# Patient Record
Sex: MALE | Race: BLACK OR AFRICAN AMERICAN | NOT HISPANIC OR LATINO | Employment: UNEMPLOYED | RURAL
[De-identification: names, ages, dates, MRNs, and addresses within clinical notes are randomized per-mention and may not be internally consistent; named-entity substitution may affect disease eponyms.]

---

## 2021-04-19 ENCOUNTER — HISTORICAL (OUTPATIENT)
Dept: ADMINISTRATIVE | Facility: HOSPITAL | Age: 31
End: 2021-04-19

## 2021-04-19 LAB
ALBUMIN SERPL BCP-MCNC: 2.7 G/DL (ref 3.5–5)
ALBUMIN/GLOB SERPL: 0.5 {RATIO}
ALP SERPL-CCNC: 77 U/L (ref 45–115)
ALT SERPL W P-5'-P-CCNC: 18 U/L (ref 16–61)
ANION GAP SERPL CALCULATED.3IONS-SCNC: 16 MMOL/L (ref 7–16)
AST SERPL W P-5'-P-CCNC: 22 U/L (ref 15–37)
BASOPHILS # BLD AUTO: 0.03 X10E3/UL (ref 0–0.2)
BASOPHILS NFR BLD AUTO: 0.3 % (ref 0–1)
BILIRUB SERPL-MCNC: 0.4 MG/DL (ref 0–1.2)
BILIRUB UR QL STRIP: NEGATIVE MG/DL
BUN SERPL-MCNC: 6 MG/DL (ref 7–18)
CALCIUM SERPL-MCNC: 9 MG/DL (ref 8.5–10.1)
CHLORIDE SERPL-SCNC: 95 MMOL/L (ref 98–107)
CLARITY UR: CLEAR
CO2 SERPL-SCNC: 22 MMOL/L (ref 21–32)
COLOR UR: YELLOW
CREAT SERPL-MCNC: 1.16 MG/DL (ref 0.7–1.3)
EOSINOPHIL # BLD AUTO: 0.01 X10E3/UL (ref 0–0.5)
EOSINOPHIL NFR BLD AUTO: 0.1 % (ref 1–4)
ERYTHROCYTE [DISTWIDTH] IN BLOOD BY AUTOMATED COUNT: 13 % (ref 11.5–14.5)
FLUAV AG UPPER RESP QL IA.RAPID: NEGATIVE
FLUBV AG UPPER RESP QL IA.RAPID: NEGATIVE
GLOBULIN SER-MCNC: 5.8 G/DL (ref 2–4)
GLUCOSE SERPL-MCNC: 156 MG/DL (ref 74–106)
GLUCOSE UR STRIP-MCNC: NORMAL MG/DL
HCT VFR BLD AUTO: 41.2 % (ref 40–54)
HGB BLD-MCNC: 14.5 G/DL (ref 13.5–18)
KETONES UR STRIP-SCNC: ABNORMAL MG/DL
LACTATE SERPL-SCNC: 2.9 MMOL/L (ref 0.4–2)
LEUKOCYTE ESTERASE UR QL STRIP: NEGATIVE LEU/UL
LIPASE SERPL-CCNC: 248 U/L (ref 73–393)
LYMPHOCYTES # BLD AUTO: 0.66 X10E3/UL (ref 1–4.8)
LYMPHOCYTES NFR BLD AUTO: 6.6 % (ref 27–41)
MCH RBC QN AUTO: 28.8 PG (ref 27–31)
MCHC RBC AUTO-ENTMCNC: 35.2 G/DL (ref 32–36)
MCV RBC AUTO: 82 FL (ref 80–96)
MONOCYTES # BLD AUTO: 0.65 X10E3/UL (ref 0–0.8)
MONOCYTES NFR BLD AUTO: 6.5 % (ref 2–6)
MPC BLD CALC-MCNC: 9.8 FL (ref 9.4–12.4)
NEUTROPHILS # BLD AUTO: 8.64 X10E3/UL (ref 1.8–7.7)
NEUTROPHILS NFR BLD AUTO: 86.5 % (ref 53–65)
NITRITE UR QL STRIP: NEGATIVE
PH UR STRIP: 6 PH UNITS (ref 5–8)
PLATELET # BLD AUTO: 197 X10E3/UL (ref 150–450)
POTASSIUM SERPL-SCNC: 2.9 MMOL/L (ref 3.5–5.1)
PROT SERPL-MCNC: 8.5 G/DL (ref 6.4–8.2)
PROT UR QL STRIP: NEGATIVE MG/DL
RBC # BLD AUTO: 5.04 X10E6/UL (ref 4.6–6.2)
RBC # UR STRIP: ABNORMAL ERY/UL
SARS-COV+SARS-COV-2 AG RESP QL IA.RAPID: NEGATIVE
SODIUM SERPL-SCNC: 130 MMOL/L (ref 136–145)
SP GR UR STRIP: <=1.005 (ref 1–1.03)
UROBILINOGEN UR STRIP-ACNC: 0.2 MG/DL
WBC # BLD AUTO: 9.99 X10E3/UL (ref 4.5–11)

## 2021-05-30 ENCOUNTER — HOSPITAL ENCOUNTER (INPATIENT)
Facility: HOSPITAL | Age: 31
LOS: 2 days | Discharge: HOME | DRG: 565 | End: 2021-06-02
Attending: EMERGENCY MEDICINE | Admitting: SURGERY

## 2021-05-30 DIAGNOSIS — S27.322A CONTUSION OF BOTH LUNGS, INITIAL ENCOUNTER: ICD-10-CM

## 2021-05-30 DIAGNOSIS — S22.20XA STERNAL FRACTURE: ICD-10-CM
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
## 2021-05-30 DIAGNOSIS — T14.90XA TRAUMA: ICD-10-CM

## 2021-05-30 DIAGNOSIS — S27.322A: ICD-10-CM

## 2021-05-30 DIAGNOSIS — S22.20XA CLOSED FRACTURE OF STERNUM, UNSPECIFIED PORTION OF STERNUM, INITIAL ENCOUNTER: ICD-10-CM
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
## 2021-05-30 PROCEDURE — 93010 EKG 12-LEAD: ICD-10-PCS | Mod: ,,, | Performed by: HOSPITALIST

## 2021-05-30 PROCEDURE — 96375 TX/PRO/DX INJ NEW DRUG ADDON: CPT

## 2021-05-30 PROCEDURE — 96361 HYDRATE IV INFUSION ADD-ON: CPT

## 2021-05-30 PROCEDURE — 99285 PR EMERGENCY DEPT VISIT,LEVEL V: ICD-10-PCS | Mod: ,,, | Performed by: EMERGENCY MEDICINE

## 2021-05-30 PROCEDURE — G0390 TRAUMA RESPONS W/HOSP CRITI: HCPCS

## 2021-05-30 PROCEDURE — 93005 ELECTROCARDIOGRAM TRACING: CPT

## 2021-05-30 PROCEDURE — 99285 EMERGENCY DEPT VISIT HI MDM: CPT | Mod: 25

## 2021-05-30 PROCEDURE — 96365 THER/PROPH/DIAG IV INF INIT: CPT

## 2021-05-30 PROCEDURE — 99285 EMERGENCY DEPT VISIT HI MDM: CPT | Mod: ,,, | Performed by: EMERGENCY MEDICINE

## 2021-05-30 PROCEDURE — 93010 ELECTROCARDIOGRAM REPORT: CPT | Mod: ,,, | Performed by: HOSPITALIST

## 2021-05-30 RX ORDER — AMLODIPINE BESYLATE 10 MG/1
10 TABLET ORAL
COMMUNITY
End: 2021-12-15 | Stop reason: ALTCHOICE
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
## 2021-05-31 LAB
ALBUMIN SERPL BCP-MCNC: 3.5 G/DL (ref 3.5–5)
ALBUMIN/GLOB SERPL: 0.7 {RATIO}
ALP SERPL-CCNC: 81 U/L (ref 45–115)
ALT SERPL W P-5'-P-CCNC: 43 U/L (ref 16–61)
AMPHET UR QL SCN: NEGATIVE
ANION GAP SERPL CALCULATED.3IONS-SCNC: 19 MMOL/L (ref 7–16)
ANISOCYTOSIS BLD QL SMEAR: ABNORMAL
APTT PPP: 28.2 SECONDS (ref 25.2–37.3)
AST SERPL W P-5'-P-CCNC: 42 U/L (ref 15–37)
BARBITURATES UR QL SCN: NEGATIVE
BASOPHILS # BLD AUTO: 0.02 K/UL (ref 0–0.2)
BASOPHILS # BLD AUTO: 0.05 K/UL (ref 0–0.2)
BASOPHILS NFR BLD AUTO: 0.4 % (ref 0–1)
BASOPHILS NFR BLD AUTO: 0.9 % (ref 0–1)
BENZODIAZ METAB UR QL SCN: NEGATIVE
BILIRUB SERPL-MCNC: 0.3 MG/DL (ref 0–1.2)
BILIRUB UR QL STRIP: NEGATIVE
BUN SERPL-MCNC: 14 MG/DL (ref 7–18)
BUN/CREAT SERPL: 21 (ref 6–20)
CALCIUM SERPL-MCNC: 8.5 MG/DL (ref 8.5–10.1)
CANNABINOIDS UR QL SCN: NEGATIVE
CHLORIDE SERPL-SCNC: 104 MMOL/L (ref 98–107)
CLARITY UR: CLEAR
CO2 SERPL-SCNC: 23 MMOL/L (ref 21–32)
COCAINE UR QL SCN: POSITIVE
COLOR UR: NORMAL
CREAT SERPL-MCNC: 0.67 MG/DL (ref 0.7–1.3)
DIFFERENTIAL METHOD BLD: ABNORMAL
DIFFERENTIAL METHOD BLD: ABNORMAL
EOSINOPHIL # BLD AUTO: 0.01 K/UL (ref 0–0.5)
EOSINOPHIL # BLD AUTO: 0.15 K/UL (ref 0–0.5)
EOSINOPHIL NFR BLD AUTO: 0.2 % (ref 1–4)
EOSINOPHIL NFR BLD AUTO: 2.8 % (ref 1–4)
EOSINOPHIL NFR BLD MANUAL: 1 % (ref 1–4)
ERYTHROCYTE [DISTWIDTH] IN BLOOD BY AUTOMATED COUNT: 15.8 % (ref 11.5–14.5)
ERYTHROCYTE [DISTWIDTH] IN BLOOD BY AUTOMATED COUNT: 15.9 % (ref 11.5–14.5)
ETHANOL SERPL-MCNC: 369 MG/DL
GLOBULIN SER-MCNC: 4.9 G/DL (ref 2–4)
GLUCOSE SERPL-MCNC: 94 MG/DL (ref 74–106)
GLUCOSE UR STRIP-MCNC: NEGATIVE MG/DL
HCT VFR BLD AUTO: 39.6 % (ref 40–54)
HCT VFR BLD AUTO: 45.2 % (ref 40–54)
HGB BLD-MCNC: 13.6 G/DL (ref 13.5–18)
HGB BLD-MCNC: 15.7 G/DL (ref 13.5–18)
IMM GRANULOCYTES # BLD AUTO: 0.04 K/UL (ref 0–0.04)
IMM GRANULOCYTES # BLD AUTO: 0.11 K/UL (ref 0–0.04)
IMM GRANULOCYTES NFR BLD: 0.8 % (ref 0–0.4)
IMM GRANULOCYTES NFR BLD: 2.1 % (ref 0–0.4)
INDIRECT COOMBS: NORMAL
INR BLD: 0.99 (ref 0.9–1.1)
KETONES UR STRIP-SCNC: NEGATIVE MG/DL
LEUKOCYTE ESTERASE UR QL STRIP: NEGATIVE
LYMPHOCYTES # BLD AUTO: 0.42 K/UL (ref 1–4.8)
LYMPHOCYTES # BLD AUTO: 0.95 K/UL (ref 1–4.8)
LYMPHOCYTES NFR BLD AUTO: 17.8 % (ref 27–41)
LYMPHOCYTES NFR BLD AUTO: 8.8 % (ref 27–41)
LYMPHOCYTES NFR BLD MANUAL: 9 % (ref 27–41)
MCH RBC QN AUTO: 28.9 PG (ref 27–31)
MCH RBC QN AUTO: 29.2 PG (ref 27–31)
MCHC RBC AUTO-ENTMCNC: 34.3 G/DL (ref 32–36)
MCHC RBC AUTO-ENTMCNC: 34.7 G/DL (ref 32–36)
MCV RBC AUTO: 83.2 FL (ref 80–96)
MCV RBC AUTO: 85 FL (ref 80–96)
MONOCYTES # BLD AUTO: 0.37 K/UL (ref 0–0.8)
MONOCYTES # BLD AUTO: 0.82 K/UL (ref 0–0.8)
MONOCYTES NFR BLD AUTO: 15.3 % (ref 2–6)
MONOCYTES NFR BLD AUTO: 7.7 % (ref 2–6)
MONOCYTES NFR BLD MANUAL: 7 % (ref 2–6)
MPC BLD CALC-MCNC: 9.5 FL (ref 9.4–12.4)
MPC BLD CALC-MCNC: 9.8 FL (ref 9.4–12.4)
NEUTROPHILS # BLD AUTO: 3.27 K/UL (ref 1.8–7.7)
NEUTROPHILS # BLD AUTO: 3.94 K/UL (ref 1.8–7.7)
NEUTROPHILS NFR BLD AUTO: 61.1 % (ref 53–65)
NEUTROPHILS NFR BLD AUTO: 82.1 % (ref 53–65)
NEUTS BAND NFR BLD MANUAL: 4 % (ref 1–5)
NEUTS SEG NFR BLD MANUAL: 79 % (ref 50–62)
NITRITE UR QL STRIP: NEGATIVE
NRBC # BLD AUTO: 0 X10E3/UL
NRBC # BLD AUTO: 0 X10E3/UL
NRBC, AUTO (.00): 0 %
NRBC, AUTO (.00): 0 %
OPIATES UR QL SCN: NEGATIVE
PCP UR QL SCN: NEGATIVE
PH UR STRIP: 5 PH UNITS
PLATELET # BLD AUTO: 158 K/UL (ref 150–400)
PLATELET # BLD AUTO: 175 K/UL (ref 150–400)
PLATELET MORPHOLOGY: NORMAL
POTASSIUM SERPL-SCNC: 3.9 MMOL/L (ref 3.5–5.1)
PROT SERPL-MCNC: 8.4 G/DL (ref 6.4–8.2)
PROT UR QL STRIP: NEGATIVE
PROTHROMBIN TIME: 12.6 SECONDS (ref 11.7–14.7)
RBC # BLD AUTO: 4.66 M/UL (ref 4.6–6.2)
RBC # BLD AUTO: 5.43 M/UL (ref 4.6–6.2)
RBC # UR STRIP: NEGATIVE /UL
RH BLD: NORMAL
SODIUM SERPL-SCNC: 142 MMOL/L (ref 136–145)
SP GR UR STRIP: <=1.005
UROBILINOGEN UR STRIP-ACNC: 0.2 MG/DL
WBC # BLD AUTO: 4.8 K/UL (ref 4.5–11)
WBC # BLD AUTO: 5.35 K/UL (ref 4.5–11)

## 2021-05-31 PROCEDURE — 81003 URINALYSIS AUTO W/O SCOPE: CPT | Mod: 59 | Performed by: EMERGENCY MEDICINE

## 2021-05-31 PROCEDURE — 85025 COMPLETE CBC W/AUTO DIFF WBC: CPT | Performed by: SURGERY

## 2021-05-31 PROCEDURE — 86900 BLOOD TYPING SEROLOGIC ABO: CPT | Performed by: EMERGENCY MEDICINE

## 2021-05-31 PROCEDURE — 63600175 PHARM REV CODE 636 W HCPCS: Performed by: SURGERY

## 2021-05-31 PROCEDURE — 85025 COMPLETE CBC W/AUTO DIFF WBC: CPT | Performed by: EMERGENCY MEDICINE

## 2021-05-31 PROCEDURE — 85610 PROTHROMBIN TIME: CPT | Performed by: EMERGENCY MEDICINE

## 2021-05-31 PROCEDURE — 11000001 HC ACUTE MED/SURG PRIVATE ROOM

## 2021-05-31 PROCEDURE — 80307 DRUG TEST PRSMV CHEM ANLYZR: CPT | Performed by: EMERGENCY MEDICINE

## 2021-05-31 PROCEDURE — 25000003 PHARM REV CODE 250: Performed by: SURGERY

## 2021-05-31 PROCEDURE — 80053 COMPREHEN METABOLIC PANEL: CPT | Performed by: EMERGENCY MEDICINE

## 2021-05-31 PROCEDURE — 36415 COLL VENOUS BLD VENIPUNCTURE: CPT | Performed by: EMERGENCY MEDICINE

## 2021-05-31 PROCEDURE — 85730 THROMBOPLASTIN TIME PARTIAL: CPT | Performed by: EMERGENCY MEDICINE

## 2021-05-31 PROCEDURE — 25000003 PHARM REV CODE 250: Performed by: EMERGENCY MEDICINE

## 2021-05-31 PROCEDURE — 63600175 PHARM REV CODE 636 W HCPCS: Performed by: EMERGENCY MEDICINE

## 2021-05-31 PROCEDURE — 25500020 PHARM REV CODE 255: Performed by: EMERGENCY MEDICINE

## 2021-05-31 RX ORDER — ONDANSETRON 2 MG/ML
4 INJECTION INTRAMUSCULAR; INTRAVENOUS
Status: COMPLETED | OUTPATIENT
Start: 2021-05-31 | End: 2021-05-31

## 2021-05-31 RX ORDER — PREDNISONE 20 MG/1
20 TABLET ORAL DAILY
COMMUNITY
End: 2021-12-15 | Stop reason: ALTCHOICE

## 2021-05-31 RX ORDER — MORPHINE SULFATE 4 MG/ML
4 INJECTION, SOLUTION INTRAMUSCULAR; INTRAVENOUS
Status: COMPLETED | OUTPATIENT
Start: 2021-05-31 | End: 2021-05-31

## 2021-05-31 RX ORDER — PREDNISONE 20 MG/1
20 TABLET ORAL DAILY
Status: DISCONTINUED | OUTPATIENT
Start: 2021-05-31 | End: 2021-06-02 | Stop reason: HOSPADM

## 2021-05-31 RX ORDER — HYDROCODONE BITARTRATE AND ACETAMINOPHEN 5; 325 MG/1; MG/1
2 TABLET ORAL EVERY 6 HOURS PRN
Status: DISCONTINUED | OUTPATIENT
Start: 2021-05-31 | End: 2021-06-02 | Stop reason: HOSPADM

## 2021-05-31 RX ORDER — LOSARTAN POTASSIUM AND HYDROCHLOROTHIAZIDE 12.5; 1 MG/1; MG/1
1 TABLET ORAL 2 TIMES DAILY
COMMUNITY
End: 2021-12-15 | Stop reason: ALTCHOICE

## 2021-05-31 RX ORDER — SODIUM CHLORIDE 0.9 % (FLUSH) 0.9 %
10 SYRINGE (ML) INJECTION
Status: DISCONTINUED | OUTPATIENT
Start: 2021-05-31 | End: 2021-06-02 | Stop reason: HOSPADM

## 2021-05-31 RX ORDER — DEXTROSE MONOHYDRATE, SODIUM CHLORIDE, AND POTASSIUM CHLORIDE 50; 1.49; 4.5 G/1000ML; G/1000ML; G/1000ML
INJECTION, SOLUTION INTRAVENOUS CONTINUOUS
Status: DISCONTINUED | OUTPATIENT
Start: 2021-05-31 | End: 2021-05-31

## 2021-05-31 RX ORDER — PREDNISONE 20 MG/1
20 TABLET ORAL
Status: COMPLETED | OUTPATIENT
Start: 2021-05-31 | End: 2021-05-31

## 2021-05-31 RX ORDER — LOSARTAN POTASSIUM 50 MG/1
50 TABLET ORAL DAILY
Status: DISCONTINUED | OUTPATIENT
Start: 2021-05-31 | End: 2021-06-02 | Stop reason: HOSPADM

## 2021-05-31 RX ORDER — TALC
6 POWDER (GRAM) TOPICAL NIGHTLY PRN
Status: DISCONTINUED | OUTPATIENT
Start: 2021-05-31 | End: 2021-05-31

## 2021-05-31 RX ORDER — AMLODIPINE BESYLATE 10 MG/1
10 TABLET ORAL DAILY
Status: DISCONTINUED | OUTPATIENT
Start: 2021-05-31 | End: 2021-06-02 | Stop reason: HOSPADM

## 2021-05-31 RX ORDER — HYDROCHLOROTHIAZIDE 12.5 MG/1
12.5 TABLET ORAL DAILY
Status: DISCONTINUED | OUTPATIENT
Start: 2021-05-31 | End: 2021-06-02 | Stop reason: HOSPADM

## 2021-05-31 RX ORDER — MORPHINE SULFATE 2 MG/ML
3 INJECTION, SOLUTION INTRAMUSCULAR; INTRAVENOUS
Status: DISCONTINUED | OUTPATIENT
Start: 2021-05-31 | End: 2021-06-01

## 2021-05-31 RX ADMIN — PREDNISONE 20 MG: 20 TABLET ORAL at 11:05

## 2021-05-31 RX ADMIN — AMLODIPINE BESYLATE 10 MG: 10 TABLET ORAL at 11:05

## 2021-05-31 RX ADMIN — POTASSIUM CHLORIDE, DEXTROSE MONOHYDRATE AND SODIUM CHLORIDE: 150; 5; 450 INJECTION, SOLUTION INTRAVENOUS at 09:05

## 2021-05-31 RX ADMIN — MORPHINE SULFATE 3 MG: 2 INJECTION, SOLUTION INTRAMUSCULAR; INTRAVENOUS at 09:05

## 2021-05-31 RX ADMIN — HYDROCODONE BITARTRATE AND ACETAMINOPHEN 2 TABLET: 5; 325 TABLET ORAL at 03:05

## 2021-05-31 RX ADMIN — IOPAMIDOL 100 ML: 755 INJECTION, SOLUTION INTRAVENOUS at 01:05

## 2021-05-31 RX ADMIN — LOSARTAN POTASSIUM 50 MG: 50 TABLET, FILM COATED ORAL at 11:05

## 2021-05-31 RX ADMIN — ONDANSETRON 4 MG: 2 INJECTION INTRAMUSCULAR; INTRAVENOUS at 05:05

## 2021-05-31 RX ADMIN — PREDNISONE 20 MG: 20 TABLET ORAL at 03:05

## 2021-05-31 RX ADMIN — HYDROCHLOROTHIAZIDE 12.5 MG: 12.5 TABLET ORAL at 12:05

## 2021-05-31 RX ADMIN — FOLIC ACID: 5 INJECTION, SOLUTION INTRAMUSCULAR; INTRAVENOUS; SUBCUTANEOUS at 03:05

## 2021-05-31 RX ADMIN — MORPHINE SULFATE 3 MG: 2 INJECTION, SOLUTION INTRAMUSCULAR; INTRAVENOUS at 06:05

## 2021-05-31 RX ADMIN — SODIUM CHLORIDE 1000 ML: 9 INJECTION, SOLUTION INTRAVENOUS at 12:05

## 2021-05-31 RX ADMIN — MORPHINE SULFATE 4 MG: 4 INJECTION INTRAVENOUS at 05:05

## 2021-06-01 PROCEDURE — 25000003 PHARM REV CODE 250: Performed by: NURSE PRACTITIONER

## 2021-06-01 PROCEDURE — 25000003 PHARM REV CODE 250: Performed by: SURGERY

## 2021-06-01 PROCEDURE — 11000001 HC ACUTE MED/SURG PRIVATE ROOM

## 2021-06-01 PROCEDURE — 99900035 HC TECH TIME PER 15 MIN (STAT)

## 2021-06-01 PROCEDURE — 63600175 PHARM REV CODE 636 W HCPCS: Performed by: SURGERY

## 2021-06-01 PROCEDURE — 94761 N-INVAS EAR/PLS OXIMETRY MLT: CPT

## 2021-06-01 PROCEDURE — 97166 OT EVAL MOD COMPLEX 45 MIN: CPT

## 2021-06-01 RX ORDER — METHOCARBAMOL 500 MG/1
500 TABLET, FILM COATED ORAL 4 TIMES DAILY
Status: DISCONTINUED | OUTPATIENT
Start: 2021-06-01 | End: 2021-06-02 | Stop reason: HOSPADM

## 2021-06-01 RX ORDER — DOCUSATE SODIUM 100 MG/1
100 CAPSULE, LIQUID FILLED ORAL DAILY
Status: DISCONTINUED | OUTPATIENT
Start: 2021-06-01 | End: 2021-06-02 | Stop reason: HOSPADM

## 2021-06-01 RX ORDER — ALBUTEROL SULFATE 0.83 MG/ML
2.5 SOLUTION RESPIRATORY (INHALATION) EVERY 4 HOURS PRN
Status: DISCONTINUED | OUTPATIENT
Start: 2021-06-01 | End: 2021-06-02 | Stop reason: HOSPADM

## 2021-06-01 RX ADMIN — LOSARTAN POTASSIUM 50 MG: 50 TABLET, FILM COATED ORAL at 08:06

## 2021-06-01 RX ADMIN — AMLODIPINE BESYLATE 10 MG: 10 TABLET ORAL at 08:06

## 2021-06-01 RX ADMIN — HYDROCODONE BITARTRATE AND ACETAMINOPHEN 2 TABLET: 5; 325 TABLET ORAL at 12:06

## 2021-06-01 RX ADMIN — HYDROCODONE BITARTRATE AND ACETAMINOPHEN 2 TABLET: 5; 325 TABLET ORAL at 11:06

## 2021-06-01 RX ADMIN — METHOCARBAMOL 500 MG: 500 TABLET ORAL at 05:06

## 2021-06-01 RX ADMIN — METHOCARBAMOL 500 MG: 500 TABLET ORAL at 08:06

## 2021-06-01 RX ADMIN — DOCUSATE SODIUM 100 MG: 100 CAPSULE, LIQUID FILLED ORAL at 02:06

## 2021-06-01 RX ADMIN — PREDNISONE 20 MG: 20 TABLET ORAL at 08:06

## 2021-06-01 RX ADMIN — HYDROCODONE BITARTRATE AND ACETAMINOPHEN 2 TABLET: 5; 325 TABLET ORAL at 06:06

## 2021-06-01 RX ADMIN — HYDROCODONE BITARTRATE AND ACETAMINOPHEN 2 TABLET: 5; 325 TABLET ORAL at 08:06

## 2021-06-02 VITALS
BODY MASS INDEX: 20.77 KG/M2 | SYSTOLIC BLOOD PRESSURE: 156 MMHG | TEMPERATURE: 98 F | DIASTOLIC BLOOD PRESSURE: 103 MMHG | RESPIRATION RATE: 20 BRPM | HEIGHT: 73 IN | OXYGEN SATURATION: 97 % | HEART RATE: 59 BPM | WEIGHT: 156.75 LBS

## 2021-06-02 PROBLEM — S27.322A CONTUSION OF BOTH LUNGS: Status: ACTIVE | Noted: 2021-06-02

## 2021-06-02 PROBLEM — S22.20XA STERNAL FRACTURE: Status: ACTIVE | Noted: 2021-06-02

## 2021-06-02 PROCEDURE — 63600175 PHARM REV CODE 636 W HCPCS: Performed by: SURGERY

## 2021-06-02 PROCEDURE — 99238 PR HOSPITAL DISCHARGE DAY,<30 MIN: ICD-10-PCS | Mod: ,,, | Performed by: NURSE PRACTITIONER

## 2021-06-02 PROCEDURE — 25000003 PHARM REV CODE 250: Performed by: SURGERY

## 2021-06-02 PROCEDURE — 25000003 PHARM REV CODE 250: Performed by: NURSE PRACTITIONER

## 2021-06-02 PROCEDURE — 99900035 HC TECH TIME PER 15 MIN (STAT)

## 2021-06-02 PROCEDURE — 99238 HOSP IP/OBS DSCHRG MGMT 30/<: CPT | Mod: ,,, | Performed by: NURSE PRACTITIONER

## 2021-06-02 PROCEDURE — 94761 N-INVAS EAR/PLS OXIMETRY MLT: CPT

## 2021-06-02 RX ORDER — METHOCARBAMOL 500 MG/1
500 TABLET, FILM COATED ORAL 4 TIMES DAILY
Qty: 40 TABLET | Refills: 0 | Status: SHIPPED | OUTPATIENT
Start: 2021-06-02 | End: 2021-06-12

## 2021-06-02 RX ORDER — PREDNISONE 20 MG/1
20 TABLET ORAL DAILY
Qty: 30 TABLET | Refills: 0 | Status: SHIPPED | OUTPATIENT
Start: 2021-06-02 | End: 2021-12-15 | Stop reason: ALTCHOICE

## 2021-06-02 RX ORDER — ONDANSETRON 4 MG/1
4 TABLET, FILM COATED ORAL EVERY 6 HOURS PRN
Qty: 20 TABLET | Refills: 0 | Status: SHIPPED | OUTPATIENT
Start: 2021-06-02 | End: 2021-12-15 | Stop reason: ALTCHOICE

## 2021-06-02 RX ORDER — ACETAMINOPHEN 500 MG
1000 TABLET ORAL EVERY 6 HOURS PRN
Qty: 20 TABLET | Refills: 0 | Status: SHIPPED | OUTPATIENT
Start: 2021-06-02 | End: 2022-01-24

## 2021-06-02 RX ORDER — ACETAMINOPHEN 500 MG
1000 TABLET ORAL EVERY 6 HOURS PRN
Status: DISCONTINUED | OUTPATIENT
Start: 2021-06-02 | End: 2021-06-02 | Stop reason: HOSPADM

## 2021-06-02 RX ADMIN — AMLODIPINE BESYLATE 10 MG: 10 TABLET ORAL at 09:06

## 2021-06-02 RX ADMIN — PREDNISONE 20 MG: 20 TABLET ORAL at 09:06

## 2021-06-02 RX ADMIN — HYDROCHLOROTHIAZIDE 12.5 MG: 12.5 TABLET ORAL at 09:06

## 2021-06-02 RX ADMIN — DOCUSATE SODIUM 100 MG: 100 CAPSULE, LIQUID FILLED ORAL at 09:06

## 2021-06-02 RX ADMIN — METHOCARBAMOL 500 MG: 500 TABLET ORAL at 09:06

## 2021-06-02 RX ADMIN — LOSARTAN POTASSIUM 50 MG: 50 TABLET, FILM COATED ORAL at 09:06

## 2021-06-02 RX ADMIN — HYDROCODONE BITARTRATE AND ACETAMINOPHEN 2 TABLET: 5; 325 TABLET ORAL at 04:06

## 2021-12-14 ENCOUNTER — HOSPITAL ENCOUNTER (EMERGENCY)
Facility: HOSPITAL | Age: 31
Discharge: HOME | End: 2021-12-14
Payer: COMMERCIAL

## 2021-12-14 VITALS
BODY MASS INDEX: 21.58 KG/M2 | RESPIRATION RATE: 16 BRPM | DIASTOLIC BLOOD PRESSURE: 119 MMHG | OXYGEN SATURATION: 96 % | HEART RATE: 91 BPM | TEMPERATURE: 99 F | WEIGHT: 162.81 LBS | HEIGHT: 73 IN | SYSTOLIC BLOOD PRESSURE: 162 MMHG

## 2021-12-14 DIAGNOSIS — I10 PRIMARY HYPERTENSION: Primary | ICD-10-CM

## 2021-12-14 PROCEDURE — 99283 EMERGENCY DEPT VISIT LOW MDM: CPT | Mod: ,,, | Performed by: NURSE PRACTITIONER

## 2021-12-14 PROCEDURE — 99283 PR EMERGENCY DEPT VISIT,LEVEL III: ICD-10-PCS | Mod: ,,, | Performed by: NURSE PRACTITIONER

## 2021-12-14 PROCEDURE — 99281 EMR DPT VST MAYX REQ PHY/QHP: CPT

## 2021-12-15 ENCOUNTER — OFFICE VISIT (OUTPATIENT)
Dept: FAMILY MEDICINE | Facility: CLINIC | Age: 31
End: 2021-12-15
Payer: COMMERCIAL

## 2021-12-15 VITALS
TEMPERATURE: 97 F | DIASTOLIC BLOOD PRESSURE: 123 MMHG | HEIGHT: 73 IN | SYSTOLIC BLOOD PRESSURE: 179 MMHG | BODY MASS INDEX: 21.42 KG/M2 | OXYGEN SATURATION: 97 % | WEIGHT: 161.63 LBS | HEART RATE: 86 BPM

## 2021-12-15 DIAGNOSIS — I10 HYPERTENSION, UNSPECIFIED TYPE: ICD-10-CM

## 2021-12-15 DIAGNOSIS — J02.9 SORE THROAT: ICD-10-CM

## 2021-12-15 DIAGNOSIS — D86.9 SARCOIDOSIS: ICD-10-CM

## 2021-12-15 DIAGNOSIS — J40 BRONCHITIS: Primary | ICD-10-CM

## 2021-12-15 LAB
CTP QC/QA: YES
CTP QC/QA: YES
FLUAV AG NPH QL: NEGATIVE
FLUBV AG NPH QL: NEGATIVE
S PYO RRNA THROAT QL PROBE: NEGATIVE
SARS-COV-2 AG RESP QL IA.RAPID: NEGATIVE

## 2021-12-15 PROCEDURE — 96372 THER/PROPH/DIAG INJ SC/IM: CPT | Mod: ,,, | Performed by: NURSE PRACTITIONER

## 2021-12-15 PROCEDURE — 87428 POCT SARS-COV2 (COVID) WITH FLU ANTIGEN: ICD-10-PCS | Mod: QW,,, | Performed by: NURSE PRACTITIONER

## 2021-12-15 PROCEDURE — 96372 PR INJECTION,THERAP/PROPH/DIAG2ST, IM OR SUBCUT: ICD-10-PCS | Mod: ,,, | Performed by: NURSE PRACTITIONER

## 2021-12-15 PROCEDURE — 87880 STREP A ASSAY W/OPTIC: CPT | Mod: QW,,, | Performed by: NURSE PRACTITIONER

## 2021-12-15 PROCEDURE — 99213 OFFICE O/P EST LOW 20 MIN: CPT | Mod: 25,,, | Performed by: NURSE PRACTITIONER

## 2021-12-15 PROCEDURE — 4010F PR ACE/ARB THEARPY RXD/TAKEN: ICD-10-PCS | Mod: ,,, | Performed by: NURSE PRACTITIONER

## 2021-12-15 PROCEDURE — 99213 PR OFFICE/OUTPT VISIT, EST, LEVL III, 20-29 MIN: ICD-10-PCS | Mod: 25,,, | Performed by: NURSE PRACTITIONER

## 2021-12-15 PROCEDURE — 4010F ACE/ARB THERAPY RXD/TAKEN: CPT | Mod: ,,, | Performed by: NURSE PRACTITIONER

## 2021-12-15 PROCEDURE — 87428 SARSCOV & INF VIR A&B AG IA: CPT | Mod: QW,,, | Performed by: NURSE PRACTITIONER

## 2021-12-15 PROCEDURE — 87880 POCT RAPID STREP A: ICD-10-PCS | Mod: QW,,, | Performed by: NURSE PRACTITIONER

## 2021-12-15 RX ORDER — PREDNISONE 10 MG/1
10 TABLET ORAL DAILY
Qty: 10 TABLET | Refills: 0 | Status: SHIPPED | OUTPATIENT
Start: 2021-12-15 | End: 2021-12-25

## 2021-12-15 RX ORDER — AMLODIPINE BESYLATE 10 MG/1
10 TABLET ORAL DAILY
Qty: 30 TABLET | Refills: 0 | Status: SHIPPED | OUTPATIENT
Start: 2021-12-15 | End: 2022-02-18

## 2021-12-15 RX ORDER — OLMESARTAN MEDOXOMIL 40 MG/1
40 TABLET ORAL DAILY
Qty: 30 TABLET | Refills: 0 | Status: SHIPPED | OUTPATIENT
Start: 2021-12-15 | End: 2022-02-18

## 2021-12-15 RX ORDER — AZITHROMYCIN 500 MG/1
500 TABLET, FILM COATED ORAL DAILY
Qty: 7 TABLET | Refills: 0 | Status: SHIPPED | OUTPATIENT
Start: 2021-12-15 | End: 2021-12-21

## 2021-12-15 RX ORDER — PROMETHAZINE HYDROCHLORIDE AND DEXTROMETHORPHAN HYDROBROMIDE 6.25; 15 MG/5ML; MG/5ML
5 SYRUP ORAL EVERY 4 HOURS PRN
Qty: 180 ML | Refills: 0 | Status: SHIPPED | OUTPATIENT
Start: 2021-12-15 | End: 2021-12-25

## 2021-12-15 RX ORDER — CEFTRIAXONE 1 G/1
1 INJECTION, POWDER, FOR SOLUTION INTRAMUSCULAR; INTRAVENOUS
Status: COMPLETED | OUTPATIENT
Start: 2021-12-15 | End: 2021-12-15

## 2021-12-15 RX ADMIN — CEFTRIAXONE 1 G: 1 INJECTION, POWDER, FOR SOLUTION INTRAMUSCULAR; INTRAVENOUS at 11:12

## 2022-01-24 ENCOUNTER — OFFICE VISIT (OUTPATIENT)
Dept: FAMILY MEDICINE | Facility: CLINIC | Age: 32
End: 2022-01-24
Payer: COMMERCIAL

## 2022-01-24 VITALS
BODY MASS INDEX: 21.05 KG/M2 | SYSTOLIC BLOOD PRESSURE: 138 MMHG | OXYGEN SATURATION: 99 % | DIASTOLIC BLOOD PRESSURE: 94 MMHG | WEIGHT: 158.81 LBS | TEMPERATURE: 98 F | HEART RATE: 88 BPM | HEIGHT: 73 IN

## 2022-01-24 DIAGNOSIS — R07.89 INTERMITTENT LEFT-SIDED CHEST PAIN: Primary | ICD-10-CM

## 2022-01-24 DIAGNOSIS — J45.909 ASTHMA, UNSPECIFIED ASTHMA SEVERITY, UNSPECIFIED WHETHER COMPLICATED, UNSPECIFIED WHETHER PERSISTENT: ICD-10-CM

## 2022-01-24 DIAGNOSIS — D86.9 SARCOIDOSIS: ICD-10-CM

## 2022-01-24 DIAGNOSIS — I10 HYPERTENSION, UNSPECIFIED TYPE: ICD-10-CM

## 2022-01-24 DIAGNOSIS — M79.602 LEFT ARM PAIN: ICD-10-CM

## 2022-01-24 PROCEDURE — 1160F RVW MEDS BY RX/DR IN RCRD: CPT | Mod: ,,, | Performed by: REGISTERED NURSE

## 2022-01-24 PROCEDURE — 1159F MED LIST DOCD IN RCRD: CPT | Mod: ,,, | Performed by: REGISTERED NURSE

## 2022-01-24 PROCEDURE — 3080F PR MOST RECENT DIASTOLIC BLOOD PRESSURE >= 90 MM HG: ICD-10-PCS | Mod: ,,, | Performed by: REGISTERED NURSE

## 2022-01-24 PROCEDURE — 1159F PR MEDICATION LIST DOCUMENTED IN MEDICAL RECORD: ICD-10-PCS | Mod: ,,, | Performed by: REGISTERED NURSE

## 2022-01-24 PROCEDURE — 3008F BODY MASS INDEX DOCD: CPT | Mod: ,,, | Performed by: REGISTERED NURSE

## 2022-01-24 PROCEDURE — 99213 PR OFFICE/OUTPT VISIT, EST, LEVL III, 20-29 MIN: ICD-10-PCS | Mod: ,,, | Performed by: REGISTERED NURSE

## 2022-01-24 PROCEDURE — 1160F PR REVIEW ALL MEDS BY PRESCRIBER/CLIN PHARMACIST DOCUMENTED: ICD-10-PCS | Mod: ,,, | Performed by: REGISTERED NURSE

## 2022-01-24 PROCEDURE — 3075F SYST BP GE 130 - 139MM HG: CPT | Mod: ,,, | Performed by: REGISTERED NURSE

## 2022-01-24 PROCEDURE — 3080F DIAST BP >= 90 MM HG: CPT | Mod: ,,, | Performed by: REGISTERED NURSE

## 2022-01-24 PROCEDURE — 99213 OFFICE O/P EST LOW 20 MIN: CPT | Mod: ,,, | Performed by: REGISTERED NURSE

## 2022-01-24 PROCEDURE — 3075F PR MOST RECENT SYSTOLIC BLOOD PRESS GE 130-139MM HG: ICD-10-PCS | Mod: ,,, | Performed by: REGISTERED NURSE

## 2022-01-24 PROCEDURE — 3008F PR BODY MASS INDEX (BMI) DOCUMENTED: ICD-10-PCS | Mod: ,,, | Performed by: REGISTERED NURSE

## 2022-01-24 RX ORDER — METHOCARBAMOL 500 MG/1
500 TABLET, FILM COATED ORAL 3 TIMES DAILY
Qty: 15 TABLET | Refills: 0 | Status: SHIPPED | OUTPATIENT
Start: 2022-01-24 | End: 2022-01-29

## 2022-01-24 RX ORDER — DICLOFENAC SODIUM 50 MG/1
50 TABLET, DELAYED RELEASE ORAL 2 TIMES DAILY
Qty: 20 TABLET | Refills: 0 | Status: SHIPPED | OUTPATIENT
Start: 2022-01-24 | End: 2022-02-03

## 2022-01-24 RX ORDER — PREDNISONE 20 MG/1
1 TABLET ORAL DAILY
COMMUNITY
Start: 2022-01-18 | End: 2022-03-05 | Stop reason: CLARIF

## 2022-01-24 NOTE — LETTER
January 24, 2022      [facility]  [address] [place] [address] [place] [address]  Phone: [phone]  Fax: [phone]       Patient: Bo Ku   YOB: 1990  Date of Visit: 01/24/2022    To Whom It May Concern:    Buffy Ku  was at [facility] on 01/24/2022. The patient may return to work 01/25/2022 with no restrictions. Please excuse Mr. Ku from work on 01/20/2022 and 01/23/2022-01/24/2022. If you have any questions or concerns, or if I can be of further assistance, please do not hesitate to contact me.    Sincerely,        JEREMIAS Stone

## 2022-01-24 NOTE — LETTER
January 24, 2022      [facility]  [address] [place] [address] [place] [address]  Phone: [phone]  Fax: [phone]       Patient: Bo Ku   YOB: 1990  Date of Visit: 01/24/2022    To Whom It May Concern:    Buffy Ku  was at [facility] on 01/24/2022. The patient may return to work on 01/25/2022 with no restrictions. Please excuse Mr. Ku from 01/23/2022-01/24/2022.  If you have any questions or concerns, or if I can be of further assistance, please do not hesitate to contact me.    Sincerely,        JEREMIAS Stone

## 2022-01-27 NOTE — PATIENT INSTRUCTIONS
Will review results of chest x-ray with patient when available. Patient instructed to contact his pulmonologist to discuss his pain as he has a known history of Sarcoidosis and Asthma. Referring to cardiology for review due to history of hypertension and lung disease.     Patient instructed to go to the ER if he has another acute episode of chest pain or if chest pain persists, he has any shortness of breath, or if he has a high blood pressure and chest pain.

## 2022-01-27 NOTE — PROGRESS NOTES
JEREMIAS Stone        PATIENT NAME: Bo Ku  : 1990  DATE: 22  MRN: [id number]      Billing Provider: JEREMIAS Stone  Level of Service:   Patient PCP Information     Provider PCP Type    Primary Doctor No General          Reason for Visit / Chief Complaint: Chest Pain (Pain that radiates under left arm and goes to elbow/Pain also travels up left side of neck/) and Headache (On top of head)       Update PCP  Update Chief Complaint         History of Present Illness / Problem Focused Workflow     Bo Ku presents to the clinic with chest pain.     HPI 30 y/o AAM here today with complaints of right sternal pain. No nausea or vomiting. The pain radiates into his armpit but he denies pain radiating up into his neck.    Review of Systems     Review of Systems   Constitutional: Negative.    HENT: Negative.    Respiratory: Positive for chest tightness.    Cardiovascular: Positive for chest pain.   Gastrointestinal: Negative.    Genitourinary: Negative.    Musculoskeletal: Positive for back pain.   Neurological: Negative.    Psychiatric/Behavioral: Negative.         Medical / Social / Family History     Past Medical History:   Diagnosis Date    Hypertension     Sarcoidosis     Sarcoidosis 2019       Past Surgical History:   Procedure Laterality Date    ANTERIOR CRUCIATE LIGAMENT REPAIR Left 2004    ANTERIOR CRUCIATE LIGAMENT REPAIR Left     FRACTURE SURGERY         Social History  Mr. Bo Ku  reports that he has been smoking. He has never used smokeless tobacco. He reports current alcohol use of about 2.0 standard drinks of alcohol per week. He reports that he does not use drugs.    Family History  Mr. Bo Ku's family history is not on file.    Medications and Allergies     Medications  No outpatient medications have been marked as taking for the 22 encounter (Office Visit) with JEREMIAS Stone.       Allergies  Review of patient's allergies  "indicates:   Allergen Reactions    Fish containing products Anaphylaxis    Shellfish containing products Anaphylaxis    Lisinopril        Physical Examination     Vitals:    01/24/22 1536   BP: (!) 138/94   Pulse: 88   Temp:      Physical Exam  Vitals and nursing note reviewed.   Constitutional:       Appearance: Normal appearance.   HENT:      Head: Normocephalic and atraumatic.   Cardiovascular:      Rate and Rhythm: Normal rate and regular rhythm.      Heart sounds: Normal heart sounds.      Comments: Patient states his chest is not hurting "right now" but states was sent home from work Friday and Monday for the chest pain.   Pulmonary:      Effort: Pulmonary effort is normal.      Breath sounds: Normal breath sounds.   Abdominal:      General: Abdomen is flat. Bowel sounds are normal.      Palpations: Abdomen is soft.   Musculoskeletal:         General: Normal range of motion.      Cervical back: Normal range of motion.   Skin:     General: Skin is warm and dry.   Neurological:      Mental Status: He is alert and oriented to person, place, and time.   Psychiatric:         Behavior: Behavior normal.          Assessment and Plan (including Health Maintenance)      Problem List  Smart Sets  Document Outside HM   :    Plan:   Intermittent left-sided chest pain  -     Ambulatory referral/consult to Cardiology; Future; Expected date: 02/03/2022    Asthma, unspecified asthma severity, unspecified whether complicated, unspecified whether persistent  -     X-Ray Chest PA And Lateral; Future; Expected date: 01/24/2022    Sarcoidosis    Left arm pain  -     diclofenac (VOLTAREN) 50 MG EC tablet; Take 1 tablet (50 mg total) by mouth 2 (two) times daily. for 10 days  Dispense: 20 tablet; Refill: 0  -     methocarbamoL (ROBAXIN) 500 MG Tab; Take 1 tablet (500 mg total) by mouth 3 (three) times daily. for 5 days  Dispense: 15 tablet; Refill: 0    Hypertension, unspecified type  -     Ambulatory referral/consult to " Cardiology; Future; Expected date: 02/03/2022           Health Maintenance Due   Topic Date Due    Hepatitis C Screening  Never done    Lipid Panel  Never done    COVID-19 Vaccine (1) Never done    Pneumococcal Vaccines (Age 0-64) (1 of 2 - PPSV23) Never done    HIV Screening  Never done    TETANUS VACCINE  Never done    Influenza Vaccine (1) Never done       Problem List Items Addressed This Visit        Pulmonary    Asthma    Relevant Orders    X-Ray Chest PA And Lateral (Completed)       Cardiac/Vascular    Hypertension    Relevant Orders    Ambulatory referral/consult to Cardiology       Immunology/Multi System    Sarcoidosis      Other Visit Diagnoses     Intermittent left-sided chest pain    -  Primary    Relevant Orders    Ambulatory referral/consult to Cardiology    Left arm pain        Relevant Medications    diclofenac (VOLTAREN) 50 MG EC tablet    methocarbamoL (ROBAXIN) 500 MG Tab          The patient has no Health Maintenance topics of status Not Due    No future appointments.     Patient Instructions   Will review results of chest x-ray with patient when available. Patient instructed to contact his pulmonologist to discuss his pain as he has a known history of Sarcoidosis and Asthma. Referring to cardiology for review due to history of hypertension and lung disease.     Patient instructed to go to the ER if he has another acute episode of chest pain or if chest pain persists, he has any shortness of breath, or if he has a high blood pressure and chest pain.     Follow up in about 2 weeks (around 2/7/2022).     Signature:  JEREMIAS Stone      Date of encounter: 1/24/22

## 2022-02-18 DIAGNOSIS — I10 HYPERTENSION, UNSPECIFIED TYPE: ICD-10-CM

## 2022-02-18 RX ORDER — AMLODIPINE BESYLATE 10 MG/1
TABLET ORAL
Qty: 30 TABLET | Refills: 0 | Status: SHIPPED | OUTPATIENT
Start: 2022-02-18 | End: 2022-03-05 | Stop reason: SDUPTHER

## 2022-02-18 RX ORDER — OLMESARTAN MEDOXOMIL 40 MG/1
TABLET ORAL
Qty: 30 TABLET | Refills: 0 | Status: SHIPPED | OUTPATIENT
Start: 2022-02-18 | End: 2022-03-05 | Stop reason: CLARIF

## 2022-02-18 NOTE — PROGRESS NOTES
Patient seen on 01/27/2022 for chest pain. He never reported he needed refills on hypertension medications at that time. 30 Day supply given to patient. NAOMY Silvestre LPN following up with patient to verify if he has a cardiologist appointment scheduled.

## 2022-03-05 ENCOUNTER — HOSPITAL ENCOUNTER (EMERGENCY)
Facility: HOSPITAL | Age: 32
Discharge: HOME | End: 2022-03-05
Payer: COMMERCIAL

## 2022-03-05 VITALS
HEART RATE: 98 BPM | WEIGHT: 159.81 LBS | OXYGEN SATURATION: 98 % | DIASTOLIC BLOOD PRESSURE: 111 MMHG | TEMPERATURE: 99 F | RESPIRATION RATE: 19 BRPM | BODY MASS INDEX: 21.18 KG/M2 | HEIGHT: 73 IN | SYSTOLIC BLOOD PRESSURE: 151 MMHG

## 2022-03-05 DIAGNOSIS — R04.2 HEMOPTYSIS: ICD-10-CM

## 2022-03-05 DIAGNOSIS — I10 HYPERTENSION, UNSPECIFIED TYPE: ICD-10-CM

## 2022-03-05 DIAGNOSIS — R07.9 CHEST PAIN: Primary | ICD-10-CM

## 2022-03-05 DIAGNOSIS — R06.02 SOB (SHORTNESS OF BREATH): ICD-10-CM

## 2022-03-05 LAB
ALBUMIN SERPL BCP-MCNC: 3.2 G/DL (ref 3.5–5)
ALBUMIN/GLOB SERPL: 0.6 {RATIO}
ALP SERPL-CCNC: 78 U/L (ref 45–115)
ALT SERPL W P-5'-P-CCNC: 37 U/L (ref 16–61)
ANION GAP SERPL CALCULATED.3IONS-SCNC: 14 MMOL/L (ref 7–16)
AST SERPL W P-5'-P-CCNC: 41 U/L (ref 15–37)
BASOPHILS # BLD AUTO: 0.02 K/UL (ref 0–0.2)
BASOPHILS NFR BLD AUTO: 0.2 % (ref 0–1)
BILIRUB SERPL-MCNC: 0.7 MG/DL (ref 0–1.2)
BUN SERPL-MCNC: 4 MG/DL (ref 7–18)
BUN/CREAT SERPL: 5 (ref 6–20)
CALCIUM SERPL-MCNC: 8.1 MG/DL (ref 8.5–10.1)
CHLORIDE SERPL-SCNC: 97 MMOL/L (ref 98–107)
CO2 SERPL-SCNC: 28 MMOL/L (ref 21–32)
CREAT SERPL-MCNC: 0.77 MG/DL (ref 0.7–1.3)
D DIMER PPP FEU-MCNC: 0.73 ΜG/ML (ref 0–0.47)
DIFFERENTIAL METHOD BLD: ABNORMAL
EOSINOPHIL # BLD AUTO: 0.03 K/UL (ref 0–0.5)
EOSINOPHIL NFR BLD AUTO: 0.3 % (ref 1–4)
ERYTHROCYTE [DISTWIDTH] IN BLOOD BY AUTOMATED COUNT: 14.2 % (ref 11.5–14.5)
FLUAV AG UPPER RESP QL IA.RAPID: NEGATIVE
FLUBV AG UPPER RESP QL IA.RAPID: NEGATIVE
GLOBULIN SER-MCNC: 5.5 G/DL (ref 2–4)
GLUCOSE SERPL-MCNC: 129 MG/DL (ref 74–106)
HCT VFR BLD AUTO: 40.7 % (ref 40–54)
HGB BLD-MCNC: 14.3 G/DL (ref 13.5–18)
INR BLD: 1 (ref 0.9–1.1)
LYMPHOCYTES # BLD AUTO: 0.68 K/UL (ref 1–4.8)
LYMPHOCYTES NFR BLD AUTO: 7.8 % (ref 27–41)
LYMPHOCYTES NFR BLD MANUAL: 14 % (ref 27–41)
MCH RBC QN AUTO: 29.3 PG (ref 27–31)
MCHC RBC AUTO-ENTMCNC: 35.1 G/DL (ref 32–36)
MCV RBC AUTO: 83.4 FL (ref 80–96)
MONOCYTES # BLD AUTO: 0.78 K/UL (ref 0–0.8)
MONOCYTES NFR BLD AUTO: 9 % (ref 2–6)
MONOCYTES NFR BLD MANUAL: 10 % (ref 2–6)
MPC BLD CALC-MCNC: 10.6 FL (ref 9.4–12.4)
NEUTROPHILS # BLD AUTO: 7.16 K/UL (ref 1.8–7.7)
NEUTROPHILS NFR BLD AUTO: 82.7 % (ref 53–65)
NEUTS SEG NFR BLD MANUAL: 76 % (ref 50–62)
PLATELET # BLD AUTO: 205 K/UL (ref 150–400)
PLATELET MORPHOLOGY: NORMAL
POTASSIUM SERPL-SCNC: 3.1 MMOL/L (ref 3.5–5.1)
PROT SERPL-MCNC: 8.7 G/DL (ref 6.4–8.2)
PROTHROMBIN TIME: 13.3 SECONDS (ref 11.7–14.7)
RBC # BLD AUTO: 4.88 M/UL (ref 4.6–6.2)
RBC MORPH BLD: NORMAL
SARS-COV+SARS-COV-2 AG RESP QL IA.RAPID: NEGATIVE
SODIUM SERPL-SCNC: 136 MMOL/L (ref 136–145)
TROPONIN I SERPL HS-MCNC: 10.7 PG/ML
TROPONIN I SERPL HS-MCNC: 14.2 PG/ML
WBC # BLD AUTO: 8.67 K/UL (ref 4.5–11)

## 2022-03-05 PROCEDURE — 87428 SARSCOV & INF VIR A&B AG IA: CPT | Performed by: NURSE PRACTITIONER

## 2022-03-05 PROCEDURE — 36415 COLL VENOUS BLD VENIPUNCTURE: CPT | Performed by: NURSE PRACTITIONER

## 2022-03-05 PROCEDURE — 85378 FIBRIN DEGRADE SEMIQUANT: CPT | Performed by: NURSE PRACTITIONER

## 2022-03-05 PROCEDURE — 93005 ELECTROCARDIOGRAM TRACING: CPT

## 2022-03-05 PROCEDURE — 96360 HYDRATION IV INFUSION INIT: CPT | Mod: 59

## 2022-03-05 PROCEDURE — 99284 EMERGENCY DEPT VISIT MOD MDM: CPT | Mod: 25

## 2022-03-05 PROCEDURE — 93010 EKG 12-LEAD: ICD-10-PCS | Mod: ,,, | Performed by: INTERNAL MEDICINE

## 2022-03-05 PROCEDURE — 99284 PR EMERGENCY DEPT VISIT,LEVEL IV: ICD-10-PCS | Mod: ,,, | Performed by: NURSE PRACTITIONER

## 2022-03-05 PROCEDURE — 85610 PROTHROMBIN TIME: CPT | Performed by: NURSE PRACTITIONER

## 2022-03-05 PROCEDURE — 84484 ASSAY OF TROPONIN QUANT: CPT | Performed by: NURSE PRACTITIONER

## 2022-03-05 PROCEDURE — 25000003 PHARM REV CODE 250: Performed by: NURSE PRACTITIONER

## 2022-03-05 PROCEDURE — 25000242 PHARM REV CODE 250 ALT 637 W/ HCPCS: Performed by: NURSE PRACTITIONER

## 2022-03-05 PROCEDURE — 25500020 PHARM REV CODE 255: Performed by: NURSE PRACTITIONER

## 2022-03-05 PROCEDURE — 85025 COMPLETE CBC W/AUTO DIFF WBC: CPT | Performed by: NURSE PRACTITIONER

## 2022-03-05 PROCEDURE — 93010 ELECTROCARDIOGRAM REPORT: CPT | Mod: ,,, | Performed by: INTERNAL MEDICINE

## 2022-03-05 PROCEDURE — 99284 EMERGENCY DEPT VISIT MOD MDM: CPT | Mod: ,,, | Performed by: NURSE PRACTITIONER

## 2022-03-05 PROCEDURE — 80053 COMPREHEN METABOLIC PANEL: CPT | Performed by: NURSE PRACTITIONER

## 2022-03-05 RX ORDER — ASPIRIN 325 MG
325 TABLET ORAL ONCE
Status: DISCONTINUED | OUTPATIENT
Start: 2022-03-05 | End: 2022-03-05

## 2022-03-05 RX ORDER — AMLODIPINE BESYLATE 10 MG/1
10 TABLET ORAL DAILY
Qty: 30 TABLET | Refills: 0 | Status: ON HOLD | OUTPATIENT
Start: 2022-03-05 | End: 2023-03-03 | Stop reason: HOSPADM

## 2022-03-05 RX ORDER — LOSARTAN POTASSIUM 25 MG/1
25 TABLET ORAL DAILY
Qty: 30 TABLET | Refills: 0 | Status: SHIPPED | OUTPATIENT
Start: 2022-03-05 | End: 2022-03-24

## 2022-03-05 RX ORDER — NITROGLYCERIN 0.4 MG/1
0.4 TABLET SUBLINGUAL
Status: COMPLETED | OUTPATIENT
Start: 2022-03-05 | End: 2022-03-05

## 2022-03-05 RX ORDER — LOSARTAN POTASSIUM 25 MG/1
25 TABLET ORAL DAILY
COMMUNITY
End: 2022-03-05 | Stop reason: SDUPTHER

## 2022-03-05 RX ADMIN — IOPAMIDOL 100 ML: 755 INJECTION, SOLUTION INTRAVENOUS at 11:03

## 2022-03-05 RX ADMIN — NITROGLYCERIN 0.4 MG: 0.4 TABLET SUBLINGUAL at 10:03

## 2022-03-05 RX ADMIN — SODIUM CHLORIDE 1000 ML: 9 INJECTION, SOLUTION INTRAVENOUS at 11:03

## 2022-03-05 NOTE — ED TRIAGE NOTES
Presents to ER with reports of Coughing x 8.5 hours. Patient reports coughing up dark tinged blood sputum also.

## 2022-03-05 NOTE — ED PROVIDER NOTES
Encounter Date: 3/5/2022       History     Chief Complaint   Patient presents with    Cough    Pleurisy    Hemoptysis     31 yr old ambulatory to ED with c/o cough, chest pain and coughing up blood since 0200 this morning.  Reports known hx of sarcoidosis and see pulmonology at [facility] but has not seen him recently.  Admits to hx of HTN but reports ran out of meds yesterday.          Review of patient's allergies indicates:   Allergen Reactions    Fish containing products Anaphylaxis    Shellfish containing products Anaphylaxis    Lisinopril      Past Medical History:   Diagnosis Date    Hypertension     Sarcoidosis     Sarcoidosis 2019     Past Surgical History:   Procedure Laterality Date    ANTERIOR CRUCIATE LIGAMENT REPAIR Left 09/01/2004    ANTERIOR CRUCIATE LIGAMENT REPAIR Left     FRACTURE SURGERY       History reviewed. No pertinent family history.  Social History     Tobacco Use    Smoking status: Never Smoker    Smokeless tobacco: Never Used   Substance Use Topics    Alcohol use: Yes     Alcohol/week: 2.0 standard drinks     Types: 2 Cans of beer per week    Drug use: Never     Review of Systems   Constitutional: Negative for activity change and fever.   HENT: Negative.    Respiratory: Positive for cough and shortness of breath.    Cardiovascular: Positive for chest pain. Negative for palpitations and leg swelling.   Gastrointestinal: Negative for abdominal pain, diarrhea, nausea and vomiting.   Genitourinary: Negative.    Musculoskeletal: Negative for back pain.   Skin: Negative for color change.   Neurological: Negative for dizziness, light-headedness and headaches.   Hematological: Negative.    Psychiatric/Behavioral: Negative.        Physical Exam     Initial Vitals [03/05/22 1008]   BP Pulse Resp Temp SpO2   (!) 191/120 (!) 114 20 99.1 °F (37.3 °C) 96 %      MAP       --         Physical Exam    Nursing note and vitals reviewed.  Constitutional: He appears well-developed and  well-nourished.   Neck: Neck supple.   Cardiovascular: Normal rate and regular rhythm.   Pulmonary/Chest: He has rhonchi.   Abdominal: Abdomen is soft. There is no abdominal tenderness.   Musculoskeletal:         General: No edema. Normal range of motion.      Cervical back: Neck supple.     Neurological: He is alert and oriented to person, place, and time.   Skin: Skin is warm and dry. Capillary refill takes less than 2 seconds.   Psychiatric: He has a normal mood and affect.         Medical Screening Exam   See Full Note    ED Course   Procedures  Labs Reviewed   COMPREHENSIVE METABOLIC PANEL - Abnormal; Notable for the following components:       Result Value    Potassium 3.1 (*)     Chloride 97 (*)     Glucose 129 (*)     BUN 4 (*)     BUN/Creatinine Ratio 5 (*)     Calcium 8.1 (*)     Total Protein 8.7 (*)     Albumin 3.2 (*)     Globulin 5.5 (*)     AST 41 (*)     All other components within normal limits   D DIMER, QUANTITATIVE - Abnormal; Notable for the following components:    D-Dimer 0.73 (*)     All other components within normal limits   CBC WITH DIFFERENTIAL - Abnormal; Notable for the following components:    Neutrophils % 82.7 (*)     Lymphocytes % 7.8 (*)     Lymphocytes, Absolute 0.68 (*)     Monocytes % 9.0 (*)     Eosinophils % 0.3 (*)     All other components within normal limits   MANUAL DIFFERENTIAL - Abnormal; Notable for the following components:    Segmented Neutrophils, Man % 76 (*)     Lymphocytes, Man % 14 (*)     Monocytes, Man % 10 (*)     All other components within normal limits   SARS-COV2 (COVID) W/ FLU ANTIGEN - Normal    Narrative:     Negative SARS-CoV results should not be used as the sole basis for treatment or patient management decisions; negative results should be considered in the context of a patient's recent exposures, history and the presene of clinical signs and symptoms consistent with COVID-19.  Negative results should be treated as presumptive and confirmed by  molecular assay, if necessary for patient management.   TROPONIN I - Normal   PROTIME-INR - Normal   TROPONIN I - Normal   CBC W/ AUTO DIFFERENTIAL    Narrative:     The following orders were created for panel order CBC Auto Differential.  Procedure                               Abnormality         Status                     ---------                               -----------         ------                     CBC with Differential[[id number]]        Abnormal            Final result               Manual Differential[[id number]]          Abnormal            Final result                 Please view results for these tests on the individual orders.        ECG Results          EKG 12-lead (In process)  Result time 03/05/22 12:00:37    In process by Interface, Lab In [facility] (03/05/22 12:00:37)                 Narrative:    Test Reason : R07.9,    Vent. Rate : 121 BPM     Atrial Rate : 121 BPM     P-R Int : 142 ms          QRS Dur : 084 ms      QT Int : 344 ms       P-R-T Axes : 052 066 -24 degrees     QTc Int : 488 ms    Sinus tachycardia  Biatrial enlargement  Right ventricular conduction delay  LVH  ST and T wave abnormality, consider inferior ischemia  Abnormal ECG  When compared with ECG of 30-MAY-2021 23:41,  PREVIOUS ECG IS PRESENT    Referred By: [email or web address]   SELF           Confirmed By:                             Imaging Results          CTA Chest Non-Coronary (PE Study) (Final result)  Result time 03/05/22 11:39:38    Final result by Zeus Westfall MD (03/05/22 11:39:38)                 Impression:      No central pulmonary embolus.  Evaluation for acute pulmonary embolic disease is otherwise limited due to contrast timing factors.    Chronic lung changes and stable mediastinal lymphadenopathy in this patient with a history of sarcoidosis    No definite acute changes      Electronically signed by: Zeus Westfall  Date:    03/05/2022  Time:    11:39             Narrative:    EXAMINATION:  CTA CHEST NON  CORONARY    CLINICAL HISTORY:  Pulmonary embolism (PE) suspected, unknown D-dimer;.    Cough.  Hemoptysis.  Pleurisy    COMPARISON:  05/31/2021 CT chest available    TECHNIQUE:  Thin spiral CT sections were obtained through the chest during the dynamic IV administration of 100 ml of Isovue 370.  In addition to multiplanar reconstruction images, 3-D images were also generated, archived, and analyzed.    The CT examination was performed using one or more of the following dose reduction techniques: Automated exposure control, adjustment of the mA and kV according to patient's size, use of acute or iterative reconstruction techniques.    FINDINGS:  There is no central pulmonary embolus or saddle embolus.  Evaluation for pulmonary embolic disease otherwise is grossly limited because of contrast timing fractures.    There is no thoracic aorta aneurysm or dissection.    There is right and left paratracheal and AP window lymphadenopathy as before in this patient with a known history of sarcoidosis.    There is equivocal trace layering pleural effusion bilaterally.  There is no pericardial effusion.    Central airway is clear.  There are some changes of centrilobular emphysema.  There are confluent areas of multifocal parenchymal consolidation in the mid to lower lungs bilaterally, involving both lower lobes, lingula, and right middle lobe as well as some upper lobe involvement.  There is also an element of reticulonodular infiltrate.  These changes are chronic and were present on 05/31/2021.    There is no definite acute process in the partially visualized upper abdomen.    There is no acute osseous abnormality                               X-Ray Chest 1 View (Final result)  Result time 03/05/22 10:36:47    Final result by Zeus Westfall MD (03/05/22 10:36:47)                 Impression:      Chronic lung infiltrates without change.  No definite new or worsening process      Electronically signed by: Zeus  Khoi  Date:    03/05/2022  Time:    10:36             Narrative:    EXAMINATION:  XR CHEST 1 VIEW    CLINICAL HISTORY:  cough;.    Hemoptysis.  History of sarcoidosis.  History of hypertension    COMPARISON:  January 24, 2022 chest x-ray    TECHNIQUE:  Chest x-ray AP portable    FINDINGS:  The cardiac silhouette is not enlarged.  There is no mediastinal mass.    There is some continued confluent and platelike infiltrate in the mid to lower lungs bilaterally.  There is no new or worsening infiltrate.  There is no pleural effusion.    Osseous structures are unchanged                                 Medications   nitroGLYCERIN SL tablet 0.4 mg (0.4 mg Sublingual Given 3/5/22 1011)   iopamidoL (ISOVUE-370) injection 100 mL (100 mLs Intravenous Given 3/5/22 1117)   sodium chloride 0.9% bolus 1,000 mL (1,000 mLs Intravenous New Bag 3/5/22 1157)        Additional MDM:   Chest Pain: Medications: Nitroglycerin SL. Pain response to treatment: unchanged. Initial EKG: non-specific ST changes. EKG change from treatment: no changes.   PERC Rule:   Age is greater than or equal to 50 = 0.0  Heart Rate is greater than or equal to 100 = 1.0  SaO2 on room air < 95% = 0.0  Unilateral leg swelling = 0.0  Hemoptysis = 1.0  Recent surgery or trauma = 0.0  Prior PE or DVT =  0.0  Hormone use = 0.00  PERC Score = 2  Heart Score:    History:          Slightly suspicious.    ANNALEE Score:   Age over 65:                                    0.00   > or = to 3 CAD risk factors:           0.00  Established CAD:                            0.00  > or = to 2 anginal events in the past 24 hours: 0.00  Use of ASA in past 7 days:              0.00  Elevated Enzymes:                         0.00  ST Depression > or = to 0.05 mV:  0.00  ANNALEE score: 0          ED Course as of 03/05/22 1318   Sat Mar 05, 2022   1037 HR up to 133, D dimer not resulted but concerned about PE, will call for [facility] telemed about CT.  [CG]   1040 Dr. Elliott agrees with CT [CG]    2592 Med Com reports Dr. Elliott on another call.  [CG]   1307 Telemed consult with Dr Elliott who states ok to DC with f/u with PCP this week. [CG]      ED Course User Index  [CG] JEREMIAS Bravo          Clinical Impression:   Final diagnoses:  [R07.9] Chest pain (Primary)  [R04.2] Hemoptysis  [R06.02] SOB (shortness of breath)  [I10] Hypertension, unspecified type          ED Disposition Condition    Discharge Stable        ED Prescriptions     Medication Sig Dispense Start Date End Date Auth. Provider    amLODIPine (NORVASC) 10 MG tablet Take 1 tablet (10 mg total) by mouth once daily. 30 tablet 3/5/2022 4/4/2022 JEREMIAS Bravo    losartan (COZAAR) 25 MG tablet Take 1 tablet (25 mg total) by mouth once daily. 30 tablet 3/5/2022 4/4/2022 JEREMIAS Bravo        Follow-up Information     Follow up With Specialties Details Why Contact Info    [facility] Go in 2 days  [address] [place] [id number] [phone]             JEREMIAS Bravo  03/05/22 3396

## 2022-03-05 NOTE — DISCHARGE INSTRUCTIONS
Take BP meds as prescribed.  See your primary care provider this week to have BP recheck and discuss todays symptoms.  Return for return of chest pain, if short of breath or worsening condition

## 2022-03-24 ENCOUNTER — OFFICE VISIT (OUTPATIENT)
Dept: FAMILY MEDICINE | Facility: CLINIC | Age: 32
End: 2022-03-24
Payer: COMMERCIAL

## 2022-03-24 DIAGNOSIS — Z00.01 ENCOUNTER FOR GENERAL ADULT MEDICAL EXAMINATION WITH ABNORMAL FINDINGS: Primary | ICD-10-CM

## 2022-03-24 DIAGNOSIS — I10 HYPERTENSION, UNSPECIFIED TYPE: ICD-10-CM

## 2022-03-24 DIAGNOSIS — Z13.220 SCREENING FOR LIPOID DISORDERS: ICD-10-CM

## 2022-03-24 LAB
ALBUMIN SERPL BCP-MCNC: 3 G/DL (ref 3.5–5)
ALBUMIN/GLOB SERPL: 0.6 {RATIO}
ALP SERPL-CCNC: 92 U/L (ref 45–115)
ALT SERPL W P-5'-P-CCNC: 62 U/L (ref 16–61)
ANION GAP SERPL CALCULATED.3IONS-SCNC: 11 MMOL/L (ref 7–16)
AST SERPL W P-5'-P-CCNC: 96 U/L (ref 15–37)
BASOPHILS # BLD AUTO: 0.04 K/UL (ref 0–0.2)
BASOPHILS NFR BLD AUTO: 0.9 % (ref 0–1)
BILIRUB SERPL-MCNC: 1.1 MG/DL (ref 0–1.2)
BILIRUB UR QL STRIP: ABNORMAL
BUN SERPL-MCNC: 5 MG/DL (ref 7–18)
BUN/CREAT SERPL: 7 (ref 6–20)
CALCIUM SERPL-MCNC: 8.8 MG/DL (ref 8.5–10.1)
CHLORIDE SERPL-SCNC: 99 MMOL/L (ref 98–107)
CHOLEST SERPL-MCNC: 121 MG/DL (ref 0–200)
CHOLEST/HDLC SERPL: 1.9 {RATIO}
CLARITY UR: CLEAR
CO2 SERPL-SCNC: 26 MMOL/L (ref 21–32)
COLOR UR: YELLOW
CREAT SERPL-MCNC: 0.68 MG/DL (ref 0.7–1.3)
CREAT UR-MCNC: 233 MG/DL (ref 39–259)
DIFFERENTIAL METHOD BLD: ABNORMAL
EOSINOPHIL # BLD AUTO: 0.06 K/UL (ref 0–0.5)
EOSINOPHIL NFR BLD AUTO: 1.4 % (ref 1–4)
ERYTHROCYTE [DISTWIDTH] IN BLOOD BY AUTOMATED COUNT: 14.6 % (ref 11.5–14.5)
GLOBULIN SER-MCNC: 5.2 G/DL (ref 2–4)
GLUCOSE SERPL-MCNC: 106 MG/DL (ref 74–106)
GLUCOSE UR STRIP-MCNC: NEGATIVE MG/DL
HCT VFR BLD AUTO: 41.9 % (ref 40–54)
HDLC SERPL-MCNC: 64 MG/DL (ref 40–60)
HGB BLD-MCNC: 14.7 G/DL (ref 13.5–18)
IMM GRANULOCYTES # BLD AUTO: 0.02 K/UL (ref 0–0.04)
IMM GRANULOCYTES NFR BLD: 0.5 % (ref 0–0.4)
KETONES UR STRIP-SCNC: 15 MG/DL
LDLC SERPL CALC-MCNC: 44 MG/DL
LDLC/HDLC SERPL: 0.7 {RATIO}
LEUKOCYTE ESTERASE UR QL STRIP: NEGATIVE
LYMPHOCYTES # BLD AUTO: 0.61 K/UL (ref 1–4.8)
LYMPHOCYTES NFR BLD AUTO: 14.5 % (ref 27–41)
MCH RBC QN AUTO: 30.2 PG (ref 27–31)
MCHC RBC AUTO-ENTMCNC: 35.1 G/DL (ref 32–36)
MCV RBC AUTO: 86.2 FL (ref 80–96)
MICROALBUMIN UR-MCNC: 1.5 MG/DL (ref 0–2.8)
MICROALBUMIN/CREAT RATIO PNL UR: 6.4 MG/G (ref 0–30)
MONOCYTES # BLD AUTO: 0.69 K/UL (ref 0–0.8)
MONOCYTES NFR BLD AUTO: 16.4 % (ref 2–6)
MPC BLD CALC-MCNC: 10.9 FL (ref 9.4–12.4)
NEUTROPHILS # BLD AUTO: 2.8 K/UL (ref 1.8–7.7)
NEUTROPHILS NFR BLD AUTO: 66.3 % (ref 53–65)
NITRITE UR QL STRIP: NEGATIVE
NONHDLC SERPL-MCNC: 57 MG/DL
NRBC # BLD AUTO: 0 X10E3/UL
NRBC, AUTO (.00): 0 %
PH UR STRIP: 7 PH UNITS
PLATELET # BLD AUTO: 181 K/UL (ref 150–400)
POTASSIUM SERPL-SCNC: 3.4 MMOL/L (ref 3.5–5.1)
PROT SERPL-MCNC: 8.2 G/DL (ref 6.4–8.2)
PROT UR QL STRIP: NEGATIVE
RBC # BLD AUTO: 4.86 M/UL (ref 4.6–6.2)
RBC # UR STRIP: NEGATIVE /UL
SODIUM SERPL-SCNC: 133 MMOL/L (ref 136–145)
SP GR UR STRIP: 1.01
TRIGL SERPL-MCNC: 63 MG/DL (ref 35–150)
UROBILINOGEN UR STRIP-ACNC: 1 MG/DL
VLDLC SERPL-MCNC: 13 MG/DL
WBC # BLD AUTO: 4.22 K/UL (ref 4.5–11)

## 2022-03-24 PROCEDURE — 3077F PR MOST RECENT SYSTOLIC BLOOD PRESSURE >= 140 MM HG: ICD-10-PCS | Mod: ,,, | Performed by: REGISTERED NURSE

## 2022-03-24 PROCEDURE — 80053 COMPREHENSIVE METABOLIC PANEL: ICD-10-PCS | Mod: ,,, | Performed by: CLINICAL MEDICAL LABORATORY

## 2022-03-24 PROCEDURE — 1159F MED LIST DOCD IN RCRD: CPT | Mod: ,,, | Performed by: REGISTERED NURSE

## 2022-03-24 PROCEDURE — 3077F SYST BP >= 140 MM HG: CPT | Mod: ,,, | Performed by: REGISTERED NURSE

## 2022-03-24 PROCEDURE — 3061F NEG MICROALBUMINURIA REV: CPT | Mod: ,,, | Performed by: REGISTERED NURSE

## 2022-03-24 PROCEDURE — 80053 COMPREHEN METABOLIC PANEL: CPT | Mod: ,,, | Performed by: CLINICAL MEDICAL LABORATORY

## 2022-03-24 PROCEDURE — 3080F PR MOST RECENT DIASTOLIC BLOOD PRESSURE >= 90 MM HG: ICD-10-PCS | Mod: ,,, | Performed by: REGISTERED NURSE

## 2022-03-24 PROCEDURE — 82570 MICROALBUMIN / CREATININE RATIO URINE: ICD-10-PCS | Mod: ,,, | Performed by: CLINICAL MEDICAL LABORATORY

## 2022-03-24 PROCEDURE — 3008F PR BODY MASS INDEX (BMI) DOCUMENTED: ICD-10-PCS | Mod: ,,, | Performed by: REGISTERED NURSE

## 2022-03-24 PROCEDURE — 3080F DIAST BP >= 90 MM HG: CPT | Mod: ,,, | Performed by: REGISTERED NURSE

## 2022-03-24 PROCEDURE — 82043 UR ALBUMIN QUANTITATIVE: CPT | Mod: ,,, | Performed by: CLINICAL MEDICAL LABORATORY

## 2022-03-24 PROCEDURE — 3061F PR NEG MICROALBUMINURIA RESULT DOCUMENTED/REVIEW: ICD-10-PCS | Mod: ,,, | Performed by: REGISTERED NURSE

## 2022-03-24 PROCEDURE — 80061 LIPID PANEL: ICD-10-PCS | Mod: ,,, | Performed by: CLINICAL MEDICAL LABORATORY

## 2022-03-24 PROCEDURE — 85025 CBC WITH DIFFERENTIAL: ICD-10-PCS | Mod: ,,, | Performed by: CLINICAL MEDICAL LABORATORY

## 2022-03-24 PROCEDURE — 85025 COMPLETE CBC W/AUTO DIFF WBC: CPT | Mod: ,,, | Performed by: CLINICAL MEDICAL LABORATORY

## 2022-03-24 PROCEDURE — 82043 MICROALBUMIN / CREATININE RATIO URINE: ICD-10-PCS | Mod: ,,, | Performed by: CLINICAL MEDICAL LABORATORY

## 2022-03-24 PROCEDURE — 1160F RVW MEDS BY RX/DR IN RCRD: CPT | Mod: ,,, | Performed by: REGISTERED NURSE

## 2022-03-24 PROCEDURE — 4010F ACE/ARB THERAPY RXD/TAKEN: CPT | Mod: ,,, | Performed by: REGISTERED NURSE

## 2022-03-24 PROCEDURE — 80061 LIPID PANEL: CPT | Mod: ,,, | Performed by: CLINICAL MEDICAL LABORATORY

## 2022-03-24 PROCEDURE — 1159F PR MEDICATION LIST DOCUMENTED IN MEDICAL RECORD: ICD-10-PCS | Mod: ,,, | Performed by: REGISTERED NURSE

## 2022-03-24 PROCEDURE — 99395 PREV VISIT EST AGE 18-39: CPT | Mod: ,,, | Performed by: REGISTERED NURSE

## 2022-03-24 PROCEDURE — 82570 ASSAY OF URINE CREATININE: CPT | Mod: ,,, | Performed by: CLINICAL MEDICAL LABORATORY

## 2022-03-24 PROCEDURE — 81003 URINALYSIS AUTO W/O SCOPE: CPT | Mod: QW,,, | Performed by: CLINICAL MEDICAL LABORATORY

## 2022-03-24 PROCEDURE — 3066F PR DOCUMENTATION OF TREATMENT FOR NEPHROPATHY: ICD-10-PCS | Mod: ,,, | Performed by: REGISTERED NURSE

## 2022-03-24 PROCEDURE — 3066F NEPHROPATHY DOC TX: CPT | Mod: ,,, | Performed by: REGISTERED NURSE

## 2022-03-24 PROCEDURE — 81003 URINALYSIS, REFLEX TO URINE CULTURE: ICD-10-PCS | Mod: QW,,, | Performed by: CLINICAL MEDICAL LABORATORY

## 2022-03-24 PROCEDURE — 1160F PR REVIEW ALL MEDS BY PRESCRIBER/CLIN PHARMACIST DOCUMENTED: ICD-10-PCS | Mod: ,,, | Performed by: REGISTERED NURSE

## 2022-03-24 PROCEDURE — 4010F PR ACE/ARB THEARPY RXD/TAKEN: ICD-10-PCS | Mod: ,,, | Performed by: REGISTERED NURSE

## 2022-03-24 PROCEDURE — 3008F BODY MASS INDEX DOCD: CPT | Mod: ,,, | Performed by: REGISTERED NURSE

## 2022-03-24 PROCEDURE — 99395 PR PREVENTIVE VISIT,EST,18-39: ICD-10-PCS | Mod: ,,, | Performed by: REGISTERED NURSE

## 2022-03-24 RX ORDER — OLMESARTAN MEDOXOMIL 40 MG/1
40 TABLET ORAL DAILY
Status: ON HOLD | COMMUNITY
Start: 2022-03-23 | End: 2023-03-03 | Stop reason: HOSPADM

## 2022-03-24 RX ORDER — CLONIDINE HYDROCHLORIDE 0.1 MG/1
0.1 TABLET ORAL
Status: COMPLETED | OUTPATIENT
Start: 2022-03-24 | End: 2022-03-24

## 2022-03-24 RX ORDER — HYDROCHLOROTHIAZIDE 12.5 MG/1
12.5 CAPSULE ORAL DAILY
Qty: 30 CAPSULE | Refills: 0 | Status: ON HOLD | OUTPATIENT
Start: 2022-03-24 | End: 2023-03-03 | Stop reason: HOSPADM

## 2022-03-24 RX ADMIN — CLONIDINE HYDROCHLORIDE 0.1 MG: 0.1 TABLET ORAL at 11:03

## 2022-03-24 RX ADMIN — CLONIDINE HYDROCHLORIDE 0.1 MG: 0.1 TABLET ORAL at 10:03

## 2022-03-24 NOTE — PATIENT INSTRUCTIONS
medication from pharmacy as directed. Take Benicar and HCTZ together in the morning and amlodipine in the evening. Keep blood pressure log and record blood pressure one time per day at least 1-2 hours after taking medication. Go to the ED if you have elevated blood pressure with chest pains or fainting.     Return tomorrow for blood pressure check and to go over medications and lab work.     Referring to Cardiologist for next available appointment.     Avoid injury by wearing seatbelts and bike helmets, using smoke and carbon monoxide detectors in the home, and using street smarts when walking alone. If you own a gun, recognize the dangers of having a gun in your home. Use safety precautions at all times.  Don't smoke, and quit if you do. Ask your health care provider for help.   If you drink alcohol, drink in moderation. Never drink before or when driving.  Help prevent STD's and HIV by using condoms every time you have sexual contact. Keep in mind, condoms are not 100 percent foolproof, so discuss STI screening with your provider. Birth control methods other than condoms, such as pills and implants, won't protect you from STIs or HIV.  Brush your teeth after meals with a soft or medium bristled toothbrush. Also brush after drinking, before going to bed. Use dental floss daily.  Stay out of the sun, especially between 10 a.m. and 3 p.m. when the sun's harmful rays are strongest. Don't think you are safe if it is cloudy or if you are in the water, as harmful rays pass through both. Use a broad spectrum sunscreen that guards against both UVA and UVB rays, with a sun protection factor (SPF) of 15 or higher. Select sunglasses that block 99 to 100 percent of the sun's rays.  Learn to recognize and manage stress in your life. Signs of stress include trouble sleeping, frequent headaches and stomach problems; being angry a lot; and turning to food, drugs and alcohol to relieve stress.    Next wellness appointment  on or around 03/25/2023.

## 2022-03-24 NOTE — PROGRESS NOTES
JEREMIAS Stone        PATIENT NAME: Bo Ku  : 1990  DATE: 3/24/22  MRN: [id number]      Billing Provider: JEREMIAS Stone  Level of Service: [place] OFFICE/OUTPT VISIT, ESTLACIDESSAIMA IV, 30-39 MIN  Patient PCP Information     Provider PCP Type    Primary Doctor No General          Reason for Visit / Chief Complaint: Follow-up (Healthy you)       Update PCP  Update Chief Complaint         History of Present Illness / Problem Focused Workflow     HPI Mr. Ku is here today for wellness exam. He reports he was seen in the ER on 2022 and was told his heart looked fine but his potassium was low. He was not given oral potassium supplement at that time. He reports he has not been in sooner because he already had today's appointment scheduled. His blood pressure today is elevated. He admits he has been sent home from work every night since last 2022 due to elevated BP. He works night shift and takes his blood pressure medications immediately prior to going in to work. He denies missing any doses. He continues to report chest pain but denies shortness of breath, dizziness, headaches, or blurred vision. He has a follow-up appointment with his Pulmonologist in April.     Review of Systems     Review of Systems   Constitutional: Negative.    HENT: Negative.    Respiratory: Positive for cough.    Cardiovascular: Positive for chest pain.   Gastrointestinal: Negative.    Musculoskeletal: Negative.    Neurological: Negative.    Psychiatric/Behavioral: Negative.         Medical / Social / Family History     Past Medical History:   Diagnosis Date    Hypertension     Sarcoidosis     Sarcoidosis 2019       Past Surgical History:   Procedure Laterality Date    ANTERIOR CRUCIATE LIGAMENT REPAIR Left 2004    ANTERIOR CRUCIATE LIGAMENT REPAIR Left     FRACTURE SURGERY         Social History  Mr. Bo Ku  reports that he has never smoked. He has never used smokeless tobacco. He  reports current alcohol use of about 2.0 standard drinks of alcohol per week. He reports that he does not use drugs.    Family History  Mr. Bo Ku's family history is not on file.    Medications and Allergies     Medications  No outpatient medications have been marked as taking for the 3/24/22 encounter (Office Visit) with JEREMIAS Stone.     Current Facility-Administered Medications for the 3/24/22 encounter (Office Visit) with JEREMIAS Stone   Medication Dose Route Frequency Provider Last Rate Last Admin    [COMPLETED] cloNIDine tablet 0.1 mg  0.1 mg Oral 1 time in Clinic/HOD BRIAN StoneP   0.1 mg at 03/24/22 1038    [COMPLETED] cloNIDine tablet 0.1 mg  0.1 mg Oral 1 time in Clinic/HOD BRIAN StoneP   0.1 mg at 03/24/22 1112       Allergies  Review of patient's allergies indicates:   Allergen Reactions    Fish containing products Anaphylaxis    Shellfish containing products Anaphylaxis    Lisinopril        Physical Examination     Vitals:    03/24/22 1119   BP: (!) 167/99   Pulse:    Temp:      Physical Exam  Vitals and nursing note reviewed.   Constitutional:       Appearance: Normal appearance.   HENT:      Head: Normocephalic and atraumatic.   Cardiovascular:      Rate and Rhythm: Normal rate and regular rhythm.      Pulses:           Carotid pulses are 1+ on the right side and 1+ on the left side.       Radial pulses are 1+ on the right side and 1+ on the left side.      Heart sounds: Normal heart sounds.   Pulmonary:      Effort: Pulmonary effort is normal.      Breath sounds: Normal breath sounds.   Musculoskeletal:         General: Normal range of motion.      Cervical back: Normal range of motion.   Skin:     General: Skin is warm and dry.   Neurological:      Mental Status: He is alert and oriented to person, place, and time.   Psychiatric:         Behavior: Behavior normal.          Assessment and Plan (including Health Maintenance)      Problem List  Smart Sets   Document Outside HM   :    Plan:   Hypertension, unspecified type  -     hydroCHLOROthiazide (MICROZIDE) 12.5 mg capsule; Take 1 capsule (12.5 mg total) by mouth once daily.  Dispense: 30 capsule; Refill: 0  -     cloNIDine tablet 0.1 mg  -     Comprehensive Metabolic Panel; Future; Expected date: 03/24/2022  -     Microalbumin/Creatinine Ratio, Urine; Future; Expected date: 03/24/2022  -     Urinalysis, Reflex to Urine Culture Urine, Clean Catch; Future; Expected date: 03/24/2022  -     CBC Auto Differential; Future; Expected date: 03/24/2022  -     Ambulatory referral/consult to Cardiology; Future; Expected date: 03/31/2022  -     cloNIDine tablet 0.1 mg    Screening for lipoid disorders  -     Lipid Panel; Future; Expected date: 03/24/2022         Health Maintenance Due   Topic Date Due    Hepatitis C Screening  Never done    COVID-19 Vaccine (1) Never done    Pneumococcal Vaccines (Age 0-64) (1 of 2 - PPSV23) Never done    HIV Screening  Never done    TETANUS VACCINE  Never done    Influenza Vaccine (1) Never done       Problem List Items Addressed This Visit        Cardiac/Vascular    Hypertension - Primary    Relevant Medications    hydroCHLOROthiazide (MICROZIDE) 12.5 mg capsule    cloNIDine tablet 0.1 mg (Completed)    cloNIDine tablet 0.1 mg (Completed)    Other Relevant Orders    Comprehensive Metabolic Panel (Completed)    Microalbumin/Creatinine Ratio, Urine (Completed)    Urinalysis, Reflex to Urine Culture Urine, Clean Catch (Completed)    CBC Auto Differential (Completed)    Ambulatory referral/consult to Cardiology      Other Visit Diagnoses     Screening for lipoid disorders        Relevant Orders    Lipid Panel (Completed)          The patient has no Health Maintenance topics of status Not Due    Future Appointments   Date Time Provider Department Center   3/27/2023  8:00 AM JEREMIAS Stone [facility]        Patient Instructions    medication from pharmacy as  directed. Take Benicar and HCTZ together in the morning and amlodipine in the evening. Keep blood pressure log and record blood pressure one time per day at least 1-2 hours after taking medication. Go to the ED if you have elevated blood pressure with chest pains or fainting.     Return tomorrow for blood pressure check and to go over medications and lab work.     Referring to Cardiologist for next available appointment.     Avoid injury by wearing seatbelts and bike helmets, using smoke and carbon monoxide detectors in the home, and using street smarts when walking alone. If you own a gun, recognize the dangers of having a gun in your home. Use safety precautions at all times.  Don't smoke, and quit if you do. Ask your health care provider for help.   If you drink alcohol, drink in moderation. Never drink before or when driving.  Help prevent STD's and HIV by using condoms every time you have sexual contact. Keep in mind, condoms are not 100 percent foolproof, so discuss STI screening with your provider. Birth control methods other than condoms, such as pills and implants, won't protect you from STIs or HIV.  Brush your teeth after meals with a soft or medium bristled toothbrush. Also brush after drinking, before going to bed. Use dental floss daily.  Stay out of the sun, especially between 10 a.m. and 3 p.m. when the sun's harmful rays are strongest. Don't think you are safe if it is cloudy or if you are in the water, as harmful rays pass through both. Use a broad spectrum sunscreen that guards against both UVA and UVB rays, with a sun protection factor (SPF) of 15 or higher. Select sunglasses that block 99 to 100 percent of the sun's rays.  Learn to recognize and manage stress in your life. Signs of stress include trouble sleeping, frequent headaches and stomach problems; being angry a lot; and turning to food, drugs and alcohol to relieve stress.    Next wellness appointment on or around  03/25/2023.          Follow up in about 1 day (around 3/25/2022).     Signature:  JEREMIAS Stone      Date of encounter: 3/24/22

## 2022-03-24 NOTE — LETTER
March 24, 2022    Bo Ku  [address] [place] [address]             [facility]  Family Medicine  [address] [place] [address] [place] [address]  Phone: [phone]  Fax: [phone]   March 24, 2022     Patient: Bo Ku   YOB: 1990   Date of Visit: 3/24/2022       To Whom it May Concern:    Bo Ku was seen in my clinic on 3/24/2022. He can return to work on March 28, 2022, with no restrictions.     Please excuse him from any work missed from March 17,2022.    If you have any questions or concerns, please don't hesitate to call.    Sincerely,         JEREMIAS Stone

## 2022-03-24 NOTE — LETTER
March 30, 2022      [facility]  [address] [place] [address] [place] [address]  Phone: [phone]  Fax: [phone]       Patient: Bo Ku   YOB: 1990  Date of Visit: 03/30/2022    To Whom It May Concern:    Buffy Ku  was at [facility] on 03/24/2022 for blood pressure check. The patient may return to work on 03/30/2022.  Bo was under my care for uncontrolled blood pressure from 03/17/2022-03/29/2022. He is able to return today with no restrictions. If you have any questions or concerns, or if I can be of further assistance, please do not hesitate to contact me.    Sincerely,      JEREMIAS Stone

## 2022-03-25 ENCOUNTER — TELEPHONE (OUTPATIENT)
Dept: FAMILY MEDICINE | Facility: CLINIC | Age: 32
End: 2022-03-25
Payer: COMMERCIAL

## 2022-03-25 ENCOUNTER — CLINICAL SUPPORT (OUTPATIENT)
Dept: FAMILY MEDICINE | Facility: CLINIC | Age: 32
End: 2022-03-25
Payer: COMMERCIAL

## 2022-03-25 VITALS
DIASTOLIC BLOOD PRESSURE: 99 MMHG | BODY MASS INDEX: 20.12 KG/M2 | OXYGEN SATURATION: 97 % | WEIGHT: 151.81 LBS | HEIGHT: 73 IN | HEART RATE: 97 BPM | TEMPERATURE: 98 F | SYSTOLIC BLOOD PRESSURE: 167 MMHG

## 2022-03-25 VITALS — DIASTOLIC BLOOD PRESSURE: 112 MMHG | SYSTOLIC BLOOD PRESSURE: 152 MMHG

## 2022-03-25 NOTE — TELEPHONE ENCOUNTER
Pt in clinic today for blood pressure check and lab results discussed with him by FRANCO Browning.

## 2022-03-25 NOTE — TELEPHONE ENCOUNTER
----- Message from JEREMIAS Stone sent at 3/25/2022  9:56 AM CDT -----  Please call Sabi Elmira and remind him he is supposed to come today for follow-up and recheck on his blood pressure.

## 2023-02-28 ENCOUNTER — HOSPITAL ENCOUNTER (INPATIENT)
Facility: HOSPITAL | Age: 33
LOS: 3 days | Discharge: HOME | DRG: 287 | End: 2023-03-03
Attending: HOSPITALIST | Admitting: HOSPITALIST
Payer: MEDICAID

## 2023-02-28 ENCOUNTER — HOSPITAL ENCOUNTER (EMERGENCY)
Facility: HOSPITAL | Age: 33
Discharge: HOME | End: 2023-02-28
Attending: FAMILY MEDICINE

## 2023-02-28 VITALS
DIASTOLIC BLOOD PRESSURE: 109 MMHG | HEART RATE: 113 BPM | HEIGHT: 73 IN | OXYGEN SATURATION: 98 % | WEIGHT: 155 LBS | BODY MASS INDEX: 20.54 KG/M2 | SYSTOLIC BLOOD PRESSURE: 139 MMHG | RESPIRATION RATE: 26 BRPM | TEMPERATURE: 98 F

## 2023-02-28 DIAGNOSIS — R07.9 CHEST PAIN: ICD-10-CM

## 2023-02-28 DIAGNOSIS — I50.9 CONGESTIVE HEART FAILURE, UNSPECIFIED HF CHRONICITY, UNSPECIFIED HEART FAILURE TYPE: Primary | ICD-10-CM

## 2023-02-28 DIAGNOSIS — D86.85 CARDIAC SARCOIDOSIS: ICD-10-CM

## 2023-02-28 DIAGNOSIS — I27.20 PULMONARY HYPERTENSION: Primary | ICD-10-CM

## 2023-02-28 DIAGNOSIS — I10 ESSENTIAL HYPERTENSION: ICD-10-CM

## 2023-02-28 DIAGNOSIS — D86.9 SARCOIDOSIS: ICD-10-CM

## 2023-02-28 DIAGNOSIS — F10.10 CHRONIC ALCOHOL ABUSE: ICD-10-CM

## 2023-02-28 DIAGNOSIS — I27.21 PULMONARY ARTERY HYPERTENSION: ICD-10-CM

## 2023-02-28 DIAGNOSIS — I27.81 COR PULMONALE: ICD-10-CM

## 2023-02-28 DIAGNOSIS — I50.9 CHF (CONGESTIVE HEART FAILURE): ICD-10-CM

## 2023-02-28 DIAGNOSIS — E55.9 VITAMIN D DEFICIENCY: ICD-10-CM

## 2023-02-28 DIAGNOSIS — R13.19 ESOPHAGEAL DYSPHAGIA: ICD-10-CM

## 2023-02-28 DIAGNOSIS — Z91.199 HX OF NONCOMPLIANCE WITH MEDICAL TREATMENT, PRESENTING HAZARDS TO HEALTH: ICD-10-CM

## 2023-02-28 DIAGNOSIS — I50.810 RVF (RIGHT VENTRICULAR FAILURE): ICD-10-CM

## 2023-02-28 DIAGNOSIS — R73.9 HYPERGLYCEMIA: ICD-10-CM

## 2023-02-28 LAB
ALBUMIN SERPL BCP-MCNC: 2.9 G/DL (ref 3.5–5)
ALBUMIN/GLOB SERPL: 0.5 {RATIO}
ALP SERPL-CCNC: 125 U/L (ref 45–115)
ALT SERPL W P-5'-P-CCNC: 16 U/L (ref 16–61)
ANION GAP SERPL CALCULATED.3IONS-SCNC: 12 MMOL/L (ref 7–16)
AST SERPL W P-5'-P-CCNC: 30 U/L (ref 15–37)
BASOPHILS # BLD AUTO: 0.01 K/UL (ref 0–0.2)
BASOPHILS NFR BLD AUTO: 0.3 % (ref 0–1)
BILIRUB SERPL-MCNC: 1.2 MG/DL (ref ?–1.2)
BUN SERPL-MCNC: 10 MG/DL (ref 7–18)
BUN/CREAT SERPL: 9 (ref 6–20)
CALCIUM SERPL-MCNC: 8.9 MG/DL (ref 8.5–10.1)
CHLORIDE SERPL-SCNC: 97 MMOL/L (ref 98–107)
CO2 SERPL-SCNC: 30 MMOL/L (ref 21–32)
CREAT SERPL-MCNC: 1.1 MG/DL (ref 0.7–1.3)
DIFFERENTIAL METHOD BLD: ABNORMAL
EGFR (NO RACE VARIABLE) (RUSH/TITUS): 90 ML/MIN/1.73M²
EOSINOPHIL # BLD AUTO: 0.07 K/UL (ref 0–0.5)
EOSINOPHIL NFR BLD AUTO: 1.8 % (ref 1–4)
EOSINOPHIL NFR BLD MANUAL: 5 % (ref 1–4)
ERYTHROCYTE [DISTWIDTH] IN BLOOD BY AUTOMATED COUNT: 14.7 % (ref 11.5–14.5)
FLUAV AG UPPER RESP QL IA.RAPID: NEGATIVE
FLUBV AG UPPER RESP QL IA.RAPID: NEGATIVE
GLOBULIN SER-MCNC: 5.4 G/DL (ref 2–4)
GLUCOSE SERPL-MCNC: 117 MG/DL (ref 74–106)
HCT VFR BLD AUTO: 43.3 % (ref 40–54)
HGB BLD-MCNC: 14.7 G/DL (ref 13.5–18)
LACTATE SERPL-SCNC: 2.6 MMOL/L (ref 0.4–2)
LYMPHOCYTES # BLD AUTO: 1.17 K/UL (ref 1–4.8)
LYMPHOCYTES NFR BLD AUTO: 29.9 % (ref 27–41)
LYMPHOCYTES NFR BLD MANUAL: 27 % (ref 27–41)
MCH RBC QN AUTO: 29.7 PG (ref 27–31)
MCHC RBC AUTO-ENTMCNC: 33.9 G/DL (ref 32–36)
MCV RBC AUTO: 87.5 FL (ref 80–96)
MONOCYTES # BLD AUTO: 0.59 K/UL (ref 0–0.8)
MONOCYTES NFR BLD AUTO: 15.1 % (ref 2–6)
MONOCYTES NFR BLD MANUAL: 15 % (ref 2–6)
MPC BLD CALC-MCNC: 10.6 FL (ref 9.4–12.4)
NEUTROPHILS # BLD AUTO: 2.07 K/UL (ref 1.8–7.7)
NEUTROPHILS NFR BLD AUTO: 52.9 % (ref 53–65)
NEUTS SEG NFR BLD MANUAL: 53 % (ref 50–62)
NRBC BLD MANUAL-RTO: ABNORMAL %
NT-PROBNP SERPL-MCNC: ABNORMAL PG/ML (ref 1–125)
PLATELET # BLD AUTO: 198 K/UL (ref 150–400)
PLATELET MORPHOLOGY: NORMAL
POTASSIUM SERPL-SCNC: 4.3 MMOL/L (ref 3.5–5.1)
PROT SERPL-MCNC: 8.3 G/DL (ref 6.4–8.2)
RBC # BLD AUTO: 4.95 M/UL (ref 4.6–6.2)
RBC MORPH BLD: NORMAL
SARS-COV+SARS-COV-2 AG RESP QL IA.RAPID: NEGATIVE
SODIUM SERPL-SCNC: 135 MMOL/L (ref 136–145)
TROPONIN I SERPL HS-MCNC: 24.1 PG/ML
TSH SERPL DL<=0.005 MIU/L-ACNC: 0.59 UIU/ML (ref 0.36–3.74)
WBC # BLD AUTO: 3.91 K/UL (ref 4.5–11)

## 2023-02-28 PROCEDURE — 96375 TX/PRO/DX INJ NEW DRUG ADDON: CPT

## 2023-02-28 PROCEDURE — 83880 ASSAY OF NATRIURETIC PEPTIDE: CPT | Performed by: FAMILY MEDICINE

## 2023-02-28 PROCEDURE — 93010 EKG 12-LEAD: ICD-10-PCS | Mod: ,,, | Performed by: STUDENT IN AN ORGANIZED HEALTH CARE EDUCATION/TRAINING PROGRAM

## 2023-02-28 PROCEDURE — 11000001 HC ACUTE MED/SURG PRIVATE ROOM

## 2023-02-28 PROCEDURE — 27000221 HC OXYGEN, UP TO 24 HOURS

## 2023-02-28 PROCEDURE — 80053 COMPREHEN METABOLIC PANEL: CPT | Performed by: FAMILY MEDICINE

## 2023-02-28 PROCEDURE — 99223 1ST HOSP IP/OBS HIGH 75: CPT | Mod: ,,, | Performed by: INTERNAL MEDICINE

## 2023-02-28 PROCEDURE — 83605 ASSAY OF LACTIC ACID: CPT | Performed by: FAMILY MEDICINE

## 2023-02-28 PROCEDURE — 99285 PR EMERGENCY DEPT VISIT,LEVEL V: ICD-10-PCS | Mod: ,,, | Performed by: FAMILY MEDICINE

## 2023-02-28 PROCEDURE — 87428 SARSCOV & INF VIR A&B AG IA: CPT | Performed by: FAMILY MEDICINE

## 2023-02-28 PROCEDURE — 63600175 PHARM REV CODE 636 W HCPCS: Performed by: FAMILY MEDICINE

## 2023-02-28 PROCEDURE — 99285 EMERGENCY DEPT VISIT HI MDM: CPT | Mod: 25

## 2023-02-28 PROCEDURE — 96376 TX/PRO/DX INJ SAME DRUG ADON: CPT

## 2023-02-28 PROCEDURE — 93010 ELECTROCARDIOGRAM REPORT: CPT | Mod: ,,, | Performed by: STUDENT IN AN ORGANIZED HEALTH CARE EDUCATION/TRAINING PROGRAM

## 2023-02-28 PROCEDURE — 25000242 PHARM REV CODE 250 ALT 637 W/ HCPCS: Performed by: FAMILY MEDICINE

## 2023-02-28 PROCEDURE — 84484 ASSAY OF TROPONIN QUANT: CPT | Performed by: FAMILY MEDICINE

## 2023-02-28 PROCEDURE — 99285 EMERGENCY DEPT VISIT HI MDM: CPT | Mod: ,,, | Performed by: FAMILY MEDICINE

## 2023-02-28 PROCEDURE — 99223 PR INITIAL HOSPITAL CARE,LEVL III: ICD-10-PCS | Mod: ,,, | Performed by: INTERNAL MEDICINE

## 2023-02-28 PROCEDURE — 96374 THER/PROPH/DIAG INJ IV PUSH: CPT

## 2023-02-28 PROCEDURE — 93005 ELECTROCARDIOGRAM TRACING: CPT

## 2023-02-28 PROCEDURE — 85025 COMPLETE CBC W/AUTO DIFF WBC: CPT | Performed by: FAMILY MEDICINE

## 2023-02-28 PROCEDURE — 63600175 PHARM REV CODE 636 W HCPCS: Performed by: INTERNAL MEDICINE

## 2023-02-28 PROCEDURE — 94640 AIRWAY INHALATION TREATMENT: CPT

## 2023-02-28 PROCEDURE — 84443 ASSAY THYROID STIM HORMONE: CPT | Performed by: FAMILY MEDICINE

## 2023-02-28 PROCEDURE — 25000003 PHARM REV CODE 250: Performed by: FAMILY MEDICINE

## 2023-02-28 RX ORDER — SODIUM CHLORIDE 0.9 % (FLUSH) 0.9 %
10 SYRINGE (ML) INJECTION EVERY 12 HOURS PRN
Status: DISCONTINUED | OUTPATIENT
Start: 2023-02-28 | End: 2023-03-03 | Stop reason: HOSPADM

## 2023-02-28 RX ORDER — METOPROLOL TARTRATE 1 MG/ML
5 INJECTION, SOLUTION INTRAVENOUS
Status: COMPLETED | OUTPATIENT
Start: 2023-02-28 | End: 2023-02-28

## 2023-02-28 RX ORDER — METHYLPREDNISOLONE SOD SUCC 125 MG
125 VIAL (EA) INJECTION
Status: COMPLETED | OUTPATIENT
Start: 2023-02-28 | End: 2023-02-28

## 2023-02-28 RX ORDER — IPRATROPIUM BROMIDE AND ALBUTEROL SULFATE 2.5; .5 MG/3ML; MG/3ML
3 SOLUTION RESPIRATORY (INHALATION)
Status: COMPLETED | OUTPATIENT
Start: 2023-02-28 | End: 2023-02-28

## 2023-02-28 RX ORDER — ONDANSETRON 2 MG/ML
4 INJECTION INTRAMUSCULAR; INTRAVENOUS EVERY 8 HOURS PRN
Status: DISCONTINUED | OUTPATIENT
Start: 2023-02-28 | End: 2023-03-03 | Stop reason: HOSPADM

## 2023-02-28 RX ORDER — HYDROCODONE BITARTRATE AND ACETAMINOPHEN 5; 325 MG/1; MG/1
1 TABLET ORAL EVERY 6 HOURS PRN
Status: DISCONTINUED | OUTPATIENT
Start: 2023-02-28 | End: 2023-03-03 | Stop reason: HOSPADM

## 2023-02-28 RX ORDER — ENOXAPARIN SODIUM 100 MG/ML
40 INJECTION SUBCUTANEOUS EVERY 24 HOURS
Status: DISCONTINUED | OUTPATIENT
Start: 2023-02-28 | End: 2023-03-02

## 2023-02-28 RX ORDER — HYDRALAZINE HYDROCHLORIDE 20 MG/ML
10 INJECTION INTRAMUSCULAR; INTRAVENOUS
Status: COMPLETED | OUTPATIENT
Start: 2023-02-28 | End: 2023-02-28

## 2023-02-28 RX ORDER — ACETAMINOPHEN 325 MG/1
650 TABLET ORAL EVERY 4 HOURS PRN
Status: DISCONTINUED | OUTPATIENT
Start: 2023-02-28 | End: 2023-03-03 | Stop reason: HOSPADM

## 2023-02-28 RX ORDER — AMLODIPINE BESYLATE 10 MG/1
10 TABLET ORAL DAILY
Status: DISCONTINUED | OUTPATIENT
Start: 2023-03-01 | End: 2023-03-01

## 2023-02-28 RX ORDER — IPRATROPIUM BROMIDE AND ALBUTEROL SULFATE 2.5; .5 MG/3ML; MG/3ML
3 SOLUTION RESPIRATORY (INHALATION) EVERY 4 HOURS PRN
Status: DISCONTINUED | OUTPATIENT
Start: 2023-02-28 | End: 2023-03-03 | Stop reason: HOSPADM

## 2023-02-28 RX ORDER — HYDROCHLOROTHIAZIDE 12.5 MG/1
12.5 TABLET ORAL DAILY
Status: DISCONTINUED | OUTPATIENT
Start: 2023-03-01 | End: 2023-02-28

## 2023-02-28 RX ORDER — NALOXONE HCL 0.4 MG/ML
0.02 VIAL (ML) INJECTION
Status: DISCONTINUED | OUTPATIENT
Start: 2023-02-28 | End: 2023-03-03 | Stop reason: HOSPADM

## 2023-02-28 RX ORDER — FUROSEMIDE 10 MG/ML
40 INJECTION INTRAMUSCULAR; INTRAVENOUS
Status: DISCONTINUED | OUTPATIENT
Start: 2023-02-28 | End: 2023-03-01

## 2023-02-28 RX ORDER — FUROSEMIDE 10 MG/ML
40 INJECTION INTRAMUSCULAR; INTRAVENOUS
Status: COMPLETED | OUTPATIENT
Start: 2023-02-28 | End: 2023-02-28

## 2023-02-28 RX ORDER — PREDNISONE 20 MG/1
40 TABLET ORAL DAILY
Status: DISCONTINUED | OUTPATIENT
Start: 2023-02-28 | End: 2023-03-03 | Stop reason: HOSPADM

## 2023-02-28 RX ORDER — VALSARTAN 80 MG/1
320 TABLET ORAL DAILY
Status: DISCONTINUED | OUTPATIENT
Start: 2023-03-01 | End: 2023-03-03 | Stop reason: HOSPADM

## 2023-02-28 RX ADMIN — PREDNISONE 40 MG: 20 TABLET ORAL at 09:02

## 2023-02-28 RX ADMIN — IPRATROPIUM BROMIDE AND ALBUTEROL SULFATE 3 ML: 2.5; .5 SOLUTION RESPIRATORY (INHALATION) at 01:02

## 2023-02-28 RX ADMIN — FUROSEMIDE 40 MG: 10 INJECTION, SOLUTION INTRAMUSCULAR; INTRAVENOUS at 12:02

## 2023-02-28 RX ADMIN — HYDRALAZINE HYDROCHLORIDE 10 MG: 20 INJECTION INTRAMUSCULAR; INTRAVENOUS at 03:02

## 2023-02-28 RX ADMIN — FUROSEMIDE 40 MG: 10 INJECTION, SOLUTION INTRAMUSCULAR; INTRAVENOUS at 09:02

## 2023-02-28 RX ADMIN — METHYLPREDNISOLONE SODIUM SUCCINATE 125 MG: 125 INJECTION, POWDER, FOR SOLUTION INTRAMUSCULAR; INTRAVENOUS at 01:02

## 2023-02-28 RX ADMIN — HYDRALAZINE HYDROCHLORIDE 10 MG: 20 INJECTION INTRAMUSCULAR; INTRAVENOUS at 12:02

## 2023-02-28 RX ADMIN — ENOXAPARIN SODIUM 40 MG: 40 INJECTION SUBCUTANEOUS at 09:02

## 2023-02-28 RX ADMIN — METOPROLOL TARTRATE 5 MG: 1 INJECTION, SOLUTION INTRAVENOUS at 01:02

## 2023-02-28 NOTE — ED PROVIDER NOTES
Encounter Date: 2/28/2023       History     Chief Complaint   Patient presents with    Chest Pain    Shortness of Breath    Leg Swelling     BILATERAL     PT WITH SOB, CHEST PAIN AND LEG SWELLING. STATES HAS HX OF SARCOIDOSIS. ADMITTED TO [facility] THE END OF December X 1 WEEK FOR PNEUMONIA. HAS NOT FELT WELL SINCE THEN. REPORTS LEG SWELLING STARTED ONE WEEK AGO. NOW STATES SWELLING HAS COME UP TO HIS ABDOMEN.     The history is provided by the patient.   Review of patient's allergies indicates:   Allergen Reactions    Fish containing products Anaphylaxis    Shellfish containing products Anaphylaxis    Lisinopril      Past Medical History:   Diagnosis Date    Hypertension     Sarcoidosis     Sarcoidosis 2019     Past Surgical History:   Procedure Laterality Date    ANTERIOR CRUCIATE LIGAMENT REPAIR Left 09/01/2004    ANTERIOR CRUCIATE LIGAMENT REPAIR Left     FRACTURE SURGERY      RIGHT HEART CATHETERIZATION Right 3/1/2023    Procedure: INSERTION, CATHETER, RIGHT HEART;  Surgeon: Abdelrahman Adam MD;  Location: [facility] CATH LAB;  Service: Cardiology;  Laterality: Right;     History reviewed. No pertinent family history.  Social History     Tobacco Use    Smoking status: Never    Smokeless tobacco: Never   Substance Use Topics    Alcohol use: Yes     Alcohol/week: 2.0 standard drinks     Types: 2 Cans of beer per week    Drug use: Never     Review of Systems   Constitutional:  Positive for activity change.   Respiratory:  Positive for cough and shortness of breath.    Cardiovascular:  Positive for chest pain and leg swelling.   Musculoskeletal:         LEG SWELLING     Physical Exam     Initial Vitals [02/28/23 1155]   BP Pulse Resp Temp SpO2   (!) 187/144 (!) 111 (!) 24 97.8 °F (36.6 °C) 96 %      MAP       --         Physical Exam    Constitutional: He appears well-developed and well-nourished.   HENT:   Head: Normocephalic and atraumatic.   Eyes: EOM are normal. Pupils are equal, round, and reactive to  light.   Cardiovascular:  Normal rate and regular rhythm.           Pulmonary/Chest:   COARSE BREATH SOUNDS   Abdominal: Abdomen is soft.   Musculoskeletal:         General: Normal range of motion.      Comments: 1+ PITTING EDEMA     Neurological: He is alert and oriented to person, place, and time.   Skin: Skin is warm.   Psychiatric: He has a normal mood and affect. Thought content normal.       Medical Screening Exam   See Full Note    ED Course   Procedures  Labs Reviewed   COMPREHENSIVE METABOLIC PANEL - Abnormal; Notable for the following components:       Result Value    Sodium 135 (*)     Chloride 97 (*)     Glucose 117 (*)     Total Protein 8.3 (*)     Albumin 2.9 (*)     Globulin 5.4 (*)     Alk Phos 125 (*)     All other components within normal limits   CBC WITH DIFFERENTIAL - Abnormal; Notable for the following components:    WBC 3.91 (*)     RDW 14.7 (*)     Neutrophils % 52.9 (*)     Monocytes % 15.1 (*)     All other components within normal limits   NT-PRO NATRIURETIC PEPTIDE - Abnormal; Notable for the following components:    ProBNP 15,772 (*)     All other components within normal limits   MANUAL DIFFERENTIAL - Abnormal; Notable for the following components:    Monocytes, Man % 15 (*)     Eosinophils, Man % 5 (*)     All other components within normal limits   LACTIC ACID, PLASMA - Abnormal; Notable for the following components:    Lactic Acid 2.6 (*)     All other components within normal limits   TROPONIN I - Normal   TSH - Normal   SARS-COV2 (COVID) W/ FLU ANTIGEN - Normal    Narrative:     Negative SARS-CoV results should not be used as the sole basis for treatment or patient management decisions; negative results should be considered in the context of a patient's recent exposures, history and the presene of clinical signs and symptoms consistent with COVID-19.  Negative results should be treated as presumptive and confirmed by molecular assay, if necessary for patient management.   CBC W/  AUTO DIFFERENTIAL    Narrative:     The following orders were created for panel order CBC auto differential.  Procedure                               Abnormality         Status                     ---------                               -----------         ------                     CBC with Differential[[id number]]        Abnormal            Final result               Manual Differential[[id number]]          Abnormal            Final result                 Please view results for these tests on the individual orders.        ECG Results              EKG 12-lead (Final result)  Result time 03/01/23 15:40:35      Final result by Interface, Lab In [facility] (03/01/23 15:40:35)                   Narrative:    Test Reason : R07.9,    Vent. Rate : 117 BPM     Atrial Rate : 117 BPM     P-R Int : 128 ms          QRS Dur : 088 ms      QT Int : 336 ms       P-R-T Axes : 065 086 -18 degrees     QTc Int : 468 ms    Sinus tachycardia  Biatrial enlargement  ST and T wave abnormality, consider inferior ischemia  ST and T wave abnormality, consider anterior ischemia  Abnormal ECG  When compared with ECG of 05-MAR-2022 10:40,  Nonspecific T wave abnormality now evident in Lateral leads  Confirmed by Elvis MORAN, Carl AVILA ([id number]) on 3/1/2023 3:40:07 PM    Referred By: [email or web address]   SELF           Confirmed By:Carl Leal MD                                  Imaging Results              X-Ray Chest AP Portable (Final result)  Result time 02/28/23 12:16:44      Final result by Misha Strange DO (02/28/23 12:16:44)                   Impression:      As above.    Point of Service: [facility]      Electronically signed by: Misha Strange  Date:    02/28/2023  Time:    12:16               Narrative:    EXAMINATION:  XR CHEST AP PORTABLE    CLINICAL HISTORY:  SOB;    COMPARISON:  Chest x-ray March 5, 2022    TECHNIQUE:  Frontal view/views of the chest.    FINDINGS:  Heart partially obscured.  Chronic patchy opacification of  the bilateral lower lungs which may be related to patient's history of sarcoidosis.  Visualized osseous and surrounding soft tissue structures appear grossly unchanged.                                       Medications   hydrALAZINE injection 10 mg (10 mg Intravenous Given 2/28/23 1228)   furosemide injection 40 mg (40 mg Intravenous Given 2/28/23 1227)   albuterol-ipratropium 2.5 mg-0.5 mg/3 mL nebulizer solution 3 mL (3 mLs Nebulization Given 2/28/23 1335)   methylPREDNISolone sodium succinate injection 125 mg (125 mg Intravenous Given 2/28/23 1332)   metoprolol injection 5 mg (5 mg Intravenous Given 2/28/23 1355)   hydrALAZINE injection 10 mg (10 mg Intravenous Given 2/28/23 1557)     Medical Decision Making:   ED Management:  HAS BEEN OUT OF BP MEDS FOR A WHILE. REPORTS HAS NOTICED INCREASE LEG SWELLING NAD ABDOMINAL SWEELING.  LABS HERE INDICATE HEART FAILURE.   NO HX OF HF IN THE PAST.   TRANSFER TO [facility]                 Clinical Impression:   Final diagnoses:  [R07.9] Chest pain  [I50.9] Congestive heart failure, unspecified HF chronicity, unspecified heart failure type (Primary)  [D86.9] Sarcoidosis        ED Disposition Condition    Transfer to Another Facility Stable                Camille Stout MD  03/04/23 1958       Camille Stout MD  04/05/23 1025

## 2023-02-28 NOTE — Clinical Note
Report given to and site and distal pulse assessed juan Arrington. Dsg at [facility] D/I s hematoma. Distal pulse bounding. Informed pt he could get out of bed at 1500. Verbalized understanding. Denies questions.

## 2023-02-28 NOTE — ED TRIAGE NOTES
PT ARRIVED TO ER WITH C/O SOB, EDEMA TO BLE X 1 WEEK AND CP THAT STARTED THIS MORNING. PT STATES HE HAS BEEN OUT OF HIS MEDS FOR A LITTLE OVER A MONTH. BP /144 AND TACHY . PT PLACED IN EXAM 2. EKG CALLED.

## 2023-03-01 PROBLEM — R73.9 HYPERGLYCEMIA: Status: ACTIVE | Noted: 2023-03-01

## 2023-03-01 PROBLEM — D86.85 CARDIAC SARCOIDOSIS: Status: RESOLVED | Noted: 2023-02-28 | Resolved: 2023-03-01

## 2023-03-01 PROBLEM — R13.19 ESOPHAGEAL DYSPHAGIA: Status: ACTIVE | Noted: 2023-03-01

## 2023-03-01 PROBLEM — I27.20 PULMONARY HYPERTENSION: Status: ACTIVE | Noted: 2023-02-28

## 2023-03-01 PROBLEM — I10 PRIMARY HYPERTENSION: Status: RESOLVED | Noted: 2023-02-28 | Resolved: 2023-03-01

## 2023-03-01 PROBLEM — Z91.199 HX OF NONCOMPLIANCE WITH MEDICAL TREATMENT, PRESENTING HAZARDS TO HEALTH: Status: ACTIVE | Noted: 2023-03-01

## 2023-03-01 PROBLEM — F10.10 CHRONIC ALCOHOL ABUSE: Status: ACTIVE | Noted: 2023-03-01

## 2023-03-01 LAB
ALBUMIN SERPL BCP-MCNC: 2.7 G/DL (ref 3.5–5)
ALBUMIN/GLOB SERPL: 0.6 {RATIO}
ALP SERPL-CCNC: 127 U/L (ref 45–115)
ALT SERPL W P-5'-P-CCNC: 15 U/L (ref 16–61)
AMPHET UR QL SCN: NEGATIVE
ANION GAP SERPL CALCULATED.3IONS-SCNC: 14 MMOL/L (ref 7–16)
AORTIC ROOT ANNULUS: 2.8 CM
AORTIC VALVE CUSP SEPERATION: 2.01 CM
AST SERPL W P-5'-P-CCNC: 18 U/L (ref 15–37)
AV INDEX (PROSTH): 0.67
AV MEAN GRADIENT: 1 MMHG
AV PEAK GRADIENT: 1 MMHG
AV VALVE AREA: 2.53 CM2
AV VELOCITY RATIO: 0.83
BARBITURATES UR QL SCN: NEGATIVE
BENZODIAZ METAB UR QL SCN: POSITIVE
BILIRUB SERPL-MCNC: 0.6 MG/DL (ref ?–1.2)
BSA FOR ECHO PROCEDURE: 1.9 M2
BUN SERPL-MCNC: 15 MG/DL (ref 7–18)
BUN/CREAT SERPL: 12 (ref 6–20)
CALCIUM SERPL-MCNC: 8.6 MG/DL (ref 8.5–10.1)
CANNABINOIDS UR QL SCN: NEGATIVE
CHLORIDE SERPL-SCNC: 100 MMOL/L (ref 98–107)
CO2 SERPL-SCNC: 29 MMOL/L (ref 21–32)
COCAINE UR QL SCN: NEGATIVE
CREAT SERPL-MCNC: 1.29 MG/DL (ref 0.7–1.3)
CV ECHO LV RWT: 0.51 CM
DOP CALC AO PEAK VEL: 0.6 M/S
DOP CALC AO VTI: 9 CM
DOP CALC LVOT AREA: 3.8 CM2
DOP CALC LVOT DIAMETER: 2.2 CM
DOP CALC LVOT PEAK VEL: 0.5 M/S
DOP CALC LVOT STROKE VOLUME: 22.8 CM3
DOP CALCLVOT PEAK VEL VTI: 6 CM
E WAVE DECELERATION TIME: 188 MSEC
ECHO EF ESTIMATED: 65 %
ECHO LV POSTERIOR WALL: 1.05 CM (ref 0.6–1.1)
EGFR (NO RACE VARIABLE) (RUSH/TITUS): 75 ML/MIN/1.73M²
EJECTION FRACTION: 50 %
FRACTIONAL SHORTENING: 28 % (ref 28–44)
GLOBULIN SER-MCNC: 4.9 G/DL (ref 2–4)
GLUCOSE SERPL-MCNC: 152 MG/DL (ref 70–105)
GLUCOSE SERPL-MCNC: 164 MG/DL (ref 74–106)
INTERVENTRICULAR SEPTUM: 0.99 CM (ref 0.6–1.1)
IVC OSTIUM: 1.4 CM
LEFT ATRIUM SIZE: 2.7 CM
LEFT INTERNAL DIMENSION IN SYSTOLE: 2.99 CM (ref 2.1–4)
LEFT VENTRICLE DIASTOLIC VOLUME INDEX: 39.33 ML/M2
LEFT VENTRICLE DIASTOLIC VOLUME: 75.9 ML
LEFT VENTRICLE MASS INDEX: 71 G/M2
LEFT VENTRICLE SYSTOLIC VOLUME INDEX: 18 ML/M2
LEFT VENTRICLE SYSTOLIC VOLUME: 34.7 ML
LEFT VENTRICULAR INTERNAL DIMENSION IN DIASTOLE: 4.14 CM (ref 3.5–6)
LEFT VENTRICULAR MASS: 137.94 G
LVOT MG: 1 MMHG
MV PEAK E VEL: 0.31 M/S
OPIATES UR QL SCN: NEGATIVE
PCP UR QL SCN: NEGATIVE
PISA TR MAX VEL: 4 M/S
POTASSIUM SERPL-SCNC: 3.8 MMOL/L (ref 3.5–5.1)
PROT SERPL-MCNC: 7.6 G/DL (ref 6.4–8.2)
RA MAJOR: 7 CM
RA PRESSURE: 3 MMHG
RIGHT VENTRICULAR END-DIASTOLIC DIMENSION: 5.2 CM
SODIUM SERPL-SCNC: 139 MMOL/L (ref 136–145)
TR MAX PG: 64 MMHG
TRICUSPID ANNULAR PLANE SYSTOLIC EXCURSION: 2.2 CM
TROPONIN I SERPL HS-MCNC: 8.9 PG/ML
TV REST PULMONARY ARTERY PRESSURE: 67 MMHG

## 2023-03-01 PROCEDURE — 80053 COMPREHEN METABOLIC PANEL: CPT | Performed by: INTERNAL MEDICINE

## 2023-03-01 PROCEDURE — 93451 RIGHT HEART CATH: CPT | Mod: 26,,, | Performed by: INTERNAL MEDICINE

## 2023-03-01 PROCEDURE — 99153 MOD SED SAME PHYS/QHP EA: CPT | Performed by: INTERNAL MEDICINE

## 2023-03-01 PROCEDURE — 80307 DRUG TEST PRSMV CHEM ANLYZR: CPT | Performed by: HOSPITALIST

## 2023-03-01 PROCEDURE — 82962 GLUCOSE BLOOD TEST: CPT

## 2023-03-01 PROCEDURE — C1751 CATH, INF, PER/CENT/MIDLINE: HCPCS | Performed by: INTERNAL MEDICINE

## 2023-03-01 PROCEDURE — 99233 PR SUBSEQUENT HOSPITAL CARE,LEVL III: ICD-10-PCS | Mod: ,,, | Performed by: HOSPITALIST

## 2023-03-01 PROCEDURE — 99152 MOD SED SAME PHYS/QHP 5/>YRS: CPT | Performed by: INTERNAL MEDICINE

## 2023-03-01 PROCEDURE — 99223 1ST HOSP IP/OBS HIGH 75: CPT | Mod: ,,, | Performed by: STUDENT IN AN ORGANIZED HEALTH CARE EDUCATION/TRAINING PROGRAM

## 2023-03-01 PROCEDURE — C1769 GUIDE WIRE: HCPCS | Performed by: INTERNAL MEDICINE

## 2023-03-01 PROCEDURE — 11000001 HC ACUTE MED/SURG PRIVATE ROOM

## 2023-03-01 PROCEDURE — 99152 PR MOD CONSCIOUS SEDATION, SAME PHYS, 5+ YRS, FIRST 15 MIN: ICD-10-PCS | Mod: ,,, | Performed by: INTERNAL MEDICINE

## 2023-03-01 PROCEDURE — 25000003 PHARM REV CODE 250: Performed by: INTERNAL MEDICINE

## 2023-03-01 PROCEDURE — 99152 MOD SED SAME PHYS/QHP 5/>YRS: CPT | Mod: ,,, | Performed by: INTERNAL MEDICINE

## 2023-03-01 PROCEDURE — 84484 ASSAY OF TROPONIN QUANT: CPT | Performed by: NURSE PRACTITIONER

## 2023-03-01 PROCEDURE — 99233 SBSQ HOSP IP/OBS HIGH 50: CPT | Mod: ,,, | Performed by: HOSPITALIST

## 2023-03-01 PROCEDURE — 85025 COMPLETE CBC W/AUTO DIFF WBC: CPT | Performed by: INTERNAL MEDICINE

## 2023-03-01 PROCEDURE — 99223 PR INITIAL HOSPITAL CARE,LEVL III: ICD-10-PCS | Mod: ,,, | Performed by: STUDENT IN AN ORGANIZED HEALTH CARE EDUCATION/TRAINING PROGRAM

## 2023-03-01 PROCEDURE — 25500020 PHARM REV CODE 255: Performed by: HOSPITALIST

## 2023-03-01 PROCEDURE — 93451 PR RIGHT HEART CATH O2 SATURATION & CARDIAC OUTPUT: ICD-10-PCS | Mod: 26,,, | Performed by: INTERNAL MEDICINE

## 2023-03-01 PROCEDURE — C1894 INTRO/SHEATH, NON-LASER: HCPCS | Performed by: INTERNAL MEDICINE

## 2023-03-01 PROCEDURE — 25000003 PHARM REV CODE 250: Performed by: HOSPITALIST

## 2023-03-01 PROCEDURE — 94761 N-INVAS EAR/PLS OXIMETRY MLT: CPT

## 2023-03-01 PROCEDURE — 93451 RIGHT HEART CATH: CPT | Performed by: INTERNAL MEDICINE

## 2023-03-01 PROCEDURE — 63600175 PHARM REV CODE 636 W HCPCS: Performed by: INTERNAL MEDICINE

## 2023-03-01 RX ORDER — AMLODIPINE BESYLATE 5 MG/1
5 TABLET ORAL DAILY
Status: DISCONTINUED | OUTPATIENT
Start: 2023-03-02 | End: 2023-03-01

## 2023-03-01 RX ORDER — MUPIROCIN 20 MG/G
OINTMENT TOPICAL 2 TIMES DAILY
Status: DISCONTINUED | OUTPATIENT
Start: 2023-03-01 | End: 2023-03-03 | Stop reason: HOSPADM

## 2023-03-01 RX ORDER — CLONIDINE HYDROCHLORIDE 0.1 MG/1
0.1 TABLET ORAL EVERY 4 HOURS PRN
Status: DISCONTINUED | OUTPATIENT
Start: 2023-03-01 | End: 2023-03-03 | Stop reason: HOSPADM

## 2023-03-01 RX ORDER — DIAZEPAM 5 MG/1
TABLET ORAL
Status: DISCONTINUED | OUTPATIENT
Start: 2023-03-01 | End: 2023-03-01 | Stop reason: HOSPADM

## 2023-03-01 RX ORDER — FUROSEMIDE 40 MG/1
40 TABLET ORAL DAILY
Status: DISCONTINUED | OUTPATIENT
Start: 2023-03-02 | End: 2023-03-03 | Stop reason: HOSPADM

## 2023-03-01 RX ORDER — GUAIFENESIN 600 MG/1
600 TABLET, EXTENDED RELEASE ORAL 2 TIMES DAILY
Status: DISCONTINUED | OUTPATIENT
Start: 2023-03-01 | End: 2023-03-03 | Stop reason: HOSPADM

## 2023-03-01 RX ORDER — HEPARIN SOD,PORCINE/0.9 % NACL 1000/500ML
INTRAVENOUS SOLUTION INTRAVENOUS
Status: DISCONTINUED | OUTPATIENT
Start: 2023-03-01 | End: 2023-03-01 | Stop reason: HOSPADM

## 2023-03-01 RX ORDER — GLUCAGON 1 MG
1 KIT INJECTION
Status: DISCONTINUED | OUTPATIENT
Start: 2023-03-01 | End: 2023-03-03 | Stop reason: HOSPADM

## 2023-03-01 RX ORDER — DIPHENHYDRAMINE HCL 25 MG
CAPSULE ORAL
Status: DISCONTINUED | OUTPATIENT
Start: 2023-03-01 | End: 2023-03-01 | Stop reason: HOSPADM

## 2023-03-01 RX ORDER — DEXTROSE 40 %
15 GEL (GRAM) ORAL
Status: DISCONTINUED | OUTPATIENT
Start: 2023-03-01 | End: 2023-03-03 | Stop reason: HOSPADM

## 2023-03-01 RX ORDER — SODIUM CHLORIDE 450 MG/100ML
INJECTION, SOLUTION INTRAVENOUS CONTINUOUS
Status: DISCONTINUED | OUTPATIENT
Start: 2023-03-01 | End: 2023-03-01

## 2023-03-01 RX ORDER — MIDAZOLAM HYDROCHLORIDE 1 MG/ML
INJECTION INTRAMUSCULAR; INTRAVENOUS
Status: DISCONTINUED | OUTPATIENT
Start: 2023-03-01 | End: 2023-03-01 | Stop reason: HOSPADM

## 2023-03-01 RX ORDER — DEXTROSE 40 %
30 GEL (GRAM) ORAL
Status: DISCONTINUED | OUTPATIENT
Start: 2023-03-01 | End: 2023-03-03 | Stop reason: HOSPADM

## 2023-03-01 RX ORDER — NIFEDIPINE 30 MG/1
60 TABLET, EXTENDED RELEASE ORAL DAILY
Status: DISCONTINUED | OUTPATIENT
Start: 2023-03-02 | End: 2023-03-03 | Stop reason: HOSPADM

## 2023-03-01 RX ORDER — HYDRALAZINE HYDROCHLORIDE 25 MG/1
25 TABLET, FILM COATED ORAL EVERY 8 HOURS
Status: DISCONTINUED | OUTPATIENT
Start: 2023-03-01 | End: 2023-03-03 | Stop reason: HOSPADM

## 2023-03-01 RX ORDER — FENTANYL CITRATE 50 UG/ML
INJECTION, SOLUTION INTRAMUSCULAR; INTRAVENOUS
Status: DISCONTINUED | OUTPATIENT
Start: 2023-03-01 | End: 2023-03-01 | Stop reason: HOSPADM

## 2023-03-01 RX ORDER — INSULIN ASPART 100 [IU]/ML
0-5 INJECTION, SOLUTION INTRAVENOUS; SUBCUTANEOUS
Status: DISCONTINUED | OUTPATIENT
Start: 2023-03-01 | End: 2023-03-03 | Stop reason: HOSPADM

## 2023-03-01 RX ORDER — LIDOCAINE HYDROCHLORIDE 10 MG/ML
INJECTION INFILTRATION; PERINEURAL
Status: DISCONTINUED | OUTPATIENT
Start: 2023-03-01 | End: 2023-03-01 | Stop reason: HOSPADM

## 2023-03-01 RX ADMIN — ENOXAPARIN SODIUM 40 MG: 40 INJECTION SUBCUTANEOUS at 05:03

## 2023-03-01 RX ADMIN — HYDRALAZINE HYDROCHLORIDE 25 MG: 25 TABLET ORAL at 10:03

## 2023-03-01 RX ADMIN — PREDNISONE 40 MG: 20 TABLET ORAL at 08:03

## 2023-03-01 RX ADMIN — MUPIROCIN: 20 OINTMENT TOPICAL at 09:03

## 2023-03-01 RX ADMIN — AMLODIPINE BESYLATE 10 MG: 10 TABLET ORAL at 08:03

## 2023-03-01 RX ADMIN — CLONIDINE HYDROCHLORIDE 0.1 MG: 0.1 TABLET ORAL at 06:03

## 2023-03-01 RX ADMIN — IOPAMIDOL 100 ML: 755 INJECTION, SOLUTION INTRAVENOUS at 01:03

## 2023-03-01 RX ADMIN — FUROSEMIDE 40 MG: 10 INJECTION, SOLUTION INTRAMUSCULAR; INTRAVENOUS at 08:03

## 2023-03-01 RX ADMIN — GUAIFENESIN 600 MG: 600 TABLET, EXTENDED RELEASE ORAL at 10:03

## 2023-03-01 RX ADMIN — MUPIROCIN: 20 OINTMENT TOPICAL at 10:03

## 2023-03-01 RX ADMIN — GUAIFENESIN 600 MG: 600 TABLET, EXTENDED RELEASE ORAL at 12:03

## 2023-03-01 RX ADMIN — VALSARTAN 320 MG: 80 TABLET, FILM COATED ORAL at 08:03

## 2023-03-01 NOTE — PROGRESS NOTES
[facility] Hospital Medicine  Progress Note    Patient Name: Bo Ku  MRN: [id number]  Patient Class: IP- Inpatient   Admission Date: 2/28/2023  Length of Stay: 1 days  Attending Physician: Cecilio Cooper MD  Primary Care Provider: JEREMIAS Stone        Subjective:     Principal Problem:Pulmonary hypertension        HPI:  Patient is a 32-year-old male with a history of pulmonary sarcoidosis and essential hypertension who initially presented to outside ED with shortness breath, increased abdominal girth and bilateral lower extremity edema for the past 3 days.  Associated symptoms include dry cough and 3 pillow orthopnea but patient otherwise denied any fever chills hemoptysis chest pain or palpitation.  Patient stated that from time to time he experienced dyspnea but he never experienced swelling of abdomen and bilateral lower extremity previously from initial diagnosis of pulmonary sarcoidosis in 2019.  Other than involvement of lungs, patient is not aware of any other organs involvement associated with sarcoidosis.     On initial presentation vital signs were stable and patient was afebrile and did not have any supplemental oxygen requirement.  Workup was notable for elevation of proBNP and x-ray demonstrating chronic appearing bibasal infiltrates suggestive of pulmonary sarcoidosis but otherwise unremarkable.  Patient was given a dose of IV Lasix with significant improvement in symptomatology and was subsequently transferred to this facility for higher level of care.      Overview/Hospital Course:  03/01 - records reviewed.  Patient states several weeks off medication for financial reasons.  Previously medications included prednisone 20 mg daily.  Stopped all this abruptly.  Recently lower abdominal discomfort, peripheral edema and shortness of breath.  No orthopnea.  No chest pain.  Has had some constipation.  Complains of some dysphagia to solids.  Previously  followed for his sarcoidosis at [facility] although currently lives in [place], [place].  Will look into medication needs.  Have GI see related to dysphagia to solids.  Needs follow-up arranged outpatient.  Patient states he is just signed up to try to get Medicaid so that should help with his finances related to medical needs.  Drinks a case of beer every other week in.  Told him this would be good to stop any use money for more important things to him  Seen by cardiology earlier look related to abnormal echocardiogram with severe right-sided changes and pulmonary hypertension.  Taken for right heart catheterization with confirming these findings.  Chest x-ray is abnormal but looks very unchanged from previous studies last couple years.  Positive cocaine in 2021 and check UDS.      Interval History:     Review of Systems   Constitutional:  Negative for appetite change, fatigue and fever.   HENT:  Negative for congestion, hearing loss and trouble swallowing.    Respiratory:  Positive for shortness of breath. Negative for chest tightness and wheezing.    Cardiovascular:  Positive for leg swelling. Negative for chest pain and palpitations.   Gastrointestinal:  Positive for abdominal pain and constipation. Negative for nausea.   Genitourinary:  Negative for difficulty urinating and dysuria.   Musculoskeletal:  Negative for back pain and neck stiffness.   Skin:  Negative for pallor and rash.   Neurological:  Negative for dizziness, speech difficulty and headaches.   Psychiatric/Behavioral:  Negative for confusion and suicidal ideas.    Objective:     Vital Signs (Most Recent):  Temp: 97 °F (36.1 °C) (03/01/23 1610)  Pulse: 97 (03/01/23 1610)  Resp: 20 (03/01/23 1610)  BP: (!) 145/102 (03/01/23 1610)  SpO2: 96 % (03/01/23 1610)   Vital Signs (24h Range):  Temp:  [97 °F (36.1 °C)-98.6 °F (37 °C)] 97 °F (36.1 °C)  Pulse:  [] 97  Resp:  [18-22] 20  SpO2:  [93 %-97 %] 96 %  BP: (133-160)/() 145/102     Weight: 70.3  kg (155 lb)  Body mass index is 20.45 kg/m².    Intake/Output Summary (Last 24 hours) at 3/1/2023 1634  Last data filed at 2/28/2023 2222  Gross per 24 hour   Intake 472 ml   Output --   Net 472 ml      Physical Exam  Vitals reviewed.   Constitutional:       General: He is awake. He is not in acute distress.     Appearance: He is well-developed. He is not toxic-appearing.   HENT:      Head: Normocephalic.      Comments: Moon face consistent with past steroid use     Nose: Nose normal.      Mouth/Throat:      Pharynx: Oropharynx is clear.   Eyes:      Extraocular Movements: Extraocular movements intact.      Pupils: Pupils are equal, round, and reactive to light.   Neck:      Thyroid: No thyroid mass.      Vascular: No carotid bruit.   Cardiovascular:      Rate and Rhythm: Normal rate and regular rhythm.      Pulses: Normal pulses.      Heart sounds: Normal heart sounds. No murmur heard.  Pulmonary:      Effort: Pulmonary effort is normal.      Breath sounds: Normal breath sounds and air entry. No wheezing.   Abdominal:      General: Bowel sounds are normal. There is no distension.      Palpations: Abdomen is soft.      Tenderness: There is no abdominal tenderness.   Musculoskeletal:         General: Normal range of motion.      Cervical back: Neck supple. No rigidity.   Skin:     General: Skin is warm.      Coloration: Skin is not jaundiced.      Findings: No lesion.   Neurological:      General: No focal deficit present.      Mental Status: He is alert and oriented to person, place, and time.      Cranial Nerves: No cranial nerve deficit.   Psychiatric:         Attention and Perception: Attention normal.         Mood and Affect: Mood normal.         Behavior: Behavior normal. Behavior is cooperative.         Thought Content: Thought content normal.         Cognition and Memory: Cognition normal.       Significant Labs: All pertinent labs within the past 24 hours have been reviewed.  BMP:   Recent Labs   Lab  03/01/23  0502   *      K 3.8      CO2 29   BUN 15   CREATININE 1.29   CALCIUM 8.6     CBC:   Recent Labs   Lab 02/28/23  1157   WBC 3.91*   HGB 14.7   HCT 43.3        CMP:   Recent Labs   Lab 02/28/23  1157 03/01/23  0502   * 139   K 4.3 3.8   CL 97* 100   CO2 30 29   * 164*   BUN 10 15   CREATININE 1.10 1.29   CALCIUM 8.9 8.6   PROT 8.3* 7.6   ALBUMIN 2.9* 2.7*   BILITOT 1.2 0.6   ALKPHOS 125* 127*   AST 30 18   ALT 16 15*   ANIONGAP 12 14       Significant Imaging: I have reviewed all pertinent imaging results/findings within the past 24 hours.        Microbiology Results (last 7 days)       ** No results found for the last 168 hours. **          Intake/Output - Last 3 Shifts         02/27 0700  02/28 0659 02/28 0700 03/01 0659 03/01 0700  03/02 0659    P.O.  472     Total Intake(mL/kg)  472 (6.7)     Net  +472                          Assessment/Plan:      * Pulmonary hypertension    Patient likely is experiencing acute decompensation of pulmonary artery hypertension ( Group V ) stemming from pulmonary sarcoidosis.  Chest x-ray demonstrated chronic appearing bibasal infiltrates without any other acute appearing abnormalities.  Will diurese patient with IV Lasix    03/01 normal wedge pressure with right heart catheterization.  Will stop Lasix IV and also stop IV fluids.  Will leave oral Lasix to help with blood pressure  May need Norvasc but might make edema worse, at least will changed to Procardia XL    Esophageal dysphagia    Consult GI related to dysphagia to solid    Chronic alcohol abuse    Patient drinks a case of beer most weak in  Encouraged him to stop drinking, states cause toxic affects to his heart and liver among other problems  Told could use money for things he needs that would be more port to his health    Hx of noncompliance with medical treatment, presenting hazards to health        Hyperglycemia    Check hemoglobin A1c and follow-up blood sugar  Blood  sugar likely worsen with steroids    Cor pulmonale    Patient has signs and symptoms of cor pulmonale.  Will again diurese patient.      Essential hypertension  Patient has been off his medication several months  Blood pressure up and medicines and been restarted        Sarcoidosis    Patient was initially diagnosed with pulmonary sarcoidosis in 2019 and has been taking prednisone 20 mg daily.  Feel that there was a component of acute exacerbation and thus patient will be started on prednisone 40 mg daily    03/01 patient had stopped his prednisone abruptly a few months previously and had not started back      VTE Risk Mitigation (From admission, onward)         Ordered     enoxaparin injection 40 mg  Daily         02/28/23 2035     IP VTE HIGH RISK PATIENT  Once         02/28/23 2035     Place sequential compression device  Until discontinued         02/28/23 2035                Discharge Planning   FELIPE:      Code Status: Full Code   Is the patient medically ready for discharge?:     Reason for patient still in hospital (select all that apply): Patient trending condition, Laboratory test, Treatment, Imaging and Consult recommendations                     Cecilio Cooper MD  Department of Hospital Medicine   [facility]

## 2023-03-01 NOTE — ASSESSMENT & PLAN NOTE
Patient drinks a case of beer most weak in  Encouraged him to stop drinking, states cause toxic affects to his heart and liver among other problems  Told could use money for things he needs that would be more port to his health

## 2023-03-01 NOTE — HOSPITAL COURSE
03/01 - records reviewed.  Patient states several weeks off medication for financial reasons.  Previously medications included prednisone 20 mg daily.  Stopped all this abruptly.  Recently lower abdominal discomfort, peripheral edema and shortness of breath.  No orthopnea.  No chest pain.  Has had some constipation.  Complains of some dysphagia to solids.  Previously followed for his sarcoidosis at [facility] although currently lives in [place], [place].  Will look into medication needs.  Have GI see related to dysphagia to solids.  Needs follow-up arranged outpatient.  Patient states he is just signed up to try to get Medicaid so that should help with his finances related to medical needs.  Drinks a case of beer every other week in.  Told him this would be good to stop any use money for more important things to him  Seen by cardiology earlier look related to abnormal echocardiogram with severe right-sided changes and pulmonary hypertension.  Taken for right heart catheterization with confirming these findings.  Chest x-ray is abnormal but looks very unchanged from previous studies last couple years.  Positive cocaine in 2021 and check UDS.    03/02 Some better. No other issues. [address]. Talked with him about importance compliance. Noted very low Vit D and low Mg and will correct.    03/03--Pt with no new issues or concerns, is s/p EGD with dilatation.  No other symptoms  o2 sats 96-97% on room air.  Pt to follow up with cardiology in 3 weeks, establish care/follow up with pcp in one week with BMP.  Will need referral to [facility] for Sarcoidosis & Pulm Htn, needs sleep study, pt has medicaid application pending.  Pt rec'd counseling on ETOH use and adherence with treatment plans.  Pt states would like to be discharged home today and is stable for discharge.     Patient counseled on the importance of keeping all hospital follow up appointments and compliance with medications, therapies, or devices as prescribed in order  to provide for the best health outcomes.   Additionally, patient given written literature regarding their current disease processes and home care recommendations.   Patient given opportunity to ask questions and verbalized understanding of all information discussed.

## 2023-03-01 NOTE — HPI
33 y/o male with PMH of pulmonary sarcoidosis and essential hypertension who initially presented to outside ED with shortness breath, increased abdominal girth and bilateral lower extremity edema for the past 3 days.  Associated symptoms include dry cough and 3 pillow orthopnea but patient otherwise denied any fever chills hemoptysis chest pain or palpitation.  Patient stated that from time to time he experienced dyspnea but he never experienced swelling of abdomen and bilateral lower extremity previously from initial diagnosis of pulmonary sarcoidosis in 2019.  Other than involvement of lungs, patient is not aware of any other organs involvement associated with sarcoidosis.      On initial presentation vital signs were stable and patient was afebrile and did not have any supplemental oxygen requirement.  Workup was notable for elevation of proBNP and x-ray demonstrating chronic appearing bibasal infiltrates suggestive of pulmonary sarcoidosis but otherwise unremarkable.  Patient was given a dose of IV Lasix with significant improvement in symptomatology and was subsequently transferred to this facility for higher level of care.     Cardiology consulted for cardiac sarcoidosis.

## 2023-03-01 NOTE — H&P
[facility] Hospital Medicine  History & Physical    Patient Name: Bo Ku  MRN: [id number]  Patient Class: IP- Inpatient  Admission Date: 2/28/2023  Attending Physician: ADRIANNA Cooper MD  Primary Care Provider: JEREMIAS Stone         Patient information was obtained from patient and ER records.     Subjective:     Principal Problem:Pulmonary artery hypertension    Chief Complaint:  Short as of breath with dry cough       HPI: Patient is a 32-year-old male with a history of pulmonary sarcoidosis and essential hypertension who initially presented to outside ED with shortness breath, increased abdominal girth and bilateral lower extremity edema for the past 3 days.  Associated symptoms include dry cough and 3 pillow orthopnea but patient otherwise denied any fever chills hemoptysis chest pain or palpitation.  Patient stated that from time to time he experienced dyspnea but he never experienced swelling of abdomen and bilateral lower extremity previously from initial diagnosis of pulmonary sarcoidosis in 2019.  Other than involvement of lungs, patient is not aware of any other organs involvement associated with sarcoidosis.     On initial presentation vital signs were stable and patient was afebrile and did not have any supplemental oxygen requirement.  Workup was notable for elevation of proBNP and x-ray demonstrating chronic appearing bibasal infiltrates suggestive of pulmonary sarcoidosis but otherwise unremarkable.  Patient was given a dose of IV Lasix with significant improvement in symptomatology and was subsequently transferred to this facility for higher level of care.      Past Medical History:   Diagnosis Date    Hypertension     Sarcoidosis     Sarcoidosis 2019       Past Surgical History:   Procedure Laterality Date    ANTERIOR CRUCIATE LIGAMENT REPAIR Left 09/01/2004    ANTERIOR CRUCIATE LIGAMENT REPAIR Left     FRACTURE SURGERY         Review of  patient's allergies indicates:   Allergen Reactions    Fish containing products Anaphylaxis    Shellfish containing products Anaphylaxis    Lisinopril        Current Facility-Administered Medications on File Prior to Encounter   Medication    [COMPLETED] albuterol-ipratropium 2.5 mg-0.5 mg/3 mL nebulizer solution 3 mL    [COMPLETED] furosemide injection 40 mg    [COMPLETED] hydrALAZINE injection 10 mg    [COMPLETED] hydrALAZINE injection 10 mg    [COMPLETED] methylPREDNISolone sodium succinate injection 125 mg    [COMPLETED] metoprolol injection 5 mg     Current Outpatient Medications on File Prior to Encounter   Medication Sig    amLODIPine (NORVASC) 10 MG tablet Take 1 tablet (10 mg total) by mouth once daily.    hydroCHLOROthiazide (MICROZIDE) 12.5 mg capsule Take 1 capsule (12.5 mg total) by mouth once daily.    olmesartan (BENICAR) 40 MG tablet Take 40 mg by mouth once daily.     Family History    None       Tobacco Use    Smoking status: Never    Smokeless tobacco: Never   Substance and Sexual Activity    Alcohol use: Yes     Alcohol/week: 2.0 standard drinks     Types: 2 Cans of beer per week    Drug use: Never    Sexual activity: Yes     Partners: Female     Review of Systems   Constitutional:  Negative for chills, diaphoresis, fatigue and fever.   HENT:  Negative for congestion, hearing loss, nosebleeds, postnasal drip, sore throat, tinnitus and trouble swallowing.    Eyes:  Negative for photophobia, pain, discharge, itching and visual disturbance.   Respiratory:  Positive for cough and shortness of breath. Negative for wheezing and stridor.    Cardiovascular:  Positive for leg swelling. Negative for chest pain and palpitations.   Gastrointestinal:  Negative for abdominal distention, abdominal pain, anal bleeding, blood in stool, constipation, diarrhea, nausea and vomiting.   Endocrine: Negative for cold intolerance, heat intolerance, polydipsia, polyphagia and polyuria.   Genitourinary:   Negative for decreased urine volume, difficulty urinating, dysuria, flank pain, frequency, hematuria and urgency.   Musculoskeletal:  Negative for arthralgias, back pain, gait problem, joint swelling, myalgias, neck pain and neck stiffness.   Skin:  Negative for color change, pallor and rash.   Allergic/Immunologic: Negative for immunocompromised state.   Neurological:  Negative for dizziness, tremors, seizures, syncope, facial asymmetry, speech difficulty, weakness, light-headedness, numbness and headaches.   Hematological:  Negative for adenopathy. Does not bruise/bleed easily.   Objective:     Vital Signs (Most Recent):  Temp: 98.6 °F (37 °C) (02/28/23 1809)  Pulse: 105 (02/28/23 1809)  Resp: (!) 22 (02/28/23 1809)  BP: (!) 160/90 (02/28/23 1835)  SpO2: 96 % (02/28/23 1809)   Vital Signs (24h Range):  Temp:  [97.8 °F (36.6 °C)-98.6 °F (37 °C)] 98.6 °F (37 °C)  Pulse:  [103-131] 105  Resp:  [22-27] 22  SpO2:  [96 %-98 %] 96 %  BP: (139-187)/() 160/90     Weight: 70.3 kg (155 lb)  Body mass index is 20.45 kg/m².    Physical Exam  Vitals reviewed.   Constitutional:       General: He is not in acute distress.     Appearance: Normal appearance.   HENT:      Head: Normocephalic and atraumatic.      Right Ear: External ear normal.      Left Ear: External ear normal.   Eyes:      Extraocular Movements: Extraocular movements intact.      Pupils: Pupils are equal, round, and reactive to light.   Cardiovascular:      Rate and Rhythm: Normal rate and regular rhythm.      Pulses: Normal pulses.      Heart sounds: Normal heart sounds. No murmur heard.  Pulmonary:      Effort: Pulmonary effort is normal. No respiratory distress.      Breath sounds: Normal breath sounds. No wheezing.   Chest:      Chest wall: No tenderness.   Abdominal:      General: Abdomen is flat.      Palpations: Abdomen is soft. There is no mass.      Tenderness: There is no abdominal tenderness. There is no right CVA tenderness or left CVA  tenderness.   Musculoskeletal:         General: No swelling or tenderness. Normal range of motion.   Skin:     General: Skin is warm and dry.      Capillary Refill: Capillary refill takes less than 2 seconds.   Neurological:      General: No focal deficit present.      Mental Status: He is alert and oriented to person, place, and time. Mental status is at baseline.   Psychiatric:         Mood and Affect: Mood normal.         Thought Content: Thought content normal.     Cranial nerves 2-12 were grossly intact     Significant Labs: All pertinent labs within the past 24 hours have been reviewed.    Significant Imaging: I have reviewed all pertinent imaging results/findings within the past 24 hours.    Assessment/Plan:     * Pulmonary artery hypertension    Patient likely is experiencing acute decompensation of pulmonary artery hypertension ( Group V ) stemming from pulmonary sarcoidosis.  Chest x-ray demonstrated chronic appearing bibasal infiltrates without any other acute appearing abnormalities.  Will diurese patient with IV Lasix      Cor pulmonale    Patient has signs and symptoms of cor pulmonale.  Will again diurese patient.      Sarcoidosis    Patient was initially diagnosed with pulmonary sarcoidosis in 2019 and has been taking prednisone 20 mg daily.  Feel that there was a component of acute exacerbation and thus patient will be started on prednisone 40 mg daily      Cardiac sarcoidosis    Patient's proBNP was 15,772 and EKG showed sinus tachycardia with minimally prolonged QTC and nonspecific ST depression in the inferior leads.  Possibility of cardiac sarcoidosis exists and I feel that there is a need for CMR.  Will start with echocardiogram and consult Cardiology in a.m.    Primary hypertension    Continue present management        VTE Risk Mitigation (From admission, onward)    Lovenox 40 mg SQ daily             Alvarado Eugene MD  Department of Hospital Medicine   [facility] - [facility]  Telemetry

## 2023-03-01 NOTE — SUBJECTIVE & OBJECTIVE
Interval History:     Review of Systems   Constitutional:  Negative for appetite change, fatigue and fever.   HENT:  Negative for congestion, hearing loss and trouble swallowing.    Respiratory:  Positive for shortness of breath. Negative for chest tightness and wheezing.    Cardiovascular:  Positive for leg swelling. Negative for chest pain and palpitations.   Gastrointestinal:  Positive for abdominal pain and constipation. Negative for nausea.   Genitourinary:  Negative for difficulty urinating and dysuria.   Musculoskeletal:  Negative for back pain and neck stiffness.   Skin:  Negative for pallor and rash.   Neurological:  Negative for dizziness, speech difficulty and headaches.   Psychiatric/Behavioral:  Negative for confusion and suicidal ideas.    Objective:     Vital Signs (Most Recent):  Temp: 97 °F (36.1 °C) (03/01/23 1610)  Pulse: 97 (03/01/23 1610)  Resp: 20 (03/01/23 1610)  BP: (!) 145/102 (03/01/23 1610)  SpO2: 96 % (03/01/23 1610)   Vital Signs (24h Range):  Temp:  [97 °F (36.1 °C)-98.6 °F (37 °C)] 97 °F (36.1 °C)  Pulse:  [] 97  Resp:  [18-22] 20  SpO2:  [93 %-97 %] 96 %  BP: (133-160)/() 145/102     Weight: 70.3 kg (155 lb)  Body mass index is 20.45 kg/m².    Intake/Output Summary (Last 24 hours) at 3/1/2023 1634  Last data filed at 2/28/2023 2222  Gross per 24 hour   Intake 472 ml   Output --   Net 472 ml      Physical Exam  Vitals reviewed.   Constitutional:       General: He is awake. He is not in acute distress.     Appearance: He is well-developed. He is not toxic-appearing.   HENT:      Head: Normocephalic.      Comments: Moon face consistent with past steroid use     Nose: Nose normal.      Mouth/Throat:      Pharynx: Oropharynx is clear.   Eyes:      Extraocular Movements: Extraocular movements intact.      Pupils: Pupils are equal, round, and reactive to light.   Neck:      Thyroid: No thyroid mass.      Vascular: No carotid bruit.   Cardiovascular:      Rate and Rhythm: Normal  rate and regular rhythm.      Pulses: Normal pulses.      Heart sounds: Normal heart sounds. No murmur heard.  Pulmonary:      Effort: Pulmonary effort is normal.      Breath sounds: Normal breath sounds and air entry. No wheezing.   Abdominal:      General: Bowel sounds are normal. There is no distension.      Palpations: Abdomen is soft.      Tenderness: There is no abdominal tenderness.   Musculoskeletal:         General: Normal range of motion.      Cervical back: Neck supple. No rigidity.   Skin:     General: Skin is warm.      Coloration: Skin is not jaundiced.      Findings: No lesion.   Neurological:      General: No focal deficit present.      Mental Status: He is alert and oriented to person, place, and time.      Cranial Nerves: No cranial nerve deficit.   Psychiatric:         Attention and Perception: Attention normal.         Mood and Affect: Mood normal.         Behavior: Behavior normal. Behavior is cooperative.         Thought Content: Thought content normal.         Cognition and Memory: Cognition normal.       Significant Labs: All pertinent labs within the past 24 hours have been reviewed.  BMP:   Recent Labs   Lab 03/01/23  0502   *      K 3.8      CO2 29   BUN 15   CREATININE 1.29   CALCIUM 8.6     CBC:   Recent Labs   Lab 02/28/23  1157   WBC 3.91*   HGB 14.7   HCT 43.3        CMP:   Recent Labs   Lab 02/28/23  1157 03/01/23  0502   * 139   K 4.3 3.8   CL 97* 100   CO2 30 29   * 164*   BUN 10 15   CREATININE 1.10 1.29   CALCIUM 8.9 8.6   PROT 8.3* 7.6   ALBUMIN 2.9* 2.7*   BILITOT 1.2 0.6   ALKPHOS 125* 127*   AST 30 18   ALT 16 15*   ANIONGAP 12 14       Significant Imaging: I have reviewed all pertinent imaging results/findings within the past 24 hours.        Microbiology Results (last 7 days)       ** No results found for the last 168 hours. **          Intake/Output - Last 3 Shifts         02/27 0700  02/28 0659 02/28 0700  03/01 0659 03/01  0700  03/02 0659    P.O.  472     Total Intake(mL/kg)  472 (6.7)     Net  +472

## 2023-03-01 NOTE — ASSESSMENT & PLAN NOTE
Patient has been off his medication several months  Blood pressure up and medicines and been restarted

## 2023-03-01 NOTE — ASSESSMENT & PLAN NOTE
Patient was initially diagnosed with pulmonary sarcoidosis in 2019 and has been taking prednisone 20 mg daily.  Feel that there was a component of acute exacerbation and thus patient will be started on prednisone 40 mg daily    03/01 patient had stopped his prednisone abruptly a few months previously and had not started back

## 2023-03-01 NOTE — ASSESSMENT & PLAN NOTE
Patient's proBNP was 15,772 and EKG showed sinus tachycardia with minimally prolonged QTC and nonspecific ST depression in the inferior leads.  Possibility of cardiac sarcoidosis exists and I feel that there is a need for CMR.  Will start with echocardiogram and consult Cardiology in a.m.

## 2023-03-01 NOTE — SUBJECTIVE & OBJECTIVE
Past Medical History:   Diagnosis Date    Hypertension     Sarcoidosis     Sarcoidosis 2019       Past Surgical History:   Procedure Laterality Date    ANTERIOR CRUCIATE LIGAMENT REPAIR Left 09/01/2004    ANTERIOR CRUCIATE LIGAMENT REPAIR Left     FRACTURE SURGERY         Review of patient's allergies indicates:   Allergen Reactions    Fish containing products Anaphylaxis    Shellfish containing products Anaphylaxis    Lisinopril        Current Facility-Administered Medications on File Prior to Encounter   Medication    [COMPLETED] hydrALAZINE injection 10 mg    [COMPLETED] metoprolol injection 5 mg     Current Outpatient Medications on File Prior to Encounter   Medication Sig    amLODIPine (NORVASC) 10 MG tablet Take 1 tablet (10 mg total) by mouth once daily.    hydroCHLOROthiazide (MICROZIDE) 12.5 mg capsule Take 1 capsule (12.5 mg total) by mouth once daily.    olmesartan (BENICAR) 40 MG tablet Take 40 mg by mouth once daily.     Family History    None       Tobacco Use    Smoking status: Never    Smokeless tobacco: Never   Substance and Sexual Activity    Alcohol use: Yes     Alcohol/week: 2.0 standard drinks     Types: 2 Cans of beer per week    Drug use: Never    Sexual activity: Yes     Partners: Female     Review of Systems   Constitutional: Negative for chills and fever.   Eyes:  Positive for visual disturbance.   Cardiovascular:  Positive for chest pain, dyspnea on exertion, leg swelling and orthopnea. Negative for palpitations.   Respiratory:  Positive for shortness of breath.    Hematologic/Lymphatic: Negative for bleeding problem.   Gastrointestinal:  Positive for bloating. Negative for abdominal pain, nausea and vomiting.   Neurological: Negative.    Objective:     Vital Signs (Most Recent):  Temp: 98.2 °F (36.8 °C) (03/01/23 1128)  Pulse: 90 (03/01/23 1128)  Resp: 19 (03/01/23 1128)  BP: (!) 140/104 (03/01/23 1128)  SpO2: 96 % (03/01/23 1128)   Vital Signs (24h Range):  Temp:  [97.4 °F (36.3 °C)-98.6  °F (37 °C)] 98.2 °F (36.8 °C)  Pulse:  [] 90  Resp:  [19-26] 19  SpO2:  [93 %-97 %] 96 %  BP: (133-160)/() 140/104     Weight: 70.3 kg (155 lb)  Body mass index is 20.45 kg/m².    SpO2: 96 %         Intake/Output Summary (Last 24 hours) at 3/1/2023 1340  Last data filed at 2/28/2023 2222  Gross per 24 hour   Intake 472 ml   Output --   Net 472 ml       Lines/Drains/Airways       Peripheral Intravenous Line  Duration                  Peripheral IV - Single Lumen 02/28/23 1800 Left Antecubital <1 day                    Physical Exam  Vitals reviewed.   Constitutional:       General: He is not in acute distress.  HENT:      Nose: Nose normal.      Mouth/Throat:      Mouth: Mucous membranes are moist.      Pharynx: Oropharynx is clear.   Eyes:      General: No scleral icterus.     Pupils: Pupils are equal, round, and reactive to light.   Cardiovascular:      Rate and Rhythm: Normal rate and regular rhythm.   Pulmonary:      Effort: Pulmonary effort is normal.      Breath sounds: Decreased air movement present.      Comments: Coarse breath sounds bilaterally  Abdominal:      General: Bowel sounds are normal. There is no distension.      Palpations: Abdomen is soft.   Musculoskeletal:      Right lower leg: No edema.      Left lower leg: No edema.   Skin:     General: Skin is warm and dry.      Coloration: Skin is not jaundiced or pale.   Neurological:      Mental Status: He is alert and oriented to person, place, and time.   Psychiatric:         Mood and Affect: Mood normal.         Behavior: Behavior normal.       Significant Labs: ABG: No results for input(s): PH, PCO2, HCO3, POCSATURATED, BE in the last 48 hours., Blood Culture: No results for input(s): LABBLOO in the last 48 hours., BMP:   Recent Labs   Lab 02/28/23  1157 03/01/23  0502   * 164*   * 139   K 4.3 3.8   CL 97* 100   CO2 30 29   BUN 10 15   CREATININE 1.10 1.29   CALCIUM 8.9 8.6   , CMP   Recent Labs   Lab 02/28/23  1157  03/01/23  0502   * 139   K 4.3 3.8   CL 97* 100   CO2 30 29   * 164*   BUN 10 15   CREATININE 1.10 1.29   CALCIUM 8.9 8.6   PROT 8.3* 7.6   ALBUMIN 2.9* 2.7*   BILITOT 1.2 0.6   ALKPHOS 125* 127*   AST 30 18   ALT 16 15*   ANIONGAP 12 14   , CBC   Recent Labs   Lab 02/28/23  1157   WBC 3.91*   HGB 14.7   HCT 43.3      , INR No results for input(s): INR, PROTIME in the last 48 hours., Lipid Panel No results for input(s): CHOL, HDL, LDLCALC, TRIG, CHOLHDL in the last 48 hours., and Troponin No results for input(s): TROPONINI in the last 48 hours.    Significant Imaging: CT scan: CTPE results pending, films reviewed by Dr. Graves, negative for PE, Echocardiogram: Transthoracic echo (TTE) complete (Cupid Only):   Results for orders placed or performed during the hospital encounter of 02/28/23   Echo   Result Value Ref Range    BSA 1.9 m2    Right Atrial Pressure (from IVC) 3 mmHg    EF 50 %    Left Ventricular Outflow Tract Mean Gradient 1.00 mmHg    AORTIC VALVE CUSP SEPERATION 2.01 cm    LVIDd 4.14 3.5 - 6.0 cm    IVS 0.99 0.6 - 1.1 cm    Posterior Wall 1.05 0.6 - 1.1 cm    Ao root annulus 2.80 cm    LVIDs 2.99 2.1 - 4.0 cm    FS 28 28 - 44 %    IVC ostium 1.4 cm    LV mass 137.94 g    LA size 2.70 cm    RVDD 5.20 cm    TAPSE 2.20 cm    Left Ventricle Relative Wall Thickness 0.51 cm    AV mean gradient 1 mmHg    AV valve area 2.53 cm2    AV Velocity Ratio 0.83     AV index (prosthetic) 0.67     E wave deceleration time 188.00 msec    LVOT diameter 2.20 cm    LVOT area 3.8 cm2    LVOT peak thai 0.5 m/s    LVOT peak VTI 6.00 cm    Ao peak thai 0.6 m/s    Ao VTI 9.00 cm    LVOT stroke volume 22.80 cm3    AV peak gradient 1 mmHg    TV rest pulmonary artery pressure 67 mmHg    MV Peak E Thai 0.31 m/s    TR Max Thai 4.00 m/s    LV Systolic Volume 34.70 mL    LV Systolic Volume Index 18.0 mL/m2    LV Diastolic Volume 75.90 mL    LV Diastolic Volume Index 39.33 mL/m2    LV Mass Index 71 g/m2    Echo EF  Estimated 65 %    RA Major Axis 7.00 cm    Triscuspid Valve Regurgitation Peak Gradient 64 mmHg    Narrative    · The left ventricle is normal in size with concentric remodeling and low   normal systolic function.  · The estimated ejection fraction is 50%.  · Moderate right ventricular enlargement with moderately reduced right   ventricular systolic function.  · There is abnormal septal wall motion. There is systolic and diastolic   flattening of the interventricular septum consistent with right ventricle   pressure and volume overload.  · Severe right atrial enlargement.  · Severe tricuspid regurgitation.  · Normal central venous pressure (3 mmHg).  · The estimated PA systolic pressure is 67 mmHg.  · There is pulmonary hypertension.        , EK2023: Sinus tachycardia   Biatrial enlargement   ST and T wave abnormality, consider inferior ischemia   ST and T wave abnormality, consider anterior ischemia   Abnormal ECG   When compared with ECG of 05-MAR-2022 10:40,   Nonspecific T wave abnormality now evident in Lateral leads,     and X-Ray: CXR: X-Ray Chest 1 View (CXR): X-Ray Chest AP Portable  Order: [id number]  Status: Final result     Visible to patient: No (inaccessible in Patient Portal)     Next appt: 2023 at 08:00 AM in Family Medicine ([facility])     0 Result Notes  Details    Reading Physician Reading Date Result Priority   Misha Strange,   [phone] 2023 STAT     Narrative & Impression  EXAMINATION:  XR CHEST AP PORTABLE     CLINICAL HISTORY:  SOB;     COMPARISON:  Chest x-ray 2022     TECHNIQUE:  Frontal view/views of the chest.     FINDINGS:  Heart partially obscured.  Chronic patchy opacification of the bilateral lower lungs which may be related to patient's history of sarcoidosis.  Visualized osseous and surrounding soft tissue structures appear grossly unchanged.     Impression:     As above.     Point of Service: [facility]        Electronically  signed by: Misha Strange  Date:                                            02/28/2023  Time:                                           12:16

## 2023-03-01 NOTE — ASSESSMENT & PLAN NOTE
- Pulmonary sarcoidosis followed by pulmonology at [facility]; per patietn, currently taking prednisone and azithromycin

## 2023-03-01 NOTE — ASSESSMENT & PLAN NOTE
Patient likely is experiencing acute decompensation of pulmonary artery hypertension ( Group V ) stemming from pulmonary sarcoidosis.  Chest x-ray demonstrated chronic appearing bibasal infiltrates without any other acute appearing abnormalities.  Will diurese patient with IV Lasix    03/01 normal wedge pressure with right heart catheterization.  Will stop Lasix IV and also stop IV fluids.  Will leave oral Lasix to help with blood pressure  May need Norvasc but might make edema worse, at least will changed to Procardia XL

## 2023-03-01 NOTE — ASSESSMENT & PLAN NOTE
Patient was initially diagnosed with pulmonary sarcoidosis in 2019 and has been taking prednisone 20 mg daily.  Feel that there was a component of acute exacerbation and thus patient will be started on prednisone 40 mg daily

## 2023-03-01 NOTE — SUBJECTIVE & OBJECTIVE
Past Medical History:   Diagnosis Date    Hypertension     Sarcoidosis     Sarcoidosis 2019       Past Surgical History:   Procedure Laterality Date    ANTERIOR CRUCIATE LIGAMENT REPAIR Left 09/01/2004    ANTERIOR CRUCIATE LIGAMENT REPAIR Left     FRACTURE SURGERY         Review of patient's allergies indicates:   Allergen Reactions    Fish containing products Anaphylaxis    Shellfish containing products Anaphylaxis    Lisinopril        Current Facility-Administered Medications on File Prior to Encounter   Medication    [COMPLETED] albuterol-ipratropium 2.5 mg-0.5 mg/3 mL nebulizer solution 3 mL    [COMPLETED] furosemide injection 40 mg    [COMPLETED] hydrALAZINE injection 10 mg    [COMPLETED] hydrALAZINE injection 10 mg    [COMPLETED] methylPREDNISolone sodium succinate injection 125 mg    [COMPLETED] metoprolol injection 5 mg     Current Outpatient Medications on File Prior to Encounter   Medication Sig    amLODIPine (NORVASC) 10 MG tablet Take 1 tablet (10 mg total) by mouth once daily.    hydroCHLOROthiazide (MICROZIDE) 12.5 mg capsule Take 1 capsule (12.5 mg total) by mouth once daily.    olmesartan (BENICAR) 40 MG tablet Take 40 mg by mouth once daily.     Family History    None       Tobacco Use    Smoking status: Never    Smokeless tobacco: Never   Substance and Sexual Activity    Alcohol use: Yes     Alcohol/week: 2.0 standard drinks     Types: 2 Cans of beer per week    Drug use: Never    Sexual activity: Yes     Partners: Female     Review of Systems   Constitutional:  Negative for chills, diaphoresis, fatigue and fever.   HENT:  Negative for congestion, hearing loss, nosebleeds, postnasal drip, sore throat, tinnitus and trouble swallowing.    Eyes:  Negative for photophobia, pain, discharge, itching and visual disturbance.   Respiratory:  Positive for cough and shortness of breath. Negative for wheezing and stridor.    Cardiovascular:  Positive for leg swelling. Negative for chest pain and palpitations.    Gastrointestinal:  Negative for abdominal distention, abdominal pain, anal bleeding, blood in stool, constipation, diarrhea, nausea and vomiting.   Endocrine: Negative for cold intolerance, heat intolerance, polydipsia, polyphagia and polyuria.   Genitourinary:  Negative for decreased urine volume, difficulty urinating, dysuria, flank pain, frequency, hematuria and urgency.   Musculoskeletal:  Negative for arthralgias, back pain, gait problem, joint swelling, myalgias, neck pain and neck stiffness.   Skin:  Negative for color change, pallor and rash.   Allergic/Immunologic: Negative for immunocompromised state.   Neurological:  Negative for dizziness, tremors, seizures, syncope, facial asymmetry, speech difficulty, weakness, light-headedness, numbness and headaches.   Hematological:  Negative for adenopathy. Does not bruise/bleed easily.   Objective:     Vital Signs (Most Recent):  Temp: 98.6 °F (37 °C) (02/28/23 1809)  Pulse: 105 (02/28/23 1809)  Resp: (!) 22 (02/28/23 1809)  BP: (!) 160/90 (02/28/23 1835)  SpO2: 96 % (02/28/23 1809)   Vital Signs (24h Range):  Temp:  [97.8 °F (36.6 °C)-98.6 °F (37 °C)] 98.6 °F (37 °C)  Pulse:  [103-131] 105  Resp:  [22-27] 22  SpO2:  [96 %-98 %] 96 %  BP: (139-187)/() 160/90     Weight: 70.3 kg (155 lb)  Body mass index is 20.45 kg/m².    Physical Exam  Vitals reviewed.   Constitutional:       General: He is not in acute distress.     Appearance: Normal appearance.   HENT:      Head: Normocephalic and atraumatic.      Right Ear: External ear normal.      Left Ear: External ear normal.   Eyes:      Extraocular Movements: Extraocular movements intact.      Pupils: Pupils are equal, round, and reactive to light.   Cardiovascular:      Rate and Rhythm: Normal rate and regular rhythm.      Pulses: Normal pulses.      Heart sounds: Normal heart sounds. No murmur heard.  Pulmonary:      Effort: Pulmonary effort is normal. No respiratory distress.      Breath sounds: Normal breath  sounds. No wheezing.   Chest:      Chest wall: No tenderness.   Abdominal:      General: Abdomen is flat.      Palpations: Abdomen is soft. There is no mass.      Tenderness: There is no abdominal tenderness. There is no right CVA tenderness or left CVA tenderness.   Musculoskeletal:         General: No swelling or tenderness. Normal range of motion.   Skin:     General: Skin is warm and dry.      Capillary Refill: Capillary refill takes less than 2 seconds.   Neurological:      General: No focal deficit present.      Mental Status: He is alert and oriented to person, place, and time. Mental status is at baseline.   Psychiatric:         Mood and Affect: Mood normal.         Thought Content: Thought content normal.     Cranial nerves 2-12 were grossly intact     Significant Labs: All pertinent labs within the past 24 hours have been reviewed.    Significant Imaging: I have reviewed all pertinent imaging results/findings within the past 24 hours.

## 2023-03-01 NOTE — ASSESSMENT & PLAN NOTE
Patient likely is experiencing acute decompensation of pulmonary artery hypertension ( Group V ) stemming from pulmonary sarcoidosis.  Chest x-ray demonstrated chronic appearing bibasal infiltrates without any other acute appearing abnormalities.  Will diurese patient with IV Lasix

## 2023-03-01 NOTE — HPI
Patient is a 32-year-old male with a history of pulmonary sarcoidosis and essential hypertension who initially presented to outside ED with shortness breath, increased abdominal girth and bilateral lower extremity edema for the past 3 days.  Associated symptoms include dry cough and 3 pillow orthopnea but patient otherwise denied any fever chills hemoptysis chest pain or palpitation.  Patient stated that from time to time he experienced dyspnea but he never experienced swelling of abdomen and bilateral lower extremity previously from initial diagnosis of pulmonary sarcoidosis in 2019.  Other than involvement of lungs, patient is not aware of any other organs involvement associated with sarcoidosis.     On initial presentation vital signs were stable and patient was afebrile and did not have any supplemental oxygen requirement.  Workup was notable for elevation of proBNP and x-ray demonstrating chronic appearing bibasal infiltrates suggestive of pulmonary sarcoidosis but otherwise unremarkable.  Patient was given a dose of IV Lasix with significant improvement in symptomatology and was subsequently transferred to this facility for higher level of care.

## 2023-03-01 NOTE — CONSULTS
[facility] - Cath Lab  Cardiology  Consult Note    Patient Name: Bo Ku  MRN: [id number]  Admission Date: 2/28/2023  Hospital Length of Stay: 1 days  Code Status: Full Code   Attending Provider: Cecilio Cooper MD   Consulting Provider: JEREMIAS Torres  Primary Care Physician: JEREMIAS Stone  Principal Problem:Pulmonary hypertension    Patient information was obtained from patient, past medical records and ER records.     Inpatient consult to Cardiology  Consult performed by: JEREMIAS Torres  Consult ordered by: Alvarado Eugene MD  Reason for consult: cardiac sarcoidosis        Subjective:     Chief Complaint:  Chest pain, sob and edema     HPI:   33 y/o male with PMH of pulmonary sarcoidosis and essential hypertension who initially presented to outside ED with shortness breath, increased abdominal girth and bilateral lower extremity edema for the past 3 days.  Associated symptoms include dry cough and 3 pillow orthopnea but patient otherwise denied any fever chills hemoptysis chest pain or palpitation.  Patient stated that from time to time he experienced dyspnea but he never experienced swelling of abdomen and bilateral lower extremity previously from initial diagnosis of pulmonary sarcoidosis in 2019.  Other than involvement of lungs, patient is not aware of any other organs involvement associated with sarcoidosis.      On initial presentation vital signs were stable and patient was afebrile and did not have any supplemental oxygen requirement.  Workup was notable for elevation of proBNP and x-ray demonstrating chronic appearing bibasal infiltrates suggestive of pulmonary sarcoidosis but otherwise unremarkable.  Patient was given a dose of IV Lasix with significant improvement in symptomatology and was subsequently transferred to this facility for higher level of care.     Cardiology consulted for cardiac sarcoidosis.      Past Medical History:   Diagnosis Date    Hypertension      Sarcoidosis     Sarcoidosis 2019       Past Surgical History:   Procedure Laterality Date    ANTERIOR CRUCIATE LIGAMENT REPAIR Left 09/01/2004    ANTERIOR CRUCIATE LIGAMENT REPAIR Left     FRACTURE SURGERY         Review of patient's allergies indicates:   Allergen Reactions    Fish containing products Anaphylaxis    Shellfish containing products Anaphylaxis    Lisinopril        Current Facility-Administered Medications on File Prior to Encounter   Medication    [COMPLETED] hydrALAZINE injection 10 mg    [COMPLETED] metoprolol injection 5 mg     Current Outpatient Medications on File Prior to Encounter   Medication Sig    amLODIPine (NORVASC) 10 MG tablet Take 1 tablet (10 mg total) by mouth once daily.    hydroCHLOROthiazide (MICROZIDE) 12.5 mg capsule Take 1 capsule (12.5 mg total) by mouth once daily.    olmesartan (BENICAR) 40 MG tablet Take 40 mg by mouth once daily.     Family History    None       Tobacco Use    Smoking status: Never    Smokeless tobacco: Never   Substance and Sexual Activity    Alcohol use: Yes     Alcohol/week: 2.0 standard drinks     Types: 2 Cans of beer per week    Drug use: Never    Sexual activity: Yes     Partners: Female     Review of Systems   Constitutional: Negative for chills and fever.   Eyes:  Positive for visual disturbance.   Cardiovascular:  Positive for chest pain, dyspnea on exertion, leg swelling and orthopnea. Negative for palpitations.   Respiratory:  Positive for shortness of breath.    Hematologic/Lymphatic: Negative for bleeding problem.   Gastrointestinal:  Positive for bloating. Negative for abdominal pain, nausea and vomiting.   Neurological: Negative.    Objective:     Vital Signs (Most Recent):  Temp: 98.2 °F (36.8 °C) (03/01/23 1128)  Pulse: 90 (03/01/23 1128)  Resp: 19 (03/01/23 1128)  BP: (!) 140/104 (03/01/23 1128)  SpO2: 96 % (03/01/23 1128)   Vital Signs (24h Range):  Temp:  [97.4 °F (36.3 °C)-98.6 °F (37 °C)] 98.2 °F (36.8 °C)  Pulse:   [] 90  Resp:  [19-26] 19  SpO2:  [93 %-97 %] 96 %  BP: (133-160)/() 140/104     Weight: 70.3 kg (155 lb)  Body mass index is 20.45 kg/m².    SpO2: 96 %         Intake/Output Summary (Last 24 hours) at 3/1/2023 1340  Last data filed at 2/28/2023 2222  Gross per 24 hour   Intake 472 ml   Output --   Net 472 ml       Lines/Drains/Airways       Peripheral Intravenous Line  Duration                  Peripheral IV - Single Lumen 02/28/23 1800 Left Antecubital <1 day                    Physical Exam  Vitals reviewed.   Constitutional:       General: He is not in acute distress.  HENT:      Nose: Nose normal.      Mouth/Throat:      Mouth: Mucous membranes are moist.      Pharynx: Oropharynx is clear.   Eyes:      General: No scleral icterus.     Pupils: Pupils are equal, round, and reactive to light.   Cardiovascular:      Rate and Rhythm: Normal rate and regular rhythm.   Pulmonary:      Effort: Pulmonary effort is normal.      Breath sounds: Decreased air movement present.      Comments: Coarse breath sounds bilaterally  Abdominal:      General: Bowel sounds are normal. There is no distension.      Palpations: Abdomen is soft.   Musculoskeletal:      Right lower leg: No edema.      Left lower leg: No edema.   Skin:     General: Skin is warm and dry.      Coloration: Skin is not jaundiced or pale.   Neurological:      Mental Status: He is alert and oriented to person, place, and time.   Psychiatric:         Mood and Affect: Mood normal.         Behavior: Behavior normal.       Significant Labs: ABG: No results for input(s): PH, PCO2, HCO3, POCSATURATED, BE in the last 48 hours., Blood Culture: No results for input(s): LABBLOO in the last 48 hours., BMP:   Recent Labs   Lab 02/28/23  1157 03/01/23  0502   * 164*   * 139   K 4.3 3.8   CL 97* 100   CO2 30 29   BUN 10 15   CREATININE 1.10 1.29   CALCIUM 8.9 8.6   , CMP   Recent Labs   Lab 02/28/23  1157 03/01/23  0502   * 139   K 4.3 3.8   CL  97* 100   CO2 30 29   * 164*   BUN 10 15   CREATININE 1.10 1.29   CALCIUM 8.9 8.6   PROT 8.3* 7.6   ALBUMIN 2.9* 2.7*   BILITOT 1.2 0.6   ALKPHOS 125* 127*   AST 30 18   ALT 16 15*   ANIONGAP 12 14   , CBC   Recent Labs   Lab 02/28/23  1157   WBC 3.91*   HGB 14.7   HCT 43.3      , INR No results for input(s): INR, PROTIME in the last 48 hours., Lipid Panel No results for input(s): CHOL, HDL, LDLCALC, TRIG, CHOLHDL in the last 48 hours., and Troponin No results for input(s): TROPONINI in the last 48 hours.    Significant Imaging: CT scan: CTPE results pending, films reviewed by Dr. Graves, negative for PE, Echocardiogram: Transthoracic echo (TTE) complete (Cupid Only):   Results for orders placed or performed during the hospital encounter of 02/28/23   Echo   Result Value Ref Range    BSA 1.9 m2    Right Atrial Pressure (from IVC) 3 mmHg    EF 50 %    Left Ventricular Outflow Tract Mean Gradient 1.00 mmHg    AORTIC VALVE CUSP SEPERATION 2.01 cm    LVIDd 4.14 3.5 - 6.0 cm    IVS 0.99 0.6 - 1.1 cm    Posterior Wall 1.05 0.6 - 1.1 cm    Ao root annulus 2.80 cm    LVIDs 2.99 2.1 - 4.0 cm    FS 28 28 - 44 %    IVC ostium 1.4 cm    LV mass 137.94 g    LA size 2.70 cm    RVDD 5.20 cm    TAPSE 2.20 cm    Left Ventricle Relative Wall Thickness 0.51 cm    AV mean gradient 1 mmHg    AV valve area 2.53 cm2    AV Velocity Ratio 0.83     AV index (prosthetic) 0.67     E wave deceleration time 188.00 msec    LVOT diameter 2.20 cm    LVOT area 3.8 cm2    LVOT peak thai 0.5 m/s    LVOT peak VTI 6.00 cm    Ao peak thai 0.6 m/s    Ao VTI 9.00 cm    LVOT stroke volume 22.80 cm3    AV peak gradient 1 mmHg    TV rest pulmonary artery pressure 67 mmHg    MV Peak E Thai 0.31 m/s    TR Max Thai 4.00 m/s    LV Systolic Volume 34.70 mL    LV Systolic Volume Index 18.0 mL/m2    LV Diastolic Volume 75.90 mL    LV Diastolic Volume Index 39.33 mL/m2    LV Mass Index 71 g/m2    Echo EF Estimated 65 %    RA Major Axis 7.00 cm     Triscuspid Valve Regurgitation Peak Gradient 64 mmHg    Narrative    · The left ventricle is normal in size with concentric remodeling and low   normal systolic function.  · The estimated ejection fraction is 50%.  · Moderate right ventricular enlargement with moderately reduced right   ventricular systolic function.  · There is abnormal septal wall motion. There is systolic and diastolic   flattening of the interventricular septum consistent with right ventricle   pressure and volume overload.  · Severe right atrial enlargement.  · Severe tricuspid regurgitation.  · Normal central venous pressure (3 mmHg).  · The estimated PA systolic pressure is 67 mmHg.  · There is pulmonary hypertension.        , EK2023: Sinus tachycardia   Biatrial enlargement   ST and T wave abnormality, consider inferior ischemia   ST and T wave abnormality, consider anterior ischemia   Abnormal ECG   When compared with ECG of 05-MAR-2022 10:40,   Nonspecific T wave abnormality now evident in Lateral leads,     and X-Ray: CXR: X-Ray Chest 1 View (CXR): X-Ray Chest AP Portable  Order: [id number]   Status: Final result      Visible to patient: No (inaccessible in Patient Portal)      Next appt: 2023 at 08:00 AM in Family Medicine ([facility])      0 Result Notes  Details    Reading Physician Reading Date Result Priority   Misha Strange, DO  [phone] 2023 STAT     Narrative & Impression  EXAMINATION:  XR CHEST AP PORTABLE     CLINICAL HISTORY:  SOB;     COMPARISON:  Chest x-ray 2022     TECHNIQUE:  Frontal view/views of the chest.     FINDINGS:  Heart partially obscured.  Chronic patchy opacification of the bilateral lower lungs which may be related to patient's history of sarcoidosis.  Visualized osseous and surrounding soft tissue structures appear grossly unchanged.     Impression:     As above.     Point of Service: [facility]        Electronically signed by: Misha Strange  Date:                                             02/28/2023  Time:                                           12:16     Assessment and Plan:     * Pulmonary hypertension  - Patient seen and evaluated by Dr. Leal  - Pt presented with sob, edema, and chest pain; symptoms improving with IV diuresis  - Echo with LVEF 50%, moderate RV enlargement with moderately reduced RV systolic function The left ventricle is normal in size with concentric remodeling and low normal systolic function. There is abnormal septal wall motion. There is systolic and diastolic flattening of the interventricular septum consistent with right ventricle pressure and volume overload. Severe RA enlargement. Severe TR. Normal CVP. Pulmonary hypertension, PASP 67 mmHg  - Troponin negative x 1; BNP 15,000; EKG Sinus tachycardia, Biatrial enlargement ST and T wave abnormality, consider inferior/anterior ischemia   Nonspecific T wave abnormality now evident in Lateral leads   - CT PE performed to rule out PE  - Plan for RHC once CT PE complete. Procedure, risk and benefits discussed in detail with patient, he verbalized understanding and wishes to proceed. Consent obtained and on chart.  - Further recommendations to follow      Sarcoidosis  - Pulmonary sarcoidosis followed by pulmonology at [facility]; per patietn, currently taking prednisone and azithromycin         VTE Risk Mitigation (From admission, onward)         Ordered     enoxaparin injection 40 mg  Daily         02/28/23 2035     IP VTE HIGH RISK PATIENT  Once         02/28/23 2035     Place sequential compression device  Until discontinued         02/28/23 2035                Thank you for your consult. I will follow-up with patient. Please contact us if you have any additional questions.    Dianne Curiel, JEREMIAS  Cardiology   [facility] - Cath Lab

## 2023-03-01 NOTE — ASSESSMENT & PLAN NOTE
- Patient seen and evaluated by Dr. Leal  - Pt presented with sob, edema, and chest pain; symptoms improving with IV diuresis  - Echo with LVEF 50%, moderate RV enlargement with moderately reduced RV systolic function The left ventricle is normal in size with concentric remodeling and low normal systolic function. There is abnormal septal wall motion. There is systolic and diastolic flattening of the interventricular septum consistent with right ventricle pressure and volume overload. Severe RA enlargement. Severe TR. Normal CVP. Pulmonary hypertension, PASP 67 mmHg  - Troponin negative x 1; BNP 15,000; EKG Sinus tachycardia, Biatrial enlargement ST and T wave abnormality, consider inferior/anterior ischemia   Nonspecific T wave abnormality now evident in Lateral leads   - CT PE performed to rule out PE  - Plan for RHC once CT PE complete. Procedure, risk and benefits discussed in detail with patient, he verbalized understanding and wishes to proceed. Consent obtained and on chart.  - Further recommendations to follow

## 2023-03-02 PROBLEM — E55.9 VITAMIN D DEFICIENCY: Status: ACTIVE | Noted: 2023-03-02

## 2023-03-02 PROBLEM — I50.810 RVF (RIGHT VENTRICULAR FAILURE): Status: ACTIVE | Noted: 2023-03-02

## 2023-03-02 LAB
25(OH)D3 SERPL-MCNC: 9.2 NG/ML
ANION GAP SERPL CALCULATED.3IONS-SCNC: 11 MMOL/L (ref 7–16)
BASOPHILS # BLD AUTO: 0.03 K/UL (ref 0–0.2)
BASOPHILS NFR BLD AUTO: 0.2 % (ref 0–1)
BUN SERPL-MCNC: 16 MG/DL (ref 7–18)
BUN/CREAT SERPL: 17 (ref 6–20)
CALCIUM SERPL-MCNC: 8.4 MG/DL (ref 8.5–10.1)
CHLORIDE SERPL-SCNC: 103 MMOL/L (ref 98–107)
CHOLEST SERPL-MCNC: 98 MG/DL (ref 0–200)
CHOLEST/HDLC SERPL: 2.1 {RATIO}
CO2 SERPL-SCNC: 29 MMOL/L (ref 21–32)
CREAT SERPL-MCNC: 0.95 MG/DL (ref 0.7–1.3)
CRP SERPL-MCNC: 4 MG/DL (ref 0–0.8)
DIFFERENTIAL METHOD BLD: ABNORMAL
EGFR (NO RACE VARIABLE) (RUSH/TITUS): 108 ML/MIN/1.73M²
EOSINOPHIL # BLD AUTO: 0 K/UL (ref 0–0.5)
EOSINOPHIL NFR BLD AUTO: 0 % (ref 1–4)
ERYTHROCYTE [DISTWIDTH] IN BLOOD BY AUTOMATED COUNT: 14.6 % (ref 11.5–14.5)
EST. AVERAGE GLUCOSE BLD GHB EST-MCNC: 81 MG/DL
GLUCOSE SERPL-MCNC: 128 MG/DL (ref 70–105)
GLUCOSE SERPL-MCNC: 130 MG/DL (ref 74–106)
GLUCOSE SERPL-MCNC: 79 MG/DL (ref 70–105)
GLUCOSE SERPL-MCNC: 83 MG/DL (ref 70–105)
HBA1C MFR BLD HPLC: 5 % (ref 4.5–6.6)
HCT VFR BLD AUTO: 41.5 % (ref 40–54)
HDLC SERPL-MCNC: 46 MG/DL (ref 40–60)
HGB BLD-MCNC: 14 G/DL (ref 13.5–18)
IMM GRANULOCYTES # BLD AUTO: 0.08 K/UL (ref 0–0.04)
IMM GRANULOCYTES NFR BLD: 0.5 % (ref 0–0.4)
LDLC SERPL CALC-MCNC: 41 MG/DL
LYMPHOCYTES # BLD AUTO: 0.86 K/UL (ref 1–4.8)
LYMPHOCYTES NFR BLD AUTO: 5.2 % (ref 27–41)
MAGNESIUM SERPL-MCNC: 1.6 MG/DL (ref 1.7–2.3)
MCH RBC QN AUTO: 29.6 PG (ref 27–31)
MCHC RBC AUTO-ENTMCNC: 33.7 G/DL (ref 32–36)
MCV RBC AUTO: 87.7 FL (ref 80–96)
MONOCYTES # BLD AUTO: 1.47 K/UL (ref 0–0.8)
MONOCYTES NFR BLD AUTO: 8.9 % (ref 2–6)
MPC BLD CALC-MCNC: 9.7 FL (ref 9.4–12.4)
NEUTROPHILS # BLD AUTO: 14.12 K/UL (ref 1.8–7.7)
NEUTROPHILS NFR BLD AUTO: 85.2 % (ref 53–65)
NONHDLC SERPL-MCNC: 52 MG/DL
NRBC # BLD AUTO: 0 X10E3/UL
NRBC, AUTO (.00): 0 %
PLATELET # BLD AUTO: 220 K/UL (ref 150–400)
POTASSIUM SERPL-SCNC: 3.3 MMOL/L (ref 3.5–5.1)
RBC # BLD AUTO: 4.73 M/UL (ref 4.6–6.2)
SODIUM SERPL-SCNC: 140 MMOL/L (ref 136–145)
TRIGL SERPL-MCNC: 57 MG/DL (ref 35–150)
VLDLC SERPL-MCNC: 11 MG/DL
WBC # BLD AUTO: 16.56 K/UL (ref 4.5–11)

## 2023-03-02 PROCEDURE — 99232 SBSQ HOSP IP/OBS MODERATE 35: CPT | Mod: ,,, | Performed by: NURSE PRACTITIONER

## 2023-03-02 PROCEDURE — 82962 GLUCOSE BLOOD TEST: CPT

## 2023-03-02 PROCEDURE — 99222 1ST HOSP IP/OBS MODERATE 55: CPT | Mod: ,,, | Performed by: INTERNAL MEDICINE

## 2023-03-02 PROCEDURE — 80048 BASIC METABOLIC PNL TOTAL CA: CPT | Performed by: HOSPITALIST

## 2023-03-02 PROCEDURE — 83036 HEMOGLOBIN GLYCOSYLATED A1C: CPT | Performed by: HOSPITALIST

## 2023-03-02 PROCEDURE — 94761 N-INVAS EAR/PLS OXIMETRY MLT: CPT

## 2023-03-02 PROCEDURE — 99222 PR INITIAL HOSPITAL CARE,LEVL II: ICD-10-PCS | Mod: ,,, | Performed by: INTERNAL MEDICINE

## 2023-03-02 PROCEDURE — 63600175 PHARM REV CODE 636 W HCPCS: Performed by: INTERNAL MEDICINE

## 2023-03-02 PROCEDURE — 80061 LIPID PANEL: CPT | Performed by: HOSPITALIST

## 2023-03-02 PROCEDURE — 25000003 PHARM REV CODE 250: Performed by: INTERNAL MEDICINE

## 2023-03-02 PROCEDURE — 83735 ASSAY OF MAGNESIUM: CPT | Performed by: HOSPITALIST

## 2023-03-02 PROCEDURE — 99232 PR SUBSEQUENT HOSPITAL CARE,LEVL II: ICD-10-PCS | Mod: ,,, | Performed by: HOSPITALIST

## 2023-03-02 PROCEDURE — 99900035 HC TECH TIME PER 15 MIN (STAT)

## 2023-03-02 PROCEDURE — 99232 SBSQ HOSP IP/OBS MODERATE 35: CPT | Mod: ,,, | Performed by: HOSPITALIST

## 2023-03-02 PROCEDURE — 86140 C-REACTIVE PROTEIN: CPT | Performed by: HOSPITALIST

## 2023-03-02 PROCEDURE — 11000001 HC ACUTE MED/SURG PRIVATE ROOM

## 2023-03-02 PROCEDURE — 82306 VITAMIN D 25 HYDROXY: CPT | Performed by: HOSPITALIST

## 2023-03-02 PROCEDURE — 25000003 PHARM REV CODE 250: Performed by: HOSPITALIST

## 2023-03-02 PROCEDURE — 27000221 HC OXYGEN, UP TO 24 HOURS

## 2023-03-02 PROCEDURE — 99232 PR SUBSEQUENT HOSPITAL CARE,LEVL II: ICD-10-PCS | Mod: ,,, | Performed by: NURSE PRACTITIONER

## 2023-03-02 RX ORDER — MAGNESIUM SULFATE HEPTAHYDRATE 40 MG/ML
2 INJECTION, SOLUTION INTRAVENOUS ONCE
Status: COMPLETED | OUTPATIENT
Start: 2023-03-02 | End: 2023-03-03

## 2023-03-02 RX ORDER — CHOLECALCIFEROL (VITAMIN D3) 25 MCG
1000 TABLET ORAL DAILY
Status: DISCONTINUED | OUTPATIENT
Start: 2023-03-02 | End: 2023-03-03 | Stop reason: HOSPADM

## 2023-03-02 RX ADMIN — PREDNISONE 40 MG: 20 TABLET ORAL at 08:03

## 2023-03-02 RX ADMIN — HYDRALAZINE HYDROCHLORIDE 25 MG: 25 TABLET ORAL at 09:03

## 2023-03-02 RX ADMIN — MUPIROCIN: 20 OINTMENT TOPICAL at 09:03

## 2023-03-02 RX ADMIN — NIFEDIPINE 60 MG: 30 TABLET, FILM COATED, EXTENDED RELEASE ORAL at 09:03

## 2023-03-02 RX ADMIN — VALSARTAN 320 MG: 80 TABLET, FILM COATED ORAL at 08:03

## 2023-03-02 RX ADMIN — GUAIFENESIN 600 MG: 600 TABLET, EXTENDED RELEASE ORAL at 08:03

## 2023-03-02 RX ADMIN — HYDRALAZINE HYDROCHLORIDE 25 MG: 25 TABLET ORAL at 05:03

## 2023-03-02 RX ADMIN — HYDRALAZINE HYDROCHLORIDE 25 MG: 25 TABLET ORAL at 02:03

## 2023-03-02 RX ADMIN — FUROSEMIDE 40 MG: 40 TABLET ORAL at 08:03

## 2023-03-02 NOTE — SUBJECTIVE & OBJECTIVE
Interval History: Patient seen today, reports orthopnea and dyspnea with exertion improved, transitioned to PO lasix today.    Review of Systems   Constitutional: Negative for chills and fever.   Eyes:  Positive for visual disturbance.   Cardiovascular:  Positive for chest pain, dyspnea on exertion, leg swelling and orthopnea. Negative for palpitations.   Respiratory:  Positive for shortness of breath.    Hematologic/Lymphatic: Negative for bleeding problem.   Gastrointestinal:  Positive for bloating. Negative for abdominal pain, nausea and vomiting.   Neurological: Negative.    Objective:     Vital Signs (Most Recent):  Temp: 97.8 °F (36.6 °C) (03/02/23 1103)  Pulse: 80 (03/02/23 1112)  Resp: 18 (03/02/23 1112)  BP: (!) 139/103 (03/02/23 1103)  SpO2: 100 % (03/02/23 1112)   Vital Signs (24h Range):  Temp:  [97 °F (36.1 °C)-98.4 °F (36.9 °C)] 97.8 °F (36.6 °C)  Pulse:  [] 80  Resp:  [18-20] 18  SpO2:  [95 %-100 %] 100 %  BP: (118-151)/() 139/103     Weight: 70.3 kg (155 lb)  Body mass index is 20.45 kg/m².     SpO2: 100 %       No intake or output data in the 24 hours ending 03/02/23 1443    Lines/Drains/Airways       Peripheral Intravenous Line  Duration                  Peripheral IV - Single Lumen 03/01/23 1526 20 G Left Forearm <1 day                    Physical Exam  Vitals reviewed.   Neck:      Vascular: No JVD.   Cardiovascular:      Rate and Rhythm: Normal rate and regular rhythm.   Pulmonary:      Effort: Pulmonary effort is normal.      Breath sounds: Normal breath sounds. No wheezing, rhonchi or rales.   Abdominal:      General: Bowel sounds are normal.      Palpations: Abdomen is soft.   Musculoskeletal:      Right lower leg: No edema.      Left lower leg: No edema.   Skin:     General: Skin is warm and dry.      Coloration: Skin is not jaundiced or pale.   Neurological:      Mental Status: He is alert and oriented to person, place, and time.   Psychiatric:         Mood and Affect: Mood  normal.         Behavior: Behavior normal.       Significant Labs: ABG: No results for input(s): PH, PCO2, HCO3, POCSATURATED, BE in the last 48 hours., Blood Culture: No results for input(s): LABBLOO in the last 48 hours., BMP:   Recent Labs   Lab 03/01/23  0502 03/02/23  0318   * 130*    140   K 3.8 3.3*    103   CO2 29 29   BUN 15 16   CREATININE 1.29 0.95   CALCIUM 8.6 8.4*   MG  --  1.6*   , CMP   Recent Labs   Lab 03/01/23  0502 03/02/23  0318    140   K 3.8 3.3*    103   CO2 29 29   * 130*   BUN 15 16   CREATININE 1.29 0.95   CALCIUM 8.6 8.4*   PROT 7.6  --    ALBUMIN 2.7*  --    BILITOT 0.6  --    ALKPHOS 127*  --    AST 18  --    ALT 15*  --    ANIONGAP 14 11   , CBC   Recent Labs   Lab 03/01/23  2347   WBC 16.56*   HGB 14.0   HCT 41.5      , Lipid Panel   Recent Labs   Lab 03/02/23  0318   CHOL 98   HDL 46   LDLCALC 41   TRIG 57   CHOLHDL 2.1   , and Troponin No results for input(s): TROPONINI in the last 48 hours.    Significant Imaging:   Cardiac Cath: RHC  Reading physician: Abdelrahman Adam MD Ordering physician: JOSSIE Ferro Study date: 3/1/23     Patient Information    Name MRN Description   Bo Ku [id number] y.o. male     Physicians    Panel Physicians Referring Physician Case Authorizing Physician   Abdelrahman Adam MD (Primary) MD Dileep Hebert ACNP     Indications    CHF (congestive heart failure) [I50.9 (ICD-10-CM)]   Cardiac sarcoidosis [D86.85 (ICD-10-CM)]   Cor pulmonale [I27.81 (ICD-10-CM)]     Summary         The estimated blood loss was none.    The filling pressures on the right were mildly elevated. Pulmonary hypertension was moderate.     Impression:    1. Moderate pulmonary hypertension, PASP 50 mmHg.      2. Low-normal cardiac output by thermodilution, normal CO by Katherine method.     3. Normal pulmonary capillary wedge pressure, 12 mmHg.      , Echocardiogram: Transthoracic echo (TTE) complete (Cupid  Only):   Results for orders placed or performed during the hospital encounter of 02/28/23   Echo   Result Value Ref Range    BSA 1.9 m2    Right Atrial Pressure (from IVC) 3 mmHg    EF 50 %    Left Ventricular Outflow Tract Mean Gradient 1.00 mmHg    AORTIC VALVE CUSP SEPERATION 2.01 cm    LVIDd 4.14 3.5 - 6.0 cm    IVS 0.99 0.6 - 1.1 cm    Posterior Wall 1.05 0.6 - 1.1 cm    Ao root annulus 2.80 cm    LVIDs 2.99 2.1 - 4.0 cm    FS 28 28 - 44 %    IVC ostium 1.4 cm    LV mass 137.94 g    LA size 2.70 cm    RVDD 5.20 cm    TAPSE 2.20 cm    Left Ventricle Relative Wall Thickness 0.51 cm    AV mean gradient 1 mmHg    AV valve area 2.53 cm2    AV Velocity Ratio 0.83     AV index (prosthetic) 0.67     E wave deceleration time 188.00 msec    LVOT diameter 2.20 cm    LVOT area 3.8 cm2    LVOT peak thai 0.5 m/s    LVOT peak VTI 6.00 cm    Ao peak thai 0.6 m/s    Ao VTI 9.00 cm    LVOT stroke volume 22.80 cm3    AV peak gradient 1 mmHg    TV rest pulmonary artery pressure 67 mmHg    MV Peak E Thai 0.31 m/s    TR Max Thai 4.00 m/s    LV Systolic Volume 34.70 mL    LV Systolic Volume Index 18.0 mL/m2    LV Diastolic Volume 75.90 mL    LV Diastolic Volume Index 39.33 mL/m2    LV Mass Index 71 g/m2    Echo EF Estimated 65 %    RA Major Axis 7.00 cm    Triscuspid Valve Regurgitation Peak Gradient 64 mmHg    Narrative    · The left ventricle is normal in size with concentric remodeling and low   normal systolic function.  · The estimated ejection fraction is 50%.  · Moderate right ventricular enlargement with moderately reduced right   ventricular systolic function.  · There is abnormal septal wall motion. There is systolic and diastolic   flattening of the interventricular septum consistent with right ventricle   pressure and volume overload.  · Severe right atrial enlargement.  · Severe tricuspid regurgitation.  · Normal central venous pressure (3 mmHg).  · The estimated PA systolic pressure is 67 mmHg.  · There is pulmonary  hypertension.        EKG 2/28/2023:  Sinus tachycardia   Biatrial enlargement   ST and T wave abnormality, consider inferior ischemia   ST and T wave abnormality, consider anterior ischemia   Abnormal ECG   When compared with ECG of 05-MAR-2022 10:40,   Nonspecific T wave abnormality now evident in Lateral leads    , and X-Ray: CXR: X-Ray Chest 1 View (CXR): X-Ray Chest AP Portable  Order: [id number]  Status: Final result     Visible to patient: No (inaccessible in Patient Portal)     Next appt: 03/27/2023 at 08:00 AM in Family Medicine ([facility])     0 Result Notes  Details    Reading Physician Reading Date Result Priority   Misha Strange, DO  [phone] 2/28/2023 STAT     Narrative & Impression  EXAMINATION:  XR CHEST AP PORTABLE     CLINICAL HISTORY:  SOB;     COMPARISON:  Chest x-ray March 5, 2022     TECHNIQUE:  Frontal view/views of the chest.     FINDINGS:  Heart partially obscured.  Chronic patchy opacification of the bilateral lower lungs which may be related to patient's history of sarcoidosis.  Visualized osseous and surrounding soft tissue structures appear grossly unchanged.     Impression:     As above.     Point of Service: [facility]        Electronically signed by: Misha Strange  Date:                                            02/28/2023  Time:                                           12:16

## 2023-03-02 NOTE — PROGRESS NOTES
Pt states RFV site was sore and oozed over the night & had to be changed this morning. Otherwise everything was ol.

## 2023-03-02 NOTE — NURSING
"Notified Dr. Cooper of pt's blood pressure being 151/109 after rechecks. Pt asymptomatic.     1817: MD responses: "just made changes to meds and added prn"   "

## 2023-03-02 NOTE — ASSESSMENT & PLAN NOTE
3/2/2023-patient admitted with shortness of breath and cough as noted with also reports of dysphagia times several months.  No prior endoscopic history.  Painful swallowing or significant GI dyspepsia/GERD.  Reports early satiety as well as occasional regurgitation.  Could consider upper endoscopy to further evaluate if okay from a cardiac/pulmonary standpoint, however will review case further with Dr. Westfall with plan an addendum to follow.

## 2023-03-02 NOTE — HPI
"Mr. Gill is a 32-year-old male who was admitted to the hospital on yesterday with reports of shortness of breath and cough.  Patient is a fairly good historian therefore information is obtained from patient review as well as chart review.  Patient is a prior history of pulmonary hypertension and sarcoidosis.  Per was reportedly diagnosed in 2019 with pulmonary sarcoidosis.  He reportedly has not been able however to take his regular medicines recently due to financial reasons.  He states several days ago he began having some increased shortness of breath and swelling to his lower extremities and cough therefore he presented to the emergency room for evaluation.  On admission he also brought forth that over the last several months he has been having some dysphagia-like symptoms.  He states that when he eats solids and takes his pills, he will have a sensation of the food becoming "stuck" with also his description of early satiety.  He seems to tolerate liquids okay but does state at times he will regurgitate these when he has a coughing episode.  He does deny any pain with swallowing.  He denies any significant GERD symptoms.  He has no significant findings of oral lesions as well.  He was seen by cardiology and he did undergo a right heart catheterization on yesterday.  Patient states that he has not had any prior endoscopy in the past.  He has noted at this time currently be on Lovenox injections.  "

## 2023-03-02 NOTE — ASSESSMENT & PLAN NOTE
- Pulmonary sarcoidosis followed by pulmonology at [facility]; per patient, currently taking prednisone and azithromycin

## 2023-03-02 NOTE — SUBJECTIVE & OBJECTIVE
Past Medical History:   Diagnosis Date    Hypertension     Sarcoidosis     Sarcoidosis 2019       Past Surgical History:   Procedure Laterality Date    ANTERIOR CRUCIATE LIGAMENT REPAIR Left 09/01/2004    ANTERIOR CRUCIATE LIGAMENT REPAIR Left     FRACTURE SURGERY      RIGHT HEART CATHETERIZATION Right 3/1/2023    Procedure: INSERTION, CATHETER, RIGHT HEART;  Surgeon: Abdelrahman Adam MD;  Location: [facility] CATH LAB;  Service: Cardiology;  Laterality: Right;       Review of patient's allergies indicates:   Allergen Reactions    Fish containing products Anaphylaxis    Shellfish containing products Anaphylaxis    Lisinopril      Family History    None       Tobacco Use    Smoking status: Never    Smokeless tobacco: Never   Substance and Sexual Activity    Alcohol use: Yes     Alcohol/week: 2.0 standard drinks     Types: 2 Cans of beer per week    Drug use: Never    Sexual activity: Yes     Partners: Female     Review of Systems   Constitutional:  Negative for activity change, appetite change, fever and unexpected weight change.   HENT:  Positive for trouble swallowing. Negative for mouth sores and sore throat.    Eyes:  Negative for visual disturbance.   Respiratory:  Positive for cough and shortness of breath.    Cardiovascular:  Negative for chest pain.   Gastrointestinal:  Negative for abdominal distention, abdominal pain, anal bleeding, blood in stool, constipation, diarrhea, nausea, rectal pain and vomiting.   Endocrine: Negative for cold intolerance and heat intolerance.   Genitourinary:  Negative for dysuria, frequency, hematuria and urgency.   Musculoskeletal:  Negative for arthralgias and myalgias.   Skin:  Negative for color change.   Neurological:  Negative for speech difficulty, weakness and headaches.   Psychiatric/Behavioral:  Negative for confusion.    Objective:     Vital Signs (Most Recent):  Temp: 97.8 °F (36.6 °C) (03/02/23 1103)  Pulse: 80 (03/02/23 1112)  Resp: 18 (03/02/23 1112)  BP: (!)  139/103 (03/02/23 1103)  SpO2: 100 % (03/02/23 1112)   Vital Signs (24h Range):  Temp:  [97 °F (36.1 °C)-98.4 °F (36.9 °C)] 97.8 °F (36.6 °C)  Pulse:  [] 80  Resp:  [18-20] 18  SpO2:  [95 %-100 %] 100 %  BP: (118-151)/() 139/103     Weight: 70.3 kg (155 lb) (02/28/23 1809)  Body mass index is 20.45 kg/m².    No intake or output data in the 24 hours ending 03/02/23 1325    Lines/Drains/Airways       Peripheral Intravenous Line  Duration                  Peripheral IV - Single Lumen 03/01/23 1526 20 G Left Forearm <1 day                    Physical Exam  Vitals and nursing note reviewed.   Constitutional:       General: He is not in acute distress.     Appearance: Normal appearance. He is normal weight. He is not ill-appearing.   HENT:      Head: Normocephalic.      Nose: Nose normal. No congestion or rhinorrhea.      Mouth/Throat:      Mouth: Mucous membranes are moist.      Pharynx: Oropharynx is clear. No oropharyngeal exudate or posterior oropharyngeal erythema.   Eyes:      General: No scleral icterus.     Pupils: Pupils are equal, round, and reactive to light.   Cardiovascular:      Rate and Rhythm: Normal rate and regular rhythm.      Pulses: Normal pulses.      Heart sounds: Normal heart sounds. No murmur heard.  Pulmonary:      Effort: Pulmonary effort is normal.      Breath sounds: Normal breath sounds. No wheezing, rhonchi or rales.   Abdominal:      General: Abdomen is flat. Bowel sounds are normal. There is no distension.      Palpations: Abdomen is soft. There is no mass.      Tenderness: There is no abdominal tenderness.   Musculoskeletal:         General: Normal range of motion.      Cervical back: Normal range of motion. No tenderness.      Right lower leg: No edema.      Left lower leg: No edema.   Skin:     General: Skin is warm and dry.      Coloration: Skin is not jaundiced.   Neurological:      General: No focal deficit present.      Mental Status: He is alert and oriented to person,  place, and time. Mental status is at baseline.      Motor: No weakness.   Psychiatric:         Mood and Affect: Mood normal.         Behavior: Behavior normal.         Thought Content: Thought content normal.         Judgment: Judgment normal.       Significant Labs:  Recent Lab Results  (Last 5 results in the past 24 hours)        03/02/23  1018   03/02/23  0509   03/02/23  0318   03/01/23  2347   03/01/23  1950        Benzodiazepines         Positive       Cocaine         Negative       BARBITURATES         Negative       Amphetamine         Negative       Anion Gap     11           Baso #       0.03         Basophil %       0.2         BUN     16           BUN/CREAT RATIO     17           Calcium     8.4           Cannabinoid Scrn, Ur         Negative       Chloride     103           CHOL/HDLC Ratio     2.1           Cholesterol     98  Comment:   <200 mg/dL:  Desirable  200-240 mg/dL: Borderline High  >240 mg/dL:  High           CO2     29           Creatinine     0.95           CRP     4.00           Differential Type       Auto         eGFR     108           Eos #       0.00         Eosinophil %       0.0         Estimated Avg Glucose     81           Glucose     130           HDL     46  Comment:   <40 mg/dL: Low HDL  40-60 mg/dL: Normal  >60 mg/dL: Desirable           Hematocrit       41.5         Hemoglobin       14.0         Hemoglobin A1C External     5.0  Comment:   Normal:               <5.7%  Pre-Diabetic:       5.7% to 6.4%  Diabetic:             >6.4%  Diabetic Goal:     <7%           Immature Grans (Abs)       0.08         Immature Granulocytes       0.5         LDL Calculated     41           Lymph #       0.86         Lymph %       5.2         Magnesium     1.6           MCH       29.6         MCHC       33.7         MCV       87.7         Mono #       1.47         Mono %       8.9         MPV       9.7         Neutrophils, Abs       14.12         Neutrophils Relative       85.2         Non-HDL  Cholesterol     52           nRBC       0.0         NUCLEATED RBC ABSOLUTE       0.00         Opiate Scrn, Ur         Negative       Phencyclidine         Negative       Platelets       220         POC Glucose 83   79             Potassium     3.3           RBC       4.73         RDW       14.6         Sodium     140           Triglycerides     57  Comment:   Normal:  <150 mg/dL  Borderline High: 150-199 mg/dL  High:   200-499 mg/dL  Very High:  >=500           Vit D, 25-Hydroxy     9.2  Comment: Vitamin D 25-OH Adult Reference Values:  Deficiency: <20 ng/mL  Insufficiency: 20 - <30 ng/mL  Sufficiency: 30 -100 ng/mL    Vitamin D 25-OH Pediatric Reference Values:  Deficiency: <15 ng/mL  Insufficiency: 15 - <20 ng/mL  Sufficiency: 20 - 100 ng/mL           VLDL Cholesterol Josué     11           WBC       16.56                                Significant Imaging:  Imaging results within the past 24 hours have been reviewed.

## 2023-03-02 NOTE — ASSESSMENT & PLAN NOTE
- Patient seen and evaluated by Dr. Leal  - Pt presented with sob, edema, and chest pain; symptoms improving with IV diuresis  - Echo with LVEF 50%, moderate RV enlargement with moderately reduced RV systolic function The left ventricle is normal in size with concentric remodeling and low normal systolic function. There is abnormal septal wall motion. There is systolic and diastolic flattening of the interventricular septum consistent with right ventricle pressure and volume overload. Severe RA enlargement. Severe TR. Normal CVP. Pulmonary hypertension, PASP 67 mmHg  - Troponin negative x 1; BNP 15,000; EKG Sinus tachycardia, Biatrial enlargement ST and T wave abnormality, consider inferior/anterior ischemia   Nonspecific T wave abnormality now evident in Lateral leads   - CT PE performed to rule out PE  - Plan for RHC once CT PE complete. Procedure, risk and benefits discussed in detail with patient, he verbalized understanding and wishes to proceed. Consent obtained and on chart.  - Further recommendations to follow    3/2/2023:  - CT PE negative for PE; There are consolidative and airspace opacities which are scattered throughout the lungs, which are grossly similar in size relative to 3/5/22. Some of the larger consolidative opacities within the left lower lobe in the right middle lobe appear to have demonstrated cavitation. The largest cavity is located within the right middle lobe measuring 4.4 x 3.6 cm  - RHC 1. Moderate pulmonary hypertension, PASP 50 mmHg. 2. Low-normal cardiac output by thermodilution, normal CO by Katherine method. 3. Normal pulmonary capillary wedge pressure, 12 mmHg.   - Recommend outpatient referral to [facility] advanced heart failure specialist for pulmonary hypertension/sarcoidosis. Will have Areli in clinic do this referral.  - Follow up with JEREMIAS Jean Baptiste in 2 weeks; Hospital discharge/volume status and BP eval

## 2023-03-02 NOTE — NURSING
Pt up to bathroom washing up for possible procedure today water got into groin drsg, area semi saturated and op site coming off, new drsg applied, area noted with no active bleeding noted at present time

## 2023-03-02 NOTE — ASSESSMENT & PLAN NOTE
Pulmonary HTN with RV dysfunction. 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 S/p IV lasix with improvement in symptoms. Will plan for RHC for evaluation of PVR and CO/CI     Will need referral to [facility] Adv clinic for aggressive management of sarcoidosis and pulmonary HTN.

## 2023-03-02 NOTE — CONSULTS
"[facility] - [facility] Telemetry  Gastroenterology  Consult Note    Patient Name: Bo Ku  MRN: [id number]  Admission Date: 2/28/2023  Hospital Length of Stay: 2 days  Code Status: Full Code   Attending Provider: Cecilio Cooper MD   Consulting Provider: Goldy Westfall MD  Primary Care Physician: JEREMIAS Stone  Principal Problem:Pulmonary hypertension    Inpatient consult to Gastroenterology  Consult performed by: JEREMIAS Trotter  Consult ordered by: Cecilio Cooper MD  Reason for consult: dysphagia  Assessment/Recommendations: Patient seen and examined. Agree with findings of note as detailed below. 33 yo male with sarcoidosis is seen for c/o solid food dysphagia for the past 3-4 months with daily sx's. He did have dose of Lovenox last night. Recc EGD- plan to hold Lovenox and proceed tomorrow with esophageal dilation if indicated.      Subjective:     HPI:  Mr. Gill is a 32-year-old male who was admitted to the hospital on yesterday with reports of shortness of breath and cough.  Patient is a fairly good historian therefore information is obtained from patient review as well as chart review.  Patient is a prior history of pulmonary hypertension and sarcoidosis.  Per was reportedly diagnosed in 2019 with pulmonary sarcoidosis.  He reportedly has not been able however to take his regular medicines recently due to financial reasons.  He states several days ago he began having some increased shortness of breath and swelling to his lower extremities and cough therefore he presented to the emergency room for evaluation.  On admission he also brought forth that over the last several months he has been having some dysphagia-like symptoms.  He states that when he eats solids and takes his pills, he will have a sensation of the food becoming "stuck" with also his description of early satiety.  He seems to tolerate liquids okay but does state at times he will regurgitate these when he has a " coughing episode.  He does deny any pain with swallowing.  He denies any significant GERD symptoms.  He has no significant findings of oral lesions as well.  He was seen by cardiology and he did undergo a right heart catheterization on yesterday.  Patient states that he has not had any prior endoscopy in the past.  He has noted at this time currently be on Lovenox injections.      Past Medical History:   Diagnosis Date    Hypertension     Sarcoidosis     Sarcoidosis 2019       Past Surgical History:   Procedure Laterality Date    ANTERIOR CRUCIATE LIGAMENT REPAIR Left 09/01/2004    ANTERIOR CRUCIATE LIGAMENT REPAIR Left     FRACTURE SURGERY      RIGHT HEART CATHETERIZATION Right 3/1/2023    Procedure: INSERTION, CATHETER, RIGHT HEART;  Surgeon: Abdelrahman Adam MD;  Location: [facility] CATH LAB;  Service: Cardiology;  Laterality: Right;       Review of patient's allergies indicates:   Allergen Reactions    Fish containing products Anaphylaxis    Shellfish containing products Anaphylaxis    Lisinopril      Family History    None       Tobacco Use    Smoking status: Never    Smokeless tobacco: Never   Substance and Sexual Activity    Alcohol use: Yes     Alcohol/week: 2.0 standard drinks     Types: 2 Cans of beer per week    Drug use: Never    Sexual activity: Yes     Partners: Female     Review of Systems   Constitutional:  Negative for activity change, appetite change, fever and unexpected weight change.   HENT:  Positive for trouble swallowing. Negative for mouth sores and sore throat.    Eyes:  Negative for visual disturbance.   Respiratory:  Positive for cough and shortness of breath.    Cardiovascular:  Negative for chest pain.   Gastrointestinal:  Negative for abdominal distention, abdominal pain, anal bleeding, blood in stool, constipation, diarrhea, nausea, rectal pain and vomiting.   Endocrine: Negative for cold intolerance and heat intolerance.   Genitourinary:  Negative for dysuria, frequency, hematuria  and urgency.   Musculoskeletal:  Negative for arthralgias and myalgias.   Skin:  Negative for color change.   Neurological:  Negative for speech difficulty, weakness and headaches.   Psychiatric/Behavioral:  Negative for confusion.    Objective:     Vital Signs (Most Recent):  Temp: 97.8 °F (36.6 °C) (03/02/23 1103)  Pulse: 80 (03/02/23 1112)  Resp: 18 (03/02/23 1112)  BP: (!) 139/103 (03/02/23 1103)  SpO2: 100 % (03/02/23 1112)   Vital Signs (24h Range):  Temp:  [97 °F (36.1 °C)-98.4 °F (36.9 °C)] 97.8 °F (36.6 °C)  Pulse:  [] 80  Resp:  [18-20] 18  SpO2:  [95 %-100 %] 100 %  BP: (118-151)/() 139/103     Weight: 70.3 kg (155 lb) (02/28/23 1809)  Body mass index is 20.45 kg/m².    No intake or output data in the 24 hours ending 03/02/23 1325    Lines/Drains/Airways       Peripheral Intravenous Line  Duration                  Peripheral IV - Single Lumen 03/01/23 1526 20 G Left Forearm <1 day                    Physical Exam  Vitals and nursing note reviewed.   Constitutional:       General: He is not in acute distress.     Appearance: Normal appearance. He is normal weight. He is not ill-appearing.   HENT:      Head: Normocephalic.      Nose: Nose normal. No congestion or rhinorrhea.      Mouth/Throat:      Mouth: Mucous membranes are moist.      Pharynx: Oropharynx is clear. No oropharyngeal exudate or posterior oropharyngeal erythema.   Eyes:      General: No scleral icterus.     Pupils: Pupils are equal, round, and reactive to light.   Cardiovascular:      Rate and Rhythm: Normal rate and regular rhythm.      Pulses: Normal pulses.      Heart sounds: Normal heart sounds. No murmur heard.  Pulmonary:      Effort: Pulmonary effort is normal.      Breath sounds: Normal breath sounds. No wheezing, rhonchi or rales.   Abdominal:      General: Abdomen is flat. Bowel sounds are normal. There is no distension.      Palpations: Abdomen is soft. There is no mass.      Tenderness: There is no abdominal  tenderness.   Musculoskeletal:         General: Normal range of motion.      Cervical back: Normal range of motion. No tenderness.      Right lower leg: No edema.      Left lower leg: No edema.   Skin:     General: Skin is warm and dry.      Coloration: Skin is not jaundiced.   Neurological:      General: No focal deficit present.      Mental Status: He is alert and oriented to person, place, and time. Mental status is at baseline.      Motor: No weakness.   Psychiatric:         Mood and Affect: Mood normal.         Behavior: Behavior normal.         Thought Content: Thought content normal.         Judgment: Judgment normal.       Significant Labs:  Recent Lab Results  (Last 5 results in the past 24 hours)        03/02/23  1018   03/02/23  0509   03/02/23  0318   03/01/23  2347   03/01/23  1950        Benzodiazepines         Positive       Cocaine         Negative       BARBITURATES         Negative       Amphetamine         Negative       Anion Gap     11           Baso #       0.03         Basophil %       0.2         BUN     16           BUN/CREAT RATIO     17           Calcium     8.4           Cannabinoid Scrn, Ur         Negative       Chloride     103           CHOL/HDLC Ratio     2.1           Cholesterol     98  Comment:   <200 mg/dL:  Desirable  200-240 mg/dL: Borderline High  >240 mg/dL:  High           CO2     29           Creatinine     0.95           CRP     4.00           Differential Type       Auto         eGFR     108           Eos #       0.00         Eosinophil %       0.0         Estimated Avg Glucose     81           Glucose     130           HDL     46  Comment:   <40 mg/dL: Low HDL  40-60 mg/dL: Normal  >60 mg/dL: Desirable           Hematocrit       41.5         Hemoglobin       14.0         Hemoglobin A1C External     5.0  Comment:   Normal:               <5.7%  Pre-Diabetic:       5.7% to 6.4%  Diabetic:             >6.4%  Diabetic Goal:     <7%           Immature Grans (Abs)       0.08          Immature Granulocytes       0.5         LDL Calculated     41           Lymph #       0.86         Lymph %       5.2         Magnesium     1.6           MCH       29.6         MCHC       33.7         MCV       87.7         Mono #       1.47         Mono %       8.9         MPV       9.7         Neutrophils, Abs       14.12         Neutrophils Relative       85.2         Non-HDL Cholesterol     52           nRBC       0.0         NUCLEATED RBC ABSOLUTE       0.00         Opiate Scrn, Ur         Negative       Phencyclidine         Negative       Platelets       220         POC Glucose 83   79             Potassium     3.3           RBC       4.73         RDW       14.6         Sodium     140           Triglycerides     57  Comment:   Normal:  <150 mg/dL  Borderline High: 150-199 mg/dL  High:   200-499 mg/dL  Very High:  >=500           Vit D, 25-Hydroxy     9.2  Comment: Vitamin D 25-OH Adult Reference Values:  Deficiency: <20 ng/mL  Insufficiency: 20 - <30 ng/mL  Sufficiency: 30 -100 ng/mL    Vitamin D 25-OH Pediatric Reference Values:  Deficiency: <15 ng/mL  Insufficiency: 15 - <20 ng/mL  Sufficiency: 20 - 100 ng/mL           VLDL Cholesterol Josué     11           WBC       16.56                                Significant Imaging:  Imaging results within the past 24 hours have been reviewed.    Assessment/Plan:     GI  Esophageal dysphagia  3/2/2023-patient admitted with shortness of breath and cough as noted with also reports of dysphagia times several months.  No prior endoscopic history.  Painful swallowing or significant GI dyspepsia/GERD.  Reports early satiety as well as occasional regurgitation.  Could consider upper endoscopy to further evaluate if okay from a cardiac/pulmonary standpoint, however will review case further with Dr. Westfall with plan an addendum to follow.        Thank you for your consult. I will follow-up with patient. Please contact us if you have any additional questions.    Caprice  JEREMIAS Pittman  Gastroenterology  [facility] - [address] [place] [address]

## 2023-03-02 NOTE — ASSESSMENT & PLAN NOTE
- Patient seen and evaluated by Dr. Leal  - Pt presented with sob, edema, and chest pain; symptoms improving with IV diuresis  - Echo with LVEF 50%, moderate RV enlargement with moderately reduced RV systolic function The left ventricle is normal in size with concentric remodeling and low normal systolic function. There is abnormal septal wall motion. There is systolic and diastolic flattening of the interventricular septum consistent with right ventricle pressure and volume overload. Severe RA enlargement. Severe TR. Normal CVP. Pulmonary hypertension, PASP 67 mmHg  - Troponin negative x 1; BNP 15,000; EKG Sinus tachycardia, Biatrial enlargement ST and T wave abnormality, consider inferior/anterior ischemia   Nonspecific T wave abnormality now evident in Lateral leads   - CT PE performed to rule out PE  - Plan for RHC once CT PE complete. Procedure, risk and benefits discussed in detail with patient, he verbalized understanding and wishes to proceed. Consent obtained and on chart.  - Further recommendations to follow    3/2/2023:  - CT PE negative for PE; There are consolidative and airspace opacities which are scattered throughout the lungs, which are grossly similar in size relative to 3/5/22. Some of the larger consolidative opacities within the left lower lobe in the right middle lobe appear to have demonstrated cavitation. The largest cavity is located within the right middle lobe measuring 4.4 x 3.6 cm  - RHC 1. Moderate pulmonary hypertension, PASP 50 mmHg. 2. Low-normal cardiac output by thermodilution, normal CO by Katherine method. 3. Normal pulmonary capillary wedge pressure, 12 mmHg.   - Severe TR  - Recommend outpatient referral to [facility] advanced heart failure specialist for pulmonary hypertension/sarcoidosis. Will have Areli in clinic do this referral.  - Follow up with JEREMIAS Jean Baptiste in 2 weeks; Hospital discharge/volume status and BP eval

## 2023-03-02 NOTE — PROGRESS NOTES
[facility] - [facility] Medical Telemetry  Cardiology  Progress Note    Patient Name: Bo Ku  MRN: [id number]  Admission Date: 2/28/2023  Hospital Length of Stay: 2 days  Code Status: Full Code   Attending Physician: Cecilio Cooper MD   Primary Care Physician: Nhan Chavez, JEREMIAS  Expected Discharge Date:   Principal Problem:Pulmonary hypertension    Subjective:     Hospital Course:   No notes on file    Interval History: Patient seen today, reports orthopnea and dyspnea with exertion improved, transitioned to PO lasix today.    Review of Systems   Constitutional: Negative for chills and fever.   Eyes:  Positive for visual disturbance.   Cardiovascular:  Positive for chest pain, dyspnea on exertion, leg swelling and orthopnea. Negative for palpitations.   Respiratory:  Positive for shortness of breath.    Hematologic/Lymphatic: Negative for bleeding problem.   Gastrointestinal:  Positive for bloating. Negative for abdominal pain, nausea and vomiting.   Neurological: Negative.    Objective:     Vital Signs (Most Recent):  Temp: 97.8 °F (36.6 °C) (03/02/23 1103)  Pulse: 80 (03/02/23 1112)  Resp: 18 (03/02/23 1112)  BP: (!) 139/103 (03/02/23 1103)  SpO2: 100 % (03/02/23 1112)   Vital Signs (24h Range):  Temp:  [97 °F (36.1 °C)-98.4 °F (36.9 °C)] 97.8 °F (36.6 °C)  Pulse:  [] 80  Resp:  [18-20] 18  SpO2:  [95 %-100 %] 100 %  BP: (118-151)/() 139/103     Weight: 70.3 kg (155 lb)  Body mass index is 20.45 kg/m².     SpO2: 100 %       No intake or output data in the 24 hours ending 03/02/23 1443    Lines/Drains/Airways       Peripheral Intravenous Line  Duration                  Peripheral IV - Single Lumen 03/01/23 1526 20 G Left Forearm <1 day                    Physical Exam  Vitals reviewed.   Neck:      Vascular: No JVD.   Cardiovascular:      Rate and Rhythm: Normal rate and regular rhythm.   Pulmonary:      Effort: Pulmonary effort is normal.      Breath sounds: Normal breath sounds. No  wheezing, rhonchi or rales.   Abdominal:      General: Bowel sounds are normal.      Palpations: Abdomen is soft.   Musculoskeletal:      Right lower leg: No edema.      Left lower leg: No edema.   Skin:     General: Skin is warm and dry.      Coloration: Skin is not jaundiced or pale.   Neurological:      Mental Status: He is alert and oriented to person, place, and time.   Psychiatric:         Mood and Affect: Mood normal.         Behavior: Behavior normal.       Significant Labs: ABG: No results for input(s): PH, PCO2, HCO3, POCSATURATED, BE in the last 48 hours., Blood Culture: No results for input(s): LABBLOO in the last 48 hours., BMP:   Recent Labs   Lab 03/01/23  0502 03/02/23  0318   * 130*    140   K 3.8 3.3*    103   CO2 29 29   BUN 15 16   CREATININE 1.29 0.95   CALCIUM 8.6 8.4*   MG  --  1.6*   , CMP   Recent Labs   Lab 03/01/23  0502 03/02/23  0318    140   K 3.8 3.3*    103   CO2 29 29   * 130*   BUN 15 16   CREATININE 1.29 0.95   CALCIUM 8.6 8.4*   PROT 7.6  --    ALBUMIN 2.7*  --    BILITOT 0.6  --    ALKPHOS 127*  --    AST 18  --    ALT 15*  --    ANIONGAP 14 11   , CBC   Recent Labs   Lab 03/01/23  2347   WBC 16.56*   HGB 14.0   HCT 41.5      , Lipid Panel   Recent Labs   Lab 03/02/23  0318   CHOL 98   HDL 46   LDLCALC 41   TRIG 57   CHOLHDL 2.1   , and Troponin No results for input(s): TROPONINI in the last 48 hours.    Significant Imaging:   Cardiac Cath: RHC  Reading physician: Abdelrahman Adam MD Ordering physician: JOSSIE Ferro Study date: 3/1/23     Patient Information    Name MRN Description   Bo Ku [id number] 32 y.o. male     Physicians    Panel Physicians Referring Physician Case Authorizing Physician   Abdelrahman Adam MD (Primary) MD Dileep Hebert ACNP     Indications    CHF (congestive heart failure) [I50.9 (ICD-10-CM)]   Cardiac sarcoidosis [D86.85 (ICD-10-CM)]   Cor pulmonale [I27.81 (ICD-10-CM)]      Summary         The estimated blood loss was none.    The filling pressures on the right were mildly elevated. Pulmonary hypertension was moderate.     Impression:    1. Moderate pulmonary hypertension, PASP 50 mmHg.      2. Low-normal cardiac output by thermodilution, normal CO by Katherine method.     3. Normal pulmonary capillary wedge pressure, 12 mmHg.      , Echocardiogram: Transthoracic echo (TTE) complete (Cupid Only):   Results for orders placed or performed during the hospital encounter of 02/28/23   Echo   Result Value Ref Range    BSA 1.9 m2    Right Atrial Pressure (from IVC) 3 mmHg    EF 50 %    Left Ventricular Outflow Tract Mean Gradient 1.00 mmHg    AORTIC VALVE CUSP SEPERATION 2.01 cm    LVIDd 4.14 3.5 - 6.0 cm    IVS 0.99 0.6 - 1.1 cm    Posterior Wall 1.05 0.6 - 1.1 cm    Ao root annulus 2.80 cm    LVIDs 2.99 2.1 - 4.0 cm    FS 28 28 - 44 %    IVC ostium 1.4 cm    LV mass 137.94 g    LA size 2.70 cm    RVDD 5.20 cm    TAPSE 2.20 cm    Left Ventricle Relative Wall Thickness 0.51 cm    AV mean gradient 1 mmHg    AV valve area 2.53 cm2    AV Velocity Ratio 0.83     AV index (prosthetic) 0.67     E wave deceleration time 188.00 msec    LVOT diameter 2.20 cm    LVOT area 3.8 cm2    LVOT peak thai 0.5 m/s    LVOT peak VTI 6.00 cm    Ao peak thai 0.6 m/s    Ao VTI 9.00 cm    LVOT stroke volume 22.80 cm3    AV peak gradient 1 mmHg    TV rest pulmonary artery pressure 67 mmHg    MV Peak E Thai 0.31 m/s    TR Max Thai 4.00 m/s    LV Systolic Volume 34.70 mL    LV Systolic Volume Index 18.0 mL/m2    LV Diastolic Volume 75.90 mL    LV Diastolic Volume Index 39.33 mL/m2    LV Mass Index 71 g/m2    Echo EF Estimated 65 %    RA Major Axis 7.00 cm    Triscuspid Valve Regurgitation Peak Gradient 64 mmHg    Narrative    · The left ventricle is normal in size with concentric remodeling and low   normal systolic function.  · The estimated ejection fraction is 50%.  · Moderate right ventricular enlargement with moderately  reduced right   ventricular systolic function.  · There is abnormal septal wall motion. There is systolic and diastolic   flattening of the interventricular septum consistent with right ventricle   pressure and volume overload.  · Severe right atrial enlargement.  · Severe tricuspid regurgitation.  · Normal central venous pressure (3 mmHg).  · The estimated PA systolic pressure is 67 mmHg.  · There is pulmonary hypertension.        EKG 2/28/2023:  Sinus tachycardia   Biatrial enlargement   ST and T wave abnormality, consider inferior ischemia   ST and T wave abnormality, consider anterior ischemia   Abnormal ECG   When compared with ECG of 05-MAR-2022 10:40,   Nonspecific T wave abnormality now evident in Lateral leads    , and X-Ray: CXR: X-Ray Chest 1 View (CXR): X-Ray Chest AP Portable  Order: [id number]  Status: Final result     Visible to patient: No (inaccessible in Patient Portal)     Next appt: 03/27/2023 at 08:00 AM in Family Medicine ([facility])     0 Result Notes  Details    Reading Physician Reading Date Result Priority   Misha Strange, DO  [phone] 2/28/2023 STAT     Narrative & Impression  EXAMINATION:  XR CHEST AP PORTABLE     CLINICAL HISTORY:  SOB;     COMPARISON:  Chest x-ray March 5, 2022     TECHNIQUE:  Frontal view/views of the chest.     FINDINGS:  Heart partially obscured.  Chronic patchy opacification of the bilateral lower lungs which may be related to patient's history of sarcoidosis.  Visualized osseous and surrounding soft tissue structures appear grossly unchanged.     Impression:     As above.     Point of Service: [facility]        Electronically signed by: Misha Strange  Date:                                            02/28/2023  Time:                                           12:16     Assessment and Plan:     Brief HPI: 33 y/o male with PMH of pulmonary sarcoidosis and essential hypertension who initially presented to outside ED with shortness breath,  increased abdominal girth and bilateral lower extremity edema for the past 3 days.  Associated symptoms include dry cough and 3 pillow orthopnea but patient otherwise denied any fever chills hemoptysis chest pain or palpitation.  Patient stated that from time to time he experienced dyspnea but he never experienced swelling of abdomen and bilateral lower extremity previously from initial diagnosis of pulmonary sarcoidosis in 2019.  Other than involvement of lungs, patient is not aware of any other organs involvement associated with sarcoidosis.      On initial presentation vital signs were stable and patient was afebrile and did not have any supplemental oxygen requirement.  Workup was notable for elevation of proBNP and x-ray demonstrating chronic appearing bibasal infiltrates suggestive of pulmonary sarcoidosis but otherwise unremarkable.  Patient was given a dose of IV Lasix with significant improvement in symptomatology and was subsequently transferred to this facility for higher level of care.      Cardiology consulted for cardiac sarcoidosis.    * Pulmonary hypertension  - Patient seen and evaluated by Dr. Leal  - Pt presented with sob, edema, and chest pain; symptoms improving with IV diuresis  - Echo with LVEF 50%, moderate RV enlargement with moderately reduced RV systolic function The left ventricle is normal in size with concentric remodeling and low normal systolic function. There is abnormal septal wall motion. There is systolic and diastolic flattening of the interventricular septum consistent with right ventricle pressure and volume overload. Severe RA enlargement. Severe TR. Normal CVP. Pulmonary hypertension, PASP 67 mmHg  - Troponin negative x 1; BNP 15,000; EKG Sinus tachycardia, Biatrial enlargement ST and T wave abnormality, consider inferior/anterior ischemia   Nonspecific T wave abnormality now evident in Lateral leads   - CT PE performed to rule out PE  - Plan for RHC once CT PE  complete. Procedure, risk and benefits discussed in detail with patient, he verbalized understanding and wishes to proceed. Consent obtained and on chart.  - Further recommendations to follow    3/2/2023:  - CT PE negative for PE; There are consolidative and airspace opacities which are scattered throughout the lungs, which are grossly similar in size relative to 3/5/22. Some of the larger consolidative opacities within the left lower lobe in the right middle lobe appear to have demonstrated cavitation. The largest cavity is located within the right middle lobe measuring 4.4 x 3.6 cm  - RHC 1. Moderate pulmonary hypertension, PASP 50 mmHg. 2. Low-normal cardiac output by thermodilution, normal CO by Katherine method. 3. Normal pulmonary capillary wedge pressure, 12 mmHg.   - Severe TR  - Recommend outpatient referral to [facility] advanced heart failure specialist for pulmonary hypertension/sarcoidosis. Will have Areli in clinic do this referral.  - Follow up with JEREMIAS Jean Baptiste in 2 weeks; Hospital discharge/volume status and BP eval        RVF (right ventricular failure)  Pulmonary HTN with RV dysfunction. 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 S/p IV lasix with improvement in symptoms. Will plan for RHC for evaluation of PVR and CO/CI     Will need referral to [facility] Adv clinic for aggressive management of sarcoidosis and pulmonary HTN.     Sarcoidosis  - Pulmonary sarcoidosis followed by pulmonology at [facility]; per patient, currently taking prednisone and azithromycin         VTE Risk Mitigation (From admission, onward)           Ordered     enoxaparin injection 40 mg  Daily         02/28/23 2035     IP VTE HIGH RISK PATIENT  Once         02/28/23 2035     Place sequential compression device  Until discontinued         02/28/23 2035                    JEREMIAS Torres  Cardiology  [facility] - [address] [place] [address]

## 2023-03-02 NOTE — PROGRESS NOTES
Spoke with pt. States RFV site was sore & oozed over the night and had to be changed this morning. Otherwise everything was ok.

## 2023-03-03 ENCOUNTER — ANESTHESIA EVENT (OUTPATIENT)
Dept: GASTROENTEROLOGY | Facility: HOSPITAL | Age: 33
DRG: 287 | End: 2023-03-03

## 2023-03-03 ENCOUNTER — ANESTHESIA (OUTPATIENT)
Dept: GASTROENTEROLOGY | Facility: HOSPITAL | Age: 33
DRG: 287 | End: 2023-03-03
Payer: MEDICAID

## 2023-03-03 VITALS
BODY MASS INDEX: 20.54 KG/M2 | SYSTOLIC BLOOD PRESSURE: 145 MMHG | WEIGHT: 155 LBS | HEIGHT: 73 IN | HEART RATE: 89 BPM | OXYGEN SATURATION: 96 % | RESPIRATION RATE: 18 BRPM | TEMPERATURE: 97 F | DIASTOLIC BLOOD PRESSURE: 109 MMHG

## 2023-03-03 VITALS — HEART RATE: 102 BPM | OXYGEN SATURATION: 100 % | SYSTOLIC BLOOD PRESSURE: 122 MMHG | DIASTOLIC BLOOD PRESSURE: 90 MMHG

## 2023-03-03 LAB
ANION GAP SERPL CALCULATED.3IONS-SCNC: 10 MMOL/L (ref 7–16)
BUN SERPL-MCNC: 19 MG/DL (ref 7–18)
BUN/CREAT SERPL: 22 (ref 6–20)
CALCIUM SERPL-MCNC: 8.3 MG/DL (ref 8.5–10.1)
CHLORIDE SERPL-SCNC: 103 MMOL/L (ref 98–107)
CO2 SERPL-SCNC: 32 MMOL/L (ref 21–32)
CREAT SERPL-MCNC: 0.86 MG/DL (ref 0.7–1.3)
EGFR (NO RACE VARIABLE) (RUSH/TITUS): 117 ML/MIN/1.73M²
GLUCOSE SERPL-MCNC: 77 MG/DL (ref 70–105)
GLUCOSE SERPL-MCNC: 91 MG/DL (ref 74–106)
GLUCOSE SERPL-MCNC: 92 MG/DL (ref 70–105)
POTASSIUM SERPL-SCNC: 3.8 MMOL/L (ref 3.5–5.1)
SODIUM SERPL-SCNC: 141 MMOL/L (ref 136–145)

## 2023-03-03 PROCEDURE — 25000003 PHARM REV CODE 250: Performed by: HOSPITALIST

## 2023-03-03 PROCEDURE — 25000003 PHARM REV CODE 250: Performed by: NURSE ANESTHETIST, CERTIFIED REGISTERED

## 2023-03-03 PROCEDURE — 63600175 PHARM REV CODE 636 W HCPCS: Performed by: INTERNAL MEDICINE

## 2023-03-03 PROCEDURE — D9220A PRA ANESTHESIA: Mod: ,,, | Performed by: NURSE ANESTHETIST, CERTIFIED REGISTERED

## 2023-03-03 PROCEDURE — 63600175 PHARM REV CODE 636 W HCPCS: Performed by: HOSPITALIST

## 2023-03-03 PROCEDURE — D9220A PRA ANESTHESIA: ICD-10-PCS | Mod: ,,, | Performed by: NURSE ANESTHETIST, CERTIFIED REGISTERED

## 2023-03-03 PROCEDURE — 80048 BASIC METABOLIC PNL TOTAL CA: CPT | Performed by: HOSPITALIST

## 2023-03-03 PROCEDURE — 25000003 PHARM REV CODE 250: Performed by: INTERNAL MEDICINE

## 2023-03-03 PROCEDURE — 43450 DILATE ESOPHAGUS 1/MULT PASS: CPT | Performed by: INTERNAL MEDICINE

## 2023-03-03 PROCEDURE — 63600175 PHARM REV CODE 636 W HCPCS: Performed by: NURSE ANESTHETIST, CERTIFIED REGISTERED

## 2023-03-03 PROCEDURE — 99239 HOSP IP/OBS DSCHRG MGMT >30: CPT | Mod: ,,, | Performed by: HOSPITALIST

## 2023-03-03 PROCEDURE — 99239 PR HOSPITAL DISCHARGE DAY,>30 MIN: ICD-10-PCS | Mod: ,,, | Performed by: HOSPITALIST

## 2023-03-03 PROCEDURE — 82962 GLUCOSE BLOOD TEST: CPT

## 2023-03-03 RX ORDER — ALBUTEROL SULFATE 90 UG/1
2 AEROSOL, METERED RESPIRATORY (INHALATION) EVERY 6 HOURS PRN
Qty: 18 G | Refills: 0 | Status: SHIPPED | OUTPATIENT
Start: 2023-03-03 | End: 2024-03-02

## 2023-03-03 RX ORDER — FUROSEMIDE 40 MG/1
40 TABLET ORAL DAILY
Qty: 30 TABLET | Refills: 0 | Status: ON HOLD | OUTPATIENT
Start: 2023-03-04 | End: 2023-03-18 | Stop reason: SDUPTHER

## 2023-03-03 RX ORDER — CHOLECALCIFEROL (VITAMIN D3) 25 MCG
1000 TABLET ORAL DAILY
Start: 2023-03-04

## 2023-03-03 RX ORDER — PREDNISONE 20 MG/1
20 TABLET ORAL DAILY
Qty: 30 TABLET | Refills: 0 | Status: SHIPPED | OUTPATIENT
Start: 2023-03-03 | End: 2023-04-02

## 2023-03-03 RX ORDER — HYDRALAZINE HYDROCHLORIDE 25 MG/1
25 TABLET, FILM COATED ORAL EVERY 8 HOURS
Qty: 90 TABLET | Refills: 0 | Status: ON HOLD | OUTPATIENT
Start: 2023-03-03 | End: 2023-03-18 | Stop reason: HOSPADM

## 2023-03-03 RX ORDER — VALSARTAN 320 MG/1
320 TABLET ORAL DAILY
Qty: 30 TABLET | Refills: 0 | Status: SHIPPED | OUTPATIENT
Start: 2023-03-04 | End: 2024-03-03

## 2023-03-03 RX ORDER — NIFEDIPINE 60 MG/1
60 TABLET, EXTENDED RELEASE ORAL DAILY
Qty: 30 TABLET | Refills: 0 | Status: SHIPPED | OUTPATIENT
Start: 2023-03-04 | End: 2024-03-03

## 2023-03-03 RX ORDER — PROPOFOL 10 MG/ML
VIAL (ML) INTRAVENOUS CONTINUOUS PRN
Status: DISCONTINUED | OUTPATIENT
Start: 2023-03-03 | End: 2023-03-03

## 2023-03-03 RX ORDER — GUAIFENESIN 600 MG/1
600 TABLET, EXTENDED RELEASE ORAL 2 TIMES DAILY
Qty: 14 TABLET | Refills: 0 | Status: SHIPPED | OUTPATIENT
Start: 2023-03-03 | End: 2023-03-10

## 2023-03-03 RX ORDER — LIDOCAINE HYDROCHLORIDE 20 MG/ML
INJECTION, SOLUTION EPIDURAL; INFILTRATION; INTRACAUDAL; PERINEURAL
Status: DISCONTINUED | OUTPATIENT
Start: 2023-03-03 | End: 2023-03-03

## 2023-03-03 RX ADMIN — MUPIROCIN: 20 OINTMENT TOPICAL at 09:03

## 2023-03-03 RX ADMIN — GUAIFENESIN 600 MG: 600 TABLET, EXTENDED RELEASE ORAL at 09:03

## 2023-03-03 RX ADMIN — PROPOFOL 100 MCG/KG/MIN: 10 INJECTION, EMULSION INTRAVENOUS at 02:03

## 2023-03-03 RX ADMIN — FUROSEMIDE 40 MG: 40 TABLET ORAL at 09:03

## 2023-03-03 RX ADMIN — LIDOCAINE HYDROCHLORIDE 60 MG: 20 INJECTION, SOLUTION INTRAVENOUS at 02:03

## 2023-03-03 RX ADMIN — HYDRALAZINE HYDROCHLORIDE 25 MG: 25 TABLET ORAL at 03:03

## 2023-03-03 RX ADMIN — MULTIPLE VITAMINS W/ MINERALS TAB 1 TABLET: TAB at 09:03

## 2023-03-03 RX ADMIN — VALSARTAN 320 MG: 80 TABLET, FILM COATED ORAL at 09:03

## 2023-03-03 RX ADMIN — CHOLECALCIFEROL (VITAMIN D3) 25 MCG (1,000 UNIT) TABLET 1000 UNITS: at 09:03

## 2023-03-03 RX ADMIN — HYDRALAZINE HYDROCHLORIDE 25 MG: 25 TABLET ORAL at 05:03

## 2023-03-03 RX ADMIN — SODIUM CHLORIDE: 9 INJECTION, SOLUTION INTRAVENOUS at 02:03

## 2023-03-03 RX ADMIN — PREDNISONE 40 MG: 20 TABLET ORAL at 09:03

## 2023-03-03 RX ADMIN — MAGNESIUM SULFATE HEPTAHYDRATE 2 G: 2 INJECTION, SOLUTION INTRAVENOUS at 01:03

## 2023-03-03 RX ADMIN — NIFEDIPINE 60 MG: 30 TABLET, FILM COATED, EXTENDED RELEASE ORAL at 09:03

## 2023-03-03 NOTE — ASSESSMENT & PLAN NOTE
Check hemoglobin A1c and follow-up blood sugar  Blood sugar likely worsen with steroids    03/02 tapering O2 will help    03/03--stable

## 2023-03-03 NOTE — ANESTHESIA POSTPROCEDURE EVALUATION
Anesthesia Post Evaluation    Patient: Bo Ku    Procedure(s) Performed: * No procedures listed *    Final Anesthesia Type: general      Patient location during evaluation: GI PACU  Patient participation: Yes- Able to Participate  Level of consciousness: awake and alert  Post-procedure vital signs: reviewed and stable  Pain management: adequate  Airway patency: patent    PONV status at discharge: No PONV  Anesthetic complications: no      Cardiovascular status: blood pressure returned to baseline and hemodynamically stable  Respiratory status: spontaneous ventilation  Hydration status: euvolemic  Follow-up not needed.  Comments: Pt voices appreciation for care          Vitals Value Taken Time   /109 03/03/23 1545   Temp 36.2 °C (97.2 °F) 03/03/23 1545   Pulse 89 03/03/23 1545   Resp 18 03/03/23 1545   SpO2 96 % 03/03/23 1545         Event Time   Out of Recovery 03/03/2023 15:03:31         Pain/Ramya Score: Ramya Score: 10 (3/3/2023  2:42 PM)

## 2023-03-03 NOTE — ANESTHESIA PREPROCEDURE EVALUATION
03/03/2023  Bo Ku is a 32 y.o., male.    Past Medical History:   Diagnosis Date    Hypertension     Sarcoidosis     Sarcoidosis 2019       Past Surgical History:   Procedure Laterality Date    ANTERIOR CRUCIATE LIGAMENT REPAIR Left 09/01/2004    ANTERIOR CRUCIATE LIGAMENT REPAIR Left     FRACTURE SURGERY      RIGHT HEART CATHETERIZATION Right 3/1/2023    Procedure: INSERTION, CATHETER, RIGHT HEART;  Surgeon: Abdelrahman Adam MD;  Location: [facility] CATH LAB;  Service: Cardiology;  Laterality: Right;       History reviewed. No pertinent family history.    Social History     Socioeconomic History    Marital status: Single   Tobacco Use    Smoking status: Never    Smokeless tobacco: Never   Substance and Sexual Activity    Alcohol use: Yes     Alcohol/week: 2.0 standard drinks     Types: 2 Cans of beer per week    Drug use: Never    Sexual activity: Yes     Partners: Female       Current Facility-Administered Medications   Medication Dose Route Frequency Provider Last Rate Last Admin    acetaminophen tablet 650 mg  650 mg Oral Q4H PRN Alvarado Eugene MD        albuterol-ipratropium 2.5 mg-0.5 mg/3 mL nebulizer solution 3 mL  3 mL Nebulization Q4H PRN Alvarado Eugene MD        cloNIDine tablet 0.1 mg  0.1 mg Oral Q4H PRN Cecilio Cooper MD   0.1 mg at 03/01/23 1814    dextrose 10% bolus 125 mL 125 mL  12.5 g Intravenous PRN Cecilio Cooper MD        dextrose 10% bolus 250 mL 250 mL  25 g Intravenous PRN Cecilio Cooper MD        dextrose 40 % gel 15,000 mg  15 g Oral PRN Cecilio Cooper MD        dextrose 40 % gel 30,000 mg  30 g Oral PRN Cecilio Cooper MD        furosemide tablet 40 mg  40 mg Oral Daily Cecilio Cooper MD   40 mg at 03/03/23 0945    glucagon (human recombinant) injection 1 mg  1 mg Intramuscular PRN Cecilio Cooper MD        guaiFENesin 12 hr tablet 600 mg  600 mg Oral BID  Cecilio Cooper MD   600 mg at 03/03/23 0945    hydrALAZINE tablet 25 mg  25 mg Oral Q8H Cecilio Cooper MD   25 mg at 03/03/23 0553    HYDROcodone-acetaminophen 5-325 mg per tablet 1 tablet  1 tablet Oral Q6H PRN Alvarado Eugene MD        insulin aspart U-100 injection 0-5 Units  0-5 Units Subcutaneous QID (AC + HS) PRN Cecilio Cooper MD        multivitamin tablet  1 tablet Oral Daily Cecilio Cooper MD   1 tablet at 03/03/23 0944    mupirocin 2 % ointment   Nasal BID Cecilio Cooper MD   Given at 03/03/23 0946    naloxone 0.4 mg/mL injection 0.02 mg  0.02 mg Intravenous PRN Alvarado Eugene MD        NIFEdipine 24 hr tablet 60 mg  60 mg Oral Daily Cecilio Cooper MD   60 mg at 03/03/23 0945    ondansetron injection 4 mg  4 mg Intravenous Q8H PRN Alvarado Eugene MD        predniSONE tablet 40 mg  40 mg Oral Daily Alvarado Eugene MD   40 mg at 03/03/23 0944    sodium chloride 0.9% flush 10 mL  10 mL Intravenous Q12H PRN Alvarado Eugene MD        valsartan tablet 320 mg  320 mg Oral Daily Alvarado Eugene MD   320 mg at 03/03/23 0945    vitamin D 1000 units tablet 1,000 Units  1,000 Units Oral Daily Cecilio Cooper MD   1,000 Units at 03/03/23 0945       Review of patient's allergies indicates:   Allergen Reactions    Fish containing products Anaphylaxis    Shellfish containing products Anaphylaxis    Lisinopril        Pre-op Assessment    I have reviewed the Patient Summary Reports.     I have reviewed the Nursing Notes. I have reviewed the NPO Status.   I have reviewed the Medications.     Review of Systems  Anesthesia Hx:  No problems with previous Anesthesia  Denies Family Hx of Anesthesia complications.   Denies Personal Hx of Anesthesia complications.   Hematology/Oncology:     Oncology Normal     EENT/Dental:EENT/Dental Normal   Cardiovascular:   Hypertension hyperlipidemia    Pulmonary:   Asthma    Renal/:  Renal/ Normal     Musculoskeletal:  Musculoskeletal Normal    Neurological:  Neurology Normal    Dermatological:  Skin  Normal    Psych:  Psychiatric Normal           Physical Exam  General: Well nourished, Alert, Oriented and Cooperative    Airway:  Mouth Opening: Normal  Neck ROM: Normal ROM    Chest/Lungs:  Normal Respiratory Rate    Heart:  Rate: Normal        Anesthesia Plan  Type of Anesthesia, risks & benefits discussed:    Anesthesia Type: Gen Natural Airway, MAC  Intra-op Monitoring Plan: Standard ASA Monitors  Post Op Pain Control Plan: multimodal analgesia and IV/PO Opioids PRN  Induction:  IV  Informed Consent: Informed consent signed with the Patient and all parties understand the risks and agree with anesthesia plan.  All questions answered. Patient consented to blood products? Yes  ASA Score: 3  Day of Surgery Review of History & Physical: I have interviewed and examined the patient. I have reviewed the patient's H&P dated: There are no significant changes.   Anesthesia Plan Notes: Transthoracic echo (TTE) complete  Order# [id number]  Reading physician: Carl Leal MD Ordering physician: Alavrado Eugene MD Study date: 3/1/23    Reason for Exam  Priority: Routine  Dx: Pulmonary artery hypertension (I27.21 (ICD-10-CM))      Summary     The left ventricle is normal in size with concentric remodeling and low normal systolic function.   The estimated ejection fraction is 50%.   Moderate right ventricular enlargement with moderately reduced right ventricular systolic  function.   There is abnormal septal wall motion. There is systolic and diastolic flattening of the interventricular septum consistent with right ventricle pressure and volume overload.   Severe right atrial enlargement.   Severe tricuspid regurgitation.   Normal central venous pressure (3 mmHg).   The estimated PA systolic pressure is 67 mmHg.   There is pulmonary hypertension.      Ready For Surgery From Anesthesia Perspective.     .

## 2023-03-03 NOTE — ASSESSMENT & PLAN NOTE
Check hemoglobin A1c and follow-up blood sugar  Blood sugar likely worsen with steroids    03/02 tapering O2 will help

## 2023-03-03 NOTE — PROGRESS NOTES
[facility] Hospital Medicine  Progress Note    Patient Name: Bo Ku  MRN: [id number]  Patient Class: IP- Inpatient   Admission Date: 2/28/2023  Length of Stay: 2 days  Attending Physician: Cecilio Cooper MD  Primary Care Provider: JEREMIAS Stone        Subjective:     Principal Problem:Pulmonary hypertension        HPI:  Patient is a 32-year-old male with a history of pulmonary sarcoidosis and essential hypertension who initially presented to outside ED with shortness breath, increased abdominal girth and bilateral lower extremity edema for the past 3 days.  Associated symptoms include dry cough and 3 pillow orthopnea but patient otherwise denied any fever chills hemoptysis chest pain or palpitation.  Patient stated that from time to time he experienced dyspnea but he never experienced swelling of abdomen and bilateral lower extremity previously from initial diagnosis of pulmonary sarcoidosis in 2019.  Other than involvement of lungs, patient is not aware of any other organs involvement associated with sarcoidosis.     On initial presentation vital signs were stable and patient was afebrile and did not have any supplemental oxygen requirement.  Workup was notable for elevation of proBNP and x-ray demonstrating chronic appearing bibasal infiltrates suggestive of pulmonary sarcoidosis but otherwise unremarkable.  Patient was given a dose of IV Lasix with significant improvement in symptomatology and was subsequently transferred to this facility for higher level of care.      Overview/Hospital Course:  03/01 - records reviewed.  Patient states several weeks off medication for financial reasons.  Previously medications included prednisone 20 mg daily.  Stopped all this abruptly.  Recently lower abdominal discomfort, peripheral edema and shortness of breath.  No orthopnea.  No chest pain.  Has had some constipation.  Complains of some dysphagia to solids.  Previously  followed for his sarcoidosis at [facility] although currently lives in [place], [place].  Will look into medication needs.  Have GI see related to dysphagia to solids.  Needs follow-up arranged outpatient.  Patient states he is just signed up to try to get Medicaid so that should help with his finances related to medical needs.  Drinks a case of beer every other week in.  Told him this would be good to stop any use money for more important things to him  Seen by cardiology earlier look related to abnormal echocardiogram with severe right-sided changes and pulmonary hypertension.  Taken for right heart catheterization with confirming these findings.  Chest x-ray is abnormal but looks very unchanged from previous studies last couple years.  Positive cocaine in 2021 and check UDS.    03/02 Some better. No other issues. [address]. Talked with him about importance compliance. Noted very low Vit D and low Mg and will correct.      Interval History:     Review of Systems   Constitutional:  Negative for appetite change, fatigue and fever.   HENT:  Negative for congestion, hearing loss and trouble swallowing.    Respiratory:  Positive for shortness of breath. Negative for chest tightness and wheezing.    Cardiovascular:  Positive for leg swelling. Negative for chest pain and palpitations.   Gastrointestinal:  Positive for abdominal pain and constipation. Negative for nausea.   Genitourinary:  Negative for difficulty urinating and dysuria.   Musculoskeletal:  Negative for back pain and neck stiffness.   Skin:  Negative for pallor and rash.   Neurological:  Negative for dizziness, speech difficulty and headaches.   Psychiatric/Behavioral:  Negative for confusion and suicidal ideas.    Objective:     Vital Signs (Most Recent):  Temp: 97.8 °F (36.6 °C) (03/02/23 2050)  Pulse: 94 (03/02/23 2050)  Resp: 18 (03/02/23 2050)  BP: 113/82 (03/02/23 2050)  SpO2: 96 % (03/02/23 2050)   Vital Signs (24h Range):  Temp:  [97.3 °F (36.3  °C)-98.4 °F (36.9 °C)] 97.8 °F (36.6 °C)  Pulse:  [] 94  Resp:  [18-19] 18  SpO2:  [96 %-100 %] 96 %  BP: (113-139)/() 113/82     Weight: 70.3 kg (155 lb)  Body mass index is 20.45 kg/m².  No intake or output data in the 24 hours ending 03/02/23 2248     Physical Exam  Vitals reviewed.   Constitutional:       General: He is awake. He is not in acute distress.     Appearance: He is well-developed. He is not toxic-appearing.   HENT:      Head: Normocephalic.      Comments: Moon face consistent with past steroid use     Nose: Nose normal.      Mouth/Throat:      Pharynx: Oropharynx is clear.   Eyes:      Extraocular Movements: Extraocular movements intact.      Pupils: Pupils are equal, round, and reactive to light.   Neck:      Thyroid: No thyroid mass.      Vascular: No carotid bruit.   Cardiovascular:      Rate and Rhythm: Normal rate and regular rhythm.      Pulses: Normal pulses.      Heart sounds: Normal heart sounds. No murmur heard.  Pulmonary:      Effort: Pulmonary effort is normal.      Breath sounds: Normal breath sounds and air entry. No wheezing.   Abdominal:      General: Bowel sounds are normal. There is no distension.      Palpations: Abdomen is soft.      Tenderness: There is no abdominal tenderness.   Musculoskeletal:         General: Normal range of motion.      Cervical back: Neck supple. No rigidity.   Skin:     General: Skin is warm.      Coloration: Skin is not jaundiced.      Findings: No lesion.   Neurological:      General: No focal deficit present.      Mental Status: He is alert and oriented to person, place, and time.      Cranial Nerves: No cranial nerve deficit.   Psychiatric:         Attention and Perception: Attention normal.         Mood and Affect: Mood normal.         Behavior: Behavior normal. Behavior is cooperative.         Thought Content: Thought content normal.         Cognition and Memory: Cognition normal.       Significant Labs: All pertinent labs within the  past 24 hours have been reviewed.  BMP:   Recent Labs   Lab 03/02/23  0318   *      K 3.3*      CO2 29   BUN 16   CREATININE 0.95   CALCIUM 8.4*   MG 1.6*       CBC:   Recent Labs   Lab 03/01/23  2347   WBC 16.56*   HGB 14.0   HCT 41.5          CMP:   Recent Labs   Lab 03/01/23  0502 03/02/23  0318    140   K 3.8 3.3*    103   CO2 29 29   * 130*   BUN 15 16   CREATININE 1.29 0.95   CALCIUM 8.6 8.4*   PROT 7.6  --    ALBUMIN 2.7*  --    BILITOT 0.6  --    ALKPHOS 127*  --    AST 18  --    ALT 15*  --    ANIONGAP 14 11         Significant Imaging: I have reviewed all pertinent imaging results/findings within the past 24 hours.        Microbiology Results (last 7 days)       ** No results found for the last 168 hours. **          Intake/Output - Last 3 Shifts         03/01 0700  03/02 0659 03/02 0700  03/03 0659    P.O.      Total Intake(mL/kg)      Net                          Assessment/Plan:      * Pulmonary hypertension    Patient likely is experiencing acute decompensation of pulmonary artery hypertension ( Group V ) stemming from pulmonary sarcoidosis.  Chest x-ray demonstrated chronic appearing bibasal infiltrates without any other acute appearing abnormalities.  Will diurese patient with IV Lasix    03/01 normal wedge pressure with right heart catheterization.  Will stop Lasix IV and also stop IV fluids.  Will leave oral Lasix to help with blood pressure  May need Norvasc but might make edema worse, at least will changed to Procardia XL  03/02 some sleeping issue and told when f/u outpt to discuss about out sleep study.As remains stable likely home soon.    Vitamin D deficiency  replace      RVF (right ventricular failure)  Pulmonary HTN  Get outpt sleep study      Esophageal dysphagia    Consult GI related to dysphagia to solid    Chronic alcohol abuse    Patient drinks a case of beer most weak in  Encouraged him to stop drinking, states cause toxic affects to his  heart and liver among other problems  Told could use money for things he needs that would be more port to his health    Hx of noncompliance with medical treatment, presenting hazards to health        Hyperglycemia    Check hemoglobin A1c and follow-up blood sugar  Blood sugar likely worsen with steroids    03/02 tapering O2 will help    Cor pulmonale    Patient has signs and symptoms of cor pulmonale.  Will again diurese patient.      Essential hypertension  Patient has been off his medication several months  Blood pressure up and medicines and been restarted        Sarcoidosis    Patient was initially diagnosed with pulmonary sarcoidosis in 2019 and has been taking prednisone 20 mg daily.  Feel that there was a component of acute exacerbation and thus patient will be started on prednisone 40 mg daily    03/01 patient had stopped his prednisone abruptly a few months previously and had not started back  03/02 needs outpt f/u at [facility]      VTE Risk Mitigation (From admission, onward)         Ordered     IP VTE HIGH RISK PATIENT  Once         02/28/23 2035     Place sequential compression device  Until discontinued         02/28/23 2035                Discharge Planning   FELIPE:      Code Status: Full Code   Is the patient medically ready for discharge?:     Reason for patient still in hospital (select all that apply): Laboratory test, Treatment, Imaging and Consult recommendations                     Cecilio Cooper MD  Department of Hospital Medicine   [facility] - [facility]

## 2023-03-03 NOTE — DISCHARGE SUMMARY
[facility] Hospital Medicine  Discharge Summary      Patient Name: Bo Ku  MRN: [id number]  ANT: [id number]  Patient Class: IP- Inpatient  Admission Date: 2/28/2023  Hospital Length of Stay: 3 days  Discharge Date and Time:  03/03/2023 3:57 PM  Attending Physician: Cecilio Cooper MD   Discharging Provider: JEREMIAS Wing  Primary Care Provider: JEREMIAS Stone    Primary Care Team: Networked reference to record PCT     HPI:   Patient is a 32-year-old male with a history of pulmonary sarcoidosis and essential hypertension who initially presented to outside ED with shortness breath, increased abdominal girth and bilateral lower extremity edema for the past 3 days.  Associated symptoms include dry cough and 3 pillow orthopnea but patient otherwise denied any fever chills hemoptysis chest pain or palpitation.  Patient stated that from time to time he experienced dyspnea but he never experienced swelling of abdomen and bilateral lower extremity previously from initial diagnosis of pulmonary sarcoidosis in 2019.  Other than involvement of lungs, patient is not aware of any other organs involvement associated with sarcoidosis.     On initial presentation vital signs were stable and patient was afebrile and did not have any supplemental oxygen requirement.  Workup was notable for elevation of proBNP and x-ray demonstrating chronic appearing bibasal infiltrates suggestive of pulmonary sarcoidosis but otherwise unremarkable.  Patient was given a dose of IV Lasix with significant improvement in symptomatology and was subsequently transferred to this facility for higher level of care.      Procedure(s) (LRB):  INSERTION, CATHETER, RIGHT HEART (Right)      Hospital Course:   03/01 - records reviewed.  Patient states several weeks off medication for financial reasons.  Previously medications included prednisone 20 mg daily.  Stopped all this abruptly.  Recently lower  abdominal discomfort, peripheral edema and shortness of breath.  No orthopnea.  No chest pain.  Has had some constipation.  Complains of some dysphagia to solids.  Previously followed for his sarcoidosis at [facility] although currently lives in [place], [place].  Will look into medication needs.  Have GI see related to dysphagia to solids.  Needs follow-up arranged outpatient.  Patient states he is just signed up to try to get Medicaid so that should help with his finances related to medical needs.  Drinks a case of beer every other week in.  Told him this would be good to stop any use money for more important things to him  Seen by cardiology earlier look related to abnormal echocardiogram with severe right-sided changes and pulmonary hypertension.  Taken for right heart catheterization with confirming these findings.  Chest x-ray is abnormal but looks very unchanged from previous studies last couple years.  Positive cocaine in 2021 and check UDS.    03/02 Some better. No other issues. [address]. Talked with him about importance compliance. Noted very low Vit D and low Mg and will correct.    03/03--Pt with no new issues or concerns, is s/p EGD with dilatation.  No other symptoms  o2 sats 96-97% on room air.  Pt to follow up with cardiology in 3 weeks, establish care/follow up with pcp in one week with BMP.  Will need referral to [facility] for Sarcoidosis & Pulm Htn, needs sleep study, pt has medicaid application pending.  Pt rec'd counseling on ETOH use and adherence with treatment plans.  Pt states would like to be discharged home today and is stable for discharge.     Patient counseled on the importance of keeping all hospital follow up appointments and compliance with medications, therapies, or devices as prescribed in order to provide for the best health outcomes.   Additionally, patient given written literature regarding their current disease processes and home care recommendations.   Patient given opportunity to  ask questions and verbalized understanding of all information discussed.          Goals of Care Treatment Preferences:  Code Status: Full Code      Consults:   Consults (From admission, onward)        Status Ordering Provider     Inpatient consult to Gastroenterology  Once        Provider:  MICHAEL Westfall MD    Completed KALEY WEBER     Inpatient consult to Cardiology  Once        Provider:  Carl Leal MD    Completed GARRETT LIN          Psychiatric  Chronic alcohol abuse    Patient drinks a case of beer most weak in  Encouraged him to stop drinking, states cause toxic affects to his heart and liver among other problems  Told could use money for things he needs that would be more port to his health    03/03--declines assistance, voices that he understands the importance of abstaining, counseling and literature provided     Pulmonary  * Pulmonary hypertension    Patient likely is experiencing acute decompensation of pulmonary artery hypertension ( Group V ) stemming from pulmonary sarcoidosis.  Chest x-ray demonstrated chronic appearing bibasal infiltrates without any other acute appearing abnormalities.  Will diurese patient with IV Lasix    03/01 normal wedge pressure with right heart catheterization.  Will stop Lasix IV and also stop IV fluids.  Will leave oral Lasix to help with blood pressure  May need Norvasc but might make edema worse, at least will changed to Procardia XL  03/02 some sleeping issue and told when f/u outpt to discuss about out sleep study.As remains stable likely home soon.    03/03--medicaid gema pending, will need referral to [facility] which can be arranged per Pcp     Cardiac/Vascular  RVF (right ventricular failure)  Pulmonary HTN  Get outpt sleep study    03/03--follow up with cardiology outpatient, referral to [facility]     Cor pulmonale    Patient has signs and symptoms of cor pulmonale.  Will again diurese patient.      Essential hypertension  Patient has been off his medication  several months  Blood pressure up and medicines and been restarted      03/03--stable on present regimen, continue at discharge, instructed to monitor blood pressure daily and record, establish care with pcp & BMP in one week       Cardiac sarcoidosis-resolved as of 3/1/2023    Patient's proBNP was 15,772 and EKG showed sinus tachycardia with minimally prolonged QTC and nonspecific ST depression in the inferior leads.  Possibility of cardiac sarcoidosis exists and I feel that there is a need for CMR.  Will start with echocardiogram and consult Cardiology in a.m.    Primary hypertension-resolved as of 3/1/2023    Continue present management      Immunology/Multi System  Sarcoidosis    Patient was initially diagnosed with pulmonary sarcoidosis in 2019 and has been taking prednisone 20 mg daily.  Feel that there was a component of acute exacerbation and thus patient will be started on prednisone 40 mg daily    03/01 patient had stopped his prednisone abruptly a few months previously and had not started back  03/02 needs outpt f/u at [facility]  03/03--resume home dose of prednisone, follow up outpatient     Endocrine  Vitamin D deficiency  replace      Hyperglycemia    Check hemoglobin A1c and follow-up blood sugar  Blood sugar likely worsen with steroids    03/02 tapering O2 will help    03/03--stable     GI  Esophageal dysphagia    Consulted GI related to dysphagia to solid  Plan EGD and possible dilatation     03/03--s/p EGD today:  Impression  Overall Impression: 1. Mild distal esophageal stricture-s/p bougie dilation; appearance c/w ABDIRAHMAN etiology  2. Small hiatal hernia     Recommendation  Follow up with PCP is recommended.  Follow response to above. Evaluate with barium esophagram (with 12 mm tablet) if residual symptoms    Palliative Care  Hx of noncompliance with medical treatment, presenting hazards to health          Final Active Diagnoses:    Diagnosis Date Noted POA    PRINCIPAL PROBLEM:  Pulmonary hypertension  [I27.20] 02/28/2023 Yes    RVF (right ventricular failure) [I50.810] 03/02/2023 Yes    Vitamin D deficiency [E55.9] 03/02/2023 Yes    Hyperglycemia [R73.9] 03/01/2023 Yes    Hx of noncompliance with medical treatment, presenting hazards to health [Z91.199] 03/01/2023 Not Applicable    Chronic alcohol abuse [F10.10] 03/01/2023 Yes    Esophageal dysphagia [R13.19] 03/01/2023 Yes    Sarcoidosis [D86.9]  Yes    Essential hypertension [I10] 03/26/2018 Yes      Problems Resolved During this Admission:    Diagnosis Date Noted Date Resolved POA    Primary hypertension [I10] 02/28/2023 03/01/2023 Yes    Cardiac sarcoidosis [D86.85] 02/28/2023 03/01/2023 Yes       Discharged Condition: stable    Disposition: Home or Self Care    Follow Up:   Follow-up Information     JEREMIAS Torres Follow up on 3/21/2023.    Specialties: Family Medicine, Cardiology  Why: Hospital discharge; severe pulm htn & pulm sarcoidosis; RV failure; 1:00 pm  Contact information:  [address] [place] [address] [employer] [facility] [address]  [phone]             [facility] Follow up on 3/17/2023.    Why: Establish care, hospital follow up for pulmonary HTN, Needs BMP   Needs referral to [facility] for sarcoidosis, pulm htn, sleep study; (13:30; 1ST AVAILABLE APPOINTMENT IS MARCH 17, 2023 PLEASE ARRIVE AT LEAST 15 MINUTES EARLY TO FILL OUT PAPERWORK AND BRING ALL MEDICATIONS, PHOTO ID, AND INSURANCE CARD TO APPOINTMENT))  Contact information:  [address] [facility] [address]  [phone]                       Patient Instructions:      Diet Cardiac     Notify your health care provider if you experience any of the following:  temperature >100.4     Notify your health care provider if you experience any of the following:  persistent nausea and vomiting or diarrhea     Notify your health care provider if you experience any of the following:  severe uncontrolled pain     Notify your health care provider  if you experience any of the following:  redness, tenderness, or signs of infection (pain, swelling, redness, odor or green/yellow discharge around incision site)     Notify your health care provider if you experience any of the following:  difficulty breathing or increased cough     Notify your health care provider if you experience any of the following:  severe persistent headache     Notify your health care provider if you experience any of the following:  worsening rash     Notify your health care provider if you experience any of the following:  persistent dizziness, light-headedness, or visual disturbances     Notify your health care provider if you experience any of the following:  increased confusion or weakness     Activity as tolerated       Significant Diagnostic Studies: Labs:   BMP:   Recent Labs   Lab 03/02/23  0318 03/03/23  0355   * 91    141   K 3.3* 3.8    103   CO2 29 32   BUN 16 19*   CREATININE 0.95 0.86   CALCIUM 8.4* 8.3*   MG 1.6*  --     and CBC   Recent Labs   Lab 03/01/23  2347   WBC 16.56*   HGB 14.0   HCT 41.5          Pending Diagnostic Studies:     None         Medications:  Reconciled Home Medications:      Medication List      START taking these medications    albuterol 90 mcg/actuation inhaler  Commonly known as: VENTOLIN HFA  Inhale 2 puffs into the lungs every 6 (six) hours as needed for Wheezing or Shortness of Breath. Rescue     furosemide 40 MG tablet  Commonly known as: LASIX  Take 1 tablet (40 mg total) by mouth once daily.  Start taking on: March 4, 2023     guaiFENesin 600 mg 12 hr tablet  Commonly known as: MUCINEX  Take 1 tablet (600 mg total) by mouth 2 (two) times daily. for 7 days     hydrALAZINE 25 MG tablet  Commonly known as: APRESOLINE  Take 1 tablet (25 mg total) by mouth every 8 (eight) hours.     multivitamin Tab  Take 1 tablet by mouth once daily.  Start taking on: March 4, 2023     NIFEdipine 60 MG (OSM) 24 hr tablet  Commonly known  as: PROCARDIA-XL  Take 1 tablet (60 mg total) by mouth once daily.  Start taking on: March 4, 2023     predniSONE 20 MG tablet  Commonly known as: DELTASONE  Take 1 tablet (20 mg total) by mouth once daily.     valsartan 320 MG tablet  Commonly known as: DIOVAN  Take 1 tablet (320 mg total) by mouth once daily.  Start taking on: March 4, 2023  Replaces: olmesartan 40 MG tablet     vitamin D 1000 units Tab  Commonly known as: VITAMIN D3  Take 1 tablet (1,000 Units total) by mouth once daily.  Start taking on: March 4, 2023        STOP taking these medications    amLODIPine 10 MG tablet  Commonly known as: NORVASC     hydroCHLOROthiazide 12.5 mg capsule  Commonly known as: MICROZIDE     olmesartan 40 MG tablet  Commonly known as: BENICAR  Replaced by: valsartan 320 MG tablet            Indwelling Lines/Drains at time of discharge:   Lines/Drains/Airways     None                 Time spent on the discharge of patient: >30 minutes         JEREMIAS Wing  Department of Hospital Medicine  [facility] - [facility] Tele[facility]

## 2023-03-03 NOTE — ASSESSMENT & PLAN NOTE
Patient likely is experiencing acute decompensation of pulmonary artery hypertension ( Group V ) stemming from pulmonary sarcoidosis.  Chest x-ray demonstrated chronic appearing bibasal infiltrates without any other acute appearing abnormalities.  Will diurese patient with IV Lasix    03/01 normal wedge pressure with right heart catheterization.  Will stop Lasix IV and also stop IV fluids.  Will leave oral Lasix to help with blood pressure  May need Norvasc but might make edema worse, at least will changed to Procardia XL  03/02 some sleeping issue and told when f/u outpt to discuss about out sleep study.As remains stable likely home soon.

## 2023-03-03 NOTE — DISCHARGE INSTRUCTIONS
Check your blood pressure daily and record, take readings to hospital follow up visit; report out of range readings promptly.    Definition of normal and high blood pressure  Level  Top number  Bottom number    High 130 or above 80 or above   Elevated 120 to 129 79 or below   Normal 119 or below 79 or below       A normal heart rate ranges from .

## 2023-03-03 NOTE — ASSESSMENT & PLAN NOTE
Patient likely is experiencing acute decompensation of pulmonary artery hypertension ( Group V ) stemming from pulmonary sarcoidosis.  Chest x-ray demonstrated chronic appearing bibasal infiltrates without any other acute appearing abnormalities.  Will diurese patient with IV Lasix    03/01 normal wedge pressure with right heart catheterization.  Will stop Lasix IV and also stop IV fluids.  Will leave oral Lasix to help with blood pressure  May need Norvasc but might make edema worse, at least will changed to Procardia XL  03/02 some sleeping issue and told when f/u outpt to discuss about out sleep study.As remains stable likely home soon.    03/03--medicaid gema pending, will need referral to [facility] which can be arranged per Pcp

## 2023-03-03 NOTE — PLAN OF CARE
SS was contacted on pt to assist with pt medications. Pt had stopped taking his medications due to loss of insurance. Pt's medications will be around $100.00. We will assist pt with $45 of the $100 worth of medications. SS will notify pharmacy

## 2023-03-03 NOTE — ASSESSMENT & PLAN NOTE
Patient has been off his medication several months  Blood pressure up and medicines and been restarted      03/03--stable on present regimen, continue at discharge, instructed to monitor blood pressure daily and record, establish care with pcp & BMP in one week

## 2023-03-03 NOTE — ASSESSMENT & PLAN NOTE
Consulted GI related to dysphagia to solid  Plan EGD and possible dilatation     03/03--s/p EGD today:  Impression  Overall Impression: 1. Mild distal esophageal stricture-s/p bougie dilation; appearance c/w ABDIRAHMAN etiology  2. Small hiatal hernia     Recommendation  Follow up with PCP is recommended.  Follow response to above. Evaluate with barium esophagram (with 12 mm tablet) if residual symptoms

## 2023-03-03 NOTE — ASSESSMENT & PLAN NOTE
Patient drinks a case of beer most weak in  Encouraged him to stop drinking, states cause toxic affects to his heart and liver among other problems  Told could use money for things he needs that would be more port to his health    03/03--declines assistance, voices that he understands the importance of abstaining, counseling and literature provided

## 2023-03-03 NOTE — ASSESSMENT & PLAN NOTE
Patient was initially diagnosed with pulmonary sarcoidosis in 2019 and has been taking prednisone 20 mg daily.  Feel that there was a component of acute exacerbation and thus patient will be started on prednisone 40 mg daily    03/01 patient had stopped his prednisone abruptly a few months previously and had not started back  03/02 needs outpt f/u at [facility]  03/03--resume home dose of prednisone, follow up outpatient

## 2023-03-03 NOTE — ASSESSMENT & PLAN NOTE
Patient was initially diagnosed with pulmonary sarcoidosis in 2019 and has been taking prednisone 20 mg daily.  Feel that there was a component of acute exacerbation and thus patient will be started on prednisone 40 mg daily    03/01 patient had stopped his prednisone abruptly a few months previously and had not started back  03/02 needs outpt f/u at [facility]

## 2023-03-03 NOTE — TRANSFER OF CARE
"Anesthesia Transfer of Care Note    Patient: Bo Ku    Procedure(s) Performed: * No procedures listed *    Patient location: GI    Anesthesia Type: general    Transport from OR: Transported from OR on room air with adequate spontaneous ventilation. Continuous ECG monitoring in transport. Continuous SpO2 monitoring in transport    Post pain: adequate analgesia    Post assessment: no apparent anesthetic complications    Post vital signs: stable    Level of consciousness: sedated and responds to stimulation    Nausea/Vomiting: no nausea/vomiting    Complications: none    Transfer of care protocol was followedComments: Good SV continue, NAD, VSS, RTRN      Last vitals:   Visit Vitals  BP (!) 125/92 (BP Location: Left arm, Patient Position: Lying)   Pulse 99   Temp 36.1 °C (97 °F) (Skin)   Resp 12   Ht 6' 1" (1.854 m)   Wt 70.3 kg (155 lb)   SpO2 100%   BMI 20.45 kg/m²     "

## 2023-03-03 NOTE — SUBJECTIVE & OBJECTIVE
Interval History:     Review of Systems   Constitutional:  Negative for appetite change, fatigue and fever.   HENT:  Negative for congestion, hearing loss and trouble swallowing.    Respiratory:  Positive for shortness of breath. Negative for chest tightness and wheezing.    Cardiovascular:  Positive for leg swelling. Negative for chest pain and palpitations.   Gastrointestinal:  Positive for abdominal pain and constipation. Negative for nausea.   Genitourinary:  Negative for difficulty urinating and dysuria.   Musculoskeletal:  Negative for back pain and neck stiffness.   Skin:  Negative for pallor and rash.   Neurological:  Negative for dizziness, speech difficulty and headaches.   Psychiatric/Behavioral:  Negative for confusion and suicidal ideas.    Objective:     Vital Signs (Most Recent):  Temp: 97.8 °F (36.6 °C) (03/02/23 2050)  Pulse: 94 (03/02/23 2050)  Resp: 18 (03/02/23 2050)  BP: 113/82 (03/02/23 2050)  SpO2: 96 % (03/02/23 2050)   Vital Signs (24h Range):  Temp:  [97.3 °F (36.3 °C)-98.4 °F (36.9 °C)] 97.8 °F (36.6 °C)  Pulse:  [] 94  Resp:  [18-19] 18  SpO2:  [96 %-100 %] 96 %  BP: (113-139)/() 113/82     Weight: 70.3 kg (155 lb)  Body mass index is 20.45 kg/m².  No intake or output data in the 24 hours ending 03/02/23 2248     Physical Exam  Vitals reviewed.   Constitutional:       General: He is awake. He is not in acute distress.     Appearance: He is well-developed. He is not toxic-appearing.   HENT:      Head: Normocephalic.      Comments: Moon face consistent with past steroid use     Nose: Nose normal.      Mouth/Throat:      Pharynx: Oropharynx is clear.   Eyes:      Extraocular Movements: Extraocular movements intact.      Pupils: Pupils are equal, round, and reactive to light.   Neck:      Thyroid: No thyroid mass.      Vascular: No carotid bruit.   Cardiovascular:      Rate and Rhythm: Normal rate and regular rhythm.      Pulses: Normal pulses.      Heart sounds: Normal heart  sounds. No murmur heard.  Pulmonary:      Effort: Pulmonary effort is normal.      Breath sounds: Normal breath sounds and air entry. No wheezing.   Abdominal:      General: Bowel sounds are normal. There is no distension.      Palpations: Abdomen is soft.      Tenderness: There is no abdominal tenderness.   Musculoskeletal:         General: Normal range of motion.      Cervical back: Neck supple. No rigidity.   Skin:     General: Skin is warm.      Coloration: Skin is not jaundiced.      Findings: No lesion.   Neurological:      General: No focal deficit present.      Mental Status: He is alert and oriented to person, place, and time.      Cranial Nerves: No cranial nerve deficit.   Psychiatric:         Attention and Perception: Attention normal.         Mood and Affect: Mood normal.         Behavior: Behavior normal. Behavior is cooperative.         Thought Content: Thought content normal.         Cognition and Memory: Cognition normal.       Significant Labs: All pertinent labs within the past 24 hours have been reviewed.  BMP:   Recent Labs   Lab 03/02/23  0318   *      K 3.3*      CO2 29   BUN 16   CREATININE 0.95   CALCIUM 8.4*   MG 1.6*       CBC:   Recent Labs   Lab 03/01/23  2347   WBC 16.56*   HGB 14.0   HCT 41.5          CMP:   Recent Labs   Lab 03/01/23  0502 03/02/23  0318    140   K 3.8 3.3*    103   CO2 29 29   * 130*   BUN 15 16   CREATININE 1.29 0.95   CALCIUM 8.6 8.4*   PROT 7.6  --    ALBUMIN 2.7*  --    BILITOT 0.6  --    ALKPHOS 127*  --    AST 18  --    ALT 15*  --    ANIONGAP 14 11         Significant Imaging: I have reviewed all pertinent imaging results/findings within the past 24 hours.        Microbiology Results (last 7 days)       ** No results found for the last 168 hours. **          Intake/Output - Last 3 Shifts         03/01 0700  03/02 0659 03/02 0700 03/03 0659    P.O.      Total Intake(mL/kg)      Net

## 2023-03-03 NOTE — ASSESSMENT & PLAN NOTE
Pulmonary HTN  Get outpt sleep study    03/03--follow up with cardiology outpatient, referral to [facility]

## 2023-03-04 NOTE — NURSING
Discharged home, discharge instructions given, medications given and explained. Verbalized understanding. Family present at beside.

## 2023-03-10 ENCOUNTER — PATIENT OUTREACH (OUTPATIENT)
Dept: ADMINISTRATIVE | Facility: CLINIC | Age: 33
End: 2023-03-10

## 2023-03-10 NOTE — PROGRESS NOTES
C3 nurse attempted to contact Bo Ku  for a TCC post hospital discharge follow up call. No answer. Left voicemail with callback information. The patient has a scheduled HOSFU appointment with [facility] on 3/17/23 @ 130.

## 2023-03-13 NOTE — PROGRESS NOTES
C3 nurse spoke with Bo Ku  for a TCC post hospital discharge follow up call. The patient has a scheduled HOSFU appointment with [facility] on 3/17/23 @ 130.

## 2023-03-14 ENCOUNTER — HOSPITAL ENCOUNTER (INPATIENT)
Facility: HOSPITAL | Age: 33
LOS: 1 days | Discharge: HOME | End: 2023-03-18
Attending: EMERGENCY MEDICINE | Admitting: FAMILY MEDICINE
Payer: MEDICAID

## 2023-03-14 DIAGNOSIS — R06.02 SHORTNESS OF BREATH: ICD-10-CM

## 2023-03-14 DIAGNOSIS — I27.20 PULMONARY HYPERTENSION: Primary | ICD-10-CM

## 2023-03-14 DIAGNOSIS — R07.9 CHEST PAIN: ICD-10-CM

## 2023-03-14 LAB
ALBUMIN SERPL BCP-MCNC: 3.2 G/DL (ref 3.5–5)
ALBUMIN/GLOB SERPL: 0.7 {RATIO}
ALP SERPL-CCNC: 84 U/L (ref 45–115)
ALT SERPL W P-5'-P-CCNC: 32 U/L (ref 16–61)
AMPHET UR QL SCN: NEGATIVE
ANION GAP SERPL CALCULATED.3IONS-SCNC: 11 MMOL/L (ref 7–16)
AST SERPL W P-5'-P-CCNC: 26 U/L (ref 15–37)
BARBITURATES UR QL SCN: NEGATIVE
BASOPHILS # BLD AUTO: 0.05 K/UL (ref 0–0.2)
BASOPHILS NFR BLD AUTO: 0.7 % (ref 0–1)
BENZODIAZ METAB UR QL SCN: NEGATIVE
BILIRUB SERPL-MCNC: 1 MG/DL (ref ?–1.2)
BUN SERPL-MCNC: 14 MG/DL (ref 7–18)
BUN/CREAT SERPL: 14 (ref 6–20)
CALCIUM SERPL-MCNC: 9 MG/DL (ref 8.5–10.1)
CANNABINOIDS UR QL SCN: NEGATIVE
CHLORIDE SERPL-SCNC: 99 MMOL/L (ref 98–107)
CO2 SERPL-SCNC: 32 MMOL/L (ref 21–32)
COCAINE UR QL SCN: NEGATIVE
CREAT SERPL-MCNC: 1 MG/DL (ref 0.7–1.3)
D DIMER PPP FEU-MCNC: 0.71 ΜG/ML (ref 0–0.47)
DIFFERENTIAL METHOD BLD: ABNORMAL
EGFR (NO RACE VARIABLE) (RUSH/TITUS): 101 ML/MIN/1.73M²
EOSINOPHIL # BLD AUTO: 0.05 K/UL (ref 0–0.5)
EOSINOPHIL NFR BLD AUTO: 0.7 % (ref 1–4)
ERYTHROCYTE [DISTWIDTH] IN BLOOD BY AUTOMATED COUNT: 14.6 % (ref 11.5–14.5)
GLOBULIN SER-MCNC: 4.4 G/DL (ref 2–4)
GLUCOSE SERPL-MCNC: 83 MG/DL (ref 74–106)
HCT VFR BLD AUTO: 39.7 % (ref 40–54)
HGB BLD-MCNC: 13.3 G/DL (ref 13.5–18)
IMM GRANULOCYTES # BLD AUTO: 0.03 K/UL (ref 0–0.04)
IMM GRANULOCYTES NFR BLD: 0.4 % (ref 0–0.4)
LYMPHOCYTES # BLD AUTO: 0.84 K/UL (ref 1–4.8)
LYMPHOCYTES NFR BLD AUTO: 11.7 % (ref 27–41)
MAGNESIUM SERPL-MCNC: 1.5 MG/DL (ref 1.7–2.3)
MCH RBC QN AUTO: 29.3 PG (ref 27–31)
MCHC RBC AUTO-ENTMCNC: 33.5 G/DL (ref 32–36)
MCV RBC AUTO: 87.4 FL (ref 80–96)
MONOCYTES # BLD AUTO: 0.64 K/UL (ref 0–0.8)
MONOCYTES NFR BLD AUTO: 8.9 % (ref 2–6)
MPC BLD CALC-MCNC: 9.2 FL (ref 9.4–12.4)
NEUTROPHILS # BLD AUTO: 5.57 K/UL (ref 1.8–7.7)
NEUTROPHILS NFR BLD AUTO: 77.6 % (ref 53–65)
NRBC # BLD AUTO: 0 X10E3/UL
NRBC, AUTO (.00): 0 %
NT-PROBNP SERPL-MCNC: 3007 PG/ML (ref 1–125)
OPIATES UR QL SCN: NEGATIVE
PCP UR QL SCN: NEGATIVE
PLATELET # BLD AUTO: 163 K/UL (ref 150–400)
POTASSIUM SERPL-SCNC: 3.8 MMOL/L (ref 3.5–5.1)
PROT SERPL-MCNC: 7.6 G/DL (ref 6.4–8.2)
RBC # BLD AUTO: 4.54 M/UL (ref 4.6–6.2)
SODIUM SERPL-SCNC: 138 MMOL/L (ref 136–145)
TROPONIN I SERPL HS-MCNC: 11.7 PG/ML
WBC # BLD AUTO: 7.18 K/UL (ref 4.5–11)

## 2023-03-14 PROCEDURE — 99285 PR EMERGENCY DEPT VISIT,LEVEL V: ICD-10-PCS | Mod: ,,, | Performed by: EMERGENCY MEDICINE

## 2023-03-14 PROCEDURE — 93005 ELECTROCARDIOGRAM TRACING: CPT

## 2023-03-14 PROCEDURE — 85379 FIBRIN DEGRADATION QUANT: CPT | Performed by: NURSE PRACTITIONER

## 2023-03-14 PROCEDURE — 80053 COMPREHEN METABOLIC PANEL: CPT | Performed by: NURSE PRACTITIONER

## 2023-03-14 PROCEDURE — 84484 ASSAY OF TROPONIN QUANT: CPT | Performed by: NURSE PRACTITIONER

## 2023-03-14 PROCEDURE — 99285 EMERGENCY DEPT VISIT HI MDM: CPT | Mod: ,,, | Performed by: EMERGENCY MEDICINE

## 2023-03-14 PROCEDURE — 85025 COMPLETE CBC W/AUTO DIFF WBC: CPT | Performed by: NURSE PRACTITIONER

## 2023-03-14 PROCEDURE — 63600175 PHARM REV CODE 636 W HCPCS: Performed by: EMERGENCY MEDICINE

## 2023-03-14 PROCEDURE — 80307 DRUG TEST PRSMV CHEM ANLYZR: CPT | Performed by: EMERGENCY MEDICINE

## 2023-03-14 PROCEDURE — 83735 ASSAY OF MAGNESIUM: CPT | Performed by: NURSE PRACTITIONER

## 2023-03-14 PROCEDURE — 93010 ELECTROCARDIOGRAM REPORT: CPT | Mod: ,,, | Performed by: HOSPITALIST

## 2023-03-14 PROCEDURE — 93010 EKG 12-LEAD: ICD-10-PCS | Mod: ,,, | Performed by: HOSPITALIST

## 2023-03-14 PROCEDURE — 63600175 PHARM REV CODE 636 W HCPCS: Mod: TB,JG | Performed by: NURSE PRACTITIONER

## 2023-03-14 PROCEDURE — 96375 TX/PRO/DX INJ NEW DRUG ADDON: CPT

## 2023-03-14 PROCEDURE — 83880 ASSAY OF NATRIURETIC PEPTIDE: CPT | Performed by: NURSE PRACTITIONER

## 2023-03-14 PROCEDURE — 99285 EMERGENCY DEPT VISIT HI MDM: CPT | Mod: 25

## 2023-03-14 PROCEDURE — 87635 SARS-COV-2 COVID-19 AMP PRB: CPT | Performed by: NURSE PRACTITIONER

## 2023-03-14 PROCEDURE — 25000003 PHARM REV CODE 250: Performed by: EMERGENCY MEDICINE

## 2023-03-14 RX ORDER — MORPHINE SULFATE 4 MG/ML
4 INJECTION, SOLUTION INTRAMUSCULAR; INTRAVENOUS
Status: COMPLETED | OUTPATIENT
Start: 2023-03-14 | End: 2023-03-14

## 2023-03-14 RX ORDER — FUROSEMIDE 10 MG/ML
60 INJECTION INTRAMUSCULAR; INTRAVENOUS
Status: COMPLETED | OUTPATIENT
Start: 2023-03-14 | End: 2023-03-14

## 2023-03-14 RX ADMIN — NITROGLYCERIN 1 INCH: 20 OINTMENT TOPICAL at 11:03

## 2023-03-14 RX ADMIN — MORPHINE SULFATE 4 MG: 4 INJECTION, SOLUTION INTRAMUSCULAR; INTRAVENOUS at 11:03

## 2023-03-14 RX ADMIN — FUROSEMIDE 60 MG: 10 INJECTION, SOLUTION INTRAMUSCULAR; INTRAVENOUS at 11:03

## 2023-03-15 PROBLEM — R07.9 CHEST PAIN: Status: ACTIVE | Noted: 2023-03-15

## 2023-03-15 PROBLEM — R06.02 SHORTNESS OF BREATH: Status: ACTIVE | Noted: 2023-03-15

## 2023-03-15 LAB
ALBUMIN SERPL BCP-MCNC: 3.2 G/DL (ref 3.5–5)
ALBUMIN/GLOB SERPL: 0.7 {RATIO}
ALP SERPL-CCNC: 83 U/L (ref 45–115)
ALT SERPL W P-5'-P-CCNC: 28 U/L (ref 16–61)
ANION GAP SERPL CALCULATED.3IONS-SCNC: 14 MMOL/L (ref 7–16)
AST SERPL W P-5'-P-CCNC: 22 U/L (ref 15–37)
BASOPHILS # BLD AUTO: 0.03 K/UL (ref 0–0.2)
BASOPHILS NFR BLD AUTO: 0.5 % (ref 0–1)
BILIRUB SERPL-MCNC: 1.2 MG/DL (ref ?–1.2)
BUN SERPL-MCNC: 11 MG/DL (ref 7–18)
BUN/CREAT SERPL: 12 (ref 6–20)
CALCIUM SERPL-MCNC: 9.2 MG/DL (ref 8.5–10.1)
CHLORIDE SERPL-SCNC: 97 MMOL/L (ref 98–107)
CHOLEST SERPL-MCNC: 141 MG/DL (ref 0–200)
CHOLEST/HDLC SERPL: 1.6 {RATIO}
CO2 SERPL-SCNC: 29 MMOL/L (ref 21–32)
CREAT SERPL-MCNC: 0.9 MG/DL (ref 0.7–1.3)
DIFFERENTIAL METHOD BLD: ABNORMAL
EGFR (NO RACE VARIABLE) (RUSH/TITUS): 115 ML/MIN/1.73M²
EOSINOPHIL # BLD AUTO: 0.04 K/UL (ref 0–0.5)
EOSINOPHIL NFR BLD AUTO: 0.7 % (ref 1–4)
ERYTHROCYTE [DISTWIDTH] IN BLOOD BY AUTOMATED COUNT: 14.3 % (ref 11.5–14.5)
GLOBULIN SER-MCNC: 4.5 G/DL (ref 2–4)
GLUCOSE SERPL-MCNC: 86 MG/DL (ref 74–106)
HCT VFR BLD AUTO: 41.3 % (ref 40–54)
HDLC SERPL-MCNC: 86 MG/DL (ref 40–60)
HGB BLD-MCNC: 14.2 G/DL (ref 13.5–18)
IMM GRANULOCYTES # BLD AUTO: 0.03 K/UL (ref 0–0.04)
IMM GRANULOCYTES NFR BLD: 0.5 % (ref 0–0.4)
LDLC SERPL CALC-MCNC: 38 MG/DL
LYMPHOCYTES # BLD AUTO: 1 K/UL (ref 1–4.8)
LYMPHOCYTES NFR BLD AUTO: 17.9 % (ref 27–41)
MCH RBC QN AUTO: 30.1 PG (ref 27–31)
MCHC RBC AUTO-ENTMCNC: 34.4 G/DL (ref 32–36)
MCV RBC AUTO: 87.5 FL (ref 80–96)
MONOCYTES # BLD AUTO: 0.61 K/UL (ref 0–0.8)
MONOCYTES NFR BLD AUTO: 10.9 % (ref 2–6)
MPC BLD CALC-MCNC: 9.6 FL (ref 9.4–12.4)
NEUTROPHILS # BLD AUTO: 3.87 K/UL (ref 1.8–7.7)
NEUTROPHILS NFR BLD AUTO: 69.5 % (ref 53–65)
NONHDLC SERPL-MCNC: 55 MG/DL
NRBC # BLD AUTO: 0 X10E3/UL
NRBC, AUTO (.00): 0 %
PLATELET # BLD AUTO: 154 K/UL (ref 150–400)
POTASSIUM SERPL-SCNC: 3.4 MMOL/L (ref 3.5–5.1)
PROT SERPL-MCNC: 7.7 G/DL (ref 6.4–8.2)
RBC # BLD AUTO: 4.72 M/UL (ref 4.6–6.2)
SARS-COV-2 RDRP RESP QL NAA+PROBE: NEGATIVE
SODIUM SERPL-SCNC: 137 MMOL/L (ref 136–145)
TRIGL SERPL-MCNC: 83 MG/DL (ref 35–150)
TROPONIN I SERPL HS-MCNC: 11.2 PG/ML
TROPONIN I SERPL HS-MCNC: 11.7 PG/ML
VLDLC SERPL-MCNC: 17 MG/DL
WBC # BLD AUTO: 5.58 K/UL (ref 4.5–11)

## 2023-03-15 PROCEDURE — 96366 THER/PROPH/DIAG IV INF ADDON: CPT

## 2023-03-15 PROCEDURE — 96365 THER/PROPH/DIAG IV INF INIT: CPT

## 2023-03-15 PROCEDURE — 63600175 PHARM REV CODE 636 W HCPCS: Performed by: FAMILY MEDICINE

## 2023-03-15 PROCEDURE — 94761 N-INVAS EAR/PLS OXIMETRY MLT: CPT

## 2023-03-15 PROCEDURE — 80061 LIPID PANEL: CPT | Performed by: INTERNAL MEDICINE

## 2023-03-15 PROCEDURE — 84484 ASSAY OF TROPONIN QUANT: CPT | Performed by: INTERNAL MEDICINE

## 2023-03-15 PROCEDURE — 25000003 PHARM REV CODE 250: Performed by: INTERNAL MEDICINE

## 2023-03-15 PROCEDURE — 63600175 PHARM REV CODE 636 W HCPCS: Performed by: EMERGENCY MEDICINE

## 2023-03-15 PROCEDURE — G0378 HOSPITAL OBSERVATION PER HR: HCPCS

## 2023-03-15 PROCEDURE — 96376 TX/PRO/DX INJ SAME DRUG ADON: CPT

## 2023-03-15 PROCEDURE — 85025 COMPLETE CBC W/AUTO DIFF WBC: CPT | Performed by: INTERNAL MEDICINE

## 2023-03-15 PROCEDURE — 63600175 PHARM REV CODE 636 W HCPCS: Performed by: INTERNAL MEDICINE

## 2023-03-15 PROCEDURE — 99223 PR INITIAL HOSPITAL CARE,LEVL III: ICD-10-PCS | Mod: ,,, | Performed by: INTERNAL MEDICINE

## 2023-03-15 PROCEDURE — 99223 1ST HOSP IP/OBS HIGH 75: CPT | Mod: ,,, | Performed by: INTERNAL MEDICINE

## 2023-03-15 PROCEDURE — 96372 THER/PROPH/DIAG INJ SC/IM: CPT | Performed by: INTERNAL MEDICINE

## 2023-03-15 PROCEDURE — 63600175 PHARM REV CODE 636 W HCPCS: Mod: TB,JG | Performed by: INTERNAL MEDICINE

## 2023-03-15 PROCEDURE — 80053 COMPREHEN METABOLIC PANEL: CPT | Performed by: INTERNAL MEDICINE

## 2023-03-15 RX ORDER — CHOLECALCIFEROL (VITAMIN D3) 25 MCG
1000 TABLET ORAL DAILY
Status: DISCONTINUED | OUTPATIENT
Start: 2023-03-15 | End: 2023-03-18 | Stop reason: HOSPADM

## 2023-03-15 RX ORDER — ACETAMINOPHEN 325 MG/1
650 TABLET ORAL EVERY 4 HOURS PRN
Status: DISCONTINUED | OUTPATIENT
Start: 2023-03-15 | End: 2023-03-18 | Stop reason: HOSPADM

## 2023-03-15 RX ORDER — ONDANSETRON 2 MG/ML
4 INJECTION INTRAMUSCULAR; INTRAVENOUS EVERY 8 HOURS PRN
Status: DISCONTINUED | OUTPATIENT
Start: 2023-03-15 | End: 2023-03-18 | Stop reason: HOSPADM

## 2023-03-15 RX ORDER — NITROGLYCERIN 0.4 MG/1
0.4 TABLET SUBLINGUAL EVERY 5 MIN PRN
Status: DISCONTINUED | OUTPATIENT
Start: 2023-03-15 | End: 2023-03-18 | Stop reason: HOSPADM

## 2023-03-15 RX ORDER — ALBUTEROL SULFATE 0.83 MG/ML
2.5 SOLUTION RESPIRATORY (INHALATION) EVERY 4 HOURS PRN
Status: DISCONTINUED | OUTPATIENT
Start: 2023-03-15 | End: 2023-03-16

## 2023-03-15 RX ORDER — HYDRALAZINE HYDROCHLORIDE 25 MG/1
25 TABLET, FILM COATED ORAL EVERY 8 HOURS
Status: DISCONTINUED | OUTPATIENT
Start: 2023-03-15 | End: 2023-03-15

## 2023-03-15 RX ORDER — FUROSEMIDE 10 MG/ML
40 INJECTION INTRAMUSCULAR; INTRAVENOUS
Status: DISCONTINUED | OUTPATIENT
Start: 2023-03-15 | End: 2023-03-18 | Stop reason: HOSPADM

## 2023-03-15 RX ORDER — NIFEDIPINE 30 MG/1
60 TABLET, EXTENDED RELEASE ORAL DAILY
Status: DISCONTINUED | OUTPATIENT
Start: 2023-03-15 | End: 2023-03-18 | Stop reason: HOSPADM

## 2023-03-15 RX ORDER — NALOXONE HCL 0.4 MG/ML
0.02 VIAL (ML) INJECTION
Status: DISCONTINUED | OUTPATIENT
Start: 2023-03-15 | End: 2023-03-18 | Stop reason: HOSPADM

## 2023-03-15 RX ORDER — ASPIRIN 81 MG/1
81 TABLET ORAL DAILY
Status: DISCONTINUED | OUTPATIENT
Start: 2023-03-15 | End: 2023-03-18 | Stop reason: HOSPADM

## 2023-03-15 RX ORDER — HYDRALAZINE HYDROCHLORIDE 50 MG/1
50 TABLET, FILM COATED ORAL EVERY 8 HOURS
Status: DISCONTINUED | OUTPATIENT
Start: 2023-03-15 | End: 2023-03-18 | Stop reason: HOSPADM

## 2023-03-15 RX ORDER — MORPHINE SULFATE 2 MG/ML
2 INJECTION, SOLUTION INTRAMUSCULAR; INTRAVENOUS EVERY 4 HOURS PRN
Status: DISCONTINUED | OUTPATIENT
Start: 2023-03-15 | End: 2023-03-18 | Stop reason: HOSPADM

## 2023-03-15 RX ORDER — ISOSORBIDE MONONITRATE 30 MG/1
30 TABLET, EXTENDED RELEASE ORAL DAILY
Status: DISCONTINUED | OUTPATIENT
Start: 2023-03-15 | End: 2023-03-18 | Stop reason: HOSPADM

## 2023-03-15 RX ORDER — ENOXAPARIN SODIUM 100 MG/ML
40 INJECTION SUBCUTANEOUS EVERY 24 HOURS
Status: DISCONTINUED | OUTPATIENT
Start: 2023-03-15 | End: 2023-03-18 | Stop reason: HOSPADM

## 2023-03-15 RX ORDER — HYDRALAZINE HYDROCHLORIDE 20 MG/ML
10 INJECTION INTRAMUSCULAR; INTRAVENOUS EVERY 4 HOURS PRN
Status: DISCONTINUED | OUTPATIENT
Start: 2023-03-15 | End: 2023-03-18 | Stop reason: HOSPADM

## 2023-03-15 RX ORDER — SIMETHICONE 80 MG
1 TABLET,CHEWABLE ORAL 4 TIMES DAILY PRN
Status: DISCONTINUED | OUTPATIENT
Start: 2023-03-15 | End: 2023-03-18 | Stop reason: HOSPADM

## 2023-03-15 RX ORDER — SODIUM CHLORIDE 0.9 % (FLUSH) 0.9 %
10 SYRINGE (ML) INJECTION EVERY 12 HOURS PRN
Status: DISCONTINUED | OUTPATIENT
Start: 2023-03-15 | End: 2023-03-18 | Stop reason: HOSPADM

## 2023-03-15 RX ORDER — HYDROCODONE BITARTRATE AND ACETAMINOPHEN 5; 325 MG/1; MG/1
1 TABLET ORAL EVERY 6 HOURS PRN
Status: DISCONTINUED | OUTPATIENT
Start: 2023-03-15 | End: 2023-03-18 | Stop reason: HOSPADM

## 2023-03-15 RX ORDER — FUROSEMIDE 10 MG/ML
40 INJECTION INTRAMUSCULAR; INTRAVENOUS
Status: DISCONTINUED | OUTPATIENT
Start: 2023-03-15 | End: 2023-03-15

## 2023-03-15 RX ORDER — VALSARTAN 80 MG/1
320 TABLET ORAL DAILY
Status: DISCONTINUED | OUTPATIENT
Start: 2023-03-15 | End: 2023-03-18 | Stop reason: HOSPADM

## 2023-03-15 RX ORDER — PREDNISONE 20 MG/1
20 TABLET ORAL DAILY
Status: DISCONTINUED | OUTPATIENT
Start: 2023-03-15 | End: 2023-03-16

## 2023-03-15 RX ORDER — MAGNESIUM SULFATE HEPTAHYDRATE 40 MG/ML
2 INJECTION, SOLUTION INTRAVENOUS
Status: COMPLETED | OUTPATIENT
Start: 2023-03-15 | End: 2023-03-15

## 2023-03-15 RX ADMIN — VALSARTAN 320 MG: 80 TABLET, FILM COATED ORAL at 09:03

## 2023-03-15 RX ADMIN — HYDROCODONE BITARTRATE AND ACETAMINOPHEN 1 TABLET: 5; 325 TABLET ORAL at 08:03

## 2023-03-15 RX ADMIN — ASPIRIN 81 MG: 81 TABLET, DELAYED RELEASE ORAL at 08:03

## 2023-03-15 RX ADMIN — FUROSEMIDE 40 MG: 10 INJECTION, SOLUTION INTRAMUSCULAR; INTRAVENOUS at 11:03

## 2023-03-15 RX ADMIN — MAGNESIUM SULFATE HEPTAHYDRATE 2 G: 2 INJECTION, SOLUTION INTRAVENOUS at 12:03

## 2023-03-15 RX ADMIN — HYDRALAZINE HYDROCHLORIDE 50 MG: 50 TABLET, FILM COATED ORAL at 06:03

## 2023-03-15 RX ADMIN — MULTIPLE VITAMINS W/ MINERALS TAB 1 TABLET: TAB at 09:03

## 2023-03-15 RX ADMIN — HYDRALAZINE HYDROCHLORIDE 50 MG: 50 TABLET, FILM COATED ORAL at 02:03

## 2023-03-15 RX ADMIN — SIMETHICONE 80 MG: 80 TABLET, CHEWABLE ORAL at 09:03

## 2023-03-15 RX ADMIN — HYDRALAZINE HYDROCHLORIDE 50 MG: 50 TABLET, FILM COATED ORAL at 09:03

## 2023-03-15 RX ADMIN — CHOLECALCIFEROL (VITAMIN D3) 25 MCG (1,000 UNIT) TABLET 1000 UNITS: at 08:03

## 2023-03-15 RX ADMIN — HYDROCODONE BITARTRATE AND ACETAMINOPHEN 1 TABLET: 5; 325 TABLET ORAL at 03:03

## 2023-03-15 RX ADMIN — PREDNISONE 20 MG: 20 TABLET ORAL at 09:03

## 2023-03-15 RX ADMIN — ENOXAPARIN SODIUM 40 MG: 100 INJECTION SUBCUTANEOUS at 06:03

## 2023-03-15 RX ADMIN — NIFEDIPINE 60 MG: 30 TABLET, FILM COATED, EXTENDED RELEASE ORAL at 09:03

## 2023-03-15 RX ADMIN — ISOSORBIDE MONONITRATE 30 MG: 30 TABLET, EXTENDED RELEASE ORAL at 08:03

## 2023-03-15 RX ADMIN — HYDROCODONE BITARTRATE AND ACETAMINOPHEN 1 TABLET: 5; 325 TABLET ORAL at 11:03

## 2023-03-15 RX ADMIN — MORPHINE SULFATE 2 MG: 2 INJECTION, SOLUTION INTRAMUSCULAR; INTRAVENOUS at 04:03

## 2023-03-15 RX ADMIN — FUROSEMIDE 40 MG: 10 INJECTION, SOLUTION INTRAMUSCULAR; INTRAVENOUS at 10:03

## 2023-03-15 NOTE — H&P
[facility] - Emergency Department  [facility] Medicine  History & Physical    Patient Name: Bo Ku  MRN: [id number]  Patient Class: OP- Observation  Admission Date: 3/14/2023  Attending Physician: ANNMARIE Santos DO  Primary Care Provider: JEREMIAS Stone         Patient information was obtained from patient and ER records.     Subjective:     Principal Problem:Pulmonary hypertension    Chief Complaint:   Chief Complaint   Patient presents with    Shortness of Breath        HPI: Patient is a 32-year-old male with a history of pulmonary sarcoidosis, and pulmonary hypertension secondary to former and cor pulmonale along with essential hypertension who presents to the emergency room complaining of shortness of breath accompanied by lower extremity edema and intermittent chest pain of approximately 1 week in duration.  Patient stated that in spite of taking medication as prescribed it appeared that shortness breath and lower extremity edema are becoming worse and patient developed chest pain approximately 2-3 days ago which ultimately prompting ED visit.  Patient stated that chest pain is sharp in nature and is primarily located in the mid chest area with radiation to the left shoulder.  Patient provided no alleviating or exacerbating factors and denied associated symptoms such as fever chills cough or hemoptysis.  On presentation, patient's systolic blood pressure was slightly elevated but vital signs were otherwise stable and patient was afebrile.  Workup was notable for chronic appearing bilateral infiltrates with perhaps slightly worsening of pulmonary vascular congestion.  Lab work demonstrated slightly elevated proBNP level but decreased from the last admission but otherwise unremarkable including troponin level.  Patient will be admitted for further evaluation and intervention      Past Medical History:   Diagnosis Date    Hypertension     Sarcoidosis     Sarcoidosis 2019       Past Surgical  History:   Procedure Laterality Date    ANTERIOR CRUCIATE LIGAMENT REPAIR Left 09/01/2004    ANTERIOR CRUCIATE LIGAMENT REPAIR Left     FRACTURE SURGERY      RIGHT HEART CATHETERIZATION Right 3/1/2023    Procedure: INSERTION, CATHETER, RIGHT HEART;  Surgeon: Abdelrahman Adam MD;  Location: [facility] CATH LAB;  Service: Cardiology;  Laterality: Right;       Review of patient's allergies indicates:   Allergen Reactions    Fish containing products Anaphylaxis    Shellfish containing products Anaphylaxis    Lisinopril        No current facility-administered medications on file prior to encounter.     Current Outpatient Medications on File Prior to Encounter   Medication Sig    albuterol (VENTOLIN HFA) 90 mcg/actuation inhaler Inhale 2 puffs into the lungs every 6 (six) hours as needed for Wheezing or Shortness of Breath. Rescue    furosemide (LASIX) 40 MG tablet Take 1 tablet (40 mg total) by mouth once daily.    hydrALAZINE (APRESOLINE) 25 MG tablet Take 1 tablet (25 mg total) by mouth every 8 (eight) hours.    multivitamin Tab Take 1 tablet by mouth once daily. (Patient not taking: Reported on 3/13/2023)    NIFEdipine (PROCARDIA-XL) 60 MG (OSM) 24 hr tablet Take 1 tablet (60 mg total) by mouth once daily.    predniSONE (DELTASONE) 20 MG tablet Take 1 tablet (20 mg total) by mouth once daily.    valsartan (DIOVAN) 320 MG tablet Take 1 tablet (320 mg total) by mouth once daily.    vitamin D (VITAMIN D3) 1000 units Tab Take 1 tablet (1,000 Units total) by mouth once daily. (Patient not taking: Reported on 3/13/2023)     Family History    None       Tobacco Use    Smoking status: Never    Smokeless tobacco: Never   Substance and Sexual Activity    Alcohol use: Yes     Alcohol/week: 2.0 standard drinks     Types: 2 Cans of beer per week    Drug use: Never    Sexual activity: Yes     Partners: Female     Review of Systems   Constitutional:  Negative for chills, diaphoresis, fatigue and fever.   HENT:   Negative for congestion, hearing loss, nosebleeds, postnasal drip, sore throat, tinnitus and trouble swallowing.    Eyes:  Negative for photophobia, pain, discharge, itching and visual disturbance.   Respiratory:  Positive for shortness of breath. Negative for cough, wheezing and stridor.    Cardiovascular:  Positive for chest pain and leg swelling. Negative for palpitations.   Gastrointestinal:  Negative for abdominal distention, abdominal pain, anal bleeding, blood in stool, constipation, diarrhea, nausea and vomiting.   Endocrine: Negative for cold intolerance, heat intolerance, polydipsia, polyphagia and polyuria.   Genitourinary:  Negative for decreased urine volume, difficulty urinating, dysuria, flank pain, frequency, hematuria and urgency.   Musculoskeletal:  Negative for arthralgias, back pain, gait problem, joint swelling, myalgias, neck pain and neck stiffness.   Skin:  Negative for color change, pallor and rash.   Allergic/Immunologic: Negative for immunocompromised state.   Neurological:  Negative for dizziness, tremors, seizures, syncope, facial asymmetry, speech difficulty, weakness, light-headedness, numbness and headaches.   Hematological:  Negative for adenopathy. Does not bruise/bleed easily.   Objective:     Vital Signs (Most Recent):  Temp: 98.7 °F (37.1 °C) (03/14/23 2155)  Pulse: 109 (03/15/23 0038)  Resp: 18 (03/15/23 0038)  BP: (!) 157/115 (03/15/23 0038)  SpO2: (!) 94 % (03/15/23 0038)   Vital Signs (24h Range):  Temp:  [98.7 °F (37.1 °C)] 98.7 °F (37.1 °C)  Pulse:  [109-202] 109  Resp:  [18-25] 18  SpO2:  [93 %-97 %] 94 %  BP: (140-191)/(106-120) 157/115     Weight: 70.3 kg (155 lb)  Body mass index is 20.45 kg/m².    Physical Exam  Vitals reviewed.   Constitutional:       General: He is not in acute distress.     Appearance: Normal appearance.   HENT:      Head: Normocephalic and atraumatic.      Right Ear: External ear normal.      Left Ear: External ear normal.   Eyes:      Extraocular  Movements: Extraocular movements intact.      Pupils: Pupils are equal, round, and reactive to light.   Cardiovascular:      Rate and Rhythm: Normal rate and regular rhythm.      Pulses: Normal pulses.      Heart sounds: Normal heart sounds. No murmur heard.  Pulmonary:      Effort: Pulmonary effort is normal. No respiratory distress.      Breath sounds: Rhonchi present. No wheezing.   Chest:      Chest wall: No tenderness.   Abdominal:      General: Abdomen is flat.      Palpations: Abdomen is soft. There is no mass.      Tenderness: There is no abdominal tenderness. There is no right CVA tenderness or left CVA tenderness.   Musculoskeletal:         General: Normal range of motion.      Right lower leg: Edema present.      Left lower leg: Edema present.   Skin:     General: Skin is warm and dry.      Capillary Refill: Capillary refill takes less than 2 seconds.   Neurological:      General: No focal deficit present.      Mental Status: He is alert and oriented to person, place, and time. Mental status is at baseline.   Psychiatric:         Mood and Affect: Mood normal.         Thought Content: Thought content normal.     Cranial nerves 2-12 were grossly intact     Significant Labs: All pertinent labs within the past 24 hours have been reviewed.    Significant Imaging: I have reviewed all pertinent imaging results/findings within the past 24 hours.    Assessment/Plan:     * Pulmonary hypertension    Please see below      Shortness of breath    Feel that this is again likely due to acute exacerbation of pulmonary artery hypertension stemming from pulmonary sarcoidosis  Chest x-ray showed chronic bibasilar interstitial markings with perhaps slightly worsening of pulmonary artery congestion  Will start patient on IV Lasix      Chest pain    Patient has been having intermittent chest discomfort for the past 2-3 days.  Workup in ED including EKG and troponin x1 was unremarkable.  Will continue to trend troponin levels.   Provided that patient rules out for ACS via serial troponin levels, I feel that ischemia workup could be arranged as an outpatient basis  Patient is currently completely asymptomatic      Cor pulmonale    Continue with Lasix as outlined above      Sarcoidosis    Patient has sarcoidosis with lung involvement but otherwise there are no kidney or pancreatic involvement.  Will continue to monitor  Patient should follow-up with the rheumatologist/pulmonologist as an outpatient      RVF (right ventricular failure)    Start IV Lasix as outlined above      Essential hypertension    Diastolic blood pressure has been elevated.  Will start patient on Imdur and increase the dose of hydralazine      Chronic alcohol abuse    Patient has no signs and symptoms of withdrawal.  Will start patient on p.o. thiamine and folic acid        VTE Risk Mitigation (From admission, onward)    Lovenox 40mg SQ Q24             Min KATERIN Eugene MD  Department of Hospital Medicine   [facility] - Emergency Department

## 2023-03-15 NOTE — ASSESSMENT & PLAN NOTE
Feel that this is again likely due to acute exacerbation of pulmonary artery hypertension stemming from pulmonary sarcoidosis  Chest x-ray showed chronic bibasilar interstitial markings with perhaps slightly worsening of pulmonary artery congestion  Will start patient on IV Lasix

## 2023-03-15 NOTE — HPI
Patient is a 32-year-old male with a history of pulmonary sarcoidosis, and pulmonary hypertension secondary to former and cor pulmonale along with essential hypertension who presents to the emergency room complaining of shortness of breath accompanied by lower extremity edema and intermittent chest pain of approximately 1 week in duration.  Patient stated that in spite of taking medication as prescribed it appeared that shortness breath and lower extremity edema are becoming worse and patient developed chest pain approximately 2-3 days ago which ultimately prompting ED visit.  Patient stated that chest pain is sharp in nature and is primarily located in the mid chest area with radiation to the left shoulder.  Patient provided no alleviating or exacerbating factors and denied associated symptoms such as fever chills cough or hemoptysis.  On presentation, patient's systolic blood pressure was slightly elevated but vital signs were otherwise stable and patient was afebrile.  Workup was notable for chronic appearing bilateral infiltrates with perhaps slightly worsening of pulmonary vascular congestion.  Lab work demonstrated slightly elevated proBNP level but decreased from the last admission but otherwise unremarkable including troponin level.  Patient will be admitted for further evaluation and intervention

## 2023-03-15 NOTE — ASSESSMENT & PLAN NOTE
Patient has sarcoidosis with lung involvement but otherwise there are no kidney or pancreatic involvement.  Will continue to monitor  Patient should follow-up with the rheumatologist/pulmonologist as an outpatient

## 2023-03-15 NOTE — ED PROVIDER NOTES
Encounter Date: 3/14/2023       History     Chief Complaint   Patient presents with    Shortness of Breath     32 year old male presents to ED for complaint of leg swelling and shortness of breath. Patient states he was recently admitted to hospital for same complaints. He was discharged home on 3/3. He states he continued to have shortness of breath with worsening over the course of 2-3 days with intermittent chest pain. He states pain to chest is an 8 on 0-10 scale and feels like pressure. Patient endorses pain to bilateral lower extremities at his knees and rates pain a 10 on 0-10 scale. Also complains of intermittent cough and chills. Denies known fever. Patient endorses continued use of medication without missed doses.     The history is provided by the patient and medical records. No  was used.   Shortness of Breath  This is a recurrent problem. The current episode started more than 1 week ago. The problem has been gradually worsening. Associated symptoms include cough, chest pain, leg pain and leg swelling. Pertinent negatives include no fever and no vomiting. He has tried nothing for the symptoms. The treatment provided no relief. He has had Prior hospitalizations. Associated medical issues include heart failure.   Review of patient's allergies indicates:   Allergen Reactions    Fish containing products Anaphylaxis    Shellfish containing products Anaphylaxis    Lisinopril      Past Medical History:   Diagnosis Date    Hypertension     Sarcoidosis     Sarcoidosis 2019     Past Surgical History:   Procedure Laterality Date    ANTERIOR CRUCIATE LIGAMENT REPAIR Left 09/01/2004    ANTERIOR CRUCIATE LIGAMENT REPAIR Left     FRACTURE SURGERY      RIGHT HEART CATHETERIZATION Right 3/1/2023    Procedure: INSERTION, CATHETER, RIGHT HEART;  Surgeon: Abdelrahman Adam MD;  Location: [facility] CATH LAB;  Service: Cardiology;  Laterality: Right;     History reviewed. No pertinent family  history.  Social History     Tobacco Use    Smoking status: Never    Smokeless tobacco: Never   Substance Use Topics    Alcohol use: Yes     Alcohol/week: 2.0 standard drinks     Types: 2 Cans of beer per week    Drug use: Never     Review of Systems   Constitutional:  Positive for chills. Negative for fever.   HENT:  Negative for congestion, sinus pressure and sinus pain.    Respiratory:  Positive for cough and shortness of breath.    Cardiovascular:  Positive for chest pain and leg swelling.   Gastrointestinal:  Negative for nausea and vomiting.   Genitourinary:  Negative for decreased urine volume and difficulty urinating.   Musculoskeletal:  Positive for arthralgias. Negative for gait problem.   Neurological:  Negative for dizziness.   Hematological:  Negative for adenopathy. Does not bruise/bleed easily.   Psychiatric/Behavioral:  Negative for agitation and confusion.    All other systems reviewed and are negative.    Physical Exam     Initial Vitals [23 2155]   BP Pulse Resp Temp SpO2   (!) 149/106 (!) 119 20 98.7 °F (37.1 °C) (!) 94 %      MAP       --         Physical Exam    Nursing note and vitals reviewed.  Constitutional: He appears well-developed and well-nourished.   HENT:   Head: Normocephalic and atraumatic.   Eyes: EOM are normal. Pupils are equal, round, and reactive to light.   Neck: Neck supple.   Normal range of motion.  Cardiovascular:  Regular rhythm.           No murmur heard.  Pulmonary/Chest: He has no wheezes. He has no rhonchi.   Abdominal: Abdomen is soft. He exhibits no distension. There is no abdominal tenderness.   Musculoskeletal:         General: Edema present. No tenderness.      Cervical back: Normal range of motion and neck supple.      Right lower le+ Edema present.      Left lower le+ Edema present.     Neurological: He is alert and oriented to person, place, and time. No cranial nerve deficit or sensory deficit.   Skin: Skin is warm and dry. Capillary refill  takes less than 2 seconds.   Psychiatric: His behavior is normal. Thought content normal.       Medical Screening Exam   See Full Note    ED Course   Procedures  Labs Reviewed   COMPREHENSIVE METABOLIC PANEL - Abnormal; Notable for the following components:       Result Value    Albumin 3.2 (*)     Globulin 4.4 (*)     All other components within normal limits   NT-PRO NATRIURETIC PEPTIDE - Abnormal; Notable for the following components:    ProBNP 3,007 (*)     All other components within normal limits   D DIMER, QUANTITATIVE - Abnormal; Notable for the following components:    D-Dimer 0.71 (*)     All other components within normal limits   MAGNESIUM - Abnormal; Notable for the following components:    Magnesium 1.5 (*)     All other components within normal limits   CBC WITH DIFFERENTIAL - Abnormal; Notable for the following components:    RBC 4.54 (*)     Hemoglobin 13.3 (*)     Hematocrit 39.7 (*)     RDW 14.6 (*)     MPV 9.2 (*)     Neutrophils % 77.6 (*)     Lymphocytes % 11.7 (*)     Monocytes % 8.9 (*)     Eosinophils % 0.7 (*)     Lymphocytes, Absolute 0.84 (*)     All other components within normal limits   SARS-COV-2 RNA AMPLIFICATION, QUAL - Normal    Narrative:     Negative SARS-CoV results should not be used as the sole basis for treatment or patient management decisions; negative results should be considered in the context of a patient's recent exposures, history and the presene of clinical signs and symptoms consistent with COVID-19.  Negative results should be treated as presumptive and confirmed by molecular assay, if necessary for patient management.   TROPONIN I - Normal   DRUG SCREEN, URINE (BEAKER) - Normal    Narrative:     The results of screening tests should be considered presumptive. Confirmatory testing is available upon request.    Cutoff Points:  PCP:         25ng/mL  AMPH:        500ng/mL  BECKIE:        200ng/mL  ROBERT:        200ng/mL  THC:         50ng/mL  PORTILLO:         300ng/mL  OPI:          2000ng/mL   CBC W/ AUTO DIFFERENTIAL    Narrative:     The following orders were created for panel order CBC Auto Differential.  Procedure                               Abnormality         Status                     ---------                               -----------         ------                     CBC with Differential[[id number]]        Abnormal            Final result                 Please view results for these tests on the individual orders.          Imaging Results              X-Ray Chest 1 View (In process)                      Medications   magnesium sulfate 2g in water 50mL IVPB (premix) (2 g Intravenous New Bag 3/15/23 0021)   hydrALAZINE tablet 25 mg (has no administration in time range)   multivitamin tablet (has no administration in time range)   NIFEdipine 24 hr tablet 60 mg (has no administration in time range)   predniSONE tablet 20 mg (has no administration in time range)   valsartan tablet 320 mg (has no administration in time range)   vitamin D 1000 units tablet 1,000 Units (has no administration in time range)   furosemide injection 40 mg (has no administration in time range)   furosemide injection 60 mg (60 mg Intravenous Given 3/14/23 2310)   nitroGLYCERIN 2% TD oint ointment 1 inch (1 inch Topical (Top) Given 3/14/23 2300)   morphine injection 4 mg (4 mg Intravenous Given 3/14/23 2333)     Medical Decision Making:   ED Management:  MDM    Patient presents for emergent evaluation of acute shortness of breath orthopnea dyspnea on exertion leg swelling that poses a threat to life and/or bodily function.    In the ED patient found to have acute exacerbation of CHF pulmonary hypertension sarcoidosis.    I ordered labs and personally reviewed them.  Labs significant for elevated proBNP.  D-dimer elevated but recent CT PE protocol was normal.  Troponin is normal unlikely to be myocardial infarction.    I ordered X-rays and personally reviewed them and reviewed the radiologist  interpretation.  Xray significant for diffuse opacifications no change from recent chest x-ray and with no fever no productive cough unlikely to be pneumonia.    I ordered EKG and personally reviewed it.  EKG significant for ST depression laterally.  No ST elevation normal sinus rhythm.      Admission MDM  I discussed the patient presentation labs, ekg, X-rays, findings with the consultant for hospital medicine (speciality).    Patient was managed in the ED with IV Lasix.    The response to treatment was improved.    Patient required emergent consultation to Hospital Medicine (admitting physician) for admission.           Attending Attestation:     Physician Attestation Statement for NP/PA:   I have conducted a face to face encounter with this patient in addition to the NP/PA, due to NP/PA Request            ED Course as of 03/15/23 0201   Tue Mar 14, 2023   2444 Reviewed CT PE on 3/1  No PE [PK]   Wed Mar 15, 2023   0005 D/w hospitalist  [PK]      ED Course User Index  [PK] Gaetano Armas MD          Clinical Impression:   Final diagnoses:  [R06.02] Shortness of breath        ED Disposition Condition    Observation                 Gaetano Armas MD  03/15/23 0201

## 2023-03-15 NOTE — PLAN OF CARE
[facility] - Emergency Department  Initial Discharge Assessment       Primary Care Provider: JEREMIAS Stone    Admission Diagnosis: Shortness of breath [R06.02]    Admission Date: 3/14/2023  Expected Discharge Date:     Discharge Barriers Identified: None    Payor: MEDICAID / Plan: PENDING MEDICAID / Product Type: Government /     Extended Emergency Contact Information  Primary Emergency Contact: Dara Cosme  Home Phone: [phone]  Relation: Relative  Preferred language: English   needed? No    Discharge Plan A: Home  Discharge Plan B: Home      [facility] - [place], [place] - [address]  [address]  [place] [address]  Phone: [phone] Fax: [phone]    [facility] - [place], [place] - [address]  [address] [place] [address]  Phone: [phone] Fax: [phone]    [facility] [place] [place] - [address] [place]  [address] [facility] [address]  Phone: [phone] Fax: [phone]      Initial Assessment (most recent)       Adult Discharge Assessment - 03/15/23 1439          Discharge Assessment    Assessment Type Discharge Planning Assessment     Source of Information patient     Communicated FELIPE with patient/caregiver Date not available/Unable to determine     People in Home alone     Do you expect to return to your current living situation? Yes     Do you have help at home or someone to help you manage your care at home? No     Current cognitive status: Alert/Oriented     Equipment Currently Used at Home none     Patient currently being followed by outpatient case management? No     Do you currently have service(s) that help you manage your care at home? No     Do you take prescription medications? Yes     Do you have any problems affording any of your prescribed medications? No     Is the patient taking medications as prescribed? yes     Who is going to help you get home at discharge? family     How do you get to doctors  appointments? car, drives self;family or friend will provide     Are you on dialysis? No     Do you take coumadin? No     Discharge Plan A Home     Discharge Plan B Home     DME Needed Upon Discharge  none     Discharge Plan discussed with: Patient     Discharge Barriers Identified None        Physical Activity    On average, how many days per week do you engage in moderate to strenuous exercise (like a brisk walk)? 0 days     On average, how many minutes do you engage in exercise at this level? 0 min        Financial Resource Strain    How hard is it for you to pay for the very basics like food, housing, medical care, and heating? Not very hard        Housing Stability    In the last 12 months, was there a time when you were not able to pay the mortgage or rent on time? No     In the last 12 months, how many places have you lived? 1     In the last 12 months, was there a time when you did not have a steady place to sleep or slept in a shelter (including now)? No        Transportation Needs    In the past 12 months, has lack of transportation kept you from medical appointments or from getting medications? No     In the past 12 months, has lack of transportation kept you from meetings, work, or from getting things needed for daily living? No        Food Insecurity    Within the past 12 months, you worried that your food would run out before you got the money to buy more. Never true     Within the past 12 months, the food you bought just didn't last and you didn't have money to get more. Never true        Stress    Do you feel stress - tense, restless, nervous, or anxious, or unable to sleep at night because your mind is troubled all the time - these days? Only a little        Social Connections    In a typical week, how many times do you talk on the phone with family, friends, or neighbors? Twice a week     How often do you get together with friends or relatives? Once a week     How often do you attend Hindu or  Christian services? More than 4 times per year     Do you belong to any clubs or organizations such as Baptism groups, unions, fraternal or athletic groups, or school groups? No     How often do you attend meetings of the clubs or organizations you belong to? Never     Are you , , , , never , or living with a partner? Never         Alcohol Use    Q1: How often do you have a drink containing alcohol? Never     Q2: How many drinks containing alcohol do you have on a typical day when you are drinking? Patient does not drink     Q3: How often do you have six or more drinks on one occasion? Never                   Spoke with patient in his room. He lives alone. He gave his Aunt Dara as his emergency contact [phone]. He uses no equipment at home. No home health. He is able to drive self. He has applied for disability and has medicaid pending. Called and left Lazara in financial assistance a message to speak with patient at his request. Dc plan is home alone. Will follow.

## 2023-03-15 NOTE — ASSESSMENT & PLAN NOTE
Diastolic blood pressure has been elevated.  Will start patient on Imdur and increase the dose of hydralazine

## 2023-03-15 NOTE — SUBJECTIVE & OBJECTIVE
Past Medical History:   Diagnosis Date    Hypertension     Sarcoidosis     Sarcoidosis 2019       Past Surgical History:   Procedure Laterality Date    ANTERIOR CRUCIATE LIGAMENT REPAIR Left 09/01/2004    ANTERIOR CRUCIATE LIGAMENT REPAIR Left     FRACTURE SURGERY      RIGHT HEART CATHETERIZATION Right 3/1/2023    Procedure: INSERTION, CATHETER, RIGHT HEART;  Surgeon: Abdelrahman Adam MD;  Location: [facility] CATH LAB;  Service: Cardiology;  Laterality: Right;       Review of patient's allergies indicates:   Allergen Reactions    Fish containing products Anaphylaxis    Shellfish containing products Anaphylaxis    Lisinopril        No current facility-administered medications on file prior to encounter.     Current Outpatient Medications on File Prior to Encounter   Medication Sig    albuterol (VENTOLIN HFA) 90 mcg/actuation inhaler Inhale 2 puffs into the lungs every 6 (six) hours as needed for Wheezing or Shortness of Breath. Rescue    furosemide (LASIX) 40 MG tablet Take 1 tablet (40 mg total) by mouth once daily.    hydrALAZINE (APRESOLINE) 25 MG tablet Take 1 tablet (25 mg total) by mouth every 8 (eight) hours.    multivitamin Tab Take 1 tablet by mouth once daily. (Patient not taking: Reported on 3/13/2023)    NIFEdipine (PROCARDIA-XL) 60 MG (OSM) 24 hr tablet Take 1 tablet (60 mg total) by mouth once daily.    predniSONE (DELTASONE) 20 MG tablet Take 1 tablet (20 mg total) by mouth once daily.    valsartan (DIOVAN) 320 MG tablet Take 1 tablet (320 mg total) by mouth once daily.    vitamin D (VITAMIN D3) 1000 units Tab Take 1 tablet (1,000 Units total) by mouth once daily. (Patient not taking: Reported on 3/13/2023)     Family History    None       Tobacco Use    Smoking status: Never    Smokeless tobacco: Never   Substance and Sexual Activity    Alcohol use: Yes     Alcohol/week: 2.0 standard drinks     Types: 2 Cans of beer per week    Drug use: Never    Sexual activity: Yes     Partners: Female     Review  of Systems   Constitutional:  Negative for chills, diaphoresis, fatigue and fever.   HENT:  Negative for congestion, hearing loss, nosebleeds, postnasal drip, sore throat, tinnitus and trouble swallowing.    Eyes:  Negative for photophobia, pain, discharge, itching and visual disturbance.   Respiratory:  Positive for shortness of breath. Negative for cough, wheezing and stridor.    Cardiovascular:  Positive for chest pain and leg swelling. Negative for palpitations.   Gastrointestinal:  Negative for abdominal distention, abdominal pain, anal bleeding, blood in stool, constipation, diarrhea, nausea and vomiting.   Endocrine: Negative for cold intolerance, heat intolerance, polydipsia, polyphagia and polyuria.   Genitourinary:  Negative for decreased urine volume, difficulty urinating, dysuria, flank pain, frequency, hematuria and urgency.   Musculoskeletal:  Negative for arthralgias, back pain, gait problem, joint swelling, myalgias, neck pain and neck stiffness.   Skin:  Negative for color change, pallor and rash.   Allergic/Immunologic: Negative for immunocompromised state.   Neurological:  Negative for dizziness, tremors, seizures, syncope, facial asymmetry, speech difficulty, weakness, light-headedness, numbness and headaches.   Hematological:  Negative for adenopathy. Does not bruise/bleed easily.   Objective:     Vital Signs (Most Recent):  Temp: 98.7 °F (37.1 °C) (03/14/23 2155)  Pulse: 109 (03/15/23 0038)  Resp: 18 (03/15/23 0038)  BP: (!) 157/115 (03/15/23 0038)  SpO2: (!) 94 % (03/15/23 0038)   Vital Signs (24h Range):  Temp:  [98.7 °F (37.1 °C)] 98.7 °F (37.1 °C)  Pulse:  [109-202] 109  Resp:  [18-25] 18  SpO2:  [93 %-97 %] 94 %  BP: (140-191)/(106-120) 157/115     Weight: 70.3 kg (155 lb)  Body mass index is 20.45 kg/m².    Physical Exam  Vitals reviewed.   Constitutional:       General: He is not in acute distress.     Appearance: Normal appearance.   HENT:      Head: Normocephalic and atraumatic.       Right Ear: External ear normal.      Left Ear: External ear normal.   Eyes:      Extraocular Movements: Extraocular movements intact.      Pupils: Pupils are equal, round, and reactive to light.   Cardiovascular:      Rate and Rhythm: Normal rate and regular rhythm.      Pulses: Normal pulses.      Heart sounds: Normal heart sounds. No murmur heard.  Pulmonary:      Effort: Pulmonary effort is normal. No respiratory distress.      Breath sounds: Rhonchi present. No wheezing.   Chest:      Chest wall: No tenderness.   Abdominal:      General: Abdomen is flat.      Palpations: Abdomen is soft. There is no mass.      Tenderness: There is no abdominal tenderness. There is no right CVA tenderness or left CVA tenderness.   Musculoskeletal:         General: Normal range of motion.      Right lower leg: Edema present.      Left lower leg: Edema present.   Skin:     General: Skin is warm and dry.      Capillary Refill: Capillary refill takes less than 2 seconds.   Neurological:      General: No focal deficit present.      Mental Status: He is alert and oriented to person, place, and time. Mental status is at baseline.   Psychiatric:         Mood and Affect: Mood normal.         Thought Content: Thought content normal.     Cranial nerves 2-12 were grossly intact     Significant Labs: All pertinent labs within the past 24 hours have been reviewed.    Significant Imaging: I have reviewed all pertinent imaging results/findings within the past 24 hours.

## 2023-03-15 NOTE — ASSESSMENT & PLAN NOTE
Patient has no signs and symptoms of withdrawal.  Will start patient on p.o. thiamine and folic acid

## 2023-03-15 NOTE — ASSESSMENT & PLAN NOTE
Patient has been having intermittent chest discomfort for the past 2-3 days.  Workup in ED including EKG and troponin x1 was unremarkable.  Will continue to trend troponin levels.  Provided that patient rules out for ACS via serial troponin levels, I feel that ischemia workup could be arranged as an outpatient basis  Patient is currently completely asymptomatic

## 2023-03-16 LAB
ALBUMIN SERPL BCP-MCNC: 3.2 G/DL (ref 3.5–5)
ALBUMIN/GLOB SERPL: 0.7 {RATIO}
ALP SERPL-CCNC: 86 U/L (ref 45–115)
ALT SERPL W P-5'-P-CCNC: 30 U/L (ref 16–61)
ANION GAP SERPL CALCULATED.3IONS-SCNC: 16 MMOL/L (ref 7–16)
AST SERPL W P-5'-P-CCNC: 20 U/L (ref 15–37)
BILIRUB SERPL-MCNC: 0.6 MG/DL (ref ?–1.2)
BUN SERPL-MCNC: 22 MG/DL (ref 7–18)
BUN/CREAT SERPL: 21 (ref 6–20)
CALCIUM SERPL-MCNC: 9.5 MG/DL (ref 8.5–10.1)
CHLORIDE SERPL-SCNC: 98 MMOL/L (ref 98–107)
CO2 SERPL-SCNC: 27 MMOL/L (ref 21–32)
CREAT SERPL-MCNC: 1.05 MG/DL (ref 0.7–1.3)
EGFR (NO RACE VARIABLE) (RUSH/TITUS): 96 ML/MIN/1.73M²
GLOBULIN SER-MCNC: 4.3 G/DL (ref 2–4)
GLUCOSE SERPL-MCNC: 92 MG/DL (ref 74–106)
POTASSIUM SERPL-SCNC: 3.8 MMOL/L (ref 3.5–5.1)
PROT SERPL-MCNC: 7.5 G/DL (ref 6.4–8.2)
SODIUM SERPL-SCNC: 137 MMOL/L (ref 136–145)

## 2023-03-16 PROCEDURE — 94761 N-INVAS EAR/PLS OXIMETRY MLT: CPT

## 2023-03-16 PROCEDURE — 96372 THER/PROPH/DIAG INJ SC/IM: CPT | Performed by: INTERNAL MEDICINE

## 2023-03-16 PROCEDURE — 99233 PR SUBSEQUENT HOSPITAL CARE,LEVL III: ICD-10-PCS | Mod: ,,, | Performed by: HOSPITALIST

## 2023-03-16 PROCEDURE — 63600175 PHARM REV CODE 636 W HCPCS: Performed by: FAMILY MEDICINE

## 2023-03-16 PROCEDURE — 80053 COMPREHEN METABOLIC PANEL: CPT | Performed by: INTERNAL MEDICINE

## 2023-03-16 PROCEDURE — 25000003 PHARM REV CODE 250: Performed by: HOSPITALIST

## 2023-03-16 PROCEDURE — 63700000 PHARM REV CODE 250 ALT 637 W/O HCPCS: Performed by: HOSPITALIST

## 2023-03-16 PROCEDURE — 96365 THER/PROPH/DIAG IV INF INIT: CPT | Mod: 59

## 2023-03-16 PROCEDURE — 96366 THER/PROPH/DIAG IV INF ADDON: CPT

## 2023-03-16 PROCEDURE — 63600175 PHARM REV CODE 636 W HCPCS: Performed by: HOSPITALIST

## 2023-03-16 PROCEDURE — 63600175 PHARM REV CODE 636 W HCPCS: Performed by: INTERNAL MEDICINE

## 2023-03-16 PROCEDURE — 96375 TX/PRO/DX INJ NEW DRUG ADDON: CPT

## 2023-03-16 PROCEDURE — 25000242 PHARM REV CODE 250 ALT 637 W/ HCPCS: Performed by: HOSPITALIST

## 2023-03-16 PROCEDURE — G0378 HOSPITAL OBSERVATION PER HR: HCPCS

## 2023-03-16 PROCEDURE — 25000003 PHARM REV CODE 250: Performed by: INTERNAL MEDICINE

## 2023-03-16 PROCEDURE — 99900035 HC TECH TIME PER 15 MIN (STAT)

## 2023-03-16 PROCEDURE — 94640 AIRWAY INHALATION TREATMENT: CPT | Mod: XB

## 2023-03-16 PROCEDURE — 99900031 HC PATIENT EDUCATION (STAT)

## 2023-03-16 PROCEDURE — 96376 TX/PRO/DX INJ SAME DRUG ADON: CPT

## 2023-03-16 PROCEDURE — 99233 SBSQ HOSP IP/OBS HIGH 50: CPT | Mod: ,,, | Performed by: HOSPITALIST

## 2023-03-16 RX ORDER — GUAIFENESIN/DEXTROMETHORPHAN 100-10MG/5
10 SYRUP ORAL EVERY 4 HOURS PRN
Status: DISCONTINUED | OUTPATIENT
Start: 2023-03-16 | End: 2023-03-18 | Stop reason: HOSPADM

## 2023-03-16 RX ORDER — IPRATROPIUM BROMIDE AND ALBUTEROL SULFATE 2.5; .5 MG/3ML; MG/3ML
3 SOLUTION RESPIRATORY (INHALATION) EVERY 6 HOURS PRN
Status: DISCONTINUED | OUTPATIENT
Start: 2023-03-16 | End: 2023-03-18 | Stop reason: HOSPADM

## 2023-03-16 RX ORDER — METHYLPREDNISOLONE SOD SUCC 125 MG
125 VIAL (EA) INJECTION EVERY 6 HOURS
Status: DISCONTINUED | OUTPATIENT
Start: 2023-03-17 | End: 2023-03-17

## 2023-03-16 RX ORDER — AZITHROMYCIN 250 MG/1
250 TABLET, FILM COATED ORAL DAILY
Status: DISCONTINUED | OUTPATIENT
Start: 2023-03-17 | End: 2023-03-18 | Stop reason: HOSPADM

## 2023-03-16 RX ORDER — AZITHROMYCIN 250 MG/1
500 TABLET, FILM COATED ORAL ONCE
Status: COMPLETED | OUTPATIENT
Start: 2023-03-16 | End: 2023-03-16

## 2023-03-16 RX ORDER — MAGNESIUM SULFATE 1 G/100ML
1 INJECTION INTRAVENOUS ONCE
Status: COMPLETED | OUTPATIENT
Start: 2023-03-16 | End: 2023-03-17

## 2023-03-16 RX ADMIN — HYDRALAZINE HYDROCHLORIDE 50 MG: 50 TABLET, FILM COATED ORAL at 02:03

## 2023-03-16 RX ADMIN — HYDRALAZINE HYDROCHLORIDE 50 MG: 50 TABLET, FILM COATED ORAL at 09:03

## 2023-03-16 RX ADMIN — MAGNESIUM SULFATE 1 G: 1 INJECTION INTRAVENOUS at 11:03

## 2023-03-16 RX ADMIN — FUROSEMIDE 40 MG: 10 INJECTION, SOLUTION INTRAMUSCULAR; INTRAVENOUS at 10:03

## 2023-03-16 RX ADMIN — NIFEDIPINE 60 MG: 30 TABLET, FILM COATED, EXTENDED RELEASE ORAL at 08:03

## 2023-03-16 RX ADMIN — ALBUTEROL SULFATE 2.5 MG: 2.5 SOLUTION RESPIRATORY (INHALATION) at 02:03

## 2023-03-16 RX ADMIN — MULTIPLE VITAMINS W/ MINERALS TAB 1 TABLET: TAB at 08:03

## 2023-03-16 RX ADMIN — CEFTRIAXONE SODIUM 1 G: 1 INJECTION, POWDER, FOR SOLUTION INTRAMUSCULAR; INTRAVENOUS at 10:03

## 2023-03-16 RX ADMIN — GUAIFENESIN AND DEXTROMETHORPHAN 10 ML: 100; 10 SYRUP ORAL at 09:03

## 2023-03-16 RX ADMIN — SIMETHICONE 80 MG: 80 TABLET, CHEWABLE ORAL at 09:03

## 2023-03-16 RX ADMIN — ENOXAPARIN SODIUM 40 MG: 100 INJECTION SUBCUTANEOUS at 05:03

## 2023-03-16 RX ADMIN — ISOSORBIDE MONONITRATE 30 MG: 30 TABLET, EXTENDED RELEASE ORAL at 08:03

## 2023-03-16 RX ADMIN — PREDNISONE 20 MG: 20 TABLET ORAL at 08:03

## 2023-03-16 RX ADMIN — HYDROCODONE BITARTRATE AND ACETAMINOPHEN 1 TABLET: 5; 325 TABLET ORAL at 08:03

## 2023-03-16 RX ADMIN — ALBUTEROL SULFATE 2.5 MG: 2.5 SOLUTION RESPIRATORY (INHALATION) at 08:03

## 2023-03-16 RX ADMIN — CHOLECALCIFEROL (VITAMIN D3) 25 MCG (1,000 UNIT) TABLET 1000 UNITS: at 08:03

## 2023-03-16 RX ADMIN — AZITHROMYCIN 500 MG: 250 TABLET, FILM COATED ORAL at 10:03

## 2023-03-16 RX ADMIN — GUAIFENESIN AND DEXTROMETHORPHAN 10 ML: 100; 10 SYRUP ORAL at 12:03

## 2023-03-16 RX ADMIN — HYDRALAZINE HYDROCHLORIDE 50 MG: 50 TABLET, FILM COATED ORAL at 05:03

## 2023-03-16 RX ADMIN — HYDROCODONE BITARTRATE AND ACETAMINOPHEN 1 TABLET: 5; 325 TABLET ORAL at 09:03

## 2023-03-16 RX ADMIN — ASPIRIN 81 MG: 81 TABLET, DELAYED RELEASE ORAL at 08:03

## 2023-03-16 RX ADMIN — VALSARTAN 320 MG: 80 TABLET, FILM COATED ORAL at 08:03

## 2023-03-16 RX ADMIN — METHYLPREDNISOLONE SODIUM SUCCINATE 125 MG: 125 INJECTION, POWDER, FOR SOLUTION INTRAMUSCULAR; INTRAVENOUS at 11:03

## 2023-03-17 LAB
ALBUMIN SERPL BCP-MCNC: 3.2 G/DL (ref 3.5–5)
ALBUMIN/GLOB SERPL: 0.7 {RATIO}
ALP SERPL-CCNC: 93 U/L (ref 45–115)
ALT SERPL W P-5'-P-CCNC: 29 U/L (ref 16–61)
ANION GAP SERPL CALCULATED.3IONS-SCNC: 13 MMOL/L (ref 7–16)
AST SERPL W P-5'-P-CCNC: 17 U/L (ref 15–37)
BILIRUB SERPL-MCNC: 0.4 MG/DL (ref ?–1.2)
BILIRUB UR QL STRIP: NEGATIVE
BUN SERPL-MCNC: 28 MG/DL (ref 7–18)
BUN/CREAT SERPL: 26 (ref 6–20)
CALCIUM SERPL-MCNC: 9.2 MG/DL (ref 8.5–10.1)
CHLORIDE SERPL-SCNC: 99 MMOL/L (ref 98–107)
CLARITY UR: CLEAR
CO2 SERPL-SCNC: 27 MMOL/L (ref 21–32)
COLOR UR: COLORLESS
CREAT SERPL-MCNC: 1.06 MG/DL (ref 0.7–1.3)
EGFR (NO RACE VARIABLE) (RUSH/TITUS): 94 ML/MIN/1.73M²
GLOBULIN SER-MCNC: 4.7 G/DL (ref 2–4)
GLUCOSE SERPL-MCNC: 118 MG/DL (ref 74–106)
GLUCOSE UR STRIP-MCNC: NORMAL MG/DL
KETONES UR STRIP-SCNC: NEGATIVE MG/DL
LEUKOCYTE ESTERASE UR QL STRIP: NEGATIVE
MAGNESIUM SERPL-MCNC: 2.2 MG/DL (ref 1.7–2.3)
NITRITE UR QL STRIP: NEGATIVE
PH UR STRIP: 7.5 PH UNITS
POTASSIUM SERPL-SCNC: 4 MMOL/L (ref 3.5–5.1)
PROT SERPL-MCNC: 7.9 G/DL (ref 6.4–8.2)
PROT UR QL STRIP: NEGATIVE
RBC # UR STRIP: NEGATIVE /UL
SODIUM SERPL-SCNC: 135 MMOL/L (ref 136–145)
SP GR UR STRIP: 1.01
UROBILINOGEN UR STRIP-ACNC: NORMAL MG/DL

## 2023-03-17 PROCEDURE — 96376 TX/PRO/DX INJ SAME DRUG ADON: CPT

## 2023-03-17 PROCEDURE — 63600175 PHARM REV CODE 636 W HCPCS: Performed by: FAMILY MEDICINE

## 2023-03-17 PROCEDURE — G0378 HOSPITAL OBSERVATION PER HR: HCPCS

## 2023-03-17 PROCEDURE — 83735 ASSAY OF MAGNESIUM: CPT | Performed by: HOSPITALIST

## 2023-03-17 PROCEDURE — 63600175 PHARM REV CODE 636 W HCPCS: Performed by: HOSPITALIST

## 2023-03-17 PROCEDURE — 99233 PR SUBSEQUENT HOSPITAL CARE,LEVL III: ICD-10-PCS | Mod: ,,, | Performed by: INTERNAL MEDICINE

## 2023-03-17 PROCEDURE — 63600175 PHARM REV CODE 636 W HCPCS: Performed by: INTERNAL MEDICINE

## 2023-03-17 PROCEDURE — 25000003 PHARM REV CODE 250: Performed by: INTERNAL MEDICINE

## 2023-03-17 PROCEDURE — 80053 COMPREHEN METABOLIC PANEL: CPT | Performed by: INTERNAL MEDICINE

## 2023-03-17 PROCEDURE — 99233 SBSQ HOSP IP/OBS HIGH 50: CPT | Mod: ,,, | Performed by: INTERNAL MEDICINE

## 2023-03-17 PROCEDURE — 94761 N-INVAS EAR/PLS OXIMETRY MLT: CPT

## 2023-03-17 PROCEDURE — 25000003 PHARM REV CODE 250: Performed by: HOSPITALIST

## 2023-03-17 PROCEDURE — 63700000 PHARM REV CODE 250 ALT 637 W/O HCPCS: Performed by: HOSPITALIST

## 2023-03-17 PROCEDURE — 11000001 HC ACUTE MED/SURG PRIVATE ROOM

## 2023-03-17 PROCEDURE — 81003 URINALYSIS AUTO W/O SCOPE: CPT | Performed by: HOSPITALIST

## 2023-03-17 RX ORDER — BENZONATATE 100 MG/1
100 CAPSULE ORAL 3 TIMES DAILY PRN
Status: DISCONTINUED | OUTPATIENT
Start: 2023-03-17 | End: 2023-03-18 | Stop reason: HOSPADM

## 2023-03-17 RX ADMIN — ENOXAPARIN SODIUM 40 MG: 100 INJECTION SUBCUTANEOUS at 05:03

## 2023-03-17 RX ADMIN — CEFTRIAXONE SODIUM 1 G: 1 INJECTION, POWDER, FOR SOLUTION INTRAMUSCULAR; INTRAVENOUS at 09:03

## 2023-03-17 RX ADMIN — HYDRALAZINE HYDROCHLORIDE 50 MG: 50 TABLET, FILM COATED ORAL at 06:03

## 2023-03-17 RX ADMIN — METHYLPREDNISOLONE SODIUM SUCCINATE 60 MG: 40 INJECTION, POWDER, FOR SOLUTION INTRAMUSCULAR; INTRAVENOUS at 05:03

## 2023-03-17 RX ADMIN — FUROSEMIDE 40 MG: 10 INJECTION, SOLUTION INTRAMUSCULAR; INTRAVENOUS at 11:03

## 2023-03-17 RX ADMIN — AZITHROMYCIN 250 MG: 250 TABLET, FILM COATED ORAL at 09:03

## 2023-03-17 RX ADMIN — NIFEDIPINE 60 MG: 30 TABLET, FILM COATED, EXTENDED RELEASE ORAL at 09:03

## 2023-03-17 RX ADMIN — HYDROCODONE BITARTRATE AND ACETAMINOPHEN 1 TABLET: 5; 325 TABLET ORAL at 09:03

## 2023-03-17 RX ADMIN — CHOLECALCIFEROL (VITAMIN D3) 25 MCG (1,000 UNIT) TABLET 1000 UNITS: at 09:03

## 2023-03-17 RX ADMIN — FUROSEMIDE 40 MG: 10 INJECTION, SOLUTION INTRAMUSCULAR; INTRAVENOUS at 12:03

## 2023-03-17 RX ADMIN — METHYLPREDNISOLONE SODIUM SUCCINATE 60 MG: 40 INJECTION, POWDER, FOR SOLUTION INTRAMUSCULAR; INTRAVENOUS at 11:03

## 2023-03-17 RX ADMIN — HYDRALAZINE HYDROCHLORIDE 50 MG: 50 TABLET, FILM COATED ORAL at 02:03

## 2023-03-17 RX ADMIN — VALSARTAN 320 MG: 80 TABLET, FILM COATED ORAL at 09:03

## 2023-03-17 RX ADMIN — METHYLPREDNISOLONE SODIUM SUCCINATE 125 MG: 125 INJECTION, POWDER, FOR SOLUTION INTRAMUSCULAR; INTRAVENOUS at 06:03

## 2023-03-17 RX ADMIN — MULTIPLE VITAMINS W/ MINERALS TAB 1 TABLET: TAB at 09:03

## 2023-03-17 RX ADMIN — HYDRALAZINE HYDROCHLORIDE 50 MG: 50 TABLET, FILM COATED ORAL at 09:03

## 2023-03-17 RX ADMIN — ASPIRIN 81 MG: 81 TABLET, DELAYED RELEASE ORAL at 09:03

## 2023-03-17 RX ADMIN — ISOSORBIDE MONONITRATE 30 MG: 30 TABLET, EXTENDED RELEASE ORAL at 09:03

## 2023-03-17 NOTE — ASSESSMENT & PLAN NOTE
Patient has sarcoidosis with lung involvement but otherwise there are no kidney or pancreatic involvement.  Will continue to monitor  Patient should follow-up with the rheumatologist/pulmonologist as an outpatient    3/17- decrease solumedrol to 40 q6 today and monitor response . He is on 20 of prednisone daily at home .

## 2023-03-17 NOTE — PROGRESS NOTES
[facility] Medicine  Progress Note    Patient Name: Bo Ku  MRN: [id number]  Patient Class: IP- Inpatient   Admission Date: 3/14/2023  Length of Stay: 0 days  Attending Physician: Erwin Burden MD  Primary Care Provider: JEREMIAS Stone        Subjective:     Principal Problem:Shortness of breath        HPI:  Patient is a 32-year-old male with a history of pulmonary sarcoidosis, and pulmonary hypertension secondary to former and cor pulmonale along with essential hypertension who presents to the emergency room complaining of shortness of breath accompanied by lower extremity edema and intermittent chest pain of approximately 1 week in duration.  Patient stated that in spite of taking medication as prescribed it appeared that shortness breath and lower extremity edema are becoming worse and patient developed chest pain approximately 2-3 days ago which ultimately prompting ED visit.  Patient stated that chest pain is sharp in nature and is primarily located in the mid chest area with radiation to the left shoulder.  Patient provided no alleviating or exacerbating factors and denied associated symptoms such as fever chills cough or hemoptysis.  On presentation, patient's systolic blood pressure was slightly elevated but vital signs were otherwise stable and patient was afebrile.  Workup was notable for chronic appearing bilateral infiltrates with perhaps slightly worsening of pulmonary vascular congestion.  Lab work demonstrated slightly elevated proBNP level but decreased from the last admission but otherwise unremarkable including troponin level.  Patient will be admitted for further evaluation and intervention      Overview/Hospital Course:  3/16:  Patient is that he feels awful today.  He has some shortness of breath as well as joint pains throughout.    States this steroids have helped in the past.  Plan to increase dose of steroids and initiate  antibiotics patient states he has a productive cough with some shortness of breath.  DuoNeb treatment.  Bilateral infiltrates on chest x-ray.  Unclear how much of this is chronic versus acute.  Consult pulmonology.  3/17:cont to c/o some cough . However leg swelling improved . On room air . Leg pains improving as wel. Will start tapering steroids and monitor response.       No new subjective & objective note has been filed under this hospital service since the last note was generated.      Assessment/Plan:      * Shortness of breath    Feel that this is again likely due to acute exacerbation of pulmonary artery hypertension stemming from pulmonary sarcoidosis  Chest x-ray showed chronic bibasilar interstitial markings with perhaps slightly worsening of pulmonary artery congestion  Will start patient on IV Lasix      Chest pain    Patient has been having intermittent chest discomfort for the past 2-3 days.  Workup in ED including EKG and troponin x1 was unremarkable.  Will continue to trend troponin levels.  Provided that patient rules out for ACS via serial troponin levels, I feel that ischemia workup could be arranged as an outpatient basis        RVF (right ventricular failure)    Start IV Lasix as outlined above      Chronic alcohol abuse    Patient has no signs and symptoms of withdrawal.  Will start patient on p.o. thiamine and folic acid      Cor pulmonale    Continue with Lasix as outlined above  3/17- getting fairly euvolemic now      Pulmonary hypertension    PASP 67 on last echo.   Continue with current care.   Consult pulmonology.        Essential hypertension    Diastolic blood pressure has been elevated.  Will start patient on Imdur and increase the dose of hydralazine      Sarcoidosis    Patient has sarcoidosis with lung involvement but otherwise there are no kidney or pancreatic involvement.  Will continue to monitor  Patient should follow-up with the rheumatologist/pulmonologist as an  outpatient    3/17- decrease solumedrol to 40 q6 today and monitor response . He is on 20 of prednisone daily at home .       VTE Risk Mitigation (From admission, onward)         Ordered     enoxaparin injection 40 mg  Daily         03/15/23 0204     IP VTE HIGH RISK PATIENT  Once         03/15/23 0204     Place sequential compression device  Until discontinued         03/15/23 0204                Discharge Planning   FELIPE: 3/22/2023     Code Status: Full Code   Is the patient medically ready for discharge?:     Reason for patient still in hospital (select all that apply): Treatment  Discharge Plan A: Home                  NINFA ALVAREZ MD  Department of Hospital Medicine   [facility] - [address] [place] [address]

## 2023-03-17 NOTE — PROGRESS NOTES
[facility] Medicine  Progress Note    Patient Name: Bo Ku  MRN: [id number]  Patient Class: OP- Observation   Admission Date: 3/14/2023  Length of Stay: 0 days  Attending Physician: Fede Tomlinson MD  Primary Care Provider: JEREMIAS Stone        Subjective:     Principal Problem:Shortness of breath    HPI:  Patient is a 32-year-old male with a history of pulmonary sarcoidosis, and pulmonary hypertension secondary to former and cor pulmonale along with essential hypertension who presents to the emergency room complaining of shortness of breath accompanied by lower extremity edema and intermittent chest pain of approximately 1 week in duration.  Patient stated that in spite of taking medication as prescribed it appeared that shortness breath and lower extremity edema are becoming worse and patient developed chest pain approximately 2-3 days ago which ultimately prompting ED visit.  Patient stated that chest pain is sharp in nature and is primarily located in the mid chest area with radiation to the left shoulder.  Patient provided no alleviating or exacerbating factors and denied associated symptoms such as fever chills cough or hemoptysis.  On presentation, patient's systolic blood pressure was slightly elevated but vital signs were otherwise stable and patient was afebrile.  Workup was notable for chronic appearing bilateral infiltrates with perhaps slightly worsening of pulmonary vascular congestion.  Lab work demonstrated slightly elevated proBNP level but decreased from the last admission but otherwise unremarkable including troponin level.  Patient will be admitted for further evaluation and intervention      Overview/Hospital Course:  3/16:  Patient is that he feels awful today.  He has some shortness of breath as well as joint pains throughout.    States this steroids have helped in the past.  Plan to increase dose of steroids and initiate  antibiotics patient states he has a productive cough with some shortness of breath.  DuoNeb treatment.  Bilateral infiltrates on chest x-ray.  Unclear how much of this is chronic versus acute.  Consult pulmonology.      Interval History:    Review of Systems   Constitutional:  Negative for chills, diaphoresis, fatigue and fever.   HENT:  Negative for congestion, hearing loss, nosebleeds, postnasal drip, sore throat, tinnitus and trouble swallowing.    Eyes:  Negative for photophobia, pain, discharge, itching and visual disturbance.   Respiratory:  Positive for shortness of breath. Negative for cough, wheezing and stridor.    Cardiovascular:  Positive for chest pain and leg swelling. Negative for palpitations.   Gastrointestinal:  Negative for abdominal distention, abdominal pain, anal bleeding, blood in stool, constipation, diarrhea, nausea and vomiting.   Endocrine: Negative for cold intolerance, heat intolerance, polydipsia, polyphagia and polyuria.   Genitourinary:  Negative for decreased urine volume, difficulty urinating, dysuria, flank pain, frequency, hematuria and urgency.   Musculoskeletal:  Negative for arthralgias, back pain, gait problem, joint swelling, myalgias, neck pain and neck stiffness.   Skin:  Negative for color change, pallor and rash.   Allergic/Immunologic: Negative for immunocompromised state.   Neurological:  Negative for dizziness, tremors, seizures, syncope, facial asymmetry, speech difficulty, weakness, light-headedness, numbness and headaches.   Hematological:  Negative for adenopathy. Does not bruise/bleed easily.   Objective:     Vital Signs (Most Recent):  Temp: 97.1 °F (36.2 °C) (03/16/23 1900)  Pulse: (!) 114 (03/16/23 2021)  Resp: 14 (03/16/23 2139)  BP: 117/73 (03/16/23 1900)  SpO2: 97 % (03/16/23 2021)   Vital Signs (24h Range):  Temp:  [97.1 °F (36.2 °C)-98.6 °F (37 °C)] 97.1 °F (36.2 °C)  Pulse:  [] 114  Resp:  [14-20] 14  SpO2:  [94 %-100 %] 97 %  BP: (117-125)/(72-94)  117/73     Weight: 67.7 kg (149 lb 4 oz)  Body mass index is 19.69 kg/m².    Intake/Output Summary (Last 24 hours) at 3/16/2023 2141  Last data filed at 3/16/2023 1723  Gross per 24 hour   Intake 780 ml   Output --   Net 780 ml      Physical Exam  Vitals reviewed.   Constitutional:       General: He is not in acute distress.     Appearance: Normal appearance.   HENT:      Head: Normocephalic and atraumatic.      Right Ear: External ear normal.      Left Ear: External ear normal.   Eyes:      Extraocular Movements: Extraocular movements intact.      Pupils: Pupils are equal, round, and reactive to light.   Cardiovascular:      Rate and Rhythm: Normal rate and regular rhythm.      Pulses: Normal pulses.      Heart sounds: Normal heart sounds. No murmur heard.  Pulmonary:      Effort: Pulmonary effort is normal. No respiratory distress.      Breath sounds: Rhonchi present. No wheezing.   Chest:      Chest wall: No tenderness.   Abdominal:      General: Abdomen is flat.      Palpations: Abdomen is soft. There is no mass.      Tenderness: There is no abdominal tenderness. There is no right CVA tenderness or left CVA tenderness.   Musculoskeletal:         General: Normal range of motion.      Right lower leg: Edema present.      Left lower leg: Edema present.   Skin:     General: Skin is warm and dry.      Capillary Refill: Capillary refill takes less than 2 seconds.   Neurological:      General: No focal deficit present.      Mental Status: He is alert and oriented to person, place, and time. Mental status is at baseline.   Psychiatric:         Mood and Affect: Mood normal.         Thought Content: Thought content normal.       Significant Labs: All pertinent labs within the past 24 hours have been reviewed.    Significant Imaging: I have reviewed all pertinent imaging results/findings within the past 24 hours.      Assessment/Plan:      * Shortness of breath    Feel that this is again likely due to acute exacerbation of  pulmonary artery hypertension stemming from pulmonary sarcoidosis  Chest x-ray showed chronic bibasilar interstitial markings with perhaps slightly worsening of pulmonary artery congestion  Will start patient on IV Lasix      Chest pain    Patient has been having intermittent chest discomfort for the past 2-3 days.  Workup in ED including EKG and troponin x1 was unremarkable.  Will continue to trend troponin levels.  Provided that patient rules out for ACS via serial troponin levels, I feel that ischemia workup could be arranged as an outpatient basis        Pulmonary hypertension    PASP 67 on last echo.   Continue with current care.   Consult pulmonology.        Sarcoidosis    Patient has sarcoidosis with lung involvement but otherwise there are no kidney or pancreatic involvement.  Will continue to monitor  Patient should follow-up with the rheumatologist/pulmonologist as an outpatient      RVF (right ventricular failure)    Start IV Lasix as outlined above      Chronic alcohol abuse    Patient has no signs and symptoms of withdrawal.  Will start patient on p.o. thiamine and folic acid      Cor pulmonale    Continue with Lasix as outlined above      Essential hypertension    Diastolic blood pressure has been elevated.  Will start patient on Imdur and increase the dose of hydralazine        VTE Risk Mitigation (From admission, onward)         Ordered     enoxaparin injection 40 mg  Daily         03/15/23 0204     IP VTE HIGH RISK PATIENT  Once         03/15/23 0204     Place sequential compression device  Until discontinued         03/15/23 0204                Discharge Planning   FELIPE: 3/22/2023     Code Status: Full Code   Is the patient medically ready for discharge?:     Reason for patient still in hospital (select all that apply): Treatment  Discharge Plan A: Home                  Fede Tomlinson MD  Department of Hospital Medicine   [facility]

## 2023-03-17 NOTE — PLAN OF CARE
[facility]  Initial [facility]charge Assessment       Primary Care Provider: JEREMIAS Stone    Admission Diagnosis: Shortness of breath [R06.02]  Pulmonary hypertension [I27.20]  Chest pain [R07.9]    Admission Date: 3/14/2023  Expected Discharge Date: 3/22/2023    Discharge Barriers Identified: Unisured    Payor: MEDICAID / Plan: PENDING MEDICAID / Product Type: Government /     Extended Emergency Contact Information  Primary Emergency Contact: Dara Cosme  Home Phone: [phone]  Relation: Relative  Preferred language: English   needed? No    Discharge Plan A: Home  Discharge Plan B: Home      [facility] - [place], [place] - [address]  [address]  [place] [address]  Phone: [phone] Fax: [phone]    [facility] - [place], [place] - [address]  [address] [place] [address]  Phone: [phone] Fax: [phone]    [facility] [place] - [address]  [address] [facility] [address]  Phone: [phone] Fax: [phone]      Initial Assessment (most recent)       Adult Discharge Assessment - 03/17/23 1543          Discharge Assessment    Assessment Type Discharge Planning Assessment     Confirmed/corrected address, phone number and insurance Yes     Confirmed Demographics Correct on Facesheet     Source of Information patient     Does patient/caregiver understand observation status Yes     Communicated FELIPE with patient/caregiver Date not available/Unable to determine     People in Home alone     Facility Arrived From: home     Do you expect to return to your current living situation? Yes     Do you have help at home or someone to help you manage your care at home? No     Prior to hospitilization cognitive status: Unable to Assess     Current cognitive status: Alert/Oriented     Home Layout Able to live on 1st floor     Equipment Currently Used at Home none     Readmission within 30 days? Yes     Patient  currently being followed by outpatient case management? No     Do you currently have service(s) that help you manage your care at home? No     Do you take prescription medications? No     Do you have prescription coverage? No     Do you have any problems affording any of your prescribed medications? Yes     If yes, what medications? all     Is the patient taking medications as prescribed? no     How do you get to doctors appointments? car, drives self;family or friend will provide     Are you on dialysis? No     Do you take coumadin? No     Discharge Plan A Home     Discharge Plan B Home     DME Needed Upon Discharge  none     Discharge Plan discussed with: Patient     Discharge Barriers Identified Unisured        Physical Activity    On average, how many days per week do you engage in moderate to strenuous exercise (like a brisk walk)? 0 days     On average, how many minutes do you engage in exercise at this level? 0 min        Financial Resource Strain    How hard is it for you to pay for the very basics like food, housing, medical care, and heating? Very hard        Housing Stability    In the last 12 months, was there a time when you were not able to pay the mortgage or rent on time? No     In the last 12 months, how many places have you lived? 1     In the last 12 months, was there a time when you did not have a steady place to sleep or slept in a shelter (including now)? No        Transportation Needs    In the past 12 months, has lack of transportation kept you from medical appointments or from getting medications? No     In the past 12 months, has lack of transportation kept you from meetings, work, or from getting things needed for daily living? No        Food Insecurity    Within the past 12 months, you worried that your food would run out before you got the money to buy more. Sometimes true     Within the past 12 months, the food you bought just didn't last and you didn't have money to get more. Sometimes  true        Stress    Do you feel stress - tense, restless, nervous, or anxious, or unable to sleep at night because your mind is troubled all the time - these days? Very much        Social Connections    In a typical week, how many times do you talk on the phone with family, friends, or neighbors? Twice a week     How often do you get together with friends or relatives? Twice a week     How often do you attend Denominational or [employer] services? More than 4 times per year     Do you belong to any clubs or organizations such as Denominational groups, unions, fraternal or athletic groups, or school groups? Yes     How often do you attend meetings of the clubs or organizations you belong to? More than 4 times per year     Are you , , , , never , or living with a partner? Never         Alcohol Use    Q1: How often do you have a drink containing alcohol? Never     Q2: How many drinks containing alcohol do you have on a typical day when you are drinking? Patient does not drink     Q3: How often do you have six or more drinks on one occasion? Never                   Spoke with pt now. His medicaid is unsure if it active or not. Left message for finance dept crystal ext [phone]. Pt lives home alone plans to return when medically stable. Denies dme or hh. Will follow dc needs as arise.

## 2023-03-17 NOTE — SUBJECTIVE & OBJECTIVE
Interval History:    Review of Systems   Constitutional:  Negative for chills, diaphoresis, fatigue and fever.   HENT:  Negative for congestion, hearing loss, nosebleeds, postnasal drip, sore throat, tinnitus and trouble swallowing.    Eyes:  Negative for photophobia, pain, discharge, itching and visual disturbance.   Respiratory:  Positive for shortness of breath. Negative for cough, wheezing and stridor.    Cardiovascular:  Positive for chest pain and leg swelling. Negative for palpitations.   Gastrointestinal:  Negative for abdominal distention, abdominal pain, anal bleeding, blood in stool, constipation, diarrhea, nausea and vomiting.   Endocrine: Negative for cold intolerance, heat intolerance, polydipsia, polyphagia and polyuria.   Genitourinary:  Negative for decreased urine volume, difficulty urinating, dysuria, flank pain, frequency, hematuria and urgency.   Musculoskeletal:  Negative for arthralgias, back pain, gait problem, joint swelling, myalgias, neck pain and neck stiffness.   Skin:  Negative for color change, pallor and rash.   Allergic/Immunologic: Negative for immunocompromised state.   Neurological:  Negative for dizziness, tremors, seizures, syncope, facial asymmetry, speech difficulty, weakness, light-headedness, numbness and headaches.   Hematological:  Negative for adenopathy. Does not bruise/bleed easily.   Objective:     Vital Signs (Most Recent):  Temp: 97.1 °F (36.2 °C) (03/16/23 1900)  Pulse: (!) 114 (03/16/23 2021)  Resp: 14 (03/16/23 2139)  BP: 117/73 (03/16/23 1900)  SpO2: 97 % (03/16/23 2021)   Vital Signs (24h Range):  Temp:  [97.1 °F (36.2 °C)-98.6 °F (37 °C)] 97.1 °F (36.2 °C)  Pulse:  [] 114  Resp:  [14-20] 14  SpO2:  [94 %-100 %] 97 %  BP: (117-125)/(72-94) 117/73     Weight: 67.7 kg (149 lb 4 oz)  Body mass index is 19.69 kg/m².    Intake/Output Summary (Last 24 hours) at 3/16/2023 2141  Last data filed at 3/16/2023 1723  Gross per 24 hour   Intake 780 ml   Output --   Net  780 ml      Physical Exam  Vitals reviewed.   Constitutional:       General: He is not in acute distress.     Appearance: Normal appearance.   HENT:      Head: Normocephalic and atraumatic.      Right Ear: External ear normal.      Left Ear: External ear normal.   Eyes:      Extraocular Movements: Extraocular movements intact.      Pupils: Pupils are equal, round, and reactive to light.   Cardiovascular:      Rate and Rhythm: Normal rate and regular rhythm.      Pulses: Normal pulses.      Heart sounds: Normal heart sounds. No murmur heard.  Pulmonary:      Effort: Pulmonary effort is normal. No respiratory distress.      Breath sounds: Rhonchi present. No wheezing.   Chest:      Chest wall: No tenderness.   Abdominal:      General: Abdomen is flat.      Palpations: Abdomen is soft. There is no mass.      Tenderness: There is no abdominal tenderness. There is no right CVA tenderness or left CVA tenderness.   Musculoskeletal:         General: Normal range of motion.      Right lower leg: Edema present.      Left lower leg: Edema present.   Skin:     General: Skin is warm and dry.      Capillary Refill: Capillary refill takes less than 2 seconds.   Neurological:      General: No focal deficit present.      Mental Status: He is alert and oriented to person, place, and time. Mental status is at baseline.   Psychiatric:         Mood and Affect: Mood normal.         Thought Content: Thought content normal.       Significant Labs: All pertinent labs within the past 24 hours have been reviewed.    Significant Imaging: I have reviewed all pertinent imaging results/findings within the past 24 hours.

## 2023-03-17 NOTE — ASSESSMENT & PLAN NOTE
Patient has been having intermittent chest discomfort for the past 2-3 days.  Workup in ED including EKG and troponin x1 was unremarkable.  Will continue to trend troponin levels.  Provided that patient rules out for ACS via serial troponin levels, I feel that ischemia workup could be arranged as an outpatient basis

## 2023-03-17 NOTE — HOSPITAL COURSE
3/16:  Patient is that he feels awful today.  He has some shortness of breath as well as joint pains throughout.    States this steroids have helped in the past.  Plan to increase dose of steroids and initiate antibiotics patient states he has a productive cough with some shortness of breath.  DuoNeb treatment.  Bilateral infiltrates on chest x-ray.  Unclear how much of this is chronic versus acute.  Consult pulmonology.  3/17:cont to c/o some cough . However leg swelling improved . On room air . Leg pains improving as wel. Will start tapering steroids and monitor response.     3/18: tolerating tapering steroids. Remains on room air . Will dc today with pulm and PCP follow up . May need a rheumatology eval as well.

## 2023-03-18 VITALS
HEIGHT: 73 IN | SYSTOLIC BLOOD PRESSURE: 139 MMHG | OXYGEN SATURATION: 97 % | RESPIRATION RATE: 18 BRPM | TEMPERATURE: 98 F | BODY MASS INDEX: 19.78 KG/M2 | DIASTOLIC BLOOD PRESSURE: 106 MMHG | WEIGHT: 149.25 LBS | HEART RATE: 112 BPM

## 2023-03-18 PROCEDURE — 63700000 PHARM REV CODE 250 ALT 637 W/O HCPCS: Performed by: HOSPITALIST

## 2023-03-18 PROCEDURE — 63600175 PHARM REV CODE 636 W HCPCS: Performed by: INTERNAL MEDICINE

## 2023-03-18 PROCEDURE — 99239 PR HOSPITAL DISCHARGE DAY,>30 MIN: ICD-10-PCS | Mod: ,,, | Performed by: INTERNAL MEDICINE

## 2023-03-18 PROCEDURE — 94761 N-INVAS EAR/PLS OXIMETRY MLT: CPT

## 2023-03-18 PROCEDURE — 25000003 PHARM REV CODE 250: Performed by: HOSPITALIST

## 2023-03-18 PROCEDURE — 25000003 PHARM REV CODE 250: Performed by: INTERNAL MEDICINE

## 2023-03-18 PROCEDURE — 99239 HOSP IP/OBS DSCHRG MGMT >30: CPT | Mod: ,,, | Performed by: INTERNAL MEDICINE

## 2023-03-18 PROCEDURE — 63600175 PHARM REV CODE 636 W HCPCS: Performed by: HOSPITALIST

## 2023-03-18 RX ORDER — FUROSEMIDE 40 MG/1
40 TABLET ORAL DAILY
Qty: 30 TABLET | Refills: 11 | Status: SHIPPED | OUTPATIENT
Start: 2023-03-18 | End: 2024-03-17

## 2023-03-18 RX ORDER — ISOSORBIDE MONONITRATE 30 MG/1
30 TABLET, EXTENDED RELEASE ORAL DAILY
Qty: 30 TABLET | Refills: 11 | Status: SHIPPED | OUTPATIENT
Start: 2023-03-19 | End: 2024-03-18

## 2023-03-18 RX ORDER — BENZONATATE 100 MG/1
100 CAPSULE ORAL 3 TIMES DAILY PRN
Qty: 20 CAPSULE | Refills: 0 | Status: SHIPPED | OUTPATIENT
Start: 2023-03-18 | End: 2023-03-28

## 2023-03-18 RX ORDER — AZITHROMYCIN 250 MG/1
500 TABLET, FILM COATED ORAL DAILY
Qty: 10 TABLET | Refills: 0 | Status: SHIPPED | OUTPATIENT
Start: 2023-03-19 | End: 2023-03-24

## 2023-03-18 RX ORDER — HYDRALAZINE HYDROCHLORIDE 50 MG/1
50 TABLET, FILM COATED ORAL EVERY 8 HOURS
Qty: 90 TABLET | Refills: 11 | Status: SHIPPED | OUTPATIENT
Start: 2023-03-18 | End: 2024-03-17

## 2023-03-18 RX ORDER — POTASSIUM CHLORIDE 750 MG/1
10 TABLET, EXTENDED RELEASE ORAL DAILY
Qty: 14 TABLET | Refills: 0 | Status: SHIPPED | OUTPATIENT
Start: 2023-03-18 | End: 2023-04-01

## 2023-03-18 RX ORDER — PREDNISONE 20 MG/1
TABLET ORAL
Qty: 15 TABLET | Refills: 0 | Status: SHIPPED | OUTPATIENT
Start: 2023-03-19 | End: 2023-03-29

## 2023-03-18 RX ORDER — PREDNISONE 20 MG/1
40 TABLET ORAL DAILY
Status: DISCONTINUED | OUTPATIENT
Start: 2023-03-18 | End: 2023-03-18 | Stop reason: HOSPADM

## 2023-03-18 RX ADMIN — HYDRALAZINE HYDROCHLORIDE 50 MG: 50 TABLET, FILM COATED ORAL at 05:03

## 2023-03-18 RX ADMIN — ISOSORBIDE MONONITRATE 30 MG: 30 TABLET, EXTENDED RELEASE ORAL at 09:03

## 2023-03-18 RX ADMIN — NIFEDIPINE 60 MG: 30 TABLET, FILM COATED, EXTENDED RELEASE ORAL at 09:03

## 2023-03-18 RX ADMIN — METHYLPREDNISOLONE SODIUM SUCCINATE 60 MG: 40 INJECTION, POWDER, FOR SOLUTION INTRAMUSCULAR; INTRAVENOUS at 05:03

## 2023-03-18 RX ADMIN — ASPIRIN 81 MG: 81 TABLET, DELAYED RELEASE ORAL at 09:03

## 2023-03-18 RX ADMIN — PREDNISONE 40 MG: 20 TABLET ORAL at 09:03

## 2023-03-18 RX ADMIN — CHOLECALCIFEROL (VITAMIN D3) 25 MCG (1,000 UNIT) TABLET 1000 UNITS: at 09:03

## 2023-03-18 RX ADMIN — MULTIPLE VITAMINS W/ MINERALS TAB 1 TABLET: TAB at 09:03

## 2023-03-18 RX ADMIN — CEFTRIAXONE SODIUM 1 G: 1 INJECTION, POWDER, FOR SOLUTION INTRAMUSCULAR; INTRAVENOUS at 09:03

## 2023-03-18 RX ADMIN — AZITHROMYCIN 250 MG: 250 TABLET, FILM COATED ORAL at 09:03

## 2023-03-18 RX ADMIN — VALSARTAN 320 MG: 80 TABLET, FILM COATED ORAL at 09:03

## 2023-03-18 NOTE — ASSESSMENT & PLAN NOTE
Continue with Lasix as outlined above  3/17- getting fairly euvolemic now    3/18: dc with 40 of lasix daily

## 2023-03-18 NOTE — ASSESSMENT & PLAN NOTE
Patient has sarcoidosis with lung involvement but otherwise there are no kidney or pancreatic involvement.  Will continue to monitor  Patient should follow-up with the rheumatologist/pulmonologist as an outpatient    3/17- decrease solumedrol to 40 q6 today and monitor response . He is on 20 of prednisone daily at home .     3/18: will DC with 40 mg prednisone for 5 days then cont home dose and f/u pulm

## 2023-03-18 NOTE — DISCHARGE SUMMARY
[facility] Tele[facility] Hospital Medicine  Discharge Summary      Patient Name: Bo Ku  MRN: [id number]  [facility]: [id number]  Patient Class: IP- Inpatient  Admission Date: 3/14/2023  Hospital Length of Stay: 1 days  Discharge Date and Time:  03/18/2023 10:35 AM  Attending Physician: Erwin Burden MD   Discharging Provider: ERWIN BURDEN MD  Primary Care Provider: JEREMIAS Stone    Primary Care Team: Networked reference to record PCT     HPI:   Patient is a 32-year-old male with a history of pulmonary sarcoidosis, and pulmonary hypertension secondary to former and cor pulmonale along with essential hypertension who presents to the emergency room complaining of shortness of breath accompanied by lower extremity edema and intermittent chest pain of approximately 1 week in duration.  Patient stated that in spite of taking medication as prescribed it appeared that shortness breath and lower extremity edema are becoming worse and patient developed chest pain approximately 2-3 days ago which ultimately prompting ED visit.  Patient stated that chest pain is sharp in nature and is primarily located in the mid chest area with radiation to the left shoulder.  Patient provided no alleviating or exacerbating factors and denied associated symptoms such as fever chills cough or hemoptysis.  On presentation, patient's systolic blood pressure was slightly elevated but vital signs were otherwise stable and patient was afebrile.  Workup was notable for chronic appearing bilateral infiltrates with perhaps slightly worsening of pulmonary vascular congestion.  Lab work demonstrated slightly elevated proBNP level but decreased from the last admission but otherwise unremarkable including troponin level.  Patient will be admitted for further evaluation and intervention      * No surgery found *      Hospital Course:   3/16:  Patient is that he feels awful today.  He has some shortness of breath as well as  joint pains throughout.    States this steroids have helped in the past.  Plan to increase dose of steroids and initiate antibiotics patient states he has a productive cough with some shortness of breath.  DuoNeb treatment.  Bilateral infiltrates on chest x-ray.  Unclear how much of this is chronic versus acute.  Consult pulmonology.  3/17:cont to c/o some cough . However leg swelling improved . On room air . Leg pains improving as wel. Will start tapering steroids and monitor response.     3/18: tolerating tapering steroids. Remains on room air . Will dc today with pulm and PCP follow up . May need a rheumatology eval as well.        Goals of Care Treatment Preferences:  Code Status: Full Code      Consults:   Consults (From admission, onward)        Status Ordering Provider     Inpatient consult to Pulmonology  Once        Provider:  Arden Spear MD    Acknowledged KATHIA LONG          Cardiac/Vascular  Cor pulmonale    Continue with Lasix as outlined above  3/17- getting fairly euvolemic now    3/18: dc with 40 of lasix daily    Immunology/Multi System  Sarcoidosis    Patient has sarcoidosis with lung involvement but otherwise there are no kidney or pancreatic involvement.  Will continue to monitor  Patient should follow-up with the rheumatologist/pulmonologist as an outpatient    3/17- decrease solumedrol to 40 q6 today and monitor response . He is on 20 of prednisone daily at home .     3/18: will DC with 40 mg prednisone for 5 days then cont home dose and f/u pulm       Final Active Diagnoses:    Diagnosis Date Noted POA    PRINCIPAL PROBLEM:  Shortness of breath [R06.02] 03/15/2023 Yes    Sarcoidosis [D86.9]  Yes    Cor pulmonale [I27.81] 02/28/2023 Yes    Chest pain [R07.9] 03/15/2023 Yes    RVF (right ventricular failure) [I50.810] 03/02/2023 Yes    Chronic alcohol abuse [F10.10] 03/01/2023 Yes    Pulmonary hypertension [I27.20] 02/28/2023 Yes    Essential hypertension [I10]  03/26/2018 Yes      Problems Resolved During this Admission:       Discharged Condition: fair    Disposition:     Follow Up:   Follow-up Information     JEREMIAS Stone. Call in 2 day(s).    Specialty: Family Medicine  Contact information:  [address] [place] [address] [place] [address] [phone]                       Patient Instructions:   No discharge procedures on file.    Significant Diagnostic Studies: Cardiac Graphics: Echocardiogram: 2D echo with color flow doppler: No results found for this or any previous visit.    Pending Diagnostic Studies:     Procedure Component Value Units Date/Time    EKG 12-lead [[id number]] Collected: 03/14/23 2230    Order Status: Sent Lab Status: In process Updated: 03/15/23 0544    Narrative:      Test Reason : R07.9,    Vent. Rate : 109 BPM     Atrial Rate : 000 BPM     P-R Int : 110 ms          QRS Dur : 094 ms      QT Int : 308 ms       P-R-T Axes : 071 101 -01 degrees     QTc Int : 394 ms    Sinus tachycardia  Possible left atrial abnormality  Rightward axis  Right ventricular hypertrophy  Inferior and anterior ST-T abnormality may be due to the hypertrophy and/or  ischemia  Abnormal ECG      Referred By: [email or web address]   SELF           Confirmed By:          Medications:  Reconciled Home Medications:      Medication List      START taking these medications    azithromycin 250 MG tablet  Commonly known as: Z-JUNI  Take 2 tablets (500 mg total) by mouth once daily. for 5 days  Start taking on: March 19, 2023     benzonatate 100 MG capsule  Commonly known as: TESSALON  Take 1 capsule (100 mg total) by mouth 3 (three) times daily as needed for Cough.     isosorbide mononitrate 30 MG 24 hr tablet  Commonly known as: IMDUR  Take 1 tablet (30 mg total) by mouth once daily.  Start taking on: March 19, 2023     multivitamin Tab  Take 1 tablet by mouth once daily.     potassium chloride SA 10 MEQ tablet  Commonly known as: K-DUR,KLOR-CON M  Take 1 tablet (10 mEq total) by mouth once daily.  for 14 days     vitamin D 1000 units Tab  Commonly known as: VITAMIN D3  Take 1 tablet (1,000 Units total) by mouth once daily.        CHANGE how you take these medications    hydrALAZINE 50 MG tablet  Commonly known as: APRESOLINE  Take 1 tablet (50 mg total) by mouth every 8 (eight) hours.  What changed:   · medication strength  · how much to take     * predniSONE 20 MG tablet  Commonly known as: DELTASONE  Take 1 tablet (20 mg total) by mouth once daily.  What changed: Another medication with the same name was added. Make sure you understand how and when to take each.     * predniSONE 20 MG tablet  Commonly known as: DELTASONE  Take 2 tablets (40 mg total) by mouth once daily for 5 days, THEN 1 tablet (20 mg total) once daily for 5 days.  Start taking on: March 19, 2023  What changed: You were already taking a medication with the same name, and this prescription was added. Make sure you understand how and when to take each.         * This list has 2 medication(s) that are the same as other medications prescribed for you. Read the directions carefully, and ask your doctor or other care provider to review them with you.            CONTINUE taking these medications    albuterol 90 mcg/actuation inhaler  Commonly known as: VENTOLIN HFA  Inhale 2 puffs into the lungs every 6 (six) hours as needed for Wheezing or Shortness of Breath. Rescue     furosemide 40 MG tablet  Commonly known as: LASIX  Take 1 tablet (40 mg total) by mouth once daily.     NIFEdipine 60 MG (OSM) 24 hr tablet  Commonly known as: PROCARDIA-XL  Take 1 tablet (60 mg total) by mouth once daily.     valsartan 320 MG tablet  Commonly known as: DIOVAN  Take 1 tablet (320 mg total) by mouth once daily.            Indwelling Lines/Drains at time of discharge:   Lines/Drains/Airways     None                 Time spent on the discharge of patient: 42 minutes         NINFA ALVAREZ MD  Department of Hospital Medicine  [facility]  Telemetry

## 2023-03-20 ENCOUNTER — PATIENT OUTREACH (OUTPATIENT)
Dept: ADMINISTRATIVE | Facility: CLINIC | Age: 33
End: 2023-03-20

## 2023-03-20 ENCOUNTER — TELEPHONE (OUTPATIENT)
Dept: ADMINISTRATIVE | Facility: HOSPITAL | Age: 33
End: 2023-03-20
Payer: MEDICAID

## 2023-03-20 NOTE — PROGRESS NOTES
C3 nurse attempted to contact Bo Ku for a TCC post hospital discharge follow up call. No answer. Left voicemail with callback information. The patient does not have a scheduled HOSFU appointment. Message sent to PCP staff for assistance with scheduling visit with patient.

## 2023-03-20 NOTE — PLAN OF CARE
[facility] Telemetry  Discharge Final Note    Primary Care Provider: JEREMIAS Stone    Expected Discharge Date: 3/18/2023    Final Discharge Note (most recent)       Final Note - 03/20/23 0844          Final Note    Assessment Type Final Discharge Note     Anticipated Discharge Disposition Home or Self Care        Post-Acute Status    Discharge Delays None known at this time                     Important Message from Medicare             Contact Info       JEREMIAS Stone   Specialty: Family Medicine   Relationship: PCP - General    [address] [place] [id number]   Phone: [phone]       Next Steps: Call in 2 day(s)          Pt dc d home. 0 dc needs.

## 2023-03-21 NOTE — PROGRESS NOTES
C3 nurse spoke with Bo Ku for a TCC post hospital discharge follow up call. Nurse offered to scheduled TCC hospital follow-up appointment. The patient declined appointment at this time.  Patient states he is waiting on a call regarding insurance and will schedule appt when insurance is verified.

## 2023-06-19 ENCOUNTER — HOSPITAL ENCOUNTER (EMERGENCY)
Facility: HOSPITAL | Age: 33
Discharge: HOME | End: 2023-06-19

## 2023-06-19 VITALS
HEART RATE: 94 BPM | DIASTOLIC BLOOD PRESSURE: 77 MMHG | OXYGEN SATURATION: 96 % | TEMPERATURE: 98 F | RESPIRATION RATE: 19 BRPM | SYSTOLIC BLOOD PRESSURE: 113 MMHG | BODY MASS INDEX: 19.88 KG/M2 | HEIGHT: 73 IN | WEIGHT: 150 LBS

## 2023-06-19 DIAGNOSIS — R07.9 CHEST PAIN: ICD-10-CM

## 2023-06-19 DIAGNOSIS — D86.9 SARCOIDOSIS: ICD-10-CM

## 2023-06-19 DIAGNOSIS — R13.10 DYSPHAGIA, UNSPECIFIED TYPE: Primary | ICD-10-CM

## 2023-06-19 DIAGNOSIS — R05.9 COUGH: ICD-10-CM

## 2023-06-19 LAB
ALBUMIN SERPL BCP-MCNC: 3.3 G/DL (ref 3.5–5)
ALBUMIN/GLOB SERPL: 0.6 {RATIO}
ALP SERPL-CCNC: 75 U/L (ref 45–115)
ALT SERPL W P-5'-P-CCNC: 18 U/L (ref 16–61)
ANION GAP SERPL CALCULATED.3IONS-SCNC: 15 MMOL/L (ref 7–16)
AST SERPL W P-5'-P-CCNC: 30 U/L (ref 15–37)
BASOPHILS # BLD AUTO: 0.03 K/UL (ref 0–0.2)
BASOPHILS NFR BLD AUTO: 0.6 % (ref 0–1)
BILIRUB SERPL-MCNC: 1.1 MG/DL (ref ?–1.2)
BUN SERPL-MCNC: 14 MG/DL (ref 7–18)
BUN/CREAT SERPL: 12 (ref 6–20)
CALCIUM SERPL-MCNC: 9.5 MG/DL (ref 8.5–10.1)
CHLORIDE SERPL-SCNC: 97 MMOL/L (ref 98–107)
CO2 SERPL-SCNC: 24 MMOL/L (ref 21–32)
CREAT SERPL-MCNC: 1.14 MG/DL (ref 0.7–1.3)
DIFFERENTIAL METHOD BLD: ABNORMAL
EGFR (NO RACE VARIABLE) (RUSH/TITUS): 87 ML/MIN/1.73M2
EOSINOPHIL # BLD AUTO: 0.15 K/UL (ref 0–0.5)
EOSINOPHIL NFR BLD AUTO: 2.8 % (ref 1–4)
EOSINOPHIL NFR BLD MANUAL: 4 % (ref 1–4)
ERYTHROCYTE [DISTWIDTH] IN BLOOD BY AUTOMATED COUNT: 14.1 % (ref 11.5–14.5)
GLOBULIN SER-MCNC: 5.2 G/DL (ref 2–4)
GLUCOSE SERPL-MCNC: 90 MG/DL (ref 74–106)
HCT VFR BLD AUTO: 41.2 % (ref 40–54)
HGB BLD-MCNC: 14.7 G/DL (ref 13.5–18)
LYMPHOCYTES # BLD AUTO: 1.16 K/UL (ref 1–4.8)
LYMPHOCYTES NFR BLD AUTO: 21.9 % (ref 27–41)
LYMPHOCYTES NFR BLD MANUAL: 20 % (ref 27–41)
MAGNESIUM SERPL-MCNC: 1.8 MG/DL (ref 1.7–2.3)
MCH RBC QN AUTO: 30 PG (ref 27–31)
MCHC RBC AUTO-ENTMCNC: 35.7 G/DL (ref 32–36)
MCV RBC AUTO: 84.1 FL (ref 80–96)
MONOCYTES # BLD AUTO: 1.01 K/UL (ref 0–0.8)
MONOCYTES NFR BLD AUTO: 19.1 % (ref 2–6)
MONOCYTES NFR BLD MANUAL: 10 % (ref 2–6)
MPC BLD CALC-MCNC: 9.9 FL (ref 9.4–12.4)
NEUTROPHILS # BLD AUTO: 2.94 K/UL (ref 1.8–7.7)
NEUTROPHILS NFR BLD AUTO: 55.6 % (ref 53–65)
NEUTS SEG NFR BLD MANUAL: 66 % (ref 50–62)
NRBC BLD MANUAL-RTO: ABNORMAL %
NT-PROBNP SERPL-MCNC: 2969 PG/ML (ref 1–125)
PLATELET # BLD AUTO: 240 K/UL (ref 150–400)
POTASSIUM SERPL-SCNC: 4 MMOL/L (ref 3.5–5.1)
PROT SERPL-MCNC: 8.5 G/DL (ref 6.4–8.2)
RBC # BLD AUTO: 4.9 M/UL (ref 4.6–6.2)
SODIUM SERPL-SCNC: 132 MMOL/L (ref 136–145)
TROPONIN I SERPL DL<=0.01 NG/ML-MCNC: 16.4 PG/ML
WBC # BLD AUTO: 5.29 K/UL (ref 4.5–11)

## 2023-06-19 PROCEDURE — 99284 PR EMERGENCY DEPT VISIT,LEVEL IV: ICD-10-PCS | Mod: ,,, | Performed by: NURSE PRACTITIONER

## 2023-06-19 PROCEDURE — 25000003 PHARM REV CODE 250: Performed by: NURSE PRACTITIONER

## 2023-06-19 PROCEDURE — 25000242 PHARM REV CODE 250 ALT 637 W/ HCPCS: Performed by: NURSE PRACTITIONER

## 2023-06-19 PROCEDURE — 93010 ELECTROCARDIOGRAM REPORT: CPT | Mod: ,,, | Performed by: INTERNAL MEDICINE

## 2023-06-19 PROCEDURE — 99285 EMERGENCY DEPT VISIT HI MDM: CPT | Mod: 25

## 2023-06-19 PROCEDURE — 25500020 PHARM REV CODE 255: Performed by: NURSE PRACTITIONER

## 2023-06-19 PROCEDURE — 83880 ASSAY OF NATRIURETIC PEPTIDE: CPT | Performed by: NURSE PRACTITIONER

## 2023-06-19 PROCEDURE — 84484 ASSAY OF TROPONIN QUANT: CPT | Performed by: NURSE PRACTITIONER

## 2023-06-19 PROCEDURE — 85025 COMPLETE CBC W/AUTO DIFF WBC: CPT | Performed by: NURSE PRACTITIONER

## 2023-06-19 PROCEDURE — 99284 EMERGENCY DEPT VISIT MOD MDM: CPT | Mod: ,,, | Performed by: NURSE PRACTITIONER

## 2023-06-19 PROCEDURE — 94640 AIRWAY INHALATION TREATMENT: CPT

## 2023-06-19 PROCEDURE — 80053 COMPREHEN METABOLIC PANEL: CPT | Performed by: NURSE PRACTITIONER

## 2023-06-19 PROCEDURE — 93005 ELECTROCARDIOGRAM TRACING: CPT

## 2023-06-19 PROCEDURE — 96360 HYDRATION IV INFUSION INIT: CPT

## 2023-06-19 PROCEDURE — 83735 ASSAY OF MAGNESIUM: CPT | Performed by: NURSE PRACTITIONER

## 2023-06-19 PROCEDURE — 93010 EKG 12-LEAD: ICD-10-PCS | Mod: ,,, | Performed by: INTERNAL MEDICINE

## 2023-06-19 RX ORDER — PREDNISONE 20 MG/1
20 TABLET ORAL 2 TIMES DAILY
Qty: 10 TABLET | Refills: 0 | Status: SHIPPED | OUTPATIENT
Start: 2023-06-19 | End: 2023-06-24

## 2023-06-19 RX ORDER — IPRATROPIUM BROMIDE AND ALBUTEROL SULFATE 2.5; .5 MG/3ML; MG/3ML
3 SOLUTION RESPIRATORY (INHALATION)
Status: COMPLETED | OUTPATIENT
Start: 2023-06-19 | End: 2023-06-19

## 2023-06-19 RX ORDER — SODIUM CHLORIDE 9 MG/ML
1000 INJECTION, SOLUTION INTRAVENOUS
Status: COMPLETED | OUTPATIENT
Start: 2023-06-19 | End: 2023-06-19

## 2023-06-19 RX ADMIN — IOPAMIDOL 100 ML: 755 INJECTION, SOLUTION INTRAVENOUS at 03:06

## 2023-06-19 RX ADMIN — SODIUM CHLORIDE 1000 ML: 9 INJECTION, SOLUTION INTRAVENOUS at 02:06

## 2023-06-19 RX ADMIN — IPRATROPIUM BROMIDE AND ALBUTEROL SULFATE 3 ML: .5; 2.5 SOLUTION RESPIRATORY (INHALATION) at 03:06

## 2023-06-19 NOTE — ED PROVIDER NOTES
"Encounter Date: 6/19/2023       History     Chief Complaint   Patient presents with    Shortness of Breath    Cough    Chest Pain     33 y/o AAM with PMHx: Sarcoidosis, CHF and pulmonary HTN presents tot he ED with complaint of SOB, cough, right side chest pain and difficulty swallowing that started on Saturday. Describes pain as burning type pain in chest when it first started, but now has "pressure". States "it feels like a knot in my chest". Pain in constant, rates pain an 8/10. Pain makes it feel like its hard to get a deep breath, SOB worse with exertion, fatigues easily and has non-productive cough. Has been lying around since Saturday. Has not had anything to eat in 2 days due to unable to swallow food due to pain in chest when he tries to swallow Able to little drink water. Denies abdominal pain, nausea, vomiting and diarrhea.    Of note patient was in [facility] in 3/1/23 had echo that revealed EF 50% with low normal systolic function and pulmonary HTN. He was referred to [facility] cardiology, but due to no insurance he did not go that appointment. He is followed by Dr. Toussaint, pulmonologist at  [facility]. Last visit was one month ago. States medications do not seem to be helping his Sarcoidosis and has been referred to [facility]. Has appointment at [facility] scheduled for 913/    The history is provided by the patient.   Review of patient's allergies indicates:   Allergen Reactions    Fish containing products Anaphylaxis    Shellfish containing products Anaphylaxis    Lisinopril      Past Medical History:   Diagnosis Date    Hypertension     Sarcoidosis     Sarcoidosis 2019     Past Surgical History:   Procedure Laterality Date    ANTERIOR CRUCIATE LIGAMENT REPAIR Left 09/01/2004    ANTERIOR CRUCIATE LIGAMENT REPAIR Left     FRACTURE SURGERY      RIGHT HEART CATHETERIZATION Right 3/1/2023    Procedure: INSERTION, CATHETER, RIGHT HEART;  Surgeon: Abdelrahman Adam MD;  Location: [facility] CATH LAB;  Service: Cardiology;  " Laterality: Right;     History reviewed. No pertinent family history.  Social History     Tobacco Use    Smoking status: Never    Smokeless tobacco: Never   Substance Use Topics    Alcohol use: Yes     Alcohol/week: 2.0 standard drinks     Types: 2 Cans of beer per week    Drug use: Never     Review of Systems   Constitutional:  Positive for activity change and fatigue. Negative for appetite change and fever.   HENT: Negative.  Negative for congestion, sinus pressure, sinus pain and sore throat.    Eyes: Negative.    Respiratory:  Positive for cough, shortness of breath and wheezing.    Cardiovascular:  Negative for chest pain, palpitations and leg swelling.   Gastrointestinal:  Negative for abdominal pain, constipation, diarrhea, nausea and vomiting.        Denies reflux   Genitourinary:  Negative for dysuria, frequency and hematuria.   Musculoskeletal:  Negative for back pain and gait problem.   Skin:  Negative for rash.   Neurological:  Positive for weakness. Negative for dizziness, syncope, speech difficulty, light-headedness and headaches.   Hematological:  Does not bruise/bleed easily.   Psychiatric/Behavioral: Negative.       Physical Exam     Initial Vitals [06/19/23 1350]   BP Pulse Resp Temp SpO2   108/69 110 (!) 23 97.9 °F (36.6 °C) 96 %      MAP       --         Physical Exam    Nursing note and vitals reviewed.  Constitutional: Vital signs are normal. He appears well-developed and well-nourished. He is cooperative. He appears ill. He appears distressed (mild).   HENT:   Head: Normocephalic and atraumatic.   Right Ear: External ear normal.   Left Ear: External ear normal.   Mouth/Throat: Uvula is midline. Mucous membranes are dry.   Eyes: Lids are normal. Pupils are equal, round, and reactive to light.   Neck: Neck supple.   Normal range of motion.  Cardiovascular:  Regular rhythm, normal heart sounds, intact distal pulses and normal pulses.   Tachycardia present.         No edema to BLE    Pulmonary/Chest: Effort normal. Tachypnea noted. No apnea. No respiratory distress. He has decreased breath sounds. He has wheezes.   Abdominal: Abdomen is soft and flat. Bowel sounds are normal. There is no abdominal tenderness.   Musculoskeletal:         General: Normal range of motion.      Cervical back: Normal range of motion and neck supple.     Lymphadenopathy:     He has no cervical adenopathy.   Neurological: He is alert and oriented to person, place, and time. He has normal strength. Gait normal.   Skin: Skin is warm and dry. Capillary refill takes less than 2 seconds. No rash noted.   Psychiatric: His speech is normal and behavior is normal. Judgment and thought content normal. His mood appears anxious (Mildly anxious).       Medical Screening Exam   See Full Note    ED Course   Procedures  Labs Reviewed   COMPREHENSIVE METABOLIC PANEL - Abnormal; Notable for the following components:       Result Value    Sodium 132 (*)     Chloride 97 (*)     Total Protein 8.5 (*)     Albumin 3.3 (*)     Globulin 5.2 (*)     All other components within normal limits   NT-PRO NATRIURETIC PEPTIDE - Abnormal; Notable for the following components:    ProBNP 2,969 (*)     All other components within normal limits   CBC WITH DIFFERENTIAL - Abnormal; Notable for the following components:    Lymphocytes % 21.9 (*)     Monocytes % 19.1 (*)     Monocytes, Absolute 1.01 (*)     All other components within normal limits   MANUAL DIFFERENTIAL - Abnormal; Notable for the following components:    Segmented Neutrophils, Man % 66 (*)     Lymphocytes, Man % 20 (*)     Monocytes, Man % 10 (*)     All other components within normal limits   TROPONIN I - Normal   MAGNESIUM - Normal   CBC W/ AUTO DIFFERENTIAL    Narrative:     The following orders were created for panel order CBC auto differential.  Procedure                               Abnormality         Status                     ---------                               -----------          ------                     CBC with Differential[[id number]]        Abnormal            Final result               Manual Differential[[id number]]          Abnormal            Final result                 Please view results for these tests on the individual orders.        ECG Results              EKG 12-lead (In process)  Result time 06/19/23 14:11:32      In process by Interface, Lab In [facility] (06/19/23 14:11:32)                   Narrative:    Test Reason : R07.9,    Vent. Rate : 108 BPM     Atrial Rate : 108 BPM     P-R Int : 128 ms          QRS Dur : 086 ms      QT Int : 364 ms       P-R-T Axes : 074 112 -19 degrees     QTc Int : 487 ms    Sinus tachycardia  Biatrial enlargement  Right axis deviation  Pulmonary disease pattern  Right ventricular hypertrophy  ST and T wave abnormality, consider inferior ischemia  ST and T wave abnormality, consider anterolateral ischemia  Abnormal ECG  When compared with ECG of 14-MAR-2023 22:30,  PREVIOUS ECG IS PRESENT    Referred By:             Confirmed By:                                   Imaging Results              CTA Chest Non-Coronary (PE Studies) (Final result)  Result time 06/19/23 15:52:43      Final result by Stanley Watson DO (06/19/23 15:52:43)                   Impression:      no evidence to suggest pulmonary embolism.    Findings suggesting pulmonary arterial hypertension.    multifocal airspace opacities scattered throughout the lungs. Some of the airspace opacities now demonstrate cavitation. The largest cavitary opacities located within the left lower lobe measuring up to 6.2 x 4.0 cm.    Multiple enlarged mediastinal lymph nodes with the largest being a lower left-sided paratracheal lymph node measuring up to 1.8 cm.    Cardiomegaly      Electronically signed by: Stanley Watson  Date:    06/19/2023  Time:    15:52               Narrative:    EXAMINATION:  CTA CHEST NON CORONARY (PE STUDIES)    CLINICAL HISTORY:  Pulmonary embolism (PE)  suspected, high prob;    TECHNIQUE:  Multiplanar CT of the chest with PE protocol.  MIP projections obtained.  This exam is adequate for interpretation.  100 cc of isovue 370.    COMPARISON:  2023    FINDINGS:  Pulmonary artery: Patent.  Pulmonary artery is enlarged.    Lower neck: Partially calcified left-sided thyroid nodule is redemonstrated.    Chest/airways: Multifocal airspace opacities scattered throughout the lungs.  Some of the airspace opacities now demonstrate cavitation.  The largest cavitary opacities located within the left lower lobe measuring up to 6.2 x 4.0 cm.    Mediastinum: Cardiomegaly.  Multiple enlarged mediastinal lymph nodes with the largest being a lower left-sided paratracheal lymph node measuring up to 1.8 cm.    Chest wall: Normal    Bones: Normal    Upper abdomen: Normal                                       X-Ray Chest PA And Lateral (Final result)  Result time 06/19/23 14:41:39      Final result by Zeus Westfall MD (06/19/23 14:41:39)                   Impression:      No gross evidence of pneumonia    Chronic masslike infiltrates in the perihilar regions bilaterally in this patient with known sarcoidosis      Electronically signed by: Zeus Westfall  Date:    06/19/2023  Time:    14:41               Narrative:    EXAMINATION:  XR CHEST PA AND LATERAL    CLINICAL HISTORY:  .  Cough, unspecified.  History of sarcoidosis    COMPARISON:  March 14, 2023 chest x-ray    TECHNIQUE:  PA and lateral views of the chest    FINDINGS:  The cardiac silhouette is not enlarged.  There is no mediastinal mass.  There is no pulmonary vascular engorgement.    Masslike areas of chronic opacity are noted in the perihilar regions bilaterally without change from previous studies dating back to at least 05/31/2021.  Patient reportedly has known sarcoidosis.  There is no definite acute infiltrate.    No layering pleural effusion.    Osseous structures are similar                                        Medications   0.9%  NaCl infusion (0 mLs Intravenous Stopped 6/19/23 1604)   albuterol-ipratropium 2.5 mg-0.5 mg/3 mL nebulizer solution 3 mL (3 mLs Nebulization Given 6/19/23 1546)   iopamidoL (ISOVUE-370) injection 100 mL (100 mLs Intravenous Given 6/19/23 1530)     Medical Decision Making:   History:   Old Records Summarized: other records.       <> Summary of Records: Had EGD 3/1/2023 by Dr. Westfall that revealed mild esophageal stricture and small hiatal hernia that required esophageal dilatation.   Clinical Tests:   Lab Tests: Ordered and Reviewed  The following lab test(s) were unremarkable: CBC, CMP, BNP and Troponin  Radiological Study: Reviewed and Ordered  Medical Tests: Ordered and Reviewed  ED Management:  MDM:  Patient presents for emergent evaluation of acute chest pain, sob and cough that poses a threat to life and/or bodily function.    In the ED patient found to have tachycardia, tachypnea and other VS wnl. HR regular with rate 108. Wheezing and diminished breath sounds noted throughout lung fields. No distended abdomen, no edema to BLE.  I ordered labs and personally reviewed them.  Labs: CBC within acceptable limits. Na 132, K+ 4.0, BUN/CR 14/1.14, globulin 5.2, TP 8.5, Troponin 16.4, BNP 2,969,  Mag 1.8.   I ordered X-rays and personally reviewed them and reviewed the radiologist interpretation. CXR: Masslike areas of chronic opacity are noted in the perihilar regions bilaterally without change from previous studies dating back to at least 05/31/2021.  Patient reportedly has known sarcoidosis.  There is no definite acute infiltrate. Due to pain in right side of chest, SOB, difficulty swallowing and being tachycardic will get CT chest with contrast to rule PE and further assess masslike areas to see if this may be the cause of difficulty with swallowing.   I ordered EKG and personally reviewed it.  EKG: Sinus tach with biatrial enlargement, pulmonary HTN pattern, ST & T wave abnormality which  appears similar to EKG from 3/14/2023.                        I ordered CT scan and personally reviewed it and reviewed the radiologist interpretation.  CT significant for   no evidence to suggest pulmonary embolism. Findings suggesting pulmonary arterial hypertension. Multifocal airspace opacities scattered throughout the lungs. Some of the airspace opacities now demonstrate cavitation. The largest cavitary opacities located within the left lower lobe measuring up to 6.2 x 4.0 cm.   Multiple enlarged mediastinal lymph nodes with the largest being a lower left-sided paratracheal lymph node measuring up to 1.8 cm.   Cardiomegaly     Discharge MDM  I discussed the patient presentation labs, ekg, X-rays, CT findings with Dr. Stafford at [facility].    Patient was managed in the ED with IV  ml bolus  The response to treatment was HR down to 90s from 120s after bolus. Prescription given for Prednisone 20 mg BID x 5 days. Discharge instructions reviewed.  Patient was discharged in stable condition.  Detailed return precautions discussed. Patient agreed to treatment plan and verbalized understanding.  Other:   I have discussed this case with another health care provider.       <> Summary of the Discussion: 15:04 Dr. Elliott approved CT chest PE protocol.  16:50: [facility] completed with Dr. Stafford. Mostly Sarcoid flare. Recommended Prednisone 40 mg daily x 5 days to cover any lymph node inflammation that may be pressing on esophagus or on an nerve which could lead to dysphagia symptoms. Will refer patient to GI for evaluation of dysphagia.                        Clinical Impression:   Final diagnoses:  [R07.9] Chest pain  [R05.9] Cough  [R13.10] Dysphagia, unspecified type (Primary)  [D86.9] Sarcoidosis        ED Disposition Condition    Discharge Stable          ED Prescriptions       Medication Sig Dispense Start Date End Date Auth. Provider    predniSONE (DELTASONE) 20 MG tablet Take 1 tablet (20 mg total) by  mouth 2 (two) times daily. for 5 days 10 tablet 6/19/2023 6/24/2023 JEREMIAS Rivera          Follow-up Information       Follow up With Specialties Details Why Contact Info    JEREMIAS Stone [facility] Call in 1 day Schedule follow up [address] [place] [address] [place] [address]  [phone]               JEREMIAS Rivera  06/19/23 0172

## 2023-06-19 NOTE — ED TRIAGE NOTES
PT ARRIVED TO ER WITH C/O SOB, COUGH, CP ON (R) THAT FEELS LIKE A KNOT IN HIS CHEST SINCE Saturday THAT IS JUST GETTING WORSE. PT STATES PAIN IS 8/10.

## 2023-06-19 NOTE — DISCHARGE INSTRUCTIONS
Weight every other day and record weight.  Eat small frequent meals, take small bites.  Drink plenty of fluids, may drink supplements over the counter such as boost or ensure if unable to tolerate solid foods.

## 2023-06-21 ENCOUNTER — TELEPHONE (OUTPATIENT)
Dept: EMERGENCY MEDICINE | Facility: HOSPITAL | Age: 33
End: 2023-06-21

## 2023-06-21 NOTE — TELEPHONE ENCOUNTER
Returned missed call.  States he is doing a little better.  Reports he has been able to eat a a little bit.

## 2023-08-11 ENCOUNTER — HOSPITAL ENCOUNTER (EMERGENCY)
Facility: HOSPITAL | Age: 33
Discharge: ANOTHER HOSPITAL | End: 2023-08-11
Attending: FAMILY MEDICINE

## 2023-08-11 VITALS
SYSTOLIC BLOOD PRESSURE: 114 MMHG | TEMPERATURE: 99 F | BODY MASS INDEX: 19.88 KG/M2 | HEART RATE: 103 BPM | OXYGEN SATURATION: 93 % | RESPIRATION RATE: 24 BRPM | DIASTOLIC BLOOD PRESSURE: 75 MMHG | WEIGHT: 150 LBS | HEIGHT: 73 IN

## 2023-08-11 DIAGNOSIS — R09.02 HYPOXIA: ICD-10-CM

## 2023-08-11 DIAGNOSIS — J96.01 ACUTE RESPIRATORY FAILURE WITH HYPOXIA: ICD-10-CM

## 2023-08-11 DIAGNOSIS — D86.9 SARCOIDOSIS: Primary | ICD-10-CM

## 2023-08-11 PROBLEM — D86.2 SARCOIDOSIS OF LUNG WITH SARCOIDOSIS OF LYMPH NODES: Status: ACTIVE | Noted: 2023-08-11

## 2023-08-11 PROBLEM — I50.812 CHRONIC RIGHT-SIDED HEART FAILURE: Status: ACTIVE | Noted: 2023-08-11

## 2023-08-11 LAB
ALBUMIN SERPL BCP-MCNC: 3.2 G/DL (ref 3.5–5)
ALBUMIN/GLOB SERPL: 0.7 {RATIO}
ALP SERPL-CCNC: 82 U/L (ref 45–115)
ALT SERPL W P-5'-P-CCNC: 21 U/L (ref 16–61)
ANION GAP SERPL CALCULATED.3IONS-SCNC: 14 MMOL/L (ref 7–16)
AST SERPL W P-5'-P-CCNC: 28 U/L (ref 15–37)
BASOPHILS # BLD AUTO: 0.02 K/UL (ref 0–0.2)
BASOPHILS NFR BLD AUTO: 0.2 % (ref 0–1)
BILIRUB SERPL-MCNC: 0.2 MG/DL (ref ?–1.2)
BUN SERPL-MCNC: 11 MG/DL (ref 7–18)
BUN/CREAT SERPL: 10 (ref 6–20)
CALCIUM SERPL-MCNC: 9.1 MG/DL (ref 8.5–10.1)
CHLORIDE SERPL-SCNC: 109 MMOL/L (ref 98–107)
CO2 SERPL-SCNC: 24 MMOL/L (ref 21–32)
CREAT SERPL-MCNC: 1.11 MG/DL (ref 0.7–1.3)
DIFFERENTIAL METHOD BLD: ABNORMAL
EGFR (NO RACE VARIABLE) (RUSH/TITUS): 89 ML/MIN/1.73M2
EOSINOPHIL # BLD AUTO: 0 K/UL (ref 0–0.5)
EOSINOPHIL NFR BLD AUTO: 0 % (ref 1–4)
ERYTHROCYTE [DISTWIDTH] IN BLOOD BY AUTOMATED COUNT: 14.7 % (ref 11.5–14.5)
GLOBULIN SER-MCNC: 4.7 G/DL (ref 2–4)
GLUCOSE SERPL-MCNC: 106 MG/DL (ref 74–106)
HCO3 UR-SCNC: 19.3 MMOL/L (ref 21–28)
HCT VFR BLD AUTO: 40.3 % (ref 40–54)
HGB BLD-MCNC: 13.4 G/DL (ref 13.5–18)
IMM GRANULOCYTES # BLD AUTO: 0.06 K/UL (ref 0–0.04)
IMM GRANULOCYTES NFR BLD: 0.5 % (ref 0–0.4)
LYMPHOCYTES # BLD AUTO: 1.49 K/UL (ref 1–4.8)
LYMPHOCYTES NFR BLD AUTO: 11.9 % (ref 27–41)
MCH RBC QN AUTO: 29.1 PG (ref 27–31)
MCHC RBC AUTO-ENTMCNC: 33.3 G/DL (ref 32–36)
MCV RBC AUTO: 87.6 FL (ref 80–96)
MONOCYTES # BLD AUTO: 1.08 K/UL (ref 0–0.8)
MONOCYTES NFR BLD AUTO: 8.6 % (ref 2–6)
MPC BLD CALC-MCNC: 9.6 FL (ref 9.4–12.4)
NEUTROPHILS # BLD AUTO: 9.92 K/UL (ref 1.8–7.7)
NEUTROPHILS NFR BLD AUTO: 78.8 % (ref 53–65)
NRBC # BLD AUTO: 0 X10E3/UL
NRBC, AUTO (.00): 0 %
NT-PROBNP SERPL-MCNC: 7311 PG/ML (ref 1–125)
PCO2 BLDA: 41 MMHG (ref 35–48)
PH SMN: 7.28 [PH] (ref 7.35–7.45)
PLATELET # BLD AUTO: 262 K/UL (ref 150–400)
PO2 BLDA: 54 MMHG (ref 83–108)
POC BASE EXCESS: -7.1 MMOL/L (ref -2–3)
POC SATURATED O2: 83 % (ref 95–98)
POTASSIUM SERPL-SCNC: 4 MMOL/L (ref 3.5–5.1)
PROT SERPL-MCNC: 7.9 G/DL (ref 6.4–8.2)
RBC # BLD AUTO: 4.6 M/UL (ref 4.6–6.2)
SARS-COV-2 RDRP RESP QL NAA+PROBE: NEGATIVE
SODIUM SERPL-SCNC: 143 MMOL/L (ref 136–145)
TROPONIN I SERPL DL<=0.01 NG/ML-MCNC: 24.4 PG/ML
WBC # BLD AUTO: 12.57 K/UL (ref 4.5–11)

## 2023-08-11 PROCEDURE — 96361 HYDRATE IV INFUSION ADD-ON: CPT

## 2023-08-11 PROCEDURE — 96366 THER/PROPH/DIAG IV INF ADDON: CPT

## 2023-08-11 PROCEDURE — 85025 COMPLETE CBC W/AUTO DIFF WBC: CPT | Performed by: FAMILY MEDICINE

## 2023-08-11 PROCEDURE — 96367 TX/PROPH/DG ADDL SEQ IV INF: CPT

## 2023-08-11 PROCEDURE — 80053 COMPREHEN METABOLIC PANEL: CPT | Performed by: FAMILY MEDICINE

## 2023-08-11 PROCEDURE — 87040 BLOOD CULTURE FOR BACTERIA: CPT | Performed by: EMERGENCY MEDICINE

## 2023-08-11 PROCEDURE — 63600175 PHARM REV CODE 636 W HCPCS: Performed by: FAMILY MEDICINE

## 2023-08-11 PROCEDURE — 25000242 PHARM REV CODE 250 ALT 637 W/ HCPCS: Performed by: FAMILY MEDICINE

## 2023-08-11 PROCEDURE — 99285 EMERGENCY DEPT VISIT HI MDM: CPT | Mod: 25

## 2023-08-11 PROCEDURE — 82803 BLOOD GASES ANY COMBINATION: CPT

## 2023-08-11 PROCEDURE — 83880 ASSAY OF NATRIURETIC PEPTIDE: CPT | Performed by: EMERGENCY MEDICINE

## 2023-08-11 PROCEDURE — 25000003 PHARM REV CODE 250: Performed by: EMERGENCY MEDICINE

## 2023-08-11 PROCEDURE — 99285 EMERGENCY DEPT VISIT HI MDM: CPT | Mod: ,,, | Performed by: EMERGENCY MEDICINE

## 2023-08-11 PROCEDURE — 84484 ASSAY OF TROPONIN QUANT: CPT | Performed by: EMERGENCY MEDICINE

## 2023-08-11 PROCEDURE — 96365 THER/PROPH/DIAG IV INF INIT: CPT | Mod: 59

## 2023-08-11 PROCEDURE — 63600175 PHARM REV CODE 636 W HCPCS: Performed by: EMERGENCY MEDICINE

## 2023-08-11 PROCEDURE — 25000242 PHARM REV CODE 250 ALT 637 W/ HCPCS: Performed by: EMERGENCY MEDICINE

## 2023-08-11 PROCEDURE — 25000003 PHARM REV CODE 250: Performed by: FAMILY MEDICINE

## 2023-08-11 PROCEDURE — 87635 SARS-COV-2 COVID-19 AMP PRB: CPT | Performed by: FAMILY MEDICINE

## 2023-08-11 PROCEDURE — 96376 TX/PRO/DX INJ SAME DRUG ADON: CPT

## 2023-08-11 PROCEDURE — 99285 PR EMERGENCY DEPT VISIT,LEVEL V: ICD-10-PCS | Mod: ,,, | Performed by: EMERGENCY MEDICINE

## 2023-08-11 PROCEDURE — 25500020 PHARM REV CODE 255: Performed by: EMERGENCY MEDICINE

## 2023-08-11 PROCEDURE — 96375 TX/PRO/DX INJ NEW DRUG ADDON: CPT

## 2023-08-11 RX ORDER — LEVALBUTEROL INHALATION SOLUTION 1.25 MG/3ML
1.25 SOLUTION RESPIRATORY (INHALATION)
Status: COMPLETED | OUTPATIENT
Start: 2023-08-11 | End: 2023-08-11

## 2023-08-11 RX ORDER — CLONIDINE HYDROCHLORIDE 0.1 MG/1
0.1 TABLET ORAL
Status: DISCONTINUED | OUTPATIENT
Start: 2023-08-11 | End: 2023-08-11 | Stop reason: HOSPADM

## 2023-08-11 RX ORDER — MAGNESIUM SULFATE HEPTAHYDRATE 40 MG/ML
2 INJECTION, SOLUTION INTRAVENOUS
Status: COMPLETED | OUTPATIENT
Start: 2023-08-11 | End: 2023-08-11

## 2023-08-11 RX ORDER — LORAZEPAM 2 MG/ML
1 INJECTION INTRAMUSCULAR
Status: COMPLETED | OUTPATIENT
Start: 2023-08-11 | End: 2023-08-11

## 2023-08-11 RX ORDER — METHYLPREDNISOLONE SOD SUCC 125 MG
125 VIAL (EA) INJECTION
Status: COMPLETED | OUTPATIENT
Start: 2023-08-11 | End: 2023-08-11

## 2023-08-11 RX ORDER — SODIUM CHLORIDE 9 MG/ML
500 INJECTION, SOLUTION INTRAVENOUS
Status: COMPLETED | OUTPATIENT
Start: 2023-08-11 | End: 2023-08-11

## 2023-08-11 RX ORDER — ONDANSETRON 2 MG/ML
4 INJECTION INTRAMUSCULAR; INTRAVENOUS
Status: COMPLETED | OUTPATIENT
Start: 2023-08-11 | End: 2023-08-11

## 2023-08-11 RX ORDER — MORPHINE SULFATE 2 MG/ML
2 INJECTION, SOLUTION INTRAMUSCULAR; INTRAVENOUS
Status: COMPLETED | OUTPATIENT
Start: 2023-08-11 | End: 2023-08-11

## 2023-08-11 RX ORDER — IPRATROPIUM BROMIDE 0.5 MG/2.5ML
0.5 SOLUTION RESPIRATORY (INHALATION) ONCE
Status: COMPLETED | OUTPATIENT
Start: 2023-08-11 | End: 2023-08-11

## 2023-08-11 RX ADMIN — SODIUM CHLORIDE 500 ML: 9 INJECTION, SOLUTION INTRAVENOUS at 08:08

## 2023-08-11 RX ADMIN — LEVALBUTEROL HYDROCHLORIDE 1.25 MG: 1.25 SOLUTION RESPIRATORY (INHALATION) at 10:08

## 2023-08-11 RX ADMIN — SODIUM CHLORIDE 500 ML: 9 INJECTION, SOLUTION INTRAVENOUS at 07:08

## 2023-08-11 RX ADMIN — IPRATROPIUM BROMIDE 0.5 MG: 0.5 SOLUTION RESPIRATORY (INHALATION) at 05:08

## 2023-08-11 RX ADMIN — IOPAMIDOL 100 ML: 755 INJECTION, SOLUTION INTRAVENOUS at 07:08

## 2023-08-11 RX ADMIN — AMINOPHYLLINE 387.5 MG: 25 INJECTION, SOLUTION INTRAVENOUS at 05:08

## 2023-08-11 RX ADMIN — LEVALBUTEROL HYDROCHLORIDE 1.25 MG: 1.25 SOLUTION RESPIRATORY (INHALATION) at 03:08

## 2023-08-11 RX ADMIN — MORPHINE SULFATE 2 MG: 2 INJECTION, SOLUTION INTRAMUSCULAR; INTRAVENOUS at 05:08

## 2023-08-11 RX ADMIN — METHYLPREDNISOLONE SODIUM SUCCINATE 125 MG: 125 INJECTION, POWDER, FOR SOLUTION INTRAMUSCULAR; INTRAVENOUS at 03:08

## 2023-08-11 RX ADMIN — LORAZEPAM 1 MG: 2 INJECTION INTRAMUSCULAR; INTRAVENOUS at 03:08

## 2023-08-11 RX ADMIN — METHYLPREDNISOLONE SODIUM SUCCINATE 125 MG: 125 INJECTION, POWDER, FOR SOLUTION INTRAMUSCULAR; INTRAVENOUS at 02:08

## 2023-08-11 RX ADMIN — LEVALBUTEROL HYDROCHLORIDE 1.25 MG: 1.25 SOLUTION RESPIRATORY (INHALATION) at 02:08

## 2023-08-11 RX ADMIN — MAGNESIUM SULFATE HEPTAHYDRATE 2 G: 40 INJECTION, SOLUTION INTRAVENOUS at 03:08

## 2023-08-11 RX ADMIN — AZITHROMYCIN DIHYDRATE 500 MG: 500 INJECTION, POWDER, LYOPHILIZED, FOR SOLUTION INTRAVENOUS at 08:08

## 2023-08-11 RX ADMIN — ONDANSETRON 4 MG: 2 INJECTION INTRAMUSCULAR; INTRAVENOUS at 05:08

## 2023-08-11 RX ADMIN — DEXTROSE MONOHYDRATE 1 G: 5 INJECTION INTRAVENOUS at 07:08

## 2023-08-11 NOTE — ED PROVIDER NOTES
Encounter Date: 8/11/2023       History     Chief Complaint   Patient presents with    Shortness of Breath     Patient 32-year-old with history of sarcoidosis.  Coming in the ER short of breath with wheezing.  He states he is never been intubated--patient only uses an inhaler at home        Review of patient's allergies indicates:   Allergen Reactions    Fish containing products Anaphylaxis    Shellfish containing products Anaphylaxis    Lisinopril      Past Medical History:   Diagnosis Date    Asthma     Cavitary lung disease     Hypertension     Sarcoidosis of lung with sarcoidosis of lymph nodes 2019    Severe pulmonary hypertension      Past Surgical History:   Procedure Laterality Date    ANTERIOR CRUCIATE LIGAMENT REPAIR Left 09/01/2004    FRACTURE SURGERY      RIGHT HEART CATHETERIZATION Right 03/01/2023    Procedure: INSERTION, CATHETER, RIGHT HEART;  Surgeon: Abdelrahman Adam MD;  Location: [facility] CATH LAB;  Service: Cardiology;  Laterality: Right;     No family history on file.  Social History     Tobacco Use    Smoking status: Never    Smokeless tobacco: Never   Substance Use Topics    Alcohol use: Yes     Alcohol/week: 2.0 standard drinks of alcohol     Types: 2 Cans of beer per week    Drug use: Never     Review of Systems   Constitutional:  Positive for fatigue. Negative for fever.   HENT: Negative.  Negative for sore throat.    Eyes: Negative.    Respiratory:  Positive for cough, shortness of breath and wheezing.    Cardiovascular: Negative.  Negative for chest pain.   Gastrointestinal: Negative.  Negative for nausea.   Endocrine: Negative.    Genitourinary: Negative.  Negative for dysuria.   Musculoskeletal: Negative.  Negative for back pain.   Skin: Negative.  Negative for rash.   Allergic/Immunologic: Negative.    Neurological: Negative.  Negative for weakness.   Hematological: Negative.  Does not bruise/bleed easily.   Psychiatric/Behavioral: Negative.         Physical Exam     Initial Vitals    BP Pulse Resp Temp SpO2   08/11/23 0241 08/11/23 0239 08/11/23 0239 08/11/23 0230 08/11/23 0230   108/88 (!) 124 (!) 33 98.9 °F (37.2 °C) (!) 79 %      MAP       --                Physical Exam    Medical Screening Exam   See Full Note    ED Course   Procedures  Labs Reviewed   COMPREHENSIVE METABOLIC PANEL - Abnormal; Notable for the following components:       Result Value    Chloride 109 (*)     Albumin 3.2 (*)     Globulin 4.7 (*)     All other components within normal limits   CBC WITH DIFFERENTIAL - Abnormal; Notable for the following components:    WBC 12.57 (*)     Hemoglobin 13.4 (*)     RDW 14.7 (*)     Neutrophils % 78.8 (*)     Lymphocytes % 11.9 (*)     Monocytes % 8.6 (*)     Eosinophils % 0.0 (*)     Immature Granulocytes % 0.5 (*)     Neutrophils, Abs 9.92 (*)     Monocytes, Absolute 1.08 (*)     Immature Granulocytes, Absolute 0.06 (*)     All other components within normal limits   NT-PRO NATRIURETIC PEPTIDE - Abnormal; Notable for the following components:    ProBNP 7,311 (*)     All other components within normal limits   SARS-COV-2 RNA AMPLIFICATION, QUAL - Normal    Narrative:     Negative SARS-CoV results should not be used as the sole basis for treatment or patient management decisions; negative results should be considered in the context of a patient's recent exposures, history and the presene of clinical signs and symptoms consistent with COVID-19.  Negative results should be treated as presumptive and confirmed by molecular assay, if necessary for patient management.   TROPONIN I - Normal   CULTURE, BLOOD   CULTURE, BLOOD   CBC W/ AUTO DIFFERENTIAL    Narrative:     The following orders were created for panel order CBC Auto Differential.  Procedure                               Abnormality         Status                     ---------                               -----------         ------                     CBC with Differential[[id number]]        Abnormal            Final result                  Please view results for these tests on the individual orders.   LACTIC ACID, PLASMA          Imaging Results              CTA Chest Non-Coronary (PE Studies) (Final result)  Result time 08/11/23 07:46:04      Final result by Ricky Bautista II, MD (08/11/23 07:46:04)                   Impression:      No evidence of pulmonary thromboembolism.  Increased airspace density present in the lungs when compared to previous suggest pneumonia superimposed on chronic lung disease.      Electronically signed by: Ricky Bautista  Date:    08/11/2023  Time:    07:46               Narrative:    EXAMINATION:  CTA CHEST NON CORONARY (PE STUDIES)    CLINICAL HISTORY:  Pulmonary embolism (PE) suspected, high prob;    TECHNIQUE:  Axial CT imaging of the chest is performed with intravenous contrast. Contrast dose is 100 cc Isovue 370.    CT dose reduction technique used - Dose Rite and tube current modulation.    COMPARISON:  19 June 2023    FINDINGS:  No thrombus or other abnormality is identified in the pulmonary arteries or veins.  The pulmonary vessel caliber is within normal limits.  The heart, mediastinum and great vessels appear within normal limits.    Bilateral patchy pulmonary airspace densities present most extensive in the lower lobes.  There is cavitation present in these areas but these were seen on previous.    Remaining parenchyma shows no evidence of airspace disease or abnormal density.  No effusion or pneumothorax is present.                                       X-Ray Chest AP Portable (Final result)  Result time 08/11/23 06:48:17      Final result by Zeus Westfall MD (08/11/23 06:48:17)                   Impression:      Mild hazy bilateral pulmonary infiltrate/edema superimposed upon chronic lung disease.  Pneumonia is a diagnostic consideration.      Electronically signed by: Zeus Westfall  Date:    08/11/2023  Time:    06:48               Narrative:    EXAMINATION:  XR CHEST AP  PORTABLE    CLINICAL HISTORY:  Shortness of breath with sarcoidosis;.    COMPARISON:  June 19, 2023    TECHNIQUE:  AP portable erect chest    FINDINGS:  There is stable mild cardiomegaly.  Mediastinal contours are unchanged.    There is mild hazy bilateral mid lung infiltrate superimposed upon chronic mid to lower lung disease.    There is no pneumothorax or gross layering pleural effusion.    Osseous structures are similar                                       Medications   methylPREDNISolone sodium succinate injection 125 mg (125 mg Intravenous Given 8/11/23 0245)   levalbuterol nebulizer solution 1.25 mg (1.25 mg Nebulization Given 8/11/23 0245)   levalbuterol nebulizer solution 1.25 mg (1.25 mg Nebulization Given 8/11/23 0300)   LORazepam injection 1 mg (1 mg Intravenous Given 8/11/23 0300)   magnesium sulfate 2g in water 50mL IVPB (premix) (0 g Intravenous Stopped 8/11/23 0535)   levalbuterol nebulizer solution 1.25 mg (1.25 mg Nebulization Given 8/11/23 0325)   aminophylline 387.5 mg in dextrose 5 % in water (D5W) 50 mL infusion (0 mg Intravenous Stopped 8/11/23 0530)   methylPREDNISolone sodium succinate injection 125 mg (125 mg Intravenous Given 8/11/23 0350)   morphine injection 2 mg (2 mg Intravenous Given 8/11/23 0551)   ondansetron injection 4 mg (4 mg Intravenous Given 8/11/23 0551)   ipratropium 0.02 % nebulizer solution 0.5 mg (0.5 mg Nebulization Given 8/11/23 0551)   cefTRIAXone (ROCEPHIN) 1 g in dextrose 5 % in water (D5W) 100 mL IVPB (MB+) (0 g Intravenous Stopped 8/11/23 0816)   azithromycin (ZITHROMAX) 500 mg in dextrose 5 % (D5W) 250 mL IVPB (0 mg Intravenous Stopped 8/11/23 0934)   0.9%  NaCl infusion (0 mLs Intravenous Stopped 8/11/23 0803)   iopamidoL (ISOVUE-370) injection 100 mL (100 mLs Intravenous Given 8/11/23 0723)   levalbuterol nebulizer solution 1.25 mg (1.25 mg Nebulization Given 8/11/23 1006)     Medical Decision Making:   Initial Assessment:   Patient with sarcoidosis and  cavitating lesions in his lung.  He also having hemoptysis.  Also with pulmonary hypertension.  In acute respiratory wheezing  Differential Diagnosis:   Pulmonary hypertension.  Sarcoidosis of the lungs.  ED Management:  Discussed with Internal Medicine here at our facility we do not have a pulmonologist for the rest of the week the patient may need a bronchoscope.    [facility]    Patient presents for emergent evaluation of acute shortness of breath that poses a threat to life and/or bodily function.    In the ED patient found to have acute respiratory failure.    I ordered labs and personally reviewed them.  Labs significant for troponin normal unlikely to be myocardial infarction.  ProBNP elevated.  White count elevated.  Electrolytes unremarkable.  Creatinine no acute worsening  I ordered X-rays and personally reviewed them and reviewed the radiologist interpretation.  Xray significant for infiltrate edema chronic lung changes.    I ordered EKG and personally reviewed it.  EKG significant for no standing.    I ordered CT scan and personally reviewed it and reviewed the radiologist interpretation.  CT significant for infiltrate/edema chronic lung changes.      Transfer [facility]  I discussed the patient presentation and findings with the consultant for [facility] medicine potential accepting facility (speciality).  Hospitalist this [facility]  Patient was managed in the ED with IV Solu-Medrol Rocephin azithromycin  The response to treatment was improved.    Patient was transfer in stable condition.   We will transfer the patient to [facility] for further evaluation and treatment             ED Course as of 08/12/23 1356   Fri Aug 11, 2023   0556 Sign out.  Shift change.  Sarcoidosis.  Using inhaler at home has not seen doctor in a while.  Respiratory distress.  Two rounds of solumedrol.  Xoponex given (tachycardia).  Cavitating lesion in lungs.  Dr. Adhikari feels like he needs to go to hospital with pulmonology coverage.  May need  bronchoscopy.   Given ativan to calm down.  Chest pain from coughing.  Given morphine.  [facility] has been notified.  [facility] declined.  We do not have a pulmonologist at this hospital.   [PK]   5399 Talked with Dr. Columba NULL they do not have any bed availability.  Suggested reaching out to [facility] to contact [PK]   [address] [facility] declined due to no bed capacity   [PK]      ED Course User Index  [PK] Gaetano Armas MD                Clinical Impression:   Final diagnoses:  [D86.9] Sarcoidosis (Primary)  [R09.02] Hypoxia  [J96.01] Acute respiratory failure with hypoxia        ED Disposition Condition    Transfer to Another Facility Stable                Gaetano Armas MD  08/12/23 0018

## 2023-08-17 LAB
BACTERIA BLD CULT: NORMAL
BACTERIA BLD CULT: NORMAL

## 2023-08-24 ENCOUNTER — HOSPITAL ENCOUNTER (EMERGENCY)
Facility: HOSPITAL | Age: 33
Discharge: HOME | End: 2023-08-24

## 2023-08-24 VITALS
BODY MASS INDEX: 19.79 KG/M2 | DIASTOLIC BLOOD PRESSURE: 76 MMHG | RESPIRATION RATE: 20 BRPM | OXYGEN SATURATION: 97 % | SYSTOLIC BLOOD PRESSURE: 110 MMHG | TEMPERATURE: 98 F | WEIGHT: 150 LBS | HEART RATE: 90 BPM

## 2023-08-24 DIAGNOSIS — M79.89 LEG SWELLING: Primary | ICD-10-CM

## 2023-08-24 DIAGNOSIS — M79.605 PAIN IN BOTH LOWER EXTREMITIES: ICD-10-CM

## 2023-08-24 DIAGNOSIS — M79.604 PAIN IN BOTH LOWER EXTREMITIES: ICD-10-CM

## 2023-08-24 PROCEDURE — 99284 EMERGENCY DEPT VISIT MOD MDM: CPT

## 2023-08-24 PROCEDURE — 63600175 PHARM REV CODE 636 W HCPCS: Performed by: REGISTERED NURSE

## 2023-08-24 PROCEDURE — 99283 PR EMERGENCY DEPT VISIT,LEVEL III: ICD-10-PCS | Mod: ,,, | Performed by: REGISTERED NURSE

## 2023-08-24 PROCEDURE — 96372 THER/PROPH/DIAG INJ SC/IM: CPT | Performed by: REGISTERED NURSE

## 2023-08-24 PROCEDURE — 99283 EMERGENCY DEPT VISIT LOW MDM: CPT | Mod: ,,, | Performed by: REGISTERED NURSE

## 2023-08-24 RX ORDER — KETOROLAC TROMETHAMINE 30 MG/ML
30 INJECTION, SOLUTION INTRAMUSCULAR; INTRAVENOUS
Status: COMPLETED | OUTPATIENT
Start: 2023-08-24 | End: 2023-08-24

## 2023-08-24 RX ORDER — FUROSEMIDE 10 MG/ML
40 INJECTION INTRAMUSCULAR; INTRAVENOUS
Status: COMPLETED | OUTPATIENT
Start: 2023-08-24 | End: 2023-08-24

## 2023-08-24 RX ADMIN — KETOROLAC TROMETHAMINE 30 MG: 30 INJECTION, SOLUTION INTRAMUSCULAR at 06:08

## 2023-08-24 RX ADMIN — FUROSEMIDE 40 MG: 10 INJECTION, SOLUTION INTRAMUSCULAR; INTRAVENOUS at 06:08

## 2023-08-24 NOTE — ED PROVIDER NOTES
"Encounter Date: 8/24/2023       History     Chief Complaint   Patient presents with    Leg Pain    Leg Swelling     Bilateral leg pain from his hips to his toes with swelling in both lower extremities.  Swelling started yesterday but the pain started around 10 am today.Pt states he was discharged from [facility] on Monday following dx. Of pneumonia and a lung biopsy.       32 y-old AAM received to ED with complaint of pain/swelling to bilateral lower ext. Patient states that this has been ongoing for several days, but has gotten worse today. Hx of CHF, and sarcoidosis. Patient states, "I'm hurting because my legs are swollen, I have taken lasix in the past and it helps."       Review of patient's allergies indicates:   Allergen Reactions    Fish containing products Anaphylaxis    Shellfish containing products Anaphylaxis    Lisinopril      Past Medical History:   Diagnosis Date    Asthma     Cavitary lung disease     Hypertension     Sarcoidosis of lung with sarcoidosis of lymph nodes 2019    Severe pulmonary hypertension      Past Surgical History:   Procedure Laterality Date    ANTERIOR CRUCIATE LIGAMENT REPAIR Left 09/01/2004    FRACTURE SURGERY      RIGHT HEART CATHETERIZATION Right 03/01/2023    Procedure: INSERTION, CATHETER, RIGHT HEART;  Surgeon: Abdelrahman Adam MD;  Location: [facility] CATH LAB;  Service: Cardiology;  Laterality: Right;     History reviewed. No pertinent family history.  Social History     Tobacco Use    Smoking status: Never    Smokeless tobacco: Never   Substance Use Topics    Alcohol use: Yes     Alcohol/week: 2.0 standard drinks of alcohol     Types: 2 Cans of beer per week    Drug use: Never     Review of Systems   Constitutional: Negative.    HENT: Negative.     Eyes: Negative.    Respiratory: Negative.     Cardiovascular:  Positive for leg swelling. Negative for chest pain and palpitations.   Gastrointestinal: Negative.    Endocrine: Negative.    Genitourinary: Negative.  " Patient admitted for CTA procedure. Patient educated on all aspects of what to expect pre/during/and post procedure. All questions answered. Medications and possible side effects and discharge education discussed with patient and spouse () Jaylyn. Patient tolerated procedure well and monitored post procedure without changes. Patient discharged with discharge instructions and unit phone number for any future questions. Patient discharged in care of wife (Jaylyn) without incident.      Musculoskeletal: Negative.    Skin: Negative.    Allergic/Immunologic: Negative.    Neurological: Negative.    Hematological: Negative.    Psychiatric/Behavioral: Negative.         Physical Exam     Initial Vitals [08/24/23 1750]   BP Pulse Resp Temp SpO2   110/76 90 20 98 °F (36.7 °C) 97 %      MAP       --         Physical Exam    Nursing note and vitals reviewed.  Constitutional: He appears well-developed and well-nourished.   HENT:   Head: Normocephalic and atraumatic.   Right Ear: External ear normal.   Left Ear: External ear normal.   Nose: Nose normal.   Mouth/Throat: Oropharynx is clear and moist.   Eyes: Conjunctivae are normal.   Neck: Neck supple.   Normal range of motion.  Cardiovascular:  Normal rate, regular rhythm, normal heart sounds and intact distal pulses.           Pulmonary/Chest: Breath sounds normal. No respiratory distress. He has no wheezes. He has no rhonchi. He has no rales. He exhibits no tenderness.   Abdominal: Abdomen is soft. Bowel sounds are normal.   Musculoskeletal:         General: Edema present. Normal range of motion.      Cervical back: Normal range of motion and neck supple.      Comments: 2+ pitting edema noted to bilateral lower ext.      Neurological: He is alert and oriented to person, place, and time. He has normal strength. GCS score is 15. GCS eye subscore is 4. GCS verbal subscore is 5. GCS motor subscore is 6.   Skin: Skin is warm and dry. Capillary refill takes less than 2 seconds.   Psychiatric: He has a normal mood and affect. His behavior is normal. Judgment and thought content normal.         Medical Screening Exam   See Full Note    ED Course   Procedures  Labs Reviewed - No data to display       Imaging Results    None          Medications   furosemide injection 40 mg (has no administration in time range)   ketorolac injection 30 mg (has no administration in time range)     Medical Decision Making  32 y-old AAM received to ED with complaint of pain/swelling  "to bilateral lower ext. Patient states that this has been ongoing for several days, but has gotten worse today. Hx of CHF, and sarcoidosis. Patient states, "I'm hurting because my legs are swollen, I have taken lasix in the past and it helps."     Amount and/or Complexity of Data Reviewed  External Data Reviewed: labs.     Details: BUN/Creat of 20/0.81    Risk  Prescription drug management.  Risk Details: Patient has bilateral leg pain and 2+ pitting edema bilaterally. Denies any dyspnea, HOWARD, orthopnea or chest pain. States that he has had this problem in the past. History of CHF. I discussed treatment options including work up in this ED and treatment with lasix/toradol or just treatment. Patient states that he was discharged from [facility] on 08/21/2023. States that he just wants some relief and that he would f/u for any new or worsening problems or otherwise as needed. BUN/Creat 20/0.81 on 08/17/2023                               Clinical Impression:   Final diagnoses:  [M79.89] Leg swelling (Primary)  [M79.604, M79.605] Pain in both lower extremities        ED Disposition Condition    Discharge Stable          ED Prescriptions    None       Follow-up Information       Follow up With Specialties Details Why Contact Info    Nhan Chavez, FNP Family Medicine In 2 days As needed [address] [place] [address] [place] [address]  [phone]               Maikel Villa NP-C  08/24/23 8493    "

## 2023-08-25 ENCOUNTER — TELEPHONE (OUTPATIENT)
Dept: EMERGENCY MEDICINE | Facility: HOSPITAL | Age: 33
End: 2023-08-25

## 2024-01-05 ENCOUNTER — PATIENT OUTREACH (OUTPATIENT)
Dept: ADMINISTRATIVE | Facility: HOSPITAL | Age: 34
End: 2024-01-05

## 2024-06-17 ENCOUNTER — HOSPITAL ENCOUNTER (EMERGENCY)
Facility: HOSPITAL | Age: 34
Discharge: HOME | End: 2024-06-17
Payer: MEDICAID

## 2024-06-17 VITALS
OXYGEN SATURATION: 92 % | DIASTOLIC BLOOD PRESSURE: 74 MMHG | BODY MASS INDEX: 23.59 KG/M2 | HEIGHT: 73 IN | SYSTOLIC BLOOD PRESSURE: 106 MMHG | WEIGHT: 178 LBS | RESPIRATION RATE: 20 BRPM | HEART RATE: 106 BPM | TEMPERATURE: 98 F

## 2024-06-17 DIAGNOSIS — D86.2 SARCOIDOSIS OF LUNG WITH SARCOIDOSIS OF LYMPH NODES: ICD-10-CM

## 2024-06-17 DIAGNOSIS — I50.812 CHRONIC RIGHT-SIDED HEART FAILURE: Primary | ICD-10-CM

## 2024-06-17 DIAGNOSIS — R07.9 CHEST PAIN: ICD-10-CM

## 2024-06-17 LAB
ALBUMIN SERPL BCP-MCNC: 3.2 G/DL (ref 3.5–5)
ALBUMIN/GLOB SERPL: 0.9 {RATIO}
ALP SERPL-CCNC: 63 U/L (ref 45–115)
ALT SERPL W P-5'-P-CCNC: 20 U/L (ref 16–61)
AMPHET UR QL SCN: NEGATIVE
ANION GAP SERPL CALCULATED.3IONS-SCNC: 12 MMOL/L (ref 7–16)
APTT PPP: 32.2 SECONDS (ref 25.2–37.3)
AST SERPL W P-5'-P-CCNC: 20 U/L (ref 15–37)
BARBITURATES UR QL SCN: NEGATIVE
BASOPHILS # BLD AUTO: 0.03 K/UL (ref 0–0.2)
BASOPHILS NFR BLD AUTO: 0.4 % (ref 0–1)
BENZODIAZ METAB UR QL SCN: NEGATIVE
BILIRUB SERPL-MCNC: 1.1 MG/DL (ref ?–1.2)
BILIRUB UR QL STRIP: NEGATIVE
BUN SERPL-MCNC: 13 MG/DL (ref 7–18)
BUN/CREAT SERPL: 12 (ref 6–20)
CALCIUM SERPL-MCNC: 8.9 MG/DL (ref 8.5–10.1)
CANNABINOIDS UR QL SCN: NEGATIVE
CHLORIDE SERPL-SCNC: 101 MMOL/L (ref 98–107)
CLARITY UR: CLEAR
CO2 SERPL-SCNC: 25 MMOL/L (ref 21–32)
COCAINE UR QL SCN: NEGATIVE
COLOR UR: ABNORMAL
CREAT SERPL-MCNC: 1.07 MG/DL (ref 0.7–1.3)
DIFFERENTIAL METHOD BLD: ABNORMAL
EGFR (NO RACE VARIABLE) (RUSH/TITUS): 93 ML/MIN/1.73M2
EOSINOPHIL # BLD AUTO: 0.04 K/UL (ref 0–0.5)
EOSINOPHIL NFR BLD AUTO: 0.5 % (ref 1–4)
ERYTHROCYTE [DISTWIDTH] IN BLOOD BY AUTOMATED COUNT: 15.3 % (ref 11.5–14.5)
GLOBULIN SER-MCNC: 3.5 G/DL (ref 2–4)
GLUCOSE SERPL-MCNC: 90 MG/DL (ref 74–106)
GLUCOSE UR STRIP-MCNC: NORMAL MG/DL
HCT VFR BLD AUTO: 40 % (ref 40–54)
HGB BLD-MCNC: 13.4 G/DL (ref 13.5–18)
IMM GRANULOCYTES # BLD AUTO: 0.09 K/UL (ref 0–0.04)
IMM GRANULOCYTES NFR BLD: 1.1 % (ref 0–0.4)
INR BLD: 1.13
KETONES UR STRIP-SCNC: NEGATIVE MG/DL
LEUKOCYTE ESTERASE UR QL STRIP: NEGATIVE
LYMPHOCYTES # BLD AUTO: 0.84 K/UL (ref 1–4.8)
LYMPHOCYTES NFR BLD AUTO: 10.1 % (ref 27–41)
MAGNESIUM SERPL-MCNC: 1.7 MG/DL (ref 1.7–2.3)
MCH RBC QN AUTO: 31.2 PG (ref 27–31)
MCHC RBC AUTO-ENTMCNC: 33.5 G/DL (ref 32–36)
MCV RBC AUTO: 93 FL (ref 80–96)
MONOCYTES # BLD AUTO: 1.18 K/UL (ref 0–0.8)
MONOCYTES NFR BLD AUTO: 14.2 % (ref 2–6)
MPC BLD CALC-MCNC: 10.4 FL (ref 9.4–12.4)
NEUTROPHILS # BLD AUTO: 6.12 K/UL (ref 1.8–7.7)
NEUTROPHILS NFR BLD AUTO: 73.7 % (ref 53–65)
NITRITE UR QL STRIP: NEGATIVE
NRBC # BLD AUTO: 0.02 X10E3/UL
NRBC, AUTO (.00): 0.2 %
NT-PROBNP SERPL-MCNC: 5785 PG/ML (ref 1–125)
OPIATES UR QL SCN: NEGATIVE
PCP UR QL SCN: NEGATIVE
PH UR STRIP: 6.5 PH UNITS
PLATELET # BLD AUTO: 170 K/UL (ref 150–400)
POTASSIUM SERPL-SCNC: 3.7 MMOL/L (ref 3.5–5.1)
PROT SERPL-MCNC: 6.7 G/DL (ref 6.4–8.2)
PROT UR QL STRIP: NEGATIVE
PROTHROMBIN TIME: 14.4 SECONDS (ref 11.7–14.7)
RBC # BLD AUTO: 4.3 M/UL (ref 4.6–6.2)
RBC # UR STRIP: NEGATIVE /UL
SODIUM SERPL-SCNC: 134 MMOL/L (ref 136–145)
SP GR UR STRIP: 1.01
TROPONIN I SERPL DL<=0.01 NG/ML-MCNC: 14.7 PG/ML
UROBILINOGEN UR STRIP-ACNC: 4 MG/DL
WBC # BLD AUTO: 8.3 K/UL (ref 4.5–11)

## 2024-06-17 PROCEDURE — 80053 COMPREHEN METABOLIC PANEL: CPT | Performed by: NURSE PRACTITIONER

## 2024-06-17 PROCEDURE — 83735 ASSAY OF MAGNESIUM: CPT | Performed by: NURSE PRACTITIONER

## 2024-06-17 PROCEDURE — 63600175 PHARM REV CODE 636 W HCPCS: Performed by: NURSE PRACTITIONER

## 2024-06-17 PROCEDURE — 99285 EMERGENCY DEPT VISIT HI MDM: CPT | Mod: 25

## 2024-06-17 PROCEDURE — 96374 THER/PROPH/DIAG INJ IV PUSH: CPT

## 2024-06-17 PROCEDURE — 85730 THROMBOPLASTIN TIME PARTIAL: CPT | Performed by: NURSE PRACTITIONER

## 2024-06-17 PROCEDURE — 93010 ELECTROCARDIOGRAM REPORT: CPT | Mod: ,,, | Performed by: STUDENT IN AN ORGANIZED HEALTH CARE EDUCATION/TRAINING PROGRAM

## 2024-06-17 PROCEDURE — 84484 ASSAY OF TROPONIN QUANT: CPT | Performed by: NURSE PRACTITIONER

## 2024-06-17 PROCEDURE — 93005 ELECTROCARDIOGRAM TRACING: CPT

## 2024-06-17 PROCEDURE — 25000242 PHARM REV CODE 250 ALT 637 W/ HCPCS: Performed by: NURSE PRACTITIONER

## 2024-06-17 PROCEDURE — 36415 COLL VENOUS BLD VENIPUNCTURE: CPT | Performed by: NURSE PRACTITIONER

## 2024-06-17 PROCEDURE — 83880 ASSAY OF NATRIURETIC PEPTIDE: CPT | Performed by: NURSE PRACTITIONER

## 2024-06-17 PROCEDURE — 96375 TX/PRO/DX INJ NEW DRUG ADDON: CPT

## 2024-06-17 PROCEDURE — 81003 URINALYSIS AUTO W/O SCOPE: CPT | Mod: 59 | Performed by: NURSE PRACTITIONER

## 2024-06-17 PROCEDURE — 80307 DRUG TEST PRSMV CHEM ANLYZR: CPT | Performed by: NURSE PRACTITIONER

## 2024-06-17 PROCEDURE — 85025 COMPLETE CBC W/AUTO DIFF WBC: CPT | Performed by: NURSE PRACTITIONER

## 2024-06-17 PROCEDURE — 85610 PROTHROMBIN TIME: CPT | Performed by: NURSE PRACTITIONER

## 2024-06-17 RX ORDER — FUROSEMIDE 20 MG/1
40 TABLET ORAL 2 TIMES DAILY
Qty: 20 TABLET | Refills: 0 | Status: SHIPPED | OUTPATIENT
Start: 2024-06-17 | End: 2024-06-22

## 2024-06-17 RX ORDER — FUROSEMIDE 10 MG/ML
40 INJECTION INTRAMUSCULAR; INTRAVENOUS
Status: COMPLETED | OUTPATIENT
Start: 2024-06-17 | End: 2024-06-17

## 2024-06-17 RX ORDER — METHYLPREDNISOLONE 4 MG/1
TABLET ORAL
Qty: 1 EACH | Refills: 0 | Status: SHIPPED | OUTPATIENT
Start: 2024-06-17 | End: 2024-07-08

## 2024-06-17 RX ORDER — DEXAMETHASONE SODIUM PHOSPHATE 4 MG/ML
4 INJECTION, SOLUTION INTRA-ARTICULAR; INTRALESIONAL; INTRAMUSCULAR; INTRAVENOUS; SOFT TISSUE
Status: COMPLETED | OUTPATIENT
Start: 2024-06-17 | End: 2024-06-17

## 2024-06-17 RX ORDER — IPRATROPIUM BROMIDE AND ALBUTEROL SULFATE 2.5; .5 MG/3ML; MG/3ML
3 SOLUTION RESPIRATORY (INHALATION)
Status: COMPLETED | OUTPATIENT
Start: 2024-06-17 | End: 2024-06-17

## 2024-06-17 RX ADMIN — FUROSEMIDE 40 MG: 10 INJECTION, SOLUTION INTRAMUSCULAR; INTRAVENOUS at 03:06

## 2024-06-17 RX ADMIN — IPRATROPIUM BROMIDE AND ALBUTEROL SULFATE 3 ML: .5; 3 SOLUTION RESPIRATORY (INHALATION) at 03:06

## 2024-06-17 RX ADMIN — DEXAMETHASONE SODIUM PHOSPHATE 4 MG: 4 INJECTION, SOLUTION INTRA-ARTICULAR; INTRALESIONAL; INTRAMUSCULAR; INTRAVENOUS; SOFT TISSUE at 03:06

## 2024-06-17 NOTE — ED PROVIDER NOTES
Encounter Date: 6/17/2024       History     Chief Complaint   Patient presents with    Chest Pain    Shortness of Breath    Headache     Patient presents to ER with complaint of shortness of breath and chest pain.  Patient reports history of CHF and Sarcoidosis.  He states his symptoms are chronic but have been worse over the last 24 hours.  He states his chest pain is intermittent and radiates around his left chest wall.  He denies nausea or vomiting.  Denies missing doses of his medication.  He also reports swelling of his bilateral lower extremities.  He denies fever.      The history is provided by the patient. No  was used.     Review of patient's allergies indicates:   Allergen Reactions    Fish containing products Anaphylaxis    Shellfish containing products Anaphylaxis    Lisinopril     Sulfamethoxazole-trimethoprim Hives     Past Medical History:   Diagnosis Date    Asthma     Cavitary lung disease     Hypertension     Sarcoidosis of lung with sarcoidosis of lymph nodes 2019    Severe pulmonary hypertension      Past Surgical History:   Procedure Laterality Date    ANTERIOR CRUCIATE LIGAMENT REPAIR Left 09/01/2004    FRACTURE SURGERY      RIGHT HEART CATHETERIZATION Right 03/01/2023    Procedure: INSERTION, CATHETER, RIGHT HEART;  Surgeon: Abdelrahman Adam MD;  Location: [facility] CATH LAB;  Service: Cardiology;  Laterality: Right;     No family history on file.  Social History     Tobacco Use    Smoking status: Never    Smokeless tobacco: Never   Substance Use Topics    Alcohol use: Yes     Alcohol/week: 2.0 standard drinks of alcohol     Types: 2 Cans of beer per week    Drug use: Never     Review of Systems   Constitutional:  Positive for activity change and fatigue.   Respiratory:  Positive for chest tightness and shortness of breath.    Cardiovascular:  Positive for chest pain.   Neurological:  Positive for light-headedness.   All other systems reviewed and are  negative.      Physical Exam     Initial Vitals [06/17/24 1221]   BP Pulse Resp Temp SpO2   106/74 (!) 201 20 98.4 °F (36.9 °C) (!) 91 %      MAP       --         Physical Exam    Nursing note and vitals reviewed.  Constitutional: Vital signs are normal. He appears well-developed and well-nourished. He is cooperative. He has a sickly appearance.   HENT:   Head: Normocephalic.   Nose: Nose normal.   Mouth/Throat: Oropharynx is clear and moist.   Eyes: Conjunctivae and EOM are normal.   Neck: Neck supple.   Normal range of motion.  Cardiovascular:  Normal rate, normal heart sounds and intact distal pulses.           Pulmonary/Chest: Breath sounds normal.   Abdominal: Abdomen is soft. Bowel sounds are normal.   Musculoskeletal:         General: Normal range of motion.      Cervical back: Normal range of motion and neck supple.     Neurological: He is alert and oriented to person, place, and time. He has normal strength. GCS score is 15. GCS eye subscore is 4. GCS verbal subscore is 5. GCS motor subscore is 6.   Skin: Skin is warm and dry. Capillary refill takes less than 2 seconds.   Psychiatric: He has a normal mood and affect. Thought content normal.         Medical Screening Exam   See Full Note    ED Course   Procedures  Labs Reviewed   COMPREHENSIVE METABOLIC PANEL - Abnormal; Notable for the following components:       Result Value    Sodium 134 (*)     Albumin 3.2 (*)     All other components within normal limits   NT-PRO NATRIURETIC PEPTIDE - Abnormal; Notable for the following components:    ProBNP 5,785 (*)     All other components within normal limits   URINALYSIS, REFLEX TO URINE CULTURE - Abnormal; Notable for the following components:    Urobilinogen, UA 4 (*)     All other components within normal limits   CBC WITH DIFFERENTIAL - Abnormal; Notable for the following components:    RBC 4.30 (*)     Hemoglobin 13.4 (*)     MCH 31.2 (*)     RDW 15.3 (*)     Neutrophils % 73.7 (*)     Lymphocytes % 10.1 (*)      Monocytes % 14.2 (*)     Eosinophils % 0.5 (*)     Immature Granulocytes % 1.1 (*)     nRBC, Auto 0.2 (*)     Lymphocytes, Absolute 0.84 (*)     Monocytes, Absolute 1.18 (*)     Immature Granulocytes, Absolute 0.09 (*)     nRBC, Absolute 0.02 (*)     All other components within normal limits   TROPONIN I - Normal   MAGNESIUM - Normal   PROTIME-INR - Normal   APTT - Normal   DRUG SCREEN, URINE (BEAKER) - Normal    Narrative:     The results of screening tests should be considered presumptive. Confirmatory testing is available upon request.    Cutoff Points:  PCP:         25ng/mL  AMPH:        500ng/mL  BECKIE:        200ng/mL  ROBERT:        200ng/mL  THC:         50ng/mL  PORTILLO:         300ng/mL  OPI:         2000ng/mL   CBC W/ AUTO DIFFERENTIAL    Narrative:     The following orders were created for panel order CBC auto differential.  Procedure                               Abnormality         Status                     ---------                               -----------         ------                     CBC with Differential[[id number]]       Abnormal            Final result                 Please view results for these tests on the individual orders.          Imaging Results              X-Ray Chest PA And Lateral (Final result)  Result time 06/17/24 13:05:36      Final result by Cj Womack MD (06/17/24 13:05:36)                   Impression:      Fibrotic changes in the lungs similar to the prior.  No new abnormality detected by radiograph.      Electronically signed by: Cj Womack  Date:    06/17/2024  Time:    13:05               Narrative:    EXAMINATION:  XR CHEST PA AND LATERAL    CLINICAL HISTORY:  Chest Pain;  history of sarcoidosis.    TECHNIQUE:  PA and lateral views of the chest were performed.    COMPARISON:  06/19/2023    FINDINGS:  The heart size is normal.  There are extensive fibrotic changes throughout both lungs similar to prior exam.  There is no pneumothorax or large pleural effusion  detected.                                       Medications   furosemide injection 40 mg (40 mg Intravenous Given 6/17/24 1536)   dexAMETHasone injection 4 mg (4 mg Intravenous Given 6/17/24 1536)   albuterol-ipratropium 2.5 mg-0.5 mg/3 mL nebulizer solution 3 mL (3 mLs Nebulization Given 6/17/24 1536)     Medical Decision Making  Patient presents to ER with complaint of shortness of breath and chest pain.  Patient reports history of CHF and Sarcoidosis.  He states his symptoms are chronic but have been worse over the last 24 hours.  He states his chest pain is intermittent and radiates around his left chest wall.  He denies nausea or vomiting.  Denies missing doses of his medication.  He also reports swelling of his bilateral lower extremities.  He denies fever.        Amount and/or Complexity of Data Reviewed  Labs: ordered. Decision-making details documented in ED Course.     Details: Bnp 5, 785  Radiology: ordered. Decision-making details documented in ED Course.  ECG/medicine tests: ordered. Decision-making details documented in ED Course.  Discussion of management or test interpretation with external provider(s): Initiate IV collect lab work  Lasix 40 mg IV  Decadron 4mg IV  Duoneb neb  to treat shortness of breath    Patient verbalizes improvement of symptoms after neb.    Patient is dc home with dx of chronic right sided heart failure, chest pain, and sarcoidosis of lungs and lymph nodes. Patient is given rx for lasix 40mg to take BID x 5 days and medrol dose gilberto. He is instructed to follow up with PCP in 1 week for recheck.  He is instructed to return ot ER if symptoms fail to improve.        Risk  Prescription drug management.                                      Clinical Impression:   Final diagnoses:  [R07.9] Chest pain  [D86.2] Sarcoidosis of lung with sarcoidosis of lymph nodes  [I50.812] Chronic right-sided heart failure (Primary)        ED Disposition Condition    Discharge Stable          ED  Prescriptions       Medication Sig Dispense Start Date End Date Auth. Provider    methylPREDNISolone (MEDROL DOSEPACK) 4 mg tablet Take as prescribed. 1 each 6/17/2024 7/8/2024 Demetria Triana FNP    furosemide (LASIX) 20 MG tablet Take 2 tablets (40 mg total) by mouth 2 (two) times daily. for 5 days 20 tablet 6/17/2024 6/22/2024 Demetria Triana FNP          Follow-up Information       Follow up With Specialties Details Why Contact Info    Julia Alba, NP Family Medicine Schedule an appointment as soon as possible for a visit in 2 days If symptoms worsen [address]  [place] [address] [phone]               Demetria Triana FNP  06/17/24 [id number]

## 2024-06-17 NOTE — DISCHARGE INSTRUCTIONS
Continue current medications as prescribed.   Increase lasix 40mg to twice daily for 5 days.    Follow up with your PCP in 5 days for recheck, return to eR sooner with new or worsening symptoms.

## 2024-06-17 NOTE — ED TRIAGE NOTES
Pt presents to ED via POV with c/o chest pain, SOB, and headache that started last night around 2130.

## 2024-06-18 ENCOUNTER — TELEPHONE (OUTPATIENT)
Dept: EMERGENCY MEDICINE | Facility: HOSPITAL | Age: 34
End: 2024-06-18
Payer: MEDICAID

## 2024-06-21 LAB
OHS QRS DURATION: 88 MS
OHS QTC CALCULATION: 458 MS

## 2024-10-01 DIAGNOSIS — D86.0 PULMONARY SARCOIDOSIS: Primary | ICD-10-CM

## 2024-10-08 RX ORDER — FUROSEMIDE 40 MG/1
TABLET ORAL
COMMUNITY

## 2024-10-08 RX ORDER — ALBUTEROL SULFATE 90 UG/1
INHALANT RESPIRATORY (INHALATION)
COMMUNITY
Start: 2024-09-20

## 2024-10-08 RX ORDER — TRIAMCINOLONE ACETONIDE 1 MG/G
CREAM TOPICAL
COMMUNITY

## 2024-10-08 RX ORDER — BUDESONIDE AND FORMOTEROL FUMARATE DIHYDRATE 80; 4.5 UG/1; UG/1
2 AEROSOL RESPIRATORY (INHALATION) 2 TIMES DAILY
COMMUNITY

## 2024-10-08 RX ORDER — METHOTREXATE 2.5 MG/1
TABLET ORAL
COMMUNITY

## 2024-10-08 RX ORDER — PANTOPRAZOLE SODIUM 40 MG/1
1 TABLET, DELAYED RELEASE ORAL DAILY
COMMUNITY
Start: 2023-08-22

## 2024-10-08 RX ORDER — ALBUTEROL SULFATE 0.83 MG/ML
SOLUTION RESPIRATORY (INHALATION)
COMMUNITY
Start: 2024-09-05

## 2024-10-08 RX ORDER — TACROLIMUS 1 MG/G
1 OINTMENT TOPICAL 2 TIMES DAILY
COMMUNITY
Start: 2024-06-11

## 2024-10-08 RX ORDER — AZATHIOPRINE 50 MG/1
1 TABLET ORAL DAILY
COMMUNITY

## 2024-10-08 RX ORDER — GABAPENTIN 300 MG/1
1 CAPSULE ORAL DAILY
COMMUNITY

## 2024-10-08 RX ORDER — AMMONIUM LACTATE 12 G/100G
LOTION TOPICAL
COMMUNITY

## 2024-10-08 RX ORDER — NEBIVOLOL 10 MG/1
10 TABLET ORAL
COMMUNITY
Start: 2024-09-11

## 2024-10-08 RX ORDER — FOLIC ACID 1 MG/1
1000 TABLET ORAL DAILY
COMMUNITY

## 2024-10-08 RX ORDER — POTASSIUM CHLORIDE 1500 MG/1
1 TABLET, EXTENDED RELEASE ORAL DAILY
COMMUNITY
Start: 2023-07-31

## 2024-10-08 RX ORDER — HYDROCODONE BITARTRATE AND ACETAMINOPHEN 5; 325 MG/1; MG/1
1 TABLET ORAL EVERY 6 HOURS PRN
COMMUNITY
Start: 2023-08-21

## 2024-10-08 RX ORDER — DAPSONE 100 MG/1
100 TABLET ORAL
COMMUNITY

## 2024-10-08 RX ORDER — FUROSEMIDE 20 MG/1
TABLET ORAL
COMMUNITY
Start: 2024-06-17

## 2024-10-08 RX ORDER — MUPIROCIN 20 MG/G
OINTMENT TOPICAL
COMMUNITY
Start: 2024-09-06

## 2024-10-08 RX ORDER — NIFEDIPINE 60 MG/1
60 TABLET, EXTENDED RELEASE ORAL
COMMUNITY

## 2024-10-08 RX ORDER — OMEPRAZOLE 40 MG/1
40 CAPSULE, DELAYED RELEASE ORAL
COMMUNITY
Start: 2024-09-16

## 2024-10-08 RX ORDER — PREDNISONE 20 MG/1
40 TABLET ORAL
COMMUNITY

## 2024-10-08 RX ORDER — KETOROLAC TROMETHAMINE 10 MG/1
TABLET, FILM COATED ORAL
COMMUNITY
Start: 2023-07-31

## 2024-10-09 ENCOUNTER — HOSPITAL ENCOUNTER (OUTPATIENT)
Dept: RADIOLOGY | Facility: HOSPITAL | Age: 34
Discharge: HOME | End: 2024-10-09
Attending: INTERNAL MEDICINE
Payer: MEDICAID

## 2024-10-09 ENCOUNTER — OFFICE VISIT (OUTPATIENT)
Dept: PULMONOLOGY | Facility: CLINIC | Age: 34
End: 2024-10-09
Payer: MEDICAID

## 2024-10-09 VITALS
DIASTOLIC BLOOD PRESSURE: 82 MMHG | SYSTOLIC BLOOD PRESSURE: 102 MMHG | WEIGHT: 177.94 LBS | HEART RATE: 110 BPM | HEIGHT: 73 IN | BODY MASS INDEX: 23.58 KG/M2 | RESPIRATION RATE: 16 BRPM | OXYGEN SATURATION: 92 %

## 2024-10-09 DIAGNOSIS — D86.0 PULMONARY SARCOIDOSIS: ICD-10-CM

## 2024-10-09 DIAGNOSIS — D86.9 SARCOID: Primary | ICD-10-CM

## 2024-10-09 DIAGNOSIS — D86.2 SARCOIDOSIS OF LUNG WITH SARCOIDOSIS OF LYMPH NODES: ICD-10-CM

## 2024-10-09 DIAGNOSIS — D86.9 SARCOID: ICD-10-CM

## 2024-10-09 PROCEDURE — 3008F BODY MASS INDEX DOCD: CPT | Mod: CPTII,,, | Performed by: INTERNAL MEDICINE

## 2024-10-09 PROCEDURE — 99215 OFFICE O/P EST HI 40 MIN: CPT | Mod: PBBFAC | Performed by: INTERNAL MEDICINE

## 2024-10-09 PROCEDURE — 3079F DIAST BP 80-89 MM HG: CPT | Mod: CPTII,,, | Performed by: INTERNAL MEDICINE

## 2024-10-09 PROCEDURE — 71046 X-RAY EXAM CHEST 2 VIEWS: CPT | Mod: 26,,, | Performed by: RADIOLOGY

## 2024-10-09 PROCEDURE — 1159F MED LIST DOCD IN RCRD: CPT | Mod: CPTII,,, | Performed by: INTERNAL MEDICINE

## 2024-10-09 PROCEDURE — 4010F ACE/ARB THERAPY RXD/TAKEN: CPT | Mod: CPTII,,, | Performed by: INTERNAL MEDICINE

## 2024-10-09 PROCEDURE — 99204 OFFICE O/P NEW MOD 45 MIN: CPT | Mod: S$PBB,,, | Performed by: INTERNAL MEDICINE

## 2024-10-09 PROCEDURE — 3074F SYST BP LT 130 MM HG: CPT | Mod: CPTII,,, | Performed by: INTERNAL MEDICINE

## 2024-10-09 PROCEDURE — 99999 PR PBB SHADOW E&M-EST. PATIENT-LVL V: CPT | Mod: PBBFAC,,, | Performed by: INTERNAL MEDICINE

## 2024-10-09 PROCEDURE — 71046 X-RAY EXAM CHEST 2 VIEWS: CPT | Mod: TC

## 2024-10-09 NOTE — PROGRESS NOTES
Subjective:       Patient ID: Bo Ku is a 34 y.o. male.    Chief Complaint: Sarcoidosis (New patient referred by [facility] for pulmonary sarcoidosis. Dx in 2019, currently on prednisone, MTX and folic acid. Also getting 2L of o2.)    Sarcoidosis  This is a chronic problem. The current episode started more than 1 year ago. The problem occurs daily. The problem has been gradually worsening. Pertinent negatives include no abdominal pain, arthralgias, chest pain, chills, congestion, headaches or rash. The symptoms are aggravated by exertion.     Past Medical History:   Diagnosis Date    Asthma     Cavitary lung disease     Hypertension     Sarcoidosis of lung with sarcoidosis of lymph nodes 2019    Severe pulmonary hypertension      Past Surgical History:   Procedure Laterality Date    ANTERIOR CRUCIATE LIGAMENT REPAIR Left 09/01/2004    FRACTURE SURGERY      RIGHT HEART CATHETERIZATION Right 03/01/2023    Procedure: INSERTION, CATHETER, RIGHT HEART;  Surgeon: Abdelrahman Adam MD;  Location: [facility] CATH LAB;  Service: Cardiology;  Laterality: Right;     No family history on file.  Review of patient's allergies indicates:   Allergen Reactions    Fish containing products Anaphylaxis    Shellfish containing products Anaphylaxis    Lisinopril     Sulfamethoxazole-trimethoprim Hives      Social History     Tobacco Use    Smoking status: Former     Average packs/day: 1 pack/day for 15.0 years (15.0 ttl pk-yrs)     Types: Cigarettes     Start date: 2007    Smokeless tobacco: Never   Substance Use Topics    Alcohol use: Yes     Alcohol/week: 2.0 standard drinks of alcohol     Types: 2 Cans of beer per week    Drug use: Never      Review of Systems   Constitutional:  Negative for chills, activity change and night sweats.   HENT:  Negative for congestion and ear pain.    Eyes:  Negative for redness and itching.   Cardiovascular:  Negative for chest pain and palpitations.   Musculoskeletal:  Negative for  "arthralgias and back pain.   Skin:  Negative for rash.   Gastrointestinal:  Negative for abdominal pain and abdominal distention.   Neurological:  Negative for dizziness and headaches.   Hematological:  Negative for adenopathy. Does not bruise/bleed easily.   Psychiatric/Behavioral:  Negative for confusion. The patient is not nervous/anxious.        Objective:      Physical Exam   Constitutional: He is oriented to person, place, and time. He appears well-developed and well-nourished.   HENT:   Head: Normocephalic.   Nose: Nose normal.   Mouth/Throat: Oropharynx is clear and moist.   Neck: No JVD present. No thyromegaly present.   Cardiovascular: Normal rate, regular rhythm, normal heart sounds and intact distal pulses.   Pulmonary/Chest: Normal expansion, hyperinflation, symmetric chest wall expansion and effort normal. He has rhonchi.   Abdominal: Soft. Bowel sounds are normal.   Musculoskeletal:         General: Normal range of motion.      Cervical back: Normal range of motion and neck supple.   Lymphadenopathy: No supraclavicular adenopathy is present.     He has no cervical adenopathy.   Neurological: He is alert and oriented to person, place, and time. He has normal reflexes.   Skin: Skin is warm and dry.   Psychiatric: He has a normal mood and affect. His behavior is normal.     Personal Diagnostic Review  none pertinent        10/9/2024     2:53 PM 6/17/2024     4:10 PM 6/17/2024    12:26 PM 6/17/2024    12:21 PM 6/17/2024    12:18 PM 8/24/2023     5:50 PM 8/11/2023    10:48 AM   Pulmonary Function Tests   SpO2 92 % 92 % 90 % 91 %  97 % 93 %   Height 6' 1" (1.854 m)    6' 1" (1.854 m)     Weight 80.7 kg (177 lb 14.6 oz)    80.7 kg (178 lb) 68 kg (150 lb)    BMI (Calculated) 23.5    23.5           Assessment:       1. Sarcoid    2. Pulmonary sarcoidosis    3. Sarcoidosis of lung with sarcoidosis of lymph nodes        Outpatient Encounter Medications as of 10/9/2024   Medication Sig Dispense Refill    " albuterol (PROVENTIL) 2.5 mg /3 mL (0.083 %) nebulizer solution USE 1 VIAL IN NEBULIZER EVERY 4 TO 6 HOURS AS NEEDED      albuterol (PROVENTIL/VENTOLIN HFA) 90 mcg/actuation inhaler       ammonium lactate (LAC-HYDRIN) 12 % lotion APPLY LOTION TOPICALLY TWICE DAILY      budesonide-formoterol 80-4.5 mcg (SYMBICORT) 80-4.5 mcg/actuation HFAA Inhale 2 puffs into the lungs 2 (two) times daily.      dapsone 100 MG Tab Take 100 mg by mouth.      folic acid (FOLVITE) 1 MG tablet Take 1,000 mcg by mouth once daily.      furosemide (LASIX) 40 MG tablet TAKE 1 TABLET BY MOUTH ONCE DAILY AND EXTRA DOSE DAILY AS NEEDED FOR INCREASE FLUID      gabapentin (NEURONTIN) 300 MG capsule Take 1 capsule by mouth once daily.      isosorbide mononitrate (IMDUR) 30 MG 24 hr tablet Take 1 tablet (30 mg total) by mouth once daily. 30 tablet 11    methotrexate 2.5 MG Tab TAKE 6 TABLETS BY MOUTH ONCE A WEEK      mupirocin (BACTROBAN) 2 % ointment APPLY A SMALL AMOUNT OF OINTMENT TOPICALLY TO RIGHT FOOT THREE TIMES DAILY      nebivoloL (BYSTOLIC) 10 MG Tab Take 10 mg by mouth.      NIFEdipine (ADALAT CC) 60 MG TbSR Take 60 mg by mouth.      omeprazole (PRILOSEC) 40 MG capsule Take 40 mg by mouth.      potassium chloride (K-TAB) 20 mEq Take 1 tablet by mouth once daily.      predniSONE (DELTASONE) 20 MG tablet Take 40 mg by mouth.      valsartan (DIOVAN) 320 MG tablet Take 1 tablet (320 mg total) by mouth once daily. 30 tablet 0    albuterol (VENTOLIN HFA) 90 mcg/actuation inhaler Inhale 2 puffs into the lungs every 6 (six) hours as needed for Wheezing or Shortness of Breath. Rescue 18 g 0    azaTHIOprine (IMURAN) 50 mg Tab Take 1 tablet by mouth once daily. (Patient not taking: Reported on 10/9/2024)      furosemide (LASIX) 20 MG tablet Take 2 tablets (40 mg total) by mouth 2 (two) times daily. for 5 days 20 tablet 0    furosemide (LASIX) 20 MG tablet TAKE 2 TABLETS BY MOUTH TWICE DAILY FOR 5 DAYS (Patient not taking: Reported on 10/9/2024)       hydrALAZINE (APRESOLINE) 50 MG tablet Take 1 tablet (50 mg total) by mouth every 8 (eight) hours. (Patient not taking: Reported on 10/9/2024) 90 tablet 11    HYDROcodone-acetaminophen (NORCO) 5-325 mg per tablet Take 1 tablet by mouth every 6 (six) hours as needed. (Patient not taking: Reported on 10/9/2024)      ketorolac (TORADOL) 10 mg tablet Take 1 tablet every 6 hours by oral route for 2 days. (Patient not taking: Reported on 10/9/2024)      multivitamin Tab Take 1 tablet by mouth once daily. (Patient not taking: Reported on 10/9/2024)      NIFEdipine (PROCARDIA-XL) 60 MG (OSM) 24 hr tablet Take 1 tablet (60 mg total) by mouth once daily. 30 tablet 0    pantoprazole (PROTONIX) 40 MG tablet Take 1 tablet by mouth once daily. (Patient not taking: Reported on 10/9/2024)      tacrolimus (PROTOPIC) 0.1 % ointment Apply 1 application  topically 2 (two) times daily. (Patient not taking: Reported on 10/9/2024)      triamcinolone acetonide 0.1% (KENALOG) 0.1 % cream APPLY CREAM EXTERNALLY TWICE DAILY TO CHEST AND BACK AREA (Patient not taking: Reported on 10/9/2024)      vitamin D (VITAMIN D3) 1000 units Tab Take 1 tablet (1,000 Units total) by mouth once daily. (Patient not taking: Reported on 10/9/2024)       No facility-administered encounter medications on file as of 10/9/2024.     Orders Placed This Encounter   Procedures    X-Ray Chest PA And Lateral     Standing Status:   Future     Standing Expiration Date:   10/9/2025     Order Specific Question:   May the Radiologist modify the order per protocol to meet the clinical needs of the patient?     Answer:   Yes    Echo     Standing Status:   Future     Standing Expiration Date:   10/9/2025     Order Specific Question:   Color Doppler?     Answer:   Yes     Order Specific Question:   Release to patient     Answer:   Immediate    Complete PFT with bronchodilator     Standing Status:   Future     Standing Expiration Date:   10/9/2025     Order Specific Question:    Release to patient     Answer:   Immediate    Stress test, pulmonary     Standing Status:   Future     Standing Expiration Date:   10/9/2025     Order Specific Question:   Reason for study     Answer:   Functional status     Order Specific Question:   Release to patient     Answer:   Immediate       Plan:       Problem List Items Addressed This Visit          Pulmonary    Sarcoidosis of lung with sarcoidosis of lymph nodes     Patient was diagnosed with sarcoid in 2019 by Dr. joao Gonzalez by transbronchial biopsy.  he was referred to [facility] followed him for some time he was not offered transplant.  He has been on 80 mg of prednisone a day been weaned down to 40 daily in his on methotrexate 12.5 mg on echo a year ago he was found to have pulmonary hypertension with the estimated systolic pressure 67.  He also had a right heart catheterization showed a pulmonary pressure 50 year ago I am going to repeat his pulmonary functions I am going to get a 6 minute walk test repeat his x-ray repeat echocardiogram and refer him to  for evaluation          Other Visit Diagnoses       Sarcoid    -  Primary    Relevant Orders    Complete PFT with bronchodilator    Stress test, pulmonary    X-Ray Chest PA And Lateral    Echo    Pulmonary sarcoidosis

## 2024-10-09 NOTE — ASSESSMENT & PLAN NOTE
Patient was diagnosed with sarcoid in 2019 by Dr. joao Gonzalez by transbronchial biopsy.  he was referred to [facility] followed him for some time he was not offered transplant.  He has been on 80 mg of prednisone a day been weaned down to 40 daily in his on methotrexate 12.5 mg on echo a year ago he was found to have pulmonary hypertension with the estimated systolic pressure 67.  He also had a right heart catheterization showed a pulmonary pressure 50 year ago I am going to repeat his pulmonary functions I am going to get a 6 minute walk test repeat his x-ray repeat echocardiogram and refer him to  for evaluation  
Beni Gavin

## 2024-10-10 ENCOUNTER — TELEPHONE (OUTPATIENT)
Dept: PULMONOLOGY | Facility: CLINIC | Age: 34
End: 2024-10-10
Payer: MEDICAID

## 2024-10-10 NOTE — TELEPHONE ENCOUNTER
notified. Also, spoke with the patient voiced ECHO was recently covered on insurance. Patient is unsure why it is not covered now. Patient will give insurance a call to see that is going and will call our office back with an update.

## 2024-10-10 NOTE — TELEPHONE ENCOUNTER
----- Message from Nurys sent at 10/10/2024  7:50 AM CDT -----  Regarding: PATIENT HAS [place]   ECHO NOT COVERED BENEFIT  This patient has Family Planning Only Coverage.  The Echo will have to be self pay.  Not a covered Benefit.

## 2024-11-07 ENCOUNTER — OFFICE VISIT (OUTPATIENT)
Dept: PULMONOLOGY | Facility: CLINIC | Age: 34
End: 2024-11-07
Payer: MEDICAID

## 2024-11-07 ENCOUNTER — CLINICAL SUPPORT (OUTPATIENT)
Dept: PULMONOLOGY | Facility: HOSPITAL | Age: 34
End: 2024-11-07
Attending: INTERNAL MEDICINE
Payer: MEDICAID

## 2024-11-07 ENCOUNTER — TELEPHONE (OUTPATIENT)
Dept: PULMONOLOGY | Facility: CLINIC | Age: 34
End: 2024-11-07
Payer: COMMERCIAL

## 2024-11-07 VITALS
BODY MASS INDEX: 23.46 KG/M2 | HEIGHT: 73 IN | HEART RATE: 81 BPM | SYSTOLIC BLOOD PRESSURE: 106 MMHG | WEIGHT: 176.38 LBS | OXYGEN SATURATION: 96 % | HEIGHT: 73 IN | BODY MASS INDEX: 23.37 KG/M2 | RESPIRATION RATE: 16 BRPM | WEIGHT: 177 LBS | DIASTOLIC BLOOD PRESSURE: 82 MMHG | OXYGEN SATURATION: 91 %

## 2024-11-07 DIAGNOSIS — J84.9 INTERSTITIAL PULMONARY DISEASE, UNSPECIFIED: ICD-10-CM

## 2024-11-07 DIAGNOSIS — D86.9 PULMONARY HYPERTENSION ASSOCIATED WITH SARCOIDOSIS: ICD-10-CM

## 2024-11-07 DIAGNOSIS — D86.9 SARCOID: ICD-10-CM

## 2024-11-07 DIAGNOSIS — J96.11 CHRONIC RESPIRATORY FAILURE WITH HYPOXIA: ICD-10-CM

## 2024-11-07 DIAGNOSIS — I27.29 PULMONARY HYPERTENSION ASSOCIATED WITH SARCOIDOSIS: ICD-10-CM

## 2024-11-07 DIAGNOSIS — D86.9 SARCOIDOSIS: Primary | ICD-10-CM

## 2024-11-07 PROCEDURE — 1159F MED LIST DOCD IN RCRD: CPT | Mod: CPTII,,, | Performed by: STUDENT IN AN ORGANIZED HEALTH CARE EDUCATION/TRAINING PROGRAM

## 2024-11-07 PROCEDURE — 1160F RVW MEDS BY RX/DR IN RCRD: CPT | Mod: CPTII,,, | Performed by: STUDENT IN AN ORGANIZED HEALTH CARE EDUCATION/TRAINING PROGRAM

## 2024-11-07 PROCEDURE — 94726 PLETHYSMOGRAPHY LUNG VOLUMES: CPT

## 2024-11-07 PROCEDURE — 94729 DIFFUSING CAPACITY: CPT

## 2024-11-07 PROCEDURE — 94618 PULMONARY STRESS TESTING: CPT

## 2024-11-07 PROCEDURE — 3079F DIAST BP 80-89 MM HG: CPT | Mod: CPTII,,, | Performed by: STUDENT IN AN ORGANIZED HEALTH CARE EDUCATION/TRAINING PROGRAM

## 2024-11-07 PROCEDURE — 3008F BODY MASS INDEX DOCD: CPT | Mod: CPTII,,, | Performed by: STUDENT IN AN ORGANIZED HEALTH CARE EDUCATION/TRAINING PROGRAM

## 2024-11-07 PROCEDURE — 99215 OFFICE O/P EST HI 40 MIN: CPT | Mod: PBBFAC,25 | Performed by: STUDENT IN AN ORGANIZED HEALTH CARE EDUCATION/TRAINING PROGRAM

## 2024-11-07 PROCEDURE — 99214 OFFICE O/P EST MOD 30 MIN: CPT | Mod: S$PBB,25,, | Performed by: STUDENT IN AN ORGANIZED HEALTH CARE EDUCATION/TRAINING PROGRAM

## 2024-11-07 PROCEDURE — 3074F SYST BP LT 130 MM HG: CPT | Mod: CPTII,,, | Performed by: STUDENT IN AN ORGANIZED HEALTH CARE EDUCATION/TRAINING PROGRAM

## 2024-11-07 PROCEDURE — 27100098 HC SPACER

## 2024-11-07 PROCEDURE — 4010F ACE/ARB THERAPY RXD/TAKEN: CPT | Mod: CPTII,,, | Performed by: STUDENT IN AN ORGANIZED HEALTH CARE EDUCATION/TRAINING PROGRAM

## 2024-11-07 PROCEDURE — 99999 PR PBB SHADOW E&M-EST. PATIENT-LVL V: CPT | Mod: PBBFAC,,, | Performed by: STUDENT IN AN ORGANIZED HEALTH CARE EDUCATION/TRAINING PROGRAM

## 2024-11-07 PROCEDURE — 94060 EVALUATION OF WHEEZING: CPT

## 2024-11-07 RX ORDER — DAPSONE 100 MG/1
100 TABLET ORAL DAILY
Qty: 30 TABLET | Refills: 12 | Status: SHIPPED | OUTPATIENT
Start: 2024-11-07

## 2024-11-07 RX ORDER — METHOTREXATE 2.5 MG/1
15 TABLET ORAL
Qty: 24 TABLET | Refills: 6 | Status: SHIPPED | OUTPATIENT
Start: 2024-11-07 | End: 2024-11-07

## 2024-11-07 RX ORDER — METHOTREXATE 2.5 MG/1
15 TABLET ORAL
Qty: 24 TABLET | Refills: 6 | Status: SHIPPED | OUTPATIENT
Start: 2024-11-07

## 2024-11-07 RX ORDER — DAPSONE 100 MG/1
100 TABLET ORAL DAILY
Qty: 30 TABLET | Refills: 12 | Status: SHIPPED | OUTPATIENT
Start: 2024-11-07 | End: 2024-11-07

## 2024-11-07 NOTE — TELEPHONE ENCOUNTER
----- Message from Beverley Muir MD sent at 11/7/2024  1:44 PM CST -----  Hi team, could we please request records for his clinic visit at [facility].  He was seeing the transplant team.

## 2024-11-07 NOTE — Clinical Note
Hi team, could we please request records for his clinic visit at [facility].  He was seeing the transplant team.

## 2024-11-07 NOTE — Clinical Note
Hi team, I placed a referral to lung transplant at [facility] in [place].  Kindly follow-up on the referral.  Also please notify me once we get the records from [facility] that were requested from his previous transplant team.  Also, please forward my note from today to his [facility] team.

## 2024-11-07 NOTE — PROGRESS NOTES
[facility]  Pulmonology  ESTABLISHED VISIT     Patient Name:  Bo Ku  Primary Care Provider: Julia Alba FNP  Date of Service: 11/7/2024       Chief Complaint:  Shortness of breath    SUBJECTIVE   HPI:  Bo Ku is a 34 y.o. 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 Last seen 10/2024 with plan for PFT, 6MWT and CXR.    [place] reports having shortness of breath that has become worse in the past few months.  He has a cough that is most prominent in the morning upon wakening up.  His cough is productive of clear or yellow tinged sputum.  He was noticed that his walking pace has reduced.  He has significant shortness of breath with walking upstairs.  Uses his oxygen which was previously prescribed to be taken nightly and intermittently with exertion.  With regards to his sarcoidosis, it was diagnosed in 2019 with bronchoscopy with referral to [facility] Pulmonary transplant.  For management, he has previously been on prednisone which was weaned down 40 mg daily and methotrexate.  He states that he has run out of his methotrexate refill as he missed a follow-up with his team at [facility].  With regards to transplant referral, he is unaware that he has been considered for transplant.  We discussed his prior workup including right heart catheterization and his lung function and imaging at length.  He was upcoming follow-up with his [facility] team in the coming month.  He requests a refill of his methotrexate, prednisone as well as dapsone.        Past Medical History:   Diagnosis Date    Asthma     Cavitary lung disease     Hypertension     Sarcoidosis of lung with sarcoidosis of lymph nodes 2019    Severe pulmonary hypertension        Past Surgical History:   Procedure Laterality Date    ANTERIOR CRUCIATE LIGAMENT REPAIR Left 09/01/2004    FRACTURE SURGERY       RIGHT HEART CATHETERIZATION Right 2023    Procedure: INSERTION, CATHETER, RIGHT HEART;  Surgeon: Abdelrahman Adam MD;  Location: [facility] CATH LAB;  Service: Cardiology;  Laterality: Right;       No family history on file.     Social History     Socioeconomic History    Marital status: Single   Tobacco Use    Smoking status: Former     Current packs/day: 0.00     Average packs/day: 1 pack/day for 15.0 years (15.0 ttl pk-yrs)     Types: Cigarettes     Start date:      Quit date: 2022     Years since quittin.8    Smokeless tobacco: Never   Substance and Sexual Activity    Alcohol use: Yes     Alcohol/week: 2.0 standard drinks of alcohol     Types: 2 Cans of beer per week    Drug use: Never    Sexual activity: Yes     Partners: Female     Social Drivers of Health     Financial Resource Strain: Low Risk  (2023)    Received from [employer] [facility], [employer] [facility]    Overall Financial Resource Strain (CARDIA)     Difficulty of Paying Living Expenses: Not hard at all   Food Insecurity: Food Insecurity Present (3/17/2023)    Hunger Vital Sign     Worried About Running Out of Food in the Last Year: Sometimes true     Ran Out of Food in the Last Year: Sometimes true   Transportation Needs: No Transportation Needs (3/17/2023)    PRAPARE - Transportation     Lack of Transportation (Medical): No     Lack of Transportation (Non-Medical): No   Physical Activity: Inactive (3/17/2023)    Exercise Vital Sign     Days of Exercise per Week: 0 days     Minutes of Exercise per Session: 0 min   Stress: Stress Concern Present (3/17/2023)    [employer] [place] [employer] - Occupational Stress Questionnaire     Feeling of Stress : Very much   Housing Stability: Low Risk  (3/17/2023)    Housing Stability Vital Sign     Unable to Pay for Housing in the Last Year: No     " Number of Places Lived in the Last Year: 1     Unstable Housing in the Last Year: No       Social History     Social History Narrative    Not on file       Review of patient's allergies indicates:   Allergen Reactions    Fish containing products Anaphylaxis    Shellfish containing products Anaphylaxis    Lisinopril     Sulfamethoxazole-trimethoprim Hives        Medications: Medications reviewed to include over the counter medications.    Review of Systems: A focused ROS was completed and found to be negative except for that mentioned above.    OBJECTIVE   PHYSICAL EXAM:  Vitals:    11/07/24 1002 11/07/24 1005   BP: 106/82    BP Location: Left arm    Patient Position: Sitting    Pulse: 81    Resp: 16    SpO2: (!) 83%  Comment: pt put his o2 on after reading. will recheck 96%  Comment: 2L of o2   Weight: 80 kg (176 lb 5.9 oz)    Height: 6' 1" (1.854 m)         GENERAL: NAD  HEENT: normocephalic, non-icteric conjunctivae, moist oral mucosa  LYMPHATIC: no lymphadenopathy of neck  RESPIRATORY: clear to auscultation, no wheezing, rales or rhonchi  CARDIOVASCULAR: Regular rate and rhythm, no murmurs rubs or gallops  SKIN: no rash, jaundice, ecchymosis or ulcers  MUSCULOSKELETAL: No clubbing or cyanosis; no pedal edema  NEUROLOGIC: AO ×3, no gross deficits  PSYCH: Normal mood and affect    LABS:  Lab studies reviewed and notable for H/H 12.7/38.5, MCV 90, SEos 50, CO2 29, SCr 1.15, T bili 0.7, ALP 88, AST/ALT 17/13, NT pro BNP [id number] (11/2024)   Latest Reference Range & Units 02/28/23 12:02 03/14/23 22:07 06/19/23 14:13 08/11/23 08:59 06/17/24 13:13 11/07/24 11:14   NT-proBNP 1 - 125 pg/mL 15,772 (H) 3,007 (H) 2,969 (H) 7,311 (H) 5,785 (H) 11,674 (H)         IMAGING:  Imaging reviewed and notable for CT chest 08/2023 with dense consolidative opacities in bilateral mid and lower lung zone with cavitary lesion in the left lower lung, no pleural effusion    RHC 03/2023:  mPAP 34, PCWP 12, PVR 5.56, CO/CI 3.96/2.05 (Td)    LUNG " FUNCTION TESTING:     SPIROMETRY/PFT:  11/2024 Pre Post   FVC 2.20/-5.23 2.51/-4.77   FEV1 0.98/-5.79 1.15/-5.57   FEV1/FVC 45/-4.35 46/-4.27   TLC 5.30/-2.24    FRC  3.66/-0.12    RV 2.82/1.99    DLCO 5.46/-9.25      6MWD:  Date Distance (ft) Resting SpO2; Edwin SpO2 O2 Required   11/2024 973 91%, RA;86% 2L           ASSESSMENT & PLAN     1. Sarcoidosis  Assessment & Plan:  33 yo M with sarcoidosis with pulmonary involvement including cavitary basilar lung disease with previous occupational exposures including heavy metal.  Course with decompensation including progression in exertional dyspnea and new O2 requirement.  Previously referred to [facility] and patient is unclear on transplant assessment with records unavailable for review at time of this visit.  Plan for management as follows:   - resume previous suppressive regimen with methotrexate and prednisone of which he was ran out of refills   - resume PJP prophylaxis with dapsone   - obtain CBC, CMP as well as Aspergillus serum studies in this patient with cavitary lung disease  - we need to clarify his transplant candidacy at [facility]; in the meantime we will also place a referral to [facility] lung transplant team in this patient with SAPH  - Dx:  2016, bronchoscopy in [place]  - smoking status: former, quit 2022  - exposure history: metal exposure; worked in metal plant with metal molding responsibilities, 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 as well  - baseline lung imaging: CT Chest 2023    -- obtain updated CT Chest with progression in respiratory deficit  - baseline PFT: 11/2023 with severe obstruction (FEV1 1.15), moderate restriction and severe diffusion deficit  - baseline TTE and EKG:  We will follow-up on [facility] imaging, patient reports recent TTE   -- precapillary pulmonary hypertension diagnosed in 2023  - cough: consider ICS  - CMP and CBC Qyearly: due 11/2025, labs stable 11/2024  - Ophthalmology evaluation Qyear: due 01/2025, established and last seen 01/2024  -  skin manifestations limited to tattoos    Orders:  -     CT Chest Without Contrast; Future; Expected date: 11/07/2024  -     Discontinue: dapsone 100 MG Tab; Take 1 tablet (100 mg total) by mouth once daily.  Dispense: 30 tablet; Refill: 12  -     Discontinue: methotrexate 2.5 MG Tab; Take 6 tablets (15 mg total) by mouth every 7 days.  Dispense: 24 tablet; Refill: 6  -     CBC Auto Differential; Future; Expected date: 11/07/2024  -     Comprehensive Metabolic Panel; Future; Expected date: 11/07/2024  -     NT-Pro Natriuretic Peptide; Future; Expected date: 11/07/2024  -     Aspergillus antibodies; Future; Expected date: 11/07/2024  -     Aspergillus Antigen; Future; Expected date: 11/07/2024  -     Culture, Lower Respiratory; Future; Expected date: 11/07/2024  -     Resp Profile, [place]; Future; Expected date: 11/07/2024  -     OXYGEN FOR HOME USE  -     dapsone 100 MG Tab; Take 1 tablet (100 mg total) by mouth once daily.  Dispense: 30 tablet; Refill: 12  -     methotrexate 2.5 MG Tab; Take 6 tablets (15 mg total) by mouth every 7 days.  Dispense: 24 tablet; Refill: 6  -     Ambulatory referral/consult to Transplant, Lung; Future; Expected date: 11/14/2024    2. Pulmonary hypertension associated with sarcoidosis  Assessment & Plan:  Right heart catheterization completed 03/2023 with precapillary pulmonary hypertension with PVR 5.56.  He is currently followed at [facility] and his medication regimen includes Imdur.  Given sarcoidosis overlap and increasing O2 requirements is ongoing concern for progression in his pulmonary vascular disease.  Previously assessed at [facility] with unclear decision on lung transplant.  [facility] recommended.  In the meantime, we will coordinate care with his cardiology team at [facility] for discontinuation of Imdur and beta-blocker to allow initiation of of dual therapy at minimum.  - currently intermittently taking his diuretic; appears to be  decompensated at time of this presentation and recommend strongly adherence with his Lasix regimen  - we will coordinate care with Cardiology team at [facility]    Orders:  -     Ambulatory referral/consult to Transplant, Lung; Future; Expected date: 11/14/2024    3. Interstitial pulmonary disease, unspecified  -     CT Chest Without Contrast; Future; Expected date: 11/07/2024  -     OXYGEN FOR HOME USE    4. Chronic respiratory failure with hypoxia  Assessment & Plan:  Multifactorial in this patient with pulmonary sarcoidosis and pulmonary arterial hypertension.  6MWT reviewed with 2 L nasal cannula requirement and markedly diminished exercise capacity.  Management as per sarcoidosis and pulmonary hypertension plan.  Currently limited in walk distance, DMV paperwork of afforded to allow parking benefits.              Follow up in about 4 weeks (around 12/5/2024) for Routine follow up.      Case was discussed with patient; all questions were answered to patient's satisfaction and patient verbalized understanding.     Beverley Muir MD  Pulmonary Medicine  [facility]  Phone: [phone]

## 2024-11-08 ENCOUNTER — TELEPHONE (OUTPATIENT)
Dept: TRANSPLANT | Facility: CLINIC | Age: 34
End: 2024-11-08

## 2024-11-08 NOTE — ASSESSMENT & PLAN NOTE
Multifactorial in this patient with pulmonary sarcoidosis and pulmonary arterial hypertension.  6MWT reviewed with 2 L nasal cannula requirement and markedly diminished exercise capacity.  Management as per sarcoidosis and pulmonary hypertension plan.  Currently limited in walk distance, DMV paperwork of afforded to allow parking benefits.

## 2024-11-08 NOTE — PROCEDURES
PFT REPORT:  SPIROMETRY:  The pre-BD FVC is decreased, 2.20L/-5.23 Z score.  The pre-BD FEV1 is decreased, 0.98L/-5.79 Z score.  The pre-BD FEV1/FVC ratio is 45/-4.35 Z score.    The post-BD FVC is decreased, 2.51L/-4.77 Z score.  The post-BD FEV1 is decreased, 1.15L/-5.57 Z score.  The post-BD FEV1/FVC ratio is 46/-4.27 Z score.    There is a significant bronchodilator effect.  There is scooping out of the expiratory limb of the flow volume loop.     LUNG VOLUMES:  The TLC is decreased, 5.30L/-2.24 Z score.  The FRC is normal, 3.66L/-0.12 Z score.  The RV is increased, 2.82L/1.99 Z score.    DIFFUSION CAPACITY:  The diffusion capacity is corrected for hemoglobin and is decreased, 5.46/-9.25 Z score.    Interpretation:  The post-BD spirometry shows a severe obstructive defect based on the lower limit of normal (LLN) cutoff and very severe based on on GOLD guidelines for COPD (24% predicted).  There is significant response to bronchodilators.   The flow volume loop is consistent with an obstructive ventilatory defect.    There is a mild restrictive defect. Gas trapping is present.  There is a severe diffusion defect. Decreased diffusion capacity is suggestive of pulmonary vascular disease, emphysema, interstitial lung disease and/or anemia. Clinical correlation is advised.       Beverley Muir MD  Pulmonary Medicine  [facility]

## 2024-11-08 NOTE — ASSESSMENT & PLAN NOTE
35 yo M with sarcoidosis with pulmonary involvement including cavitary basilar lung disease with previous occupational exposures including heavy metal.  Course with decompensation including progression in exertional dyspnea and new O2 requirement.  Previously referred to [facility] and patient is unclear on transplant assessment with records unavailable for review at time of this visit.  Plan for management as follows:   - resume previous suppressive regimen with methotrexate and prednisone of which he was ran out of refills   - resume PJP prophylaxis with dapsone   - obtain CBC, CMP as well as Aspergillus serum studies in this patient with cavitary lung disease  - we need to clarify his transplant candidacy at [facility]; in the meantime we will also place a referral to [facility] team in this patient with SAPH

## 2024-11-08 NOTE — LETTER
November 11, 2024    Beverley Muir MD  [address] [facility] [place] [address]  Phone: [phone]  Fax: [phone]      Dear Dr. Beverley Muir:    Patient: Bo Ku   MR Number: [id number]   YOB: 1990     Thank you for the referral of Bo Ku to our lung transplant program. Your patients demographic and clinical information have been submitted for transplant provider review and financial clearance. Once the provider and insurance company has approved the consult for your patient, he will be contacted by our office re: the date for an appointment. We will update you once this initial appointment has been scheduled. You will receive an after-visit summary following the completion of your patients appointment in our clinic.    Thank you again for your trust in our program. If there is anything we can do for you or your staff, please feel free to contact us at [phone].    Sincerely,       Teresa Cerna D.O.  Medical Director, Lung Transplant  Pulmonary & Critical Care Medicine    [facility]  [address] [place] [address]  [phone]

## 2024-11-08 NOTE — PROCEDURES
SIX MINUTE WALK DISTANCE  BASELINE VITALS  Heart rate 86, /73, SpO2 91% on room air     END OF STUDY VITALS:  Heart rate 120, /73, SpO2 91% on 2 L nasal cannula    RECOVERY VITALS:  After 2 min rest  Heart rate 92, /94, SpO2 92 % on 2 L nasal cannula    Patient walked 973 ft with 2 rests.   SpO2 chas was 86 % at 2 minutes.       IMPRESSION:  Patient has tachycardia with ambulation that resolves with rest.  Patient needs 2L NC with activity.      Beverley Muir MD  Pulmonary and Critical Care  [facility]

## 2024-11-08 NOTE — TELEPHONE ENCOUNTER
Spoke with medical records from [facility] and made aware provider was seeking out release of records for Mr. Ku. Staff is aware and working to fax that over to us.

## 2024-11-08 NOTE — LETTER
November 20, 2024    Beverley Muir MD  [address] [facility] [place] [address]  Phone: [phone]  Fax: [phone]      Dear Dr. Beverley Muir:    Patient: Bo Ku   MR Number: [id number]   YOB: 1990     Thank you for the referral of Bo Ku to our lung transplant program. Upon reviewing the information provided by your office, we find that Bo Ku is not a potential candidate for lung transplantation at [facility] due to the following:      His insurance is out of network for our facility.      Once again, we appreciate your referral to our center and we look forward to working with you in the future. If you have any questions or concerns regarding this decision, then please do not hesitate to contact me at [phone].    Sincerely,       Teresa Cerna D.O.  Medical Director, Lung Transplant  Pulmonary & Critical Care Medicine    [facility]  [address] [place] [id number]  [phone]

## 2024-11-08 NOTE — ASSESSMENT & PLAN NOTE
35 yo M with sarcoidosis with pulmonary involvement including cavitary basilar lung disease with previous occupational exposures including heavy metal.  Course with decompensation including progression in exertional dyspnea and new O2 requirement.  Previously referred to [facility] and patient is unclear on transplant assessment with records unavailable for review at time of this visit.  Plan for management as follows:   - resume previous suppressive regimen with methotrexate and prednisone of which he was ran out of refills   - resume PJP prophylaxis with dapsone   - obtain CBC, CMP as well as Aspergillus serum studies in this patient with cavitary lung disease  - we need to clarify his transplant candidacy at [facility]; in the meantime we will also place a referral to [facility] team in this patient with SAPH  - Dx:  2016, bronchoscopy in [place]  - smoking status: former, quit 2022  - exposure history: metal exposure; worked in metal plant with metal molding responsibilities, 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

## 2024-11-08 NOTE — ASSESSMENT & PLAN NOTE
Right heart catheterization completed 03/2023 with precapillary pulmonary hypertension with PVR 5.56.  He is currently followed at [facility] and his medication regimen includes Imdur.  Given sarcoidosis overlap and increasing O2 requirements is ongoing concern for progression in his pulmonary vascular disease.  Previously assessed at [facility] with unclear decision on lung transplant.  Immediate [facility] at [facility] recommended.  In the meantime, we will coordinate care with his cardiology team at [facility] for discontinuation of Imdur and beta-blocker to allow initiation of of dual therapy at minimum.  - currently intermittently taking his diuretic; appears to be decompensated at time of this presentation and recommend strongly adherence with his Lasix regimen  - we will coordinate care with Cardiology team at [facility]

## 2024-11-08 NOTE — TELEPHONE ENCOUNTER
----- Message from Beverley Muir MD sent at 11/7/2024  7:13 PM CST -----  Hi team, I placed a referral to lung transplant at [facility] [place].  Kindly follow-up on the referral.  Also please notify me once we get the records from [facility] that were requested from his previous transplant team.  Also, please forward my note from today to his [facility] Cardiology team.   Pt not sure if ever received Hep A and B. Pt wanted to know if it would hurt her to receive again. Pt thinking she will get vaccines at local pharmacy prior to travel. Advised the we have no records of vaccines and it will be okay to get vaccines. Pt going to the [facility] with family and Dr. lAla Escobar had ordered the Typhoid pills for her daughters.   Pt requesting the Typhoid   Please advise

## 2024-11-08 NOTE — TELEPHONE ENCOUNTER
"24 - Received the below message from Az:    Anuksh Bernardo Stephanie, RN    Bethany Luz,    Patient has chose [facility] [place] and they use [facility] for all their transplant needs.      He will have to be declined due to the facility is not part of [facility] for Lung transplant at this time.    Ki Bernardo    Decline letter sent to referring MD and lung transplant episode closed.    24 - Received the below message from Az today:    Bethany Luz,    Please put this patient in deferred.    Patient was just approved for [place] Medicaid and have to speak to his medicaid rep on 2024 to discuss his benefit.  The medicaid that patient has on file is for family planning and not for medical, IP or OP events.    Thank you  Pauliefaraz     Patient placed in deferred status due to insurance concerns.    24 - Message and clinicals sent to Az for financial clearance for consult with PFTs, CT of chest and TTE.    ----- Message from Teresa Cerna DO sent at 2024  1:22 PM CST -----    Regarding: RE: Lung transplant referral    Appropriate for LUT.  Do we have the actual PFT numbers?  If not need PFTs, CT, and TTE.  UTD on 6MWT.    ----- Message -----  From: Natty Christian, RN  Sent: 2024  12:15 PM CST  To: Teresa Cerna DO  Subject: Lung transplant referral                         Lung Transplant Referral Note    Referral from: Beverley Muir MD    Lung diagnosis: Sarcoidosis / Pulmonary Hypertension/Pulmonary Arterial Hypertension    Age: 34 y.o.    Height/Weight/BMI:  HT: 6'1" / WT: 80 kg / Body mass index is 23.27 kg/m².    Smoking history: Social History    Tobacco Use      Smoking status: Former        Packs/day: 0.00        Years: 1 pack/day for 15.0 years (15.0 ttl pk-yrs)        Types: Cigarettes        Start date:         Quit date: 2022        Years since quittin.8      Smokeless tobacco: Never      PFT date: 2024    PFT " REPORT:  SPIROMETRY:  The pre-BD FVC is decreased, 2.20L/-5.23 Z score.  The pre-BD FEV1 is decreased, 0.98L/-5.79 Z score.  The pre-BD FEV1/FVC ratio is 45/-4.35 Z score.     The post-BD FVC is decreased, 2.51L/-4.77 Z score.  The post-BD FEV1 is decreased, 1.15L/-5.57 Z score.  The post-BD FEV1/FVC ratio is 46/-4.27 Z score.     There is a significant bronchodilator effect.  There is scooping out of the expiratory limb of the flow volume loop.      LUNG VOLUMES:  The TLC is decreased, 5.30L/-2.24 Z score.  The FRC is normal, 3.66L/-0.12 Z score.  The RV is increased, 2.82L/1.99 Z score.     DIFFUSION CAPACITY:  The diffusion capacity is corrected for hemoglobin and is decreased, 5.46/-9.25 Z score.     Interpretation:  The post-BD spirometry shows a severe obstructive defect based on the lower limit of normal (LLN) cutoff and very severe based on on GOLD guidelines for COPD (24% predicted).  There is significant response to bronchodilators.   The flow volume loop is consistent with an obstructive ventilatory defect.    There is a mild restrictive defect. Gas trapping is present.  There is a severe diffusion defect. Decreased diffusion capacity is suggestive of pulmonary vascular disease, emphysema, interstitial lung disease and/or anemia. Clinical correlation is advised.        6MWT date: 11/07/2024  973 feet  Rest: 91% on RA; Exertion: 86% on RA - required oxygen at 2L      CXR date: 10/10/2024  Impression:  No appreciable acute lung parenchymal or pleural abnormality. Chronic interstitial opacities.      Chest CT date: 08/15/2023         (REPEAT CT of chest scheduled on 12/05/24)  Impression:  Extensive lung disease consistent with history of sarcoid. There is a new area of cavitation right lower lung lobe. There is some interval decrease in amount of disease right lower lung lobe compared to 10/1/2021.      Echo date: 03/01/2023    Impression:  · The left ventricle is normal in size with concentric remodeling  and low normal systolic function.  · The estimated ejection fraction is 50%.  · Moderate right ventricular enlargement with moderately reduced right ventricular systolic function.  · There is abnormal septal wall motion. There is systolic and diastolic flattening of the interventricular septum consistent with right ventricle pressure and volume overload.  · Severe right atrial enlargement.  · Severe tricuspid regurgitation.  · Normal central venous pressure (3 mmHg).  · The estimated PA systolic pressure is 67 mmHg.  · There is pulmonary hypertension.      Other pertinent medical history:  cavitary lung disease; HTN      Recommendations?

## 2024-11-13 ENCOUNTER — TELEPHONE (OUTPATIENT)
Dept: PULMONOLOGY | Facility: CLINIC | Age: 34
End: 2024-11-13
Payer: COMMERCIAL

## 2024-11-13 ENCOUNTER — HOSPITAL ENCOUNTER (EMERGENCY)
Facility: HOSPITAL | Age: 34
Discharge: HOME | End: 2024-11-13
Payer: COMMERCIAL

## 2024-11-13 VITALS
RESPIRATION RATE: 30 BRPM | WEIGHT: 177 LBS | DIASTOLIC BLOOD PRESSURE: 86 MMHG | OXYGEN SATURATION: 93 % | SYSTOLIC BLOOD PRESSURE: 113 MMHG | TEMPERATURE: 98 F | HEART RATE: 102 BPM | BODY MASS INDEX: 23.98 KG/M2 | HEIGHT: 72 IN

## 2024-11-13 DIAGNOSIS — R07.9 CHEST PAIN: ICD-10-CM

## 2024-11-13 DIAGNOSIS — D86.9 SARCOIDOSIS: ICD-10-CM

## 2024-11-13 DIAGNOSIS — R06.02 SOB (SHORTNESS OF BREATH): Primary | ICD-10-CM

## 2024-11-13 DIAGNOSIS — R79.89 ELEVATED BRAIN NATRIURETIC PEPTIDE (BNP) LEVEL: ICD-10-CM

## 2024-11-13 DIAGNOSIS — R79.89 ELEVATED D-DIMER: ICD-10-CM

## 2024-11-13 LAB
ALBUMIN SERPL BCP-MCNC: 3.2 G/DL (ref 3.5–5)
ALBUMIN/GLOB SERPL: 0.7 {RATIO}
ALP SERPL-CCNC: 103 U/L (ref 40–150)
ALT SERPL W P-5'-P-CCNC: 7 U/L (ref 0–55)
ANION GAP SERPL CALCULATED.3IONS-SCNC: 20 MMOL/L (ref 7–16)
AST SERPL W P-5'-P-CCNC: 21 U/L (ref 5–34)
BASOPHILS # BLD AUTO: 0.01 K/UL (ref 0–0.2)
BASOPHILS NFR BLD AUTO: 0.1 % (ref 0–1)
BILIRUB SERPL-MCNC: 1.1 MG/DL
BUN SERPL-MCNC: 12 MG/DL (ref 9–21)
BUN/CREAT SERPL: 11 (ref 6–20)
CALCIUM SERPL-MCNC: 9.3 MG/DL (ref 8.4–10.2)
CHLORIDE SERPL-SCNC: 97 MMOL/L (ref 98–107)
CO2 SERPL-SCNC: 21 MMOL/L (ref 22–29)
CREAT SERPL-MCNC: 1.08 MG/DL (ref 0.72–1.25)
D DIMER PPP FEU-MCNC: 1.08 ΜG/ML (ref 0–0.47)
DIFFERENTIAL METHOD BLD: ABNORMAL
EGFR (NO RACE VARIABLE) (RUSH/TITUS): 91 ML/MIN/1.73M2
EOSINOPHIL # BLD AUTO: 0.04 K/UL (ref 0–0.5)
EOSINOPHIL NFR BLD AUTO: 0.5 % (ref 1–4)
ERYTHROCYTE [DISTWIDTH] IN BLOOD BY AUTOMATED COUNT: 13.9 % (ref 11.5–14.5)
GLOBULIN SER-MCNC: 4.4 G/DL (ref 2–4)
GLUCOSE SERPL-MCNC: 85 MG/DL (ref 74–100)
HCT VFR BLD AUTO: 36.6 % (ref 40–54)
HGB BLD-MCNC: 12.3 G/DL (ref 13.5–18)
LACTATE SERPL-SCNC: 1.6 MMOL/L (ref 0.5–2.2)
LACTATE SERPL-SCNC: 2.2 MMOL/L (ref 0.5–2.2)
LIPASE SERPL-CCNC: 47 U/L
LYMPHOCYTES # BLD AUTO: 0.78 K/UL (ref 1–4.8)
LYMPHOCYTES NFR BLD AUTO: 10.4 % (ref 27–41)
MAGNESIUM SERPL-MCNC: 1.3 MG/DL (ref 1.6–2.6)
MCH RBC QN AUTO: 29.3 PG (ref 27–31)
MCHC RBC AUTO-ENTMCNC: 33.6 G/DL (ref 32–36)
MCV RBC AUTO: 87.1 FL (ref 80–96)
MONOCYTES # BLD AUTO: 0.93 K/UL (ref 0–0.8)
MONOCYTES NFR BLD AUTO: 12.4 % (ref 2–6)
MPC BLD CALC-MCNC: 10.4 FL (ref 9.4–12.4)
NEUTROPHILS # BLD AUTO: 5.75 K/UL (ref 1.8–7.7)
NEUTROPHILS NFR BLD AUTO: 76.6 % (ref 53–65)
NT-PROBNP SERPL-MCNC: ABNORMAL PG/ML (ref 1–125)
PLATELET # BLD AUTO: 202 K/UL (ref 150–400)
POTASSIUM SERPL-SCNC: 3.5 MMOL/L (ref 3.5–5.1)
PROT SERPL-MCNC: 7.6 G/DL (ref 6.4–8.3)
RBC # BLD AUTO: 4.2 M/UL (ref 4.6–6.2)
SODIUM SERPL-SCNC: 134 MMOL/L (ref 136–145)
TROPONIN I SERPL HS-MCNC: 6 NG/L
WBC # BLD AUTO: 7.51 K/UL (ref 4.5–11)

## 2024-11-13 PROCEDURE — 85025 COMPLETE CBC W/AUTO DIFF WBC: CPT

## 2024-11-13 PROCEDURE — 99285 EMERGENCY DEPT VISIT HI MDM: CPT | Mod: 25

## 2024-11-13 PROCEDURE — 83690 ASSAY OF LIPASE: CPT

## 2024-11-13 PROCEDURE — 85379 FIBRIN DEGRADATION QUANT: CPT

## 2024-11-13 PROCEDURE — 83880 ASSAY OF NATRIURETIC PEPTIDE: CPT

## 2024-11-13 PROCEDURE — 96375 TX/PRO/DX INJ NEW DRUG ADDON: CPT

## 2024-11-13 PROCEDURE — 94640 AIRWAY INHALATION TREATMENT: CPT

## 2024-11-13 PROCEDURE — 93005 ELECTROCARDIOGRAM TRACING: CPT

## 2024-11-13 PROCEDURE — 25500020 PHARM REV CODE 255

## 2024-11-13 PROCEDURE — 63600175 PHARM REV CODE 636 W HCPCS

## 2024-11-13 PROCEDURE — 80053 COMPREHEN METABOLIC PANEL: CPT

## 2024-11-13 PROCEDURE — 99285 EMERGENCY DEPT VISIT HI MDM: CPT | Mod: ,,,

## 2024-11-13 PROCEDURE — 84484 ASSAY OF TROPONIN QUANT: CPT

## 2024-11-13 PROCEDURE — 25000003 PHARM REV CODE 250

## 2024-11-13 PROCEDURE — 27000221 HC OXYGEN, UP TO 24 HOURS

## 2024-11-13 PROCEDURE — 93010 ELECTROCARDIOGRAM REPORT: CPT | Mod: ,,, | Performed by: STUDENT IN AN ORGANIZED HEALTH CARE EDUCATION/TRAINING PROGRAM

## 2024-11-13 PROCEDURE — 96374 THER/PROPH/DIAG INJ IV PUSH: CPT

## 2024-11-13 PROCEDURE — 83735 ASSAY OF MAGNESIUM: CPT

## 2024-11-13 PROCEDURE — 83605 ASSAY OF LACTIC ACID: CPT

## 2024-11-13 PROCEDURE — 36415 COLL VENOUS BLD VENIPUNCTURE: CPT

## 2024-11-13 PROCEDURE — 25000242 PHARM REV CODE 250 ALT 637 W/ HCPCS

## 2024-11-13 PROCEDURE — 96376 TX/PRO/DX INJ SAME DRUG ADON: CPT

## 2024-11-13 RX ORDER — ASPIRIN 325 MG
325 TABLET ORAL
Status: COMPLETED | OUTPATIENT
Start: 2024-11-13 | End: 2024-11-13

## 2024-11-13 RX ORDER — ONDANSETRON HYDROCHLORIDE 2 MG/ML
4 INJECTION, SOLUTION INTRAVENOUS
Status: COMPLETED | OUTPATIENT
Start: 2024-11-13 | End: 2024-11-13

## 2024-11-13 RX ORDER — IPRATROPIUM BROMIDE AND ALBUTEROL SULFATE 2.5; .5 MG/3ML; MG/3ML
3 SOLUTION RESPIRATORY (INHALATION) EVERY 6 HOURS PRN
Qty: 75 ML | Refills: 0 | Status: ON HOLD | OUTPATIENT
Start: 2024-11-13 | End: 2025-11-08

## 2024-11-13 RX ORDER — METHYLPREDNISOLONE SOD SUCC 125 MG
125 VIAL (EA) INJECTION
Status: COMPLETED | OUTPATIENT
Start: 2024-11-13 | End: 2024-11-13

## 2024-11-13 RX ORDER — IOPAMIDOL 755 MG/ML
100 INJECTION, SOLUTION INTRAVASCULAR
Status: COMPLETED | OUTPATIENT
Start: 2024-11-13 | End: 2024-11-13

## 2024-11-13 RX ORDER — METOLAZONE 2.5 MG/1
5 TABLET ORAL DAILY
Qty: 30 TABLET | Refills: 1 | Status: ON HOLD | OUTPATIENT
Start: 2024-11-13 | End: 2024-11-23 | Stop reason: HOSPADM

## 2024-11-13 RX ORDER — FUROSEMIDE 10 MG/ML
40 INJECTION INTRAMUSCULAR; INTRAVENOUS
Status: COMPLETED | OUTPATIENT
Start: 2024-11-13 | End: 2024-11-13

## 2024-11-13 RX ORDER — HYDROMORPHONE HYDROCHLORIDE 2 MG/ML
1 INJECTION, SOLUTION INTRAMUSCULAR; INTRAVENOUS; SUBCUTANEOUS
Status: COMPLETED | OUTPATIENT
Start: 2024-11-13 | End: 2024-11-13

## 2024-11-13 RX ORDER — DIPHENHYDRAMINE HYDROCHLORIDE 50 MG/ML
50 INJECTION INTRAMUSCULAR; INTRAVENOUS
Status: COMPLETED | OUTPATIENT
Start: 2024-11-13 | End: 2024-11-13

## 2024-11-13 RX ORDER — MORPHINE SULFATE 4 MG/ML
2 INJECTION, SOLUTION INTRAMUSCULAR; INTRAVENOUS
Status: COMPLETED | OUTPATIENT
Start: 2024-11-13 | End: 2024-11-13

## 2024-11-13 RX ORDER — IPRATROPIUM BROMIDE AND ALBUTEROL SULFATE 2.5; .5 MG/3ML; MG/3ML
3 SOLUTION RESPIRATORY (INHALATION)
Status: COMPLETED | OUTPATIENT
Start: 2024-11-13 | End: 2024-11-13

## 2024-11-13 RX ADMIN — FUROSEMIDE 40 MG: 10 INJECTION, SOLUTION INTRAMUSCULAR; INTRAVENOUS at 04:11

## 2024-11-13 RX ADMIN — ONDANSETRON 4 MG: 2 INJECTION INTRAMUSCULAR; INTRAVENOUS at 02:11

## 2024-11-13 RX ADMIN — HYDROMORPHONE HYDROCHLORIDE 1 MG: 2 INJECTION INTRAMUSCULAR; INTRAVENOUS; SUBCUTANEOUS at 03:11

## 2024-11-13 RX ADMIN — IPRATROPIUM BROMIDE AND ALBUTEROL SULFATE 3 ML: 2.5; .5 SOLUTION RESPIRATORY (INHALATION) at 02:11

## 2024-11-13 RX ADMIN — MORPHINE SULFATE 2 MG: 4 INJECTION, SOLUTION INTRAMUSCULAR; INTRAVENOUS at 02:11

## 2024-11-13 RX ADMIN — IOPAMIDOL 100 ML: 755 INJECTION, SOLUTION INTRAVENOUS at 05:11

## 2024-11-13 RX ADMIN — FUROSEMIDE 40 MG: 10 INJECTION, SOLUTION INTRAMUSCULAR; INTRAVENOUS at 03:11

## 2024-11-13 RX ADMIN — METHYLPREDNISOLONE SODIUM SUCCINATE 125 MG: 125 INJECTION INTRAMUSCULAR; INTRAVENOUS at 02:11

## 2024-11-13 RX ADMIN — DIPHENHYDRAMINE HYDROCHLORIDE 50 MG: 50 INJECTION, SOLUTION INTRAMUSCULAR; INTRAVENOUS at 04:11

## 2024-11-13 RX ADMIN — ASPIRIN 325 MG: 325 TABLET ORAL at 02:11

## 2024-11-13 NOTE — ED PROVIDER NOTES
Encounter Date: 2024       History     Chief Complaint   Patient presents with    Chest Pain    Shortness of Breath     BOBBI is a 33 y/o AAM with a PMHx of Sarcoidosis, Cavitary Lung Disease, Asthma, HTN, and Pulmonary HTN who presents to the ED POV with c/o chest pain and shortness of breath. Patient stated that his current symptoms started 3 hours prior to arrival to the ED. Patient is on 3 liters of supplemental oxygen which he wears daily. Patient's chest pain is not reproducible. Patient denies any nausea, vomiting, and/or diarrhea. Patient rates his pain as a 10/10 which he describes as a sharp stabbing pain.          Review of patient's allergies indicates:   Allergen Reactions    Fish containing products Anaphylaxis    Shellfish containing products Anaphylaxis    Lisinopril     Sulfamethoxazole-trimethoprim Hives     Past Medical History:   Diagnosis Date    Asthma     Cavitary lung disease     Hypertension     Sarcoidosis of lung with sarcoidosis of lymph nodes 2019    Severe pulmonary hypertension      Past Surgical History:   Procedure Laterality Date    ANTERIOR CRUCIATE LIGAMENT REPAIR Left 2004    FRACTURE SURGERY      RIGHT HEART CATHETERIZATION Right 2023    Procedure: INSERTION, CATHETER, RIGHT HEART;  Surgeon: Abdelrahman Adam MD;  Location: [facility] CATH LAB;  Service: Cardiology;  Laterality: Right;     No family history on file.  Social History     Tobacco Use    Smoking status: Former     Current packs/day: 0.00     Average packs/day: 1 pack/day for 15.0 years (15.0 ttl pk-yrs)     Types: Cigarettes     Start date:      Quit date: 2022     Years since quittin.8    Smokeless tobacco: Never   Substance Use Topics    Alcohol use: Yes     Alcohol/week: 2.0 standard drinks of alcohol     Types: 2 Cans of beer per week    Drug use: Never     Review of Systems   Constitutional: Negative.    Eyes: Negative.    Respiratory:  Positive for shortness of breath.     Cardiovascular:  Positive for chest pain.   Gastrointestinal: Negative.    Endocrine: Negative.    Genitourinary: Negative.    Musculoskeletal: Negative.    Skin: Negative.    Allergic/Immunologic: Negative.    Neurological: Negative.    Hematological: Negative.    Psychiatric/Behavioral: Negative.         Physical Exam     Initial Vitals [11/13/24 1435]   BP Pulse Resp Temp SpO2   115/84 (!) 114 (!) 28 98.4 °F (36.9 °C) (!) 91 %      MAP       --         Physical Exam    Nursing note and vitals reviewed.  Constitutional: Vital signs are normal. He appears well-developed and well-nourished. He is cooperative. He has a sickly appearance. He appears ill. He appears distressed.   Cardiovascular:  Regular rhythm, S1 normal, S2 normal, normal heart sounds and normal pulses.   Tachycardia present.         Pulmonary/Chest: Tachypnea noted. He has no decreased breath sounds. He has no wheezes. He has no rhonchi. He has no rales.   Abdominal: Abdomen is soft and flat. Bowel sounds are normal. There is no abdominal tenderness. No hernia.     Neurological: He is alert and oriented to person, place, and time. He has normal strength and normal reflexes. He displays normal reflexes. No cranial nerve deficit or sensory deficit. He displays a negative Romberg sign. GCS eye subscore is 4. GCS verbal subscore is 5. GCS motor subscore is 6.   Skin: Skin is warm, dry and intact. Capillary refill takes less than 2 seconds. No rash noted.   Psychiatric: He has a normal mood and affect. His speech is normal and behavior is normal. Judgment and thought content normal. Cognition and memory are normal.         Medical Screening Exam   See Full Note    ED Course   Procedures  Labs Reviewed   COMPREHENSIVE METABOLIC PANEL - Abnormal       Result Value    Sodium 134 (*)     Potassium 3.5      Chloride 97 (*)     CO2 21 (*)     Anion Gap 20 (*)     Glucose 85      BUN 12      Creatinine 1.08      BUN/Creatinine Ratio 11      Calcium 9.3       Total Protein 7.6      Albumin 3.2 (*)     Globulin 4.4 (*)     A/G Ratio 0.7      Bilirubin, Total 1.1      Alk Phos 103      ALT 7      AST 21      eGFR 91     MAGNESIUM - Abnormal    Magnesium 1.3 (*)    NT-PRO NATRIURETIC PEPTIDE - Abnormal    ProBNP 15,698 (*)    CBC WITH DIFFERENTIAL - Abnormal    WBC 7.51      RBC 4.20 (*)     Hemoglobin 12.3 (*)     Hematocrit 36.6 (*)     MCV 87.1      MCH 29.3      MCHC 33.6      RDW 13.9      Platelet Count 202      MPV 10.4      Neutrophils % 76.6 (*)     Lymphocytes % 10.4 (*)     Neutrophils, Abs 5.75      Lymphocytes, Absolute 0.78 (*)     Diff Type Auto      Monocytes % 12.4 (*)     Eosinophils % 0.5 (*)     Basophils % 0.1      Monocytes, Absolute 0.93 (*)     Eosinophils, Absolute 0.04      Basophils, Absolute 0.01     D DIMER, QUANTITATIVE - Abnormal    D-Dimer 1.08 (*)    TROPONIN I - Normal    Troponin I High Sensitivity 6.0     LACTIC ACID, PLASMA - Normal    Lactic Acid 2.2     LIPASE - Normal    Lipase 47     LACTIC ACID, PLASMA - Normal    Lactic Acid 1.6     CBC W/ AUTO DIFFERENTIAL    Narrative:     The following orders were created for panel order CBC auto differential.  Procedure                               Abnormality         Status                     ---------                               -----------         ------                     CBC with Differential[[id number]]       Abnormal            Final result                 Please view results for these tests on the individual orders.     EKG Readings: (Independently Interpreted)   Initial Reading: No STEMI. Rhythm: Sinus Tachycardia. Heart Rate: 107. ST Segments: Normal ST Segments. T Waves: Normal. Axis: Right Axis Deviation.   No change from EKG performed on 6/17/2024     ECG Results              EKG 12-lead (In process)        Collection Time Result Time QRS Duration OHS QTC Calculation    11/13/24 14:32:55 11/13/24 14:38:54 92 512                     In process by Interface, Lab In [facility]  (11/13/24 14:38:57)                   Narrative:    Test Reason : R07.9,    Vent. Rate : 107 BPM     Atrial Rate : 107 BPM     P-R Int : 134 ms          QRS Dur :  92 ms      QT Int : 384 ms       P-R-T Axes :  77 122  45 degrees    QTcB Int : 512 ms    Sinus tachycardia  Biatrial enlargement  Right axis deviation  Pulmonary disease pattern  Right ventricular hypertrophy  ST and T wave abnormality, consider inferior ischemia  ST and T wave abnormality, consider anterolateral ischemia  Abnormal ECG  When compared with ECG of 17-Jun-2024 12:13,  Criteria for Septal infarct are no longer Present  QT has lengthened    Referred By: ARIELLE RUSH           Confirmed By:                                   Imaging Results               CTA Chest Non-Coronary (PE Studies) (Final result)  Result time 11/13/24 17:43:56      Final result by Renzo Nunez MD (11/13/24 17:43:56)                   Impression:      1. No evidence of pulmonary embolism on limited evaluation.  See above comments.  2. Cardiomegaly most prominently involving the right atrium.  3. Prominent bilateral areas of focal airspace disease and consolidation in upper and lower lobes with cavitary components, similar to the prior study.  Significant motion on limits visualization.  The findings could be associated with a bacterial or granulomatous infectious process with history of sarcoidosis also and mild mediastinal adenopathy.  Findings are similar to the prior study.  Follow-up recommended.  Recommend pulmonary follow-up.  4. This report was flagged in Epic as abnormal.      Electronically signed by: Renzo Nunez  Date:    11/13/2024  Time:    17:43               Narrative:    EXAMINATION:  CTA CHEST NON CORONARY (PE STUDIES)    CLINICAL HISTORY:  Pulmonary embolism (PE) suspected, positive D-dimer;    TECHNIQUE:  Low dose axial images, sagittal and coronal reformations were obtained from the thoracic inlet to the lung bases following the IV  administration of 100 mL of Isovue 370.  Contrast timing was optimized to evaluate the pulmonary arteries.  MIP images were performed.    COMPARISON:  X-ray 11/13/2024, CT 08/11/2023    FINDINGS:  Pulmonary arteries:Pulmonary arteries are suboptimally enhanced.No evidence of pulmonary embolism on limited visualization.  Significant motion artifact and suboptimal contrast enhancement.  Recommend follow-up if there is high clinical suspicion.    Thoracic soft tissues: Unremarkable.    Aorta: Left-sided aortic arch.  No aneurysm or dissection.    Heart: Heart is enlarged particularly right atrium.    Jayshree/Mediastinum: No pathologic daquan enlargement.    Airways: Patent.    Lungs/Pleura: There are bilateral areas of focal airspace disease and consolidation in upper lobes and lower lobes.  There are small cavitary components also.  Significant motion artifact.  Findings are similar to prior study.  Findings could represent bacterial or granulomatous infectious process.  History of sarcoidosis also.  Mildly enlarged prevascular mediastinal lymph nodes are noted.    No large effusion.  No evidence of pneumothorax.    Esophagus: Unremarkable.    Upper Abdomen: No abnormality of the partially imaged upper abdomen.    Bones: No acute fracture. No suspicious lytic or sclerotic lesions.    Findings are similar to the prior CT 08/11/2023.                                       X-Ray Chest 1 View (Final result)  Result time 11/13/24 14:58:42      Final result by Dileep Mckeon MD (11/13/24 14:58:42)                   Impression:      No convincing evidence of acute detrimental change relative prior radiograph performed 10/09/2024, 15:38 hours.      Electronically signed by: Dileep Mckeon  Date:    11/13/2024  Time:    14:58               Narrative:    EXAMINATION:  XR CHEST 1 VIEW    CLINICAL HISTORY:  Chest pain, unspecified    TECHNIQUE:  Single frontal view of the chest was performed.    COMPARISON:  Chest radiograph  performed 10/09/2024, 15:38 hours.    FINDINGS:  Monitoring leads over the chest.    Chronic appearing architectural distortion and patchy opacities appear relatively similar when compared to 10/09/2024, 15:38 hours radiograph    No definite pneumothorax or large pleural effusion.    No acute findings the visualized abdomen.  Osseous and soft tissue structures appear without definite acute change.                                    X-Rays:   Independently Interpreted Readings:   Other Readings:  Reading Physician Reading Date Result Priority  Dileep Mckeon MD  [phone] 11/13/2024 STAT    Narrative & Impression  EXAMINATION:  XR CHEST 1 VIEW     CLINICAL HISTORY:  Chest pain, unspecified     TECHNIQUE:  Single frontal view of the chest was performed.     COMPARISON:  Chest radiograph performed 10/09/2024, 15:38 hours.     FINDINGS:  Monitoring leads over the chest.     Chronic appearing architectural distortion and patchy opacities appear relatively similar when compared to 10/09/2024, 15:38 hours radiograph     No definite pneumothorax or large pleural effusion.     No acute findings the visualized abdomen.  Osseous and soft tissue structures appear without definite acute change.     Impression:     No convincing evidence of acute detrimental change relative prior radiograph performed 10/09/2024, 15:38 hours.        Electronically signed by:Dileep Mkceon  Date:                                            11/13/2024  Time:                                           14:58  Reading Physician Reading Date Result Priority  Renzo Nunez MD  [phone] 11/13/2024 STAT    Narrative & Impression  EXAMINATION:  CTA CHEST NON CORONARY (PE STUDIES)     CLINICAL HISTORY:  Pulmonary embolism (PE) suspected, positive D-dimer;     TECHNIQUE:  Low dose axial images, sagittal and coronal reformations were obtained from the thoracic inlet to the lung bases following the IV administration of 100 mL of Isovue 370.   Contrast timing was optimized to evaluate the pulmonary arteries.  MIP images were performed.     COMPARISON:  X-ray 11/13/2024, CT 08/11/2023     FINDINGS:  Pulmonary arteries:Pulmonary arteries are suboptimally enhanced.No evidence of pulmonary embolism on limited visualization.  Significant motion artifact and suboptimal contrast enhancement.  Recommend follow-up if there is high clinical suspicion.     Thoracic soft tissues: Unremarkable.     Aorta: Left-sided aortic arch.  No aneurysm or dissection.     Heart: Heart is enlarged particularly right atrium.     Jayshree/Mediastinum: No pathologic daquan enlargement.     Airways: Patent.     Lungs/Pleura: There are bilateral areas of focal airspace disease and consolidation in upper lobes and lower lobes.  There are small cavitary components also.  Significant motion artifact.  Findings are similar to prior study.  Findings could represent bacterial or granulomatous infectious process.  History of sarcoidosis also.  Mildly enlarged prevascular mediastinal lymph nodes are noted.     No large effusion.  No evidence of pneumothorax.     Esophagus: Unremarkable.     Upper Abdomen: No abnormality of the partially imaged upper abdomen.     Bones: No acute fracture. No suspicious lytic or sclerotic lesions.     Findings are similar to the prior CT 08/11/2023.     Impression:     1. No evidence of pulmonary embolism on limited evaluation.  See above comments.  2. Cardiomegaly most prominently involving the right atrium.  3. Prominent bilateral areas of focal airspace disease and consolidation in upper and lower lobes with cavitary components, similar to the prior study.  Significant motion on limits visualization.  The findings could be associated with a bacterial or granulomatous infectious process with history of sarcoidosis also and mild mediastinal adenopathy.  Findings are similar to the prior study.  Follow-up recommended.  Recommend pulmonary follow-up.  4. This report  was flagged in Epic as abnormal.        Electronically signed by:Renzo Shirley  Date:                                            11/13/2024  Time:                                           17:43      Medications   albuterol-ipratropium 2.5 mg-0.5 mg/3 mL nebulizer solution 3 mL (3 mLs Nebulization Given 11/13/24 1443)   methylPREDNISolone sodium succinate injection 125 mg (125 mg Intravenous Given 11/13/24 1442)   morphine injection 2 mg (2 mg Intravenous Given 11/13/24 1442)   ondansetron injection 4 mg (4 mg Intravenous Given 11/13/24 1441)   aspirin tablet 325 mg (325 mg Oral Given 11/13/24 1441)   HYDROmorphone (PF) injection 1 mg (1 mg Intravenous Given 11/13/24 1510)   furosemide injection 40 mg (40 mg Intravenous Given 11/13/24 1530)   furosemide injection 40 mg (40 mg Intravenous Given 11/13/24 1635)   diphenhydrAMINE injection 50 mg (50 mg Intravenous Given 11/13/24 1644)   iopamidoL (ISOVUE-370) injection 100 mL (100 mLs Intravenous Given 11/13/24 1718)     Medical Decision Making    BOBBI is a 35 y/o AAM with a PMHx of Sarcoidosis, Cavitary Lung Disease, Asthma, HTN, and Pulmonary HTN who presents to the ED POV with c/o chest pain and shortness of breath. Patient stated that his current symptoms started 3 hours prior to arrival to the ED. Patient is on 3 liters of supplemental oxygen which he wears daily. Patient's chest pain is not reproducible. Patient denies any nausea, vomiting, and/or diarrhea. Patient rates his pain as a 10/10 which he describes as a sharp stabbing pain.            Amount and/or Complexity of Data Reviewed  Labs: ordered. Decision-making details documented in ED Course.     Details: Elevated BNP   Elevated D-Dimer     Radiology: ordered.     Details: No acute changes noted on CXR  No PE noted on CTA Chest   Discussion of management or test interpretation with external provider(s): Sarcoidosis   Chest Pain  Shortness of Breath    Risk  OTC drugs.  Prescription drug management.                ED Course as of 11/13/24 1804   Wed Nov 13, 2024   1511 RBC(!): 4.20  Unchanged from labs performed on 11/7/2024. [AC]   1511 Hemoglobin(!): 12.3  Unchanged from labs performed on 11/7/2024. [AC]   1511 Hematocrit(!): 36.6  Unchanged from labs performed on 11/7/2024. [AC]   1511 Neutrophils Relative(!): 76.6  Unchanged from labs performed on 11/7/2024. [AC]   1511 Lymph %(!): 10.4  Unchanged from labs performed on 11/7/2024. [AC]   1511 Lymph #(!): 0.78  Unchanged from labs performed on 11/7/2024. [AC]   1511 Mono %(!): 12.4  Unchanged from labs performed on 11/7/2024. [AC]   1511 Eos %(!): 0.5  Unchanged from labs performed on 11/7/2024. [AC]   1511 Mono #(!): 0.93  Unchanged from labs performed on 11/7/2024. [AC]   1517 Working of breathing as improved since administration of Duoneb, Solumedrol, and Dilaudid. [AC]   1525 Globulin, Total(!): 4.4 [AC]   1526 Albumin(!): 3.2 [AC]   1526 CO2(!): 21 [AC]   1526 Chloride(!): 97 [AC]   1526 Sodium(!): 134 [AC]   1529 NT-proBNP(!): 15,698  BNP on 11/7/2024 = 11,674    Lasix 40 mg given IVP [AC]      ED Course User Index  [AC] Carlo Carlin FNP                           Clinical Impression:   Final diagnoses:  [R07.9] Chest pain  [R06.02] SOB (shortness of breath) (Primary)  [R79.89] Elevated d-dimer  [R79.89] Elevated brain natriuretic peptide (BNP) level        ED Disposition Condition    Discharge Stable          ED Prescriptions       Medication Sig Dispense Start Date End Date Auth. Provider    metOLazone (ZAROXOLYN) 2.5 MG tablet Take 2 tablets (5 mg total) by mouth once daily. 30 tablet 11/13/2024 11/13/2025 Carlo Carlin FNP    albuterol-ipratropium (DUO-NEB) 2.5 mg-0.5 mg/3 mL nebulizer solution Take 3 mLs by nebulization every 6 (six) hours as needed for Wheezing. Rescue 75 mL 11/13/2024 11/8/2025 Carlo Carlin FNP          Follow-up Information       Follow up With Specialties Details Why Contact Info    Wood, June, MD Pulmonary Disease In 2  days for follow up [address] [facility] [address]  [phone]               Carlo Carlin FNP  11/13/24 0950

## 2024-11-13 NOTE — TELEPHONE ENCOUNTER
Called the patient to ask him about his medicaid being family planning only. He did say he was approved for his disability and is calling today about his insurance but he also mention he has [employer] insurance. He gave me the member number, I ran it through coverage eligibility and added it to his chart. He did mention they have his last name spelled incorrectly [employer] has it as Bridget instead of [place]. I advised the patient he would have to correct that with Armondr or his claims would reject as patient mismatch. Sent a message back to pre cert to let them know insurance has been update and they could run the pre cert again for his CT.     ----- Message from Linda sent at 11/13/2024 10:59 AM CST -----  Regarding: Pre cert    Who Called: Bo Read from pre Cert    Patient is returning phone call    PATIENT HAS FAMILY PLANNING INSURANCE AND IT DOES NOT COVER THE CT THAT HAS BEEN ORDERED      Preferred Method of Contact: Phone Call  Patient's Preferred Phone Number on File: [phone]   Best Call Back Number, if different:[phone]  Additional Information: AMIRAH WOULD LIKE A CALLBACK CONCERNING ORDERED CT

## 2024-11-14 NOTE — DISCHARGE INSTRUCTIONS
- Use Duonebs every 6 hours for the next 3 days  - Use Albuterol every 4 hours AS NEEDED for shortness of breath  - Take Metolazone as directed   - Schedule an appointment to follow up with Dr. Muir

## 2024-11-15 ENCOUNTER — HOSPITAL ENCOUNTER (INPATIENT)
Facility: HOSPITAL | Age: 34
LOS: 9 days | Discharge: HOME | DRG: 286 | End: 2024-11-24
Attending: EMERGENCY MEDICINE | Admitting: STUDENT IN AN ORGANIZED HEALTH CARE EDUCATION/TRAINING PROGRAM
Payer: COMMERCIAL

## 2024-11-15 DIAGNOSIS — R07.9 CHEST PAIN: ICD-10-CM

## 2024-11-15 DIAGNOSIS — D86.9 SARCOIDOSIS: ICD-10-CM

## 2024-11-15 DIAGNOSIS — A41.9 SEPSIS, DUE TO UNSPECIFIED ORGANISM, UNSPECIFIED WHETHER ACUTE ORGAN DYSFUNCTION PRESENT: ICD-10-CM

## 2024-11-15 DIAGNOSIS — R06.02 SOB (SHORTNESS OF BREATH): ICD-10-CM

## 2024-11-15 DIAGNOSIS — D86.9 PULMONARY HYPERTENSION ASSOCIATED WITH SARCOIDOSIS: ICD-10-CM

## 2024-11-15 DIAGNOSIS — R06.02 SHORTNESS OF BREATH: ICD-10-CM

## 2024-11-15 DIAGNOSIS — T68.XXXA HYPOTHERMIA, INITIAL ENCOUNTER: ICD-10-CM

## 2024-11-15 DIAGNOSIS — E87.5 HYPERKALEMIA: ICD-10-CM

## 2024-11-15 DIAGNOSIS — I27.29 PULMONARY HYPERTENSION ASSOCIATED WITH SARCOIDOSIS: ICD-10-CM

## 2024-11-15 DIAGNOSIS — N17.9 ACUTE RENAL FAILURE, UNSPECIFIED ACUTE RENAL FAILURE TYPE: ICD-10-CM

## 2024-11-15 DIAGNOSIS — J96.21 ACUTE ON CHRONIC RESPIRATORY FAILURE WITH HYPOXIA: Primary | ICD-10-CM

## 2024-11-15 DIAGNOSIS — I50.813 ACUTE ON CHRONIC RIGHT HEART FAILURE: ICD-10-CM

## 2024-11-15 DIAGNOSIS — D86.2 SARCOIDOSIS OF LUNG WITH SARCOIDOSIS OF LYMPH NODES: ICD-10-CM

## 2024-11-15 DIAGNOSIS — J44.9 OBSTRUCTIVE LUNG DISEASE: ICD-10-CM

## 2024-11-15 DIAGNOSIS — I50.811 ACUTE RIGHT HEART FAILURE: ICD-10-CM

## 2024-11-15 LAB
ALBUMIN SERPL BCP-MCNC: 2.9 G/DL (ref 3.5–5)
ALBUMIN/GLOB SERPL: 0.7 {RATIO}
ALP SERPL-CCNC: 114 U/L (ref 40–150)
ALT SERPL W P-5'-P-CCNC: 15 U/L (ref 0–55)
ANION GAP SERPL CALCULATED.3IONS-SCNC: 23 MMOL/L (ref 7–16)
AORTIC ROOT ANNULUS: 2.94 CM
AORTIC VALVE CUSP SEPERATION: 2.05 CM
APICAL FOUR CHAMBER EJECTION FRACTION: 70 %
AST SERPL W P-5'-P-CCNC: 54 U/L (ref 5–34)
AV INDEX (PROSTH): 0.74
AV MEAN GRADIENT: 1.5 MMHG
AV PEAK GRADIENT: 2 MMHG
AV VALVE AREA BY VELOCITY RATIO: 3 CM²
AV VALVE AREA: 2.5 CM²
AV VELOCITY RATIO: 0.86
BASOPHILS # BLD AUTO: 0.01 K/UL (ref 0–0.2)
BASOPHILS NFR BLD AUTO: 0.1 % (ref 0–1)
BILIRUB SERPL-MCNC: 1.5 MG/DL
BSA FOR ECHO PROCEDURE: 2.02 M2
BUN SERPL-MCNC: 36 MG/DL (ref 9–21)
BUN/CREAT SERPL: 18 (ref 6–20)
CALCIUM SERPL-MCNC: 9.2 MG/DL (ref 8.4–10.2)
CHLORIDE SERPL-SCNC: 97 MMOL/L (ref 98–107)
CO2 SERPL-SCNC: 18 MMOL/L (ref 22–29)
CREAT SERPL-MCNC: 2.04 MG/DL (ref 0.72–1.25)
CRP SERPL HS-MCNC: >160 MG/L
CV ECHO LV RWT: 0.4 CM
DIFFERENTIAL METHOD BLD: ABNORMAL
DOP CALC AO PEAK VEL: 0.7 M/S
DOP CALC AO VTI: 14 CM
DOP CALC LVOT AREA: 3.5 CM2
DOP CALC LVOT DIAMETER: 2.1 CM
DOP CALC LVOT PEAK VEL: 0.6 M/S
DOP CALC LVOT STROKE VOLUME: 35.7 CM3
DOP CALCLVOT PEAK VEL VTI: 10.3 CM
E WAVE DECELERATION TIME: 247.83 MSEC
E/A RATIO: 1.3
E/E' RATIO: 14.33 M/S
ECHO LV POSTERIOR WALL: 0.7 CM (ref 0.6–1.1)
EGFR (NO RACE VARIABLE) (RUSH/TITUS): 43 ML/MIN/1.73M2
EJECTION FRACTION: 60 %
EOSINOPHIL # BLD AUTO: 0 K/UL (ref 0–0.5)
EOSINOPHIL NFR BLD AUTO: 0 % (ref 1–4)
ERYTHROCYTE [DISTWIDTH] IN BLOOD BY AUTOMATED COUNT: 14 % (ref 11.5–14.5)
ERYTHROCYTE [SEDIMENTATION RATE] IN BLOOD BY WESTERGREN METHOD: 50 MM/HR (ref 0–15)
FRACTIONAL SHORTENING: 40 % (ref 28–44)
GLOBULIN SER-MCNC: 4.2 G/DL (ref 2–4)
GLUCOSE SERPL-MCNC: 120 MG/DL (ref 74–100)
HADV DNA NPH QL NAA+NON-PROBE: NOT DETECTED
HCO3 UR-SCNC: 18.7 MMOL/L (ref 24–28)
HCT VFR BLD AUTO: 33.9 % (ref 40–54)
HCT VFR BLD CALC: 39 % (ref 35–51)
HGB BLD-MCNC: 11.4 G/DL (ref 13.5–18)
HMPV RNA NPH QL NAA+NON-PROBE: NOT DETECTED
IMM GRANULOCYTES # BLD AUTO: 0.16 K/UL (ref 0–0.04)
IMM GRANULOCYTES NFR BLD: 1.1 % (ref 0–0.4)
INFLUENZA A (SUBTYPE H1): NOT DETECTED
INFLUENZA A (SUBTYPE H3): NOT DETECTED
INFLUENZA A (VERIGENE): NOT DETECTED
INFLUENZA B (VERIGENE): NOT DETECTED
INTERVENTRICULAR SEPTUM: 0.7 CM (ref 0.6–1.1)
IVC DIAMETER: 2.81 CM
LACTATE SERPL-SCNC: 2.5 MMOL/L (ref 0.5–2.2)
LACTATE SERPL-SCNC: 2.8 MMOL/L (ref 0.5–2.2)
LACTATE SERPL-SCNC: 3.2 MMOL/L (ref 0.5–2.2)
LACTATE SERPL-SCNC: 3.2 MMOL/L (ref 0.5–2.2)
LACTATE SERPL-SCNC: 3.8 MMOL/L (ref 0.5–2.2)
LACTATE SERPL-SCNC: 4.2 MMOL/L (ref 0.5–2.2)
LACTATE SERPL-SCNC: 4.5 MMOL/L (ref 0.5–2.2)
LDH SERPL L TO P-CCNC: 8.2 MMOL/L (ref 0.3–1.2)
LEFT ATRIUM AREA SYSTOLIC (APICAL 4 CHAMBER): 7.87 CM2
LEFT ATRIUM SIZE: 2.7 CM
LEFT INTERNAL DIMENSION IN SYSTOLE: 2.1 CM (ref 2.1–4)
LEFT VENTRICLE DIASTOLIC VOLUME INDEX: 24.37 ML/M2
LEFT VENTRICLE DIASTOLIC VOLUME: 49.48 ML
LEFT VENTRICLE END DIASTOLIC VOLUME APICAL 4 CHAMBER: 24.19 ML
LEFT VENTRICLE END SYSTOLIC VOLUME APICAL 4 CHAMBER: 11.21 ML
LEFT VENTRICLE MASS INDEX: 30.9 G/M2
LEFT VENTRICLE SYSTOLIC VOLUME INDEX: 6.8 ML/M2
LEFT VENTRICLE SYSTOLIC VOLUME: 13.72 ML
LEFT VENTRICULAR INTERNAL DIMENSION IN DIASTOLE: 3.5 CM (ref 3.5–6)
LEFT VENTRICULAR MASS: 62.8 G
LV LATERAL E/E' RATIO: 14.33 M/S
LV SEPTAL E/E' RATIO: 14.33 M/S
LVED V (TEICH): 49.48 ML
LVES V (TEICH): 13.72 ML
LVOT MG: 1.04 MMHG
LVOT MV: 0.49 CM/S
LYMPHOCYTES # BLD AUTO: 0.89 K/UL (ref 1–4.8)
LYMPHOCYTES NFR BLD AUTO: 6.1 % (ref 27–41)
MCH RBC QN AUTO: 29.1 PG (ref 27–31)
MCHC RBC AUTO-ENTMCNC: 33.6 G/DL (ref 32–36)
MCV RBC AUTO: 86.5 FL (ref 80–96)
MONOCYTES # BLD AUTO: 1.34 K/UL (ref 0–0.8)
MONOCYTES NFR BLD AUTO: 9.2 % (ref 2–6)
MPC BLD CALC-MCNC: 10.9 FL (ref 9.4–12.4)
MV PEAK A VEL: 0.66 M/S
MV PEAK E VEL: 0.86 M/S
NEUTROPHILS # BLD AUTO: 12.14 K/UL (ref 1.8–7.7)
NEUTROPHILS NFR BLD AUTO: 83.5 % (ref 53–65)
NRBC # BLD AUTO: 0 X10E3/UL
NRBC, AUTO (.00): 0 %
NT-PROBNP SERPL-MCNC: ABNORMAL PG/ML (ref 1–125)
OHS CV RV/LV RATIO: 1.49 CM
OHS QRS DURATION: 108 MS
OHS QTC CALCULATION: 417 MS
PARAINFLUENZA 1: NOT DETECTED
PARAINFLUENZA 2: NOT DETECTED
PARAINFLUENZA 3: NOT DETECTED
PARAINFLUENZA 4: NOT DETECTED
PCO2 BLDA: 38 MMHG (ref 41–51)
PH SMN: 7.3 [PH] (ref 7.32–7.42)
PISA MRMAX VEL: 3.35 M/S
PISA TR MAX VEL: 3.83 M/S
PLATELET # BLD AUTO: 194 K/UL (ref 150–400)
PO2 BLDA: 36 MMHG (ref 25–40)
POC BASE EXCESS: -7.1 MMOL/L (ref -2–3)
POC CO2: 19.9 MMOL/L
POC IONIZED CALCIUM: 1.04 MMOL/L (ref 1.15–1.35)
POC SATURATED O2: 62 % (ref 40–70)
POCT GLUCOSE: 120 MG/DL (ref 60–95)
POTASSIUM BLD-SCNC: 4.5 MMOL/L (ref 3.4–4.5)
POTASSIUM SERPL-SCNC: 4.5 MMOL/L (ref 3.5–5.1)
PROT SERPL-MCNC: 7.1 G/DL (ref 6.4–8.3)
PV PEAK GRADIENT: 1 MMHG
PV PEAK VELOCITY: 0.42 M/S
RA MAJOR: 6.07 CM
RA PRESSURE ESTIMATED: 15 MMHG
RBC # BLD AUTO: 3.92 M/UL (ref 4.6–6.2)
RESPIRATORY SYNCYTIAL VIRUS A: NOT DETECTED
RESPIRATORY SYNCYTIAL VIRUS B: NOT DETECTED
RHINOVIRUS: NOT DETECTED
RIGHT VENTRICLE DIASTOLIC BASEL DIMENSION: 5.2 CM
RIGHT VENTRICLE DIASTOLIC LENGTH: 6.4 CM
RIGHT VENTRICLE DIASTOLIC MID DIMENSION: 4.7 CM
RIGHT VENTRICULAR LENGTH IN DIASTOLE (APICAL 4-CHAMBER VIEW): 6.38 CM
RV MID DIAMA: 4.68 CM
RV TB RVSP: 19 MMHG
SARS-COV-2 RDRP RESP QL NAA+PROBE: NEGATIVE
SODIUM BLD-SCNC: 129 MMOL/L (ref 136–145)
SODIUM SERPL-SCNC: 133 MMOL/L (ref 136–145)
TDI LATERAL: 0.06 M/S
TDI SEPTAL: 0.06 M/S
TDI: 0.06 M/S
TR MAX PG: 59 MMHG
TRICUSPID ANNULAR PLANE SYSTOLIC EXCURSION: 1.12 CM
TROPONIN I SERPL HS-MCNC: 763.3 NG/L
TV REST PULMONARY ARTERY PRESSURE: 74 MMHG
WBC # BLD AUTO: 14.54 K/UL (ref 4.5–11)
Z-SCORE OF LEFT VENTRICULAR DIMENSION IN END DIASTOLE: -5.42
Z-SCORE OF LEFT VENTRICULAR DIMENSION IN END SYSTOLE: -4.45

## 2024-11-15 PROCEDURE — 27000200 HC HIGH FLOW DEL DISP CIRCUIT

## 2024-11-15 PROCEDURE — 94640 AIRWAY INHALATION TREATMENT: CPT

## 2024-11-15 PROCEDURE — 99900035 HC TECH TIME PER 15 MIN (STAT)

## 2024-11-15 PROCEDURE — 86141 C-REACTIVE PROTEIN HS: CPT | Performed by: EMERGENCY MEDICINE

## 2024-11-15 PROCEDURE — 25000242 PHARM REV CODE 250 ALT 637 W/ HCPCS: Performed by: EMERGENCY MEDICINE

## 2024-11-15 PROCEDURE — 84484 ASSAY OF TROPONIN QUANT: CPT | Performed by: EMERGENCY MEDICINE

## 2024-11-15 PROCEDURE — 63600175 PHARM REV CODE 636 W HCPCS: Performed by: STUDENT IN AN ORGANIZED HEALTH CARE EDUCATION/TRAINING PROGRAM

## 2024-11-15 PROCEDURE — 83605 ASSAY OF LACTIC ACID: CPT

## 2024-11-15 PROCEDURE — 25000003 PHARM REV CODE 250: Performed by: STUDENT IN AN ORGANIZED HEALTH CARE EDUCATION/TRAINING PROGRAM

## 2024-11-15 PROCEDURE — 87635 SARS-COV-2 COVID-19 AMP PRB: CPT | Performed by: EMERGENCY MEDICINE

## 2024-11-15 PROCEDURE — 96366 THER/PROPH/DIAG IV INF ADDON: CPT

## 2024-11-15 PROCEDURE — 83605 ASSAY OF LACTIC ACID: CPT | Performed by: STUDENT IN AN ORGANIZED HEALTH CARE EDUCATION/TRAINING PROGRAM

## 2024-11-15 PROCEDURE — 87633 RESP VIRUS 12-25 TARGETS: CPT | Performed by: STUDENT IN AN ORGANIZED HEALTH CARE EDUCATION/TRAINING PROGRAM

## 2024-11-15 PROCEDURE — 85014 HEMATOCRIT: CPT

## 2024-11-15 PROCEDURE — 27000221 HC OXYGEN, UP TO 24 HOURS

## 2024-11-15 PROCEDURE — 93005 ELECTROCARDIOGRAM TRACING: CPT

## 2024-11-15 PROCEDURE — 99291 CRITICAL CARE FIRST HOUR: CPT | Mod: ,,, | Performed by: STUDENT IN AN ORGANIZED HEALTH CARE EDUCATION/TRAINING PROGRAM

## 2024-11-15 PROCEDURE — 99292 CRITICAL CARE ADDL 30 MIN: CPT | Mod: ,,, | Performed by: STUDENT IN AN ORGANIZED HEALTH CARE EDUCATION/TRAINING PROGRAM

## 2024-11-15 PROCEDURE — 87040 BLOOD CULTURE FOR BACTERIA: CPT | Performed by: EMERGENCY MEDICINE

## 2024-11-15 PROCEDURE — 82947 ASSAY GLUCOSE BLOOD QUANT: CPT

## 2024-11-15 PROCEDURE — 25000242 PHARM REV CODE 250 ALT 637 W/ HCPCS: Performed by: STUDENT IN AN ORGANIZED HEALTH CARE EDUCATION/TRAINING PROGRAM

## 2024-11-15 PROCEDURE — 80053 COMPREHEN METABOLIC PANEL: CPT | Performed by: EMERGENCY MEDICINE

## 2024-11-15 PROCEDURE — 96365 THER/PROPH/DIAG IV INF INIT: CPT | Mod: 59

## 2024-11-15 PROCEDURE — 94799 UNLISTED PULMONARY SVC/PX: CPT

## 2024-11-15 PROCEDURE — 85025 COMPLETE CBC W/AUTO DIFF WBC: CPT | Performed by: EMERGENCY MEDICINE

## 2024-11-15 PROCEDURE — 63600175 PHARM REV CODE 636 W HCPCS

## 2024-11-15 PROCEDURE — 93010 ELECTROCARDIOGRAM REPORT: CPT | Mod: ,,, | Performed by: HOSPITALIST

## 2024-11-15 PROCEDURE — 82330 ASSAY OF CALCIUM: CPT

## 2024-11-15 PROCEDURE — 83880 ASSAY OF NATRIURETIC PEPTIDE: CPT | Performed by: EMERGENCY MEDICINE

## 2024-11-15 PROCEDURE — 96367 TX/PROPH/DG ADDL SEQ IV INF: CPT

## 2024-11-15 PROCEDURE — 99406 BEHAV CHNG SMOKING 3-10 MIN: CPT

## 2024-11-15 PROCEDURE — 82803 BLOOD GASES ANY COMBINATION: CPT

## 2024-11-15 PROCEDURE — 25000003 PHARM REV CODE 250: Performed by: EMERGENCY MEDICINE

## 2024-11-15 PROCEDURE — 84132 ASSAY OF SERUM POTASSIUM: CPT

## 2024-11-15 PROCEDURE — 96361 HYDRATE IV INFUSION ADD-ON: CPT

## 2024-11-15 PROCEDURE — 63600175 PHARM REV CODE 636 W HCPCS: Performed by: EMERGENCY MEDICINE

## 2024-11-15 PROCEDURE — 85651 RBC SED RATE NONAUTOMATED: CPT | Performed by: EMERGENCY MEDICINE

## 2024-11-15 PROCEDURE — 84295 ASSAY OF SERUM SODIUM: CPT

## 2024-11-15 PROCEDURE — 36415 COLL VENOUS BLD VENIPUNCTURE: CPT | Performed by: EMERGENCY MEDICINE

## 2024-11-15 PROCEDURE — 94761 N-INVAS EAR/PLS OXIMETRY MLT: CPT

## 2024-11-15 PROCEDURE — 99291 CRITICAL CARE FIRST HOUR: CPT

## 2024-11-15 PROCEDURE — 20000000 HC ICU ROOM

## 2024-11-15 PROCEDURE — 5A0935A ASSISTANCE WITH RESPIRATORY VENTILATION, LESS THAN 24 CONSECUTIVE HOURS, HIGH NASAL FLOW/VELOCITY: ICD-10-PCS | Performed by: FAMILY MEDICINE

## 2024-11-15 PROCEDURE — 96375 TX/PRO/DX INJ NEW DRUG ADDON: CPT

## 2024-11-15 RX ORDER — MAGNESIUM SULFATE HEPTAHYDRATE 40 MG/ML
2 INJECTION, SOLUTION INTRAVENOUS
Status: COMPLETED | OUTPATIENT
Start: 2024-11-15 | End: 2024-11-15

## 2024-11-15 RX ORDER — IPRATROPIUM BROMIDE AND ALBUTEROL SULFATE 2.5; .5 MG/3ML; MG/3ML
3 SOLUTION RESPIRATORY (INHALATION) EVERY 6 HOURS
Status: DISCONTINUED | OUTPATIENT
Start: 2024-11-15 | End: 2024-11-24 | Stop reason: HOSPADM

## 2024-11-15 RX ORDER — NOREPINEPHRINE BITARTRATE/D5W 4MG/250ML
0-3 PLASTIC BAG, INJECTION (ML) INTRAVENOUS CONTINUOUS
Status: DISCONTINUED | OUTPATIENT
Start: 2024-11-15 | End: 2024-11-15

## 2024-11-15 RX ORDER — FUROSEMIDE 10 MG/ML
80 INJECTION INTRAMUSCULAR; INTRAVENOUS EVERY 12 HOURS
Status: DISCONTINUED | OUTPATIENT
Start: 2024-11-15 | End: 2024-11-18

## 2024-11-15 RX ORDER — METHYLPREDNISOLONE SOD SUCC 125 MG
125 VIAL (EA) INJECTION
Status: COMPLETED | OUTPATIENT
Start: 2024-11-15 | End: 2024-11-15

## 2024-11-15 RX ORDER — FUROSEMIDE 10 MG/ML
80 INJECTION INTRAMUSCULAR; INTRAVENOUS ONCE
Status: COMPLETED | OUTPATIENT
Start: 2024-11-15 | End: 2024-11-15

## 2024-11-15 RX ORDER — DIPHENHYDRAMINE HYDROCHLORIDE 50 MG/ML
25 INJECTION INTRAMUSCULAR; INTRAVENOUS
Status: COMPLETED | OUTPATIENT
Start: 2024-11-15 | End: 2024-11-15

## 2024-11-15 RX ORDER — ACETAMINOPHEN 325 MG/1
650 TABLET ORAL EVERY 4 HOURS PRN
Status: DISCONTINUED | OUTPATIENT
Start: 2024-11-15 | End: 2024-11-24 | Stop reason: HOSPADM

## 2024-11-15 RX ORDER — NOREPINEPHRINE BITARTRATE/D5W 4MG/250ML
0-.2 PLASTIC BAG, INJECTION (ML) INTRAVENOUS CONTINUOUS
Status: ACTIVE | OUTPATIENT
Start: 2024-11-15 | End: 2024-11-16

## 2024-11-15 RX ORDER — BUDESONIDE 0.5 MG/2ML
0.5 INHALANT ORAL EVERY 12 HOURS
Status: DISCONTINUED | OUTPATIENT
Start: 2024-11-15 | End: 2024-11-24 | Stop reason: HOSPADM

## 2024-11-15 RX ORDER — MUPIROCIN 20 MG/G
OINTMENT TOPICAL 2 TIMES DAILY
Status: COMPLETED | OUTPATIENT
Start: 2024-11-15 | End: 2024-11-19

## 2024-11-15 RX ORDER — MILRINONE LACTATE 0.2 MG/ML
0.25 INJECTION, SOLUTION INTRAVENOUS CONTINUOUS
Status: DISCONTINUED | OUTPATIENT
Start: 2024-11-15 | End: 2024-11-18

## 2024-11-15 RX ORDER — PREDNISONE 20 MG/1
40 TABLET ORAL DAILY
Status: DISCONTINUED | OUTPATIENT
Start: 2024-11-15 | End: 2024-11-15

## 2024-11-15 RX ORDER — PROCHLORPERAZINE EDISYLATE 5 MG/ML
10 INJECTION INTRAMUSCULAR; INTRAVENOUS
Status: COMPLETED | OUTPATIENT
Start: 2024-11-15 | End: 2024-11-15

## 2024-11-15 RX ORDER — BUDESONIDE 0.5 MG/2ML
0.5 INHALANT ORAL
Status: COMPLETED | OUTPATIENT
Start: 2024-11-15 | End: 2024-11-15

## 2024-11-15 RX ORDER — ONDANSETRON HYDROCHLORIDE 2 MG/ML
4 INJECTION, SOLUTION INTRAVENOUS EVERY 8 HOURS PRN
Status: DISCONTINUED | OUTPATIENT
Start: 2024-11-15 | End: 2024-11-24 | Stop reason: HOSPADM

## 2024-11-15 RX ORDER — SODIUM CHLORIDE, SODIUM LACTATE, POTASSIUM CHLORIDE, CALCIUM CHLORIDE 600; 310; 30; 20 MG/100ML; MG/100ML; MG/100ML; MG/100ML
INJECTION, SOLUTION INTRAVENOUS
Status: COMPLETED
Start: 2024-11-15 | End: 2024-11-15

## 2024-11-15 RX ORDER — HYDROCODONE BITARTRATE AND ACETAMINOPHEN 5; 325 MG/1; MG/1
1 TABLET ORAL EVERY 4 HOURS PRN
Status: DISCONTINUED | OUTPATIENT
Start: 2024-11-15 | End: 2024-11-24 | Stop reason: HOSPADM

## 2024-11-15 RX ORDER — POLYETHYLENE GLYCOL 3350 17 G/17G
17 POWDER, FOR SOLUTION ORAL 2 TIMES DAILY PRN
Status: DISCONTINUED | OUTPATIENT
Start: 2024-11-15 | End: 2024-11-24 | Stop reason: HOSPADM

## 2024-11-15 RX ORDER — NOREPINEPHRINE BITARTRATE/D5W 4MG/250ML
PLASTIC BAG, INJECTION (ML) INTRAVENOUS
Status: DISCONTINUED
Start: 2024-11-15 | End: 2024-11-15 | Stop reason: WASHOUT

## 2024-11-15 RX ORDER — DAPSONE 100 MG/1
100 TABLET ORAL DAILY
Status: DISCONTINUED | OUTPATIENT
Start: 2024-11-15 | End: 2024-11-24 | Stop reason: HOSPADM

## 2024-11-15 RX ORDER — PANTOPRAZOLE SODIUM 40 MG/1
40 TABLET, DELAYED RELEASE ORAL DAILY
Status: DISCONTINUED | OUTPATIENT
Start: 2024-11-15 | End: 2024-11-24 | Stop reason: HOSPADM

## 2024-11-15 RX ORDER — IPRATROPIUM BROMIDE AND ALBUTEROL SULFATE 2.5; .5 MG/3ML; MG/3ML
3 SOLUTION RESPIRATORY (INHALATION)
Status: COMPLETED | OUTPATIENT
Start: 2024-11-15 | End: 2024-11-15

## 2024-11-15 RX ORDER — IPRATROPIUM BROMIDE AND ALBUTEROL SULFATE 2.5; .5 MG/3ML; MG/3ML
6 SOLUTION RESPIRATORY (INHALATION)
Status: COMPLETED | OUTPATIENT
Start: 2024-11-15 | End: 2024-11-15

## 2024-11-15 RX ORDER — SODIUM CHLORIDE 0.9 % (FLUSH) 0.9 %
10 SYRINGE (ML) INJECTION
Status: DISCONTINUED | OUTPATIENT
Start: 2024-11-15 | End: 2024-11-24 | Stop reason: HOSPADM

## 2024-11-15 RX ORDER — HEPARIN SODIUM 5000 [USP'U]/ML
5000 INJECTION, SOLUTION INTRAVENOUS; SUBCUTANEOUS EVERY 8 HOURS
Status: DISCONTINUED | OUTPATIENT
Start: 2024-11-15 | End: 2024-11-24 | Stop reason: HOSPADM

## 2024-11-15 RX ORDER — DOBUTAMINE HYDROCHLORIDE 400 MG/100ML
2.5 INJECTION INTRAVENOUS CONTINUOUS
Status: DISCONTINUED | OUTPATIENT
Start: 2024-11-15 | End: 2024-11-17

## 2024-11-15 RX ORDER — METHOTREXATE 2.5 MG/1
15 TABLET ORAL
Status: DISCONTINUED | OUTPATIENT
Start: 2024-11-22 | End: 2024-11-24 | Stop reason: HOSPADM

## 2024-11-15 RX ADMIN — MUPIROCIN: 20 OINTMENT TOPICAL at 01:11

## 2024-11-15 RX ADMIN — METHYLPREDNISOLONE SODIUM SUCCINATE 125 MG: 125 INJECTION, POWDER, FOR SOLUTION INTRAMUSCULAR; INTRAVENOUS at 05:11

## 2024-11-15 RX ADMIN — SODIUM CHLORIDE, POTASSIUM CHLORIDE, SODIUM LACTATE AND CALCIUM CHLORIDE 1000 ML: 600; 310; 30; 20 INJECTION, SOLUTION INTRAVENOUS at 06:11

## 2024-11-15 RX ADMIN — MUPIROCIN: 20 OINTMENT TOPICAL at 09:11

## 2024-11-15 RX ADMIN — DAPSONE 100 MG: 25 TABLET ORAL at 12:11

## 2024-11-15 RX ADMIN — BUDESONIDE 0.5 MG: 0.5 INHALANT RESPIRATORY (INHALATION) at 11:11

## 2024-11-15 RX ADMIN — DEXTROSE MONOHYDRATE 1500 MG: 50 INJECTION, SOLUTION INTRAVENOUS at 07:11

## 2024-11-15 RX ADMIN — HEPARIN SODIUM 5000 UNITS: 5000 INJECTION, SOLUTION INTRAVENOUS; SUBCUTANEOUS at 02:11

## 2024-11-15 RX ADMIN — HEPARIN SODIUM 5000 UNITS: 5000 INJECTION, SOLUTION INTRAVENOUS; SUBCUTANEOUS at 09:11

## 2024-11-15 RX ADMIN — PANTOPRAZOLE SODIUM 40 MG: 40 TABLET, DELAYED RELEASE ORAL at 12:11

## 2024-11-15 RX ADMIN — IPRATROPIUM BROMIDE AND ALBUTEROL SULFATE 3 ML: .5; 3 SOLUTION RESPIRATORY (INHALATION) at 11:11

## 2024-11-15 RX ADMIN — FUROSEMIDE 80 MG: 10 INJECTION, SOLUTION INTRAMUSCULAR; INTRAVENOUS at 02:11

## 2024-11-15 RX ADMIN — IPRATROPIUM BROMIDE AND ALBUTEROL SULFATE 3 ML: .5; 3 SOLUTION RESPIRATORY (INHALATION) at 07:11

## 2024-11-15 RX ADMIN — PROCHLORPERAZINE EDISYLATE 10 MG: 5 INJECTION INTRAMUSCULAR; INTRAVENOUS at 05:11

## 2024-11-15 RX ADMIN — FUROSEMIDE 80 MG: 10 INJECTION, SOLUTION INTRAMUSCULAR; INTRAVENOUS at 09:11

## 2024-11-15 RX ADMIN — SODIUM CHLORIDE, POTASSIUM CHLORIDE, SODIUM LACTATE AND CALCIUM CHLORIDE: 600; 310; 30; 20 INJECTION, SOLUTION INTRAVENOUS at 06:11

## 2024-11-15 RX ADMIN — BUDESONIDE 0.5 MG: 0.5 INHALANT RESPIRATORY (INHALATION) at 07:11

## 2024-11-15 RX ADMIN — MAGNESIUM SULFATE HEPTAHYDRATE 2 G: 40 INJECTION, SOLUTION INTRAVENOUS at 05:11

## 2024-11-15 RX ADMIN — MILRINONE LACTATE IN DEXTROSE 0.25 MCG/KG/MIN: 200 INJECTION, SOLUTION INTRAVENOUS at 06:11

## 2024-11-15 RX ADMIN — DIPHENHYDRAMINE HYDROCHLORIDE 25 MG: 50 INJECTION INTRAMUSCULAR; INTRAVENOUS at 05:11

## 2024-11-15 RX ADMIN — BUDESONIDE 0.5 MG: 0.5 INHALANT RESPIRATORY (INHALATION) at 05:11

## 2024-11-15 RX ADMIN — PREDNISONE 40 MG: 20 TABLET ORAL at 12:11

## 2024-11-15 RX ADMIN — IPRATROPIUM BROMIDE AND ALBUTEROL SULFATE 6 ML: .5; 3 SOLUTION RESPIRATORY (INHALATION) at 07:11

## 2024-11-15 RX ADMIN — PIPERACILLIN SODIUM,TAZOBACTAM SODIUM 4.5 G: 4; .5 INJECTION, POWDER, FOR SOLUTION INTRAVENOUS at 07:11

## 2024-11-15 RX ADMIN — IPRATROPIUM BROMIDE AND ALBUTEROL SULFATE 3 ML: .5; 3 SOLUTION RESPIRATORY (INHALATION) at 05:11

## 2024-11-15 RX ADMIN — DOBUTAMINE HYDROCHLORIDE 2.5 MCG/KG/MIN: 400 INJECTION INTRAVENOUS at 10:11

## 2024-11-15 NOTE — SUBJECTIVE & OBJECTIVE
Past Medical History:   Diagnosis Date    Asthma     Cavitary lung disease     Hypertension     Sarcoidosis of lung with sarcoidosis of lymph nodes 2019    Severe pulmonary hypertension        Past Surgical History:   Procedure Laterality Date    ANTERIOR CRUCIATE LIGAMENT REPAIR Left 2004    FRACTURE SURGERY      RIGHT HEART CATHETERIZATION Right 2023    Procedure: INSERTION, CATHETER, RIGHT HEART;  Surgeon: Abdelrahman Adam MD;  Location: [facility] CATH LAB;  Service: Cardiology;  Laterality: Right;       Review of patient's allergies indicates:   Allergen Reactions    Fish containing products Anaphylaxis    Shellfish containing products Anaphylaxis    Lisinopril     Sulfamethoxazole-trimethoprim Hives       Family History    None       Tobacco Use    Smoking status: Former     Current packs/day: 0.00     Average packs/day: 1 pack/day for 15.0 years (15.0 ttl pk-yrs)     Types: Cigarettes     Start date:      Quit date: 2022     Years since quittin.8    Smokeless tobacco: Never   Substance and Sexual Activity    Alcohol use: Yes     Alcohol/week: 2.0 standard drinks of alcohol     Types: 2 Cans of beer per week    Drug use: Never    Sexual activity: Yes     Partners: Female      Review of Systems  Objective:     Vital Signs (Most Recent):  Temp: 98.2 °F (36.8 °C) (11/15/24 1513)  Pulse: 87 (11/15/24 1300)  Resp: 17 (11/15/24 1315)  BP: 100/70 (11/15/24 1315)  SpO2: (!) 84 % (11/15/24 1300) Vital Signs (24h Range):  Temp:  [94 °F (34.4 °C)-98.4 °F (36.9 °C)] 98.2 °F (36.8 °C)  Pulse:  [] 87  Resp:  [12-25] 17  SpO2:  [80 %-100 %] 84 %  BP: ()/() 100/70   Weight: 77.9 kg (171 lb 11.8 oz)  Body mass index is 22.66 kg/m².      Intake/Output Summary (Last 24 hours) at 11/15/2024 1637  Last data filed at 11/15/2024 1620  Gross per 24 hour   Intake 2867.84 ml   Output 1875 ml   Net 992.84 ml          Physical Exam  Constitutional:       Appearance: He is not toxic-appearing or  diaphoretic.   HENT:      Head: Normocephalic and atraumatic.      Mouth/Throat:      Mouth: Mucous membranes are moist.   Eyes:      General: No scleral icterus.  Cardiovascular:      Rate and Rhythm: Normal rate and regular rhythm.      Heart sounds: No murmur heard.  Pulmonary:      Effort: Pulmonary effort is normal.      Breath sounds: Rales (bibasilry inspiratory) present. No rhonchi.   Abdominal:      Palpations: Abdomen is soft.      Tenderness: There is no abdominal tenderness. There is no guarding.   Musculoskeletal:         General: Normal range of motion.      Right lower leg: No edema.      Left lower leg: No edema.   Skin:     Capillary Refill: Capillary refill takes less than 2 seconds.      Coloration: Skin is not jaundiced.      Comments: Warming blanket in place   Neurological:      General: No focal deficit present.      Mental Status: He is alert and oriented to person, place, and time.            Vents:  Oxygen Concentration (%): 32 (11/15/24 1136)  Lines/Drains/Airways       Peripheral Intravenous Line  Duration                  Peripheral IV - Single Lumen 11/15/24 0530 20 G 1 in Left;Posterior Hand <1 day         Peripheral IV - Single Lumen 11/15/24 0640 20 G 1 in Anterior;Distal;Right Forearm <1 day                  Significant Labs:    CBC/Anemia Profile:  Recent Labs   Lab 11/15/24  0523 11/15/24  0607   WBC 14.54*  --    HGB 11.4*  --    HCT 33.9* 39     --    MCV 86.5  --    RDW 14.0  --         Chemistries:  Recent Labs   Lab 11/15/24  0523   *   K 4.5   CL 97*   CO2 18*   BUN 36*   CREATININE 2.04*   CALCIUM 9.2   ALBUMIN 2.9*   PROT 7.1   BILITOT 1.5   ALKPHOS 114   ALT 15   AST 54*       All pertinent labs within the past 24 hours have been reviewed.    Significant Imaging: I have reviewed all pertinent imaging results/findings within the past 24 hours.

## 2024-11-15 NOTE — PLAN OF CARE
Problem: Adult Inpatient Plan of Care  Goal: Plan of Care Review  Outcome: Progressing     Problem: Breathing Pattern Ineffective  Goal: Effective Breathing Pattern  Outcome: Progressing     Problem: Gas Exchange Impaired  Goal: Optimal Gas Exchange  Outcome: Progressing     Problem: Airway Clearance Ineffective  Goal: Effective Airway Clearance  Outcome: Progressing

## 2024-11-15 NOTE — ED NOTES
Notified Dr. Maya that patient's BP was sitting in the 80's systolic but MAP is still around 65-67.. per Dr. Maya, hold the Levophed for now

## 2024-11-15 NOTE — HPI
33 yo M with cavitary pulmonary sarcoidosis (Dx 2019) c/b severe obstructive lung disease (FEV1 1.15, 11/2024) on chronic immunosuppression (MTX and prednisone), chronic respiratory failure with hypoxia and pre-capillary pulmonary hypertension (RHC 03/2023 mPAP 34, PCWP 12, PVR 5.56, CO/CI 3.96/2.05, Td) who presented to ED with complaints of shortness of breath.    This is his 2nd presentation to the emergency department; previously assessed 11/16 for shortness of breath with CT-PE that was negative.  On presentation today, he was hypothermic with temperature 94°, heart rate 79, BP 96/71, SpO2 chas 91%. Labs notable for POC lactate 8.2, VBG 7.3/38, Na 133, serum SCr 2.04 (last 1.08 11/13), AST 54, NT pro BNP highest to date 20/7 1287, troponin 763.3 (6.0 11/13). During his ED course, he was trialed on BPAP which he was uncomfortable and transitioned to high-flow for hypoxia.  For management and concerns of sepsis, he received LR x2 L, magnesium sulfate, Solu-Medrol 125 mg IV and was initiated on vancomycin and Zosyn.    At time of my assessment, patient reports having some mild chest discomfort and improved shortness of breath.  He can not lay recumbent though due to recurrence of his dyspnea.  POCUS evaluation with evidence of acute RV failure with admission thereafter to ICU for planned diuresis with inotropic support.  [facility] transfer request was placed at time of admission due to his comorbidities and potential transplant candidacy. Discussion with [facility] team and patient was previously assessed in Sarcoidosis clinic and at the time, no evaluation for transplant.

## 2024-11-15 NOTE — ASSESSMENT & PLAN NOTE
S[facility] in this patient with pre-capillary pulmonary hypertension that has progressed in the past 4 years (Normal JONNY 2019) presents in acute decompensated RV failure with evidence of cardiogenic shock on presentation; Tn elevation, LA and elevated central pressures.   - chronotropic: stop nifedipine and BB outpatient Rx indefinitely; no arrhythmia on presentation  - inotrope: start dobutamine, admission with up-titration for diuresis effect   -- up-trending LA, will start milrinone  - diuresis: with opt for bolus dosing as patient cannot tolerate infusion at his degree of decompensation; Lasix 80 mg IV x3/24 hrs then BID; no IV bolus  - MAP: goal MAP >60, will consider pressor support for goal as well as with milrinone initiation  - case discussed with provider at [facility] and [facility]; no ICU beds available at this time at [facility]  - low suspicion of sepsis at this time; will stop vancomycin and zosyn and monitor fever curve.

## 2024-11-15 NOTE — H&P
[facility] ICU  Critical Care Medicine  History & Physical    Patient Name: Bo Ku  MRN: [id number]  Admission Date: 11/15/2024  Hospital Length of Stay: 0 days  Code Status: Full Code  Attending Physician: Beverley Muir MD   Primary Care Provider: Julia Alba FNP   Principal Problem: Acute on chronic right heart failure    Subjective:     HPI:  33 yo M with cavitary pulmonary sarcoidosis (Dx 2019) c/b severe obstructive lung disease (FEV1 1.15, 11/2024) on chronic immunosuppression (MTX and prednisone), chronic respiratory failure with hypoxia and pre-capillary pulmonary hypertension (RHC 03/2023 mPAP 34, PCWP 12, PVR 5.56, CO/CI 3.96/2.05, Td) who presented to ED with complaints of shortness of breath.    This is his 2nd presentation to the emergency department; previously assessed 11/16 for shortness of breath with CT-PE that was negative.  On presentation today, he was hypothermic with temperature 94°, heart rate 79, BP 96/71, SpO2 chas 91%. Labs notable for POC lactate 8.2, VBG 7.3/38, Na 133, serum SCr 2.04 (last 1.08 11/13), AST 54, NT pro BNP highest to date 20/7 1287, troponin 763.3 (6.0 11/13). During his ED course, he was trialed on BPAP which he was uncomfortable and transitioned to high-flow for hypoxia.  For management and concerns of sepsis, he received LR x2 L, magnesium sulfate, Solu-Medrol 125 mg IV and was initiated on vancomycin and Zosyn.    At time of my assessment, patient reports having some mild chest discomfort and improved shortness of breath.  He can not lay recumbent though due to recurrence of his dyspnea.  POCUS evaluation with evidence of acute RV failure with admission thereafter to ICU for planned diuresis with inotropic support.  [facility] transfer request was placed at time of admission due to his comorbidities and potential transplant candidacy. Discussion with [facility] team and patient was previously assessed in Sarcoidosis clinic and at the time, no  evaluation for transplant.     Hospital/ICU Course:  No notes on file     Past Medical History:   Diagnosis Date    Asthma     Cavitary lung disease     Hypertension     Sarcoidosis of lung with sarcoidosis of lymph nodes 2019    Severe pulmonary hypertension        Past Surgical History:   Procedure Laterality Date    ANTERIOR CRUCIATE LIGAMENT REPAIR Left 2004    FRACTURE SURGERY      RIGHT HEART CATHETERIZATION Right 2023    Procedure: INSERTION, CATHETER, RIGHT HEART;  Surgeon: Abdelrahman Adam MD;  Location: [facility] CATH LAB;  Service: Cardiology;  Laterality: Right;       Review of patient's allergies indicates:   Allergen Reactions    Fish containing products Anaphylaxis    Shellfish containing products Anaphylaxis    Lisinopril     Sulfamethoxazole-trimethoprim Hives       Family History    None       Tobacco Use    Smoking status: Former     Current packs/day: 0.00     Average packs/day: 1 pack/day for 15.0 years (15.0 ttl pk-yrs)     Types: Cigarettes     Start date:      Quit date: 2022     Years since quittin.8    Smokeless tobacco: Never   Substance and Sexual Activity    Alcohol use: Yes     Alcohol/week: 2.0 standard drinks of alcohol     Types: 2 Cans of beer per week    Drug use: Never    Sexual activity: Yes     Partners: Female      Review of Systems  Objective:     Vital Signs (Most Recent):  Temp: 98.2 °F (36.8 °C) (11/15/24 1513)  Pulse: 87 (11/15/24 1300)  Resp: 17 (11/15/24 1315)  BP: 100/70 (11/15/24 1315)  SpO2: (!) 84 % (11/15/24 1300) Vital Signs (24h Range):  Temp:  [94 °F (34.4 °C)-98.4 °F (36.9 °C)] 98.2 °F (36.8 °C)  Pulse:  [] 87  Resp:  [12-25] 17  SpO2:  [80 %-100 %] 84 %  BP: ()/() 100/70   Weight: 77.9 kg (171 lb 11.8 oz)  Body mass index is 22.66 kg/m².      Intake/Output Summary (Last 24 hours) at 11/15/2024 1637  Last data filed at 11/15/2024 1620  Gross per 24 hour   Intake 2867.84 ml   Output 1875 ml   Net 992.84 ml           Physical Exam  Constitutional:       Appearance: He is not toxic-appearing or diaphoretic.   HENT:      Head: Normocephalic and atraumatic.      Mouth/Throat:      Mouth: Mucous membranes are moist.   Eyes:      General: No scleral icterus.  Cardiovascular:      Rate and Rhythm: Normal rate and regular rhythm.      Heart sounds: No murmur heard.  Pulmonary:      Effort: Pulmonary effort is normal.      Breath sounds: Rales (bibasilry inspiratory) present. No rhonchi.   Abdominal:      Palpations: Abdomen is soft.      Tenderness: There is no abdominal tenderness. There is no guarding.   Musculoskeletal:         General: Normal range of motion.      Right lower leg: No edema.      Left lower leg: No edema.   Skin:     Capillary Refill: Capillary refill takes less than 2 seconds.      Coloration: Skin is not jaundiced.      Comments: Warming blanket in place   Neurological:      General: No focal deficit present.      Mental Status: He is alert and oriented to person, place, and time.            Vents:  Oxygen Concentration (%): 32 (11/15/24 1136)  Lines/Drains/Airways       Peripheral Intravenous Line  Duration                  Peripheral IV - Single Lumen 11/15/24 0530 20 G 1 in Left;Posterior Hand <1 day         Peripheral IV - Single Lumen 11/15/24 0640 20 G 1 in Anterior;Distal;Right Forearm <1 day                  Significant Labs:    CBC/Anemia Profile:  Recent Labs   Lab 11/15/24  0523 11/15/24  0607   WBC 14.54*  --    HGB 11.4*  --    HCT 33.9* 39     --    MCV 86.5  --    RDW 14.0  --         Chemistries:  Recent Labs   Lab 11/15/24  0523   *   K 4.5   CL 97*   CO2 18*   BUN 36*   CREATININE 2.04*   CALCIUM 9.2   ALBUMIN 2.9*   PROT 7.1   BILITOT 1.5   ALKPHOS 114   ALT 15   AST 54*       All pertinent labs within the past 24 hours have been reviewed.    Significant Imaging: I have reviewed all pertinent imaging results/findings within the past 24 hours.  Assessment/Plan:     Pulmonary  Acute  on chronic respiratory failure with hypoxia  Acute on chronic respiratory failure 2/2 volume overload.   - supplement for goal SpO2 >92%  - diuresis allowing for HFNC wean; transition to NC once on 30L flow    Sarcoidosis of lung with sarcoidosis of lymph nodes  CT Chest 2 days PTA with stable pulmonary disease. Will mange with IV steroids for decompensated RV failure with hypervolemia.   - transition to methylprednisone IV  - cont MTX Q7days  - cont dapsone ppx    Obstructive lung disease  Severe obstructive lung disease 2/2 sarcoidosis.   - manage with scheduled Pulmicort Q12H and DupNebs    Cardiac/Vascular  * Acute on chronic right heart failure  S[facility] in this patient with pre-capillary pulmonary hypertension that has progressed in the past 4 years (Normal JONNY 2019) presents in acute decompensated RV failure with evidence of cardiogenic shock on presentation; Tn elevation, LA and elevated central pressures.   - chronotropic: stop nifedipine and BB outpatient Rx indefinitely; no arrhythmia on presentation  - inotrope: start dobutamine, admission with up-titration for diuresis effect   -- up-trending LA, will start milrinone  - diuresis: with opt for bolus dosing as patient cannot tolerate infusion at his degree of decompensation; Lasix 80 mg IV x3/24 hrs then BID; no IV bolus  - MAP: goal MAP >60, will consider pressor support for goal as well as with milrinone initiation  - case discussed with provider at [facility]; no ICU beds available at this time at [facility]  - low suspicion of sepsis at this time; will stop vancomycin and zosyn and monitor fever curve.    Renal/  Acute renal failure  Last SCr 1.08 now with Cr 2.04 a/w decreased UOP in setting of decompensated heart failure. Etiology 2/2 pre-renal azotemia with component of vascular congestion.  - diuresis as per heart failure plan  - twice daily BMP for aggressive diuresis  - strict Is/Os and renally dosed Rx           Critical Care Medicine Daily  Checklist:11/15/2024   F: Feeding Diet ordered.   A: Analgesia   N/A   S: Sedation CAM-ICU negative   N/A           T: Thromboprophylaxis Lower extremity SCD and Lovenox SQ   H: HOB > 300 Yes.   U: Stress Ulcer Prophylaxis N/A   G: Glucose Control Within goal (upper limit 140-180 mg/dL).   S: SAT & SBT Coordinated?  N/A   B: Bowel Regimen N/A.   I: Indwelling Catheter Reviewed Maintain: N/A  Remove: N/A   D: De-escalation of Antimicrobials Yes    Disposition ICU         Critical Care Time: 105 minutes  Critical secondary to Patient has a condition that poses threat to life and bodily function: Acute Renal Failure and Acute on chronic right heart failure    Critical care was time spent personally by me on the following activities: development of treatment plan with patient or surrogate and bedside caregivers, discussions with consultants, evaluation of patient's response to treatment, examination of patient, ordering and performing treatments and interventions, ordering and review of laboratory studies, ordering and review of radiographic studies, pulse oximetry, re-evaluation of patient's condition. This critical care time did not overlap with that of any other provider or involve time for any procedures.     Beverely Muir MD  Critical Care Medicine  [facility] ICU

## 2024-11-15 NOTE — ASSESSMENT & PLAN NOTE
CT Chest 2 days PTA with stable pulmonary disease. Will mange with IV steroids for decompensated RV failure with hypervolemia.   - transition to methylprednisone IV  - cont MTX Q7days  - cont dapsone ppx

## 2024-11-15 NOTE — ASSESSMENT & PLAN NOTE
Acute on chronic respiratory failure 2/2 volume overload.   - supplement for goal SpO2 >92%  - diuresis allowing for HFNC wean; transition to NC once on 30L flow

## 2024-11-15 NOTE — ED PROVIDER NOTES
Encounter Date: 11/15/2024       History     Chief Complaint   Patient presents with    Shortness of Breath     A 34-year-old male patient who is known case of sarcoidosis .  He is taking oral prednisone every other day and also he is taking methotrexate.  The patient started 2 days ago to become gradually short of breath.  This evening he became very short of breath with progressive difficulty in breathing.  So he called EMS they brought him to the emergency department and they stated that with non-rebreather mask he was satting in upper 80s and lower 90s.  There is no chest pain.  There is no fever or chills.  There is no nausea or vomiting.    The history is provided by the patient and the EMS personnel. No  was used.     Review of patient's allergies indicates:   Allergen Reactions    Fish containing products Anaphylaxis    Shellfish containing products Anaphylaxis    Lisinopril     Sulfamethoxazole-trimethoprim Hives     Past Medical History:   Diagnosis Date    Asthma     Cavitary lung disease     Hypertension     Sarcoidosis of lung with sarcoidosis of lymph nodes 2019    Severe pulmonary hypertension      Past Surgical History:   Procedure Laterality Date    ANTERIOR CRUCIATE LIGAMENT REPAIR Left 2004    FRACTURE SURGERY      RIGHT HEART CATHETERIZATION Right 2023    Procedure: INSERTION, CATHETER, RIGHT HEART;  Surgeon: Abdelrahman Adam MD;  Location: [facility] CATH LAB;  Service: Cardiology;  Laterality: Right;     No family history on file.  Social History     Tobacco Use    Smoking status: Former     Current packs/day: 0.00     Average packs/day: 1 pack/day for 15.0 years (15.0 ttl pk-yrs)     Types: Cigarettes     Start date:      Quit date: 2022     Years since quittin.8    Smokeless tobacco: Never   Substance Use Topics    Alcohol use: Yes     Alcohol/week: 2.0 standard drinks of alcohol     Types: 2 Cans of beer per week    Drug use: Never     Review of  Systems   Constitutional:  Negative for fever.   HENT:  Negative for sore throat.    Respiratory:  Positive for shortness of breath.    Cardiovascular:  Negative for chest pain.   Gastrointestinal:  Negative for nausea.   Genitourinary:  Negative for dysuria.   Musculoskeletal:  Negative for back pain.   Skin:  Negative for rash.   Neurological:  Negative for weakness.   Hematological:  Does not bruise/bleed easily.       Physical Exam     Initial Vitals   BP Pulse Resp Temp SpO2   11/15/24 0512 11/15/24 0510 11/15/24 0511 11/15/24 0621 11/15/24 0510   (!) 143/112 82 (!) 25 (!) 94 °F (34.4 °C) 97 %      MAP       --                Physical Exam    Nursing note and vitals reviewed.  Constitutional: He appears well-developed and well-nourished.   HENT:   Head: Normocephalic and atraumatic.   Eyes: EOM are normal. Pupils are equal, round, and reactive to light.   Neck: Neck supple. No thyromegaly present.   Normal range of motion.  Cardiovascular:  Normal rate, regular rhythm, normal heart sounds and intact distal pulses.           No murmur heard.  Pulmonary/Chest: No respiratory distress. He has no wheezes. He has rhonchi. He has rales.   Abdominal: Abdomen is soft. Bowel sounds are normal. There is no abdominal tenderness.   Musculoskeletal:         General: No tenderness or edema. Normal range of motion.      Cervical back: Normal range of motion and neck supple.     Lymphadenopathy:     He has no cervical adenopathy.   Neurological: He is alert and oriented to person, place, and time. He has normal strength and normal reflexes. GCS score is 15. GCS eye subscore is 4. GCS verbal subscore is 5. GCS motor subscore is 6.   Skin: Skin is warm and dry. Capillary refill takes less than 2 seconds. No rash noted.   Psychiatric: He has a normal mood and affect.         Medical Screening Exam   See Full Note    ED Course   Procedures  Labs Reviewed   COMPREHENSIVE METABOLIC PANEL - Abnormal       Result Value    Sodium 133  (*)     Potassium 4.5      Chloride 97 (*)     CO2 18 (*)     Anion Gap 23 (*)     Glucose 120 (*)     BUN 36 (*)     Creatinine 2.04 (*)     BUN/Creatinine Ratio 18      Calcium 9.2      Total Protein 7.1      Albumin 2.9 (*)     Globulin 4.2 (*)     A/G Ratio 0.7      Bilirubin, Total 1.5      Alk Phos 114      ALT 15      AST 54 (*)     eGFR 43 (*)    TROPONIN I - Abnormal    Troponin I High Sensitivity 763.3 (*)    CBC WITH DIFFERENTIAL - Abnormal    WBC 14.54 (*)     RBC 3.92 (*)     Hemoglobin 11.4 (*)     Hematocrit 33.9 (*)     MCV 86.5      MCH 29.1      MCHC 33.6      RDW 14.0      Platelet Count 194      MPV 10.9      Neutrophils % 83.5 (*)     Lymphocytes % 6.1 (*)     Monocytes % 9.2 (*)     Eosinophils % 0.0 (*)     Basophils % 0.1      Immature Granulocytes % 1.1 (*)     nRBC, Auto 0.0      Neutrophils, Abs 12.14 (*)     Lymphocytes, Absolute 0.89 (*)     Monocytes, Absolute 1.34 (*)     Eosinophils, Absolute 0.00      Basophils, Absolute 0.01      Immature Granulocytes, Absolute 0.16 (*)     nRBC, Absolute 0.00      Diff Type Auto     NT-PRO NATRIURETIC PEPTIDE - Abnormal    ProBNP 27,287 (*)    SEDIMENTATION RATE, AUTOMATED - Abnormal    ESR Westergren 50 (*)    HIGH SENSITIVITY CRP - Abnormal    CRP, High Senstivity >160.00 (*)    LACTIC ACID, PLASMA - Abnormal    Lactic Acid 2.5 (*)    SARS-COV-2 RNA AMPLIFICATION, QUAL - Normal    SARS COV-2 Molecular Negative      Narrative:     Negative SARS-CoV results should not be used as the sole basis for treatment or patient management decisions; negative results should be considered in the context of a patient's recent exposures, history and the presene of clinical signs and symptoms consistent with COVID-19.  Negative results should be treated as presumptive and confirmed by molecular assay, if necessary for patient management.   CULTURE, BLOOD   CULTURE, BLOOD   CBC W/ AUTO DIFFERENTIAL    Narrative:     The following orders were created for panel  order CBC auto differential.  Procedure                               Abnormality         Status                     ---------                               -----------         ------                     CBC with Differential[[id number]]       Abnormal            Final result                 Please view results for these tests on the individual orders.        ECG Results              EKG 12-lead (Final result)        Collection Time Result Time QRS Duration OHS QTC Calculation    11/15/24 05:10:46 11/15/24 17:40:47 108 417                     Final result by Interface, [facility] (11/15/24 17:40:54)                   Narrative:    Test Reason : R06.02,    Vent. Rate :  81 BPM     Atrial Rate :    BPM     P-R Int : 138 ms          QRS Dur : 108 ms      QT Int : 380 ms       P-R-T Axes :  65 101 -22 degrees    QTcB Int : 417 ms    Sinus rhythm  Possible left atrial abnormality  WPW pattern - probable anteroseptal accessory pathway  Incomplete RBBB      Confirmed by Dot Adhikari ([id number]) on 11/15/2024 5:40:46 PM    Referred By: [email or web address] SELF           Confirmed By: Dot Adhikari                                  Imaging Results              X-Ray Chest AP (Final result)  Result time 11/15/24 07:02:25      Final result by Caleb Draling MD (11/15/24 07:02:25)                   Impression:      Extensive chronic parenchymal scarring of the bilateral mid and lower lungs not significantly changed over the interval without focal consolidation.      Electronically signed by: Caleb Darling  Date:    11/15/2024  Time:    07:02               Narrative:    EXAMINATION:  XR CHEST AP PORTABLE    CLINICAL HISTORY:  sob;    TECHNIQUE:  Portable view of the chest was performed.    COMPARISON:  11/13/2024 and 05/31/2021.    FINDINGS:  There is extensive chronic parenchymal scarring of the bilateral mid and lower lungs which does not appear significantly changed over the interval.  No definite focal consolidation.  No  significant pleural effusion.  Heart size is mildly enlarged.  Bony thorax intact.                                       Medications   DOBUtamine 1000 mg in D5W 250 mL infusion (5 mcg/kg/min × 79.4 kg Intravenous Verify Only 11/15/24 1700)   sodium chloride 0.9% flush 10 mL (has no administration in time range)   mupirocin 2 % ointment ( Nasal Given 11/15/24 1302)   budesonide nebulizer solution 0.5 mg (0.5 mg Nebulization Given by Other 11/15/24 1136)   albuterol-ipratropium 2.5 mg-0.5 mg/3 mL nebulizer solution 3 mL (3 mLs Nebulization Given 11/15/24 1131)   dapsone tablet 100 mg (100 mg Oral Given 11/15/24 1259)   methotrexate tablet 15 mg (has no administration in time range)   pantoprazole EC tablet 40 mg (40 mg Oral Given 11/15/24 1259)   heparin (porcine) injection 5,000 Units (5,000 Units Subcutaneous Given 11/15/24 1459)   furosemide injection 80 mg (80 mg Intravenous Given 11/15/24 1458)   methylPREDNISolone sodium succinate injection 40 mg (has no administration in time range)   acetaminophen tablet 650 mg (has no administration in time range)   HYDROcodone-acetaminophen 5-325 mg per tablet 1 tablet (has no administration in time range)   ondansetron injection 4 mg (has no administration in time range)   polyethylene glycol packet 17 g (has no administration in time range)   milrinone 20mg in D5W 100 mL infusion (has no administration in time range)   albuterol-ipratropium 2.5 mg-0.5 mg/3 mL nebulizer solution 3 mL (3 mLs Nebulization Given 11/15/24 0529)   budesonide nebulizer solution 0.5 mg (0.5 mg Nebulization Given 11/15/24 0529)   methylPREDNISolone sodium succinate injection 125 mg (125 mg Intravenous Given 11/15/24 0539)   magnesium sulfate 2g in water 50mL IVPB (premix) (0 g Intravenous Stopped 11/15/24 0721)   prochlorperazine injection Soln 10 mg (10 mg Intravenous Given 11/15/24 0538)   diphenhydrAMINE injection 25 mg (25 mg Intravenous Given 11/15/24 0533)   lactated ringers bolus 1,000 mL (0  mLs Intravenous Stopped 11/15/24 0746)   piperacillin-tazobactam (ZOSYN) 4.5 g in D5W 100 mL IVPB (MB+) (0 g Intravenous Stopped 11/15/24 0835)   vancomycin (VANCOCIN) 1,500 mg in D5W 250 mL IVPB (0 mg Intravenous Stopped 11/15/24 0940)   lactated ringers bolus 1,000 mL (0 mLs Intravenous Stopped 11/15/24 0703)   albuterol-ipratropium 2.5 mg-0.5 mg/3 mL nebulizer solution 6 mL (6 mLs Nebulization Given 11/15/24 0730)   furosemide injection 80 mg (80 mg Intravenous Given 11/15/24 0945)     Medical Decision Making            Attending Attestation:         Attending Critical Care:   Critical Care Times:   Direct Patient Care (initial evaluation, reassessments, and time considering the case)................................................................30 minutes.   Additional History from reviewing old medical records or taking additional history from the family, EMS, PCP, etc.......................5 minutes.   Ordering, Reviewing, and Interpreting Diagnostic Studies...............................................................................................................5 minutes.   Documentation..................................................................................................................................................................................5 minutes.   Consultation with other Physicians. .................................................................................................................................................5 minutes.   ==============================================================  Total Critical Care Time - exclusive of procedural time: 50 minutes.  ==============================================================  Critical care was necessary to treat or prevent imminent or life-threatening deterioration of the following conditions: respiratory failure and hypotension.   Critical care was time spent personally by me on the following activities: obtaining  history from patient or relative, review of old charts, ordering and performing treatments and interventions, evaluation of patient's response to treatment, discussion with consultants and re-evaluation of patient's conition.   Critical Care Condition: potentially life-threatening           ED Course as of 11/15/24 1800   Fri Nov 15, 2024   0713 Care transferred to Dr. Maya   [HK]   0713 Patient needs  admission. [BB]   0816 Troponin is elevated at 763. [BB]   0817 EKG by my interpretation shows sinus tachycardia, nonspecific ST changes. [BB]   0817 Pro BNP is elevated at 6[address] which is significantly worse than baseline. [BB]   0835 I made the decision to admit patient and discussed case with ICU physician on-call who agrees with admission. [BB]      ED Course User Index  [BB] Slade Maya MD  [HK] Khang Toure MD                           Clinical Impression:   Final diagnoses:  [R06.02] SOB (shortness of breath)  [R06.02] Shortness of breath  [J96.21] Acute on chronic respiratory failure with hypoxia (Primary)  [T68.XXXA] Hypothermia, initial encounter  [A41.9] Sepsis, due to unspecified organism, unspecified whether acute organ dysfunction present        ED Disposition Condition    Admit                 Slade Maya MD  11/15/24 1800

## 2024-11-15 NOTE — PROCEDURES
"Bo Ku is a 34 y.o. male patient.    Temp: 97.6 °F (36.4 °C) (11/15/24 1115)  Pulse: 87 (11/15/24 1300)  Resp: 17 (11/15/24 1315)  BP: 100/70 (11/15/24 1315)  SpO2: (!) 84 % (11/15/24 1300)  Weight: 79.4 kg (175 lb) (11/15/24 0512)  Height: 6' 1" (185.4 cm) (11/15/24 0512)       Procedures    11/15/2024      POINT OF CARE ULTRASOUND, HEART AND LUNG  Representative Images:  Additional images sent to dedicated storage cloud      1: Short axis view  2: IVC    Findings:  Cardiac ultrasound: severe RV enlargement, dilated RA, biphasic intraventricular septal wall motion consistent with volume and pressure overload, small LV, CHARU present, IVC dilation 2.92 cm with no variation w/ sniff, estimated RAP >15 mmHg, no significant pericardial effusion  Lung ultrasound: small bilateral posterior posterior basal pleural effusion, B lines present and scattered, B profile    Impression and Plan:  Acute RV failure; plan admission to ICU for diuresis with inotropic support, will contact transfer center for possible transfer to lung transplant center/PH management, no IV fluid continuous or bolus. Further management as per ICU admission note.   "

## 2024-11-15 NOTE — PROVIDER TRANSFER
Outside Transfer Acceptance Note / Regional Referral Center    Referring facility: [facility]   Referring provider: ESTELA WATSON  Accepting facility: [facility]  Accepting provider: Eunice Plunkett MD  Admitting provider: [facility]  Reason for transfer:  ICU care  Transfer diagnosis: septic shock  Transfer specialty requested:Critical Care Medicine  Transfer specialty notified: Yes  Transfer level: NUMBER 1-5: 2  Bed type requested: med tele  Isolation status: No active isolations   Admission class or status: IP- Inpatient    Narrative     A 34-year-old male patient who is known case of sarcoidosis on oral prednisone 40 mg every other day, dapsone and methotrexate and pulmonary HTN (PASP 68 mmHg). Came in today with progressively worsening SOB that started 2 days ago. Then he was seen in ER. CTA chest was negative for PE and showed chronic changes associated with sarcoid. He was discharged on diuretic and nebs. Last evening he became very short of breath with progressive difficulty in breathing. So he called EMS they brought him to the emergency department and they stated that with non-rebreather mask he was satting in upper 80s and lower 90s. There is no chest pain. There is no fever or chills. There is no nausea or vomiting. He follows with a pulmonary, Dr. Watson, regularly and last saw her 11/7/2024 where she wanted him to continue his lung transplant work up at [facility]. In the ER he was found to be hypothermic 94 F and became hypotensive 85/59. He is now on levophed and dobutamine. Troponin , NT-proBNP 49768 - higher than baseline, SCr2 (baseline1), 7.30/38/36, POC lactate 8.2 and then srume lactate 2.3    Transfer denies as patient's insurance not accepted by hour facility for lung transplant.     Objective     Vitals: Temp: 98.4 °F (36.9 °C) (11/15/24 0940)  Pulse: 77 (11/15/24 1026)  Resp: (!) 21 (11/15/24 1026)  BP: 90/62 (11/15/24 1026)  SpO2: 97 % (11/15/24 1026)  Recent  Labs   Lab 11/13/24  1443 11/15/24  0523   * 133*   K 3.5 4.5   CL 97* 97*   CO2 21* 18*   BUN 12 36*   CREATININE 1.08 2.04*   GLU 85 120*   CALCIUM 9.3 9.2   MG 1.3*  --      Recent Labs   Lab 11/13/24  1443 11/15/24  0523   ALKPHOS 103 114   ALT 7 15   AST 21 54*   ALBUMIN 3.2* 2.9*   PROT 7.6 7.1   BILITOT 1.1 1.5       Recent Labs   Lab 11/13/24  1443 11/15/24  0523 11/15/24  0607   WBC 7.51 14.54*  --    HGB 12.3* 11.4*  --    HCT 36.6* 33.9* 39    194  --    NEUTOPHILPCT 76.6* 83.5*  --    LYMPH 10.4*  0.78* 6.1*  0.89*  --    MONO 12.4* 9.2*  --    EOSINOPHIL 0.5* 0.0*  --        Recent Labs   Lab 11/15/24  0607   POCTGLUCOSE 120*     Recent imaging:X-Ray Chest AP  Narrative: EXAMINATION:  XR CHEST AP PORTABLE    CLINICAL HISTORY:  sob;    TECHNIQUE:  Portable view of the chest was performed.    COMPARISON:  11/13/2024 and 05/31/2021.    FINDINGS:  There is extensive chronic parenchymal scarring of the bilateral mid and lower lungs which does not appear significantly changed over the interval.  No definite focal consolidation.  No significant pleural effusion.  Heart size is mildly enlarged.  Bony thorax intact.  Impression: Extensive chronic parenchymal scarring of the bilateral mid and lower lungs not significantly changed over the interval without focal consolidation.    Electronically signed by: Caleb Darling  Date:    11/15/2024  Time:    07:02    Airway:     Vent settings: Oxygen Concentration (%):  [] 32       IV access:        Peripheral IV - Single Lumen 11/15/24 0530 20 G 1 in Left;Posterior Hand (Active)            Peripheral IV - Single Lumen 11/15/24 0640 20 G 1 in Anterior;Distal;Right Forearm (Active)     Infusions: dobutamine and norepinephrine drip  Allergies:   Review of patient's allergies indicates:   Allergen Reactions    Fish containing products Anaphylaxis    Shellfish containing products Anaphylaxis    Lisinopril     Sulfamethoxazole-trimethoprim Hives      NPO:  No    Anticoagulation:   Anticoagulants       None

## 2024-11-15 NOTE — ASSESSMENT & PLAN NOTE
Last SCr 1.08 now with Cr 2.04 a/w decreased UOP in setting of decompensated heart failure. Etiology 2/2 pre-renal azotemia with component of vascular congestion.  - diuresis as per heart failure plan  - twice daily BMP for aggressive diuresis  - strict Is/Os and renally dosed Rx

## 2024-11-16 LAB
ALBUMIN SERPL BCP-MCNC: 3 G/DL (ref 3.5–5)
ALBUMIN/GLOB SERPL: 0.8 {RATIO}
ALP SERPL-CCNC: 113 U/L (ref 40–150)
ALT SERPL W P-5'-P-CCNC: 24 U/L (ref 0–55)
ANION GAP SERPL CALCULATED.3IONS-SCNC: 14 MMOL/L (ref 7–16)
ANION GAP SERPL CALCULATED.3IONS-SCNC: 18 MMOL/L (ref 7–16)
AST SERPL W P-5'-P-CCNC: 42 U/L (ref 5–34)
BASOPHILS # BLD AUTO: 0.01 K/UL (ref 0–0.2)
BASOPHILS NFR BLD AUTO: 0.1 % (ref 0–1)
BILIRUB SERPL-MCNC: 0.9 MG/DL
BUN SERPL-MCNC: 40 MG/DL (ref 9–21)
BUN SERPL-MCNC: 42 MG/DL (ref 9–21)
BUN/CREAT SERPL: 28 (ref 6–20)
BUN/CREAT SERPL: 30 (ref 6–20)
CALCIUM SERPL-MCNC: 9.3 MG/DL (ref 8.4–10.2)
CALCIUM SERPL-MCNC: 9.6 MG/DL (ref 8.4–10.2)
CHLORIDE SERPL-SCNC: 95 MMOL/L (ref 98–107)
CHLORIDE SERPL-SCNC: 97 MMOL/L (ref 98–107)
CO2 SERPL-SCNC: 23 MMOL/L (ref 22–29)
CO2 SERPL-SCNC: 29 MMOL/L (ref 22–29)
CREAT SERPL-MCNC: 1.34 MG/DL (ref 0.72–1.25)
CREAT SERPL-MCNC: 1.5 MG/DL (ref 0.72–1.25)
DIFFERENTIAL METHOD BLD: ABNORMAL
EGFR (NO RACE VARIABLE) (RUSH/TITUS): 61 ML/MIN/1.73M2
EGFR (NO RACE VARIABLE) (RUSH/TITUS): 70 ML/MIN/1.73M2
EOSINOPHIL # BLD AUTO: 0.01 K/UL (ref 0–0.5)
EOSINOPHIL NFR BLD AUTO: 0.1 % (ref 1–4)
ERYTHROCYTE [DISTWIDTH] IN BLOOD BY AUTOMATED COUNT: 13.6 % (ref 11.5–14.5)
GLOBULIN SER-MCNC: 3.7 G/DL (ref 2–4)
GLUCOSE SERPL-MCNC: 116 MG/DL (ref 74–100)
GLUCOSE SERPL-MCNC: 126 MG/DL (ref 74–100)
HCT VFR BLD AUTO: 30.3 % (ref 40–54)
HGB BLD-MCNC: 10.6 G/DL (ref 13.5–18)
IMM GRANULOCYTES # BLD AUTO: 0.09 K/UL (ref 0–0.04)
IMM GRANULOCYTES NFR BLD: 0.6 % (ref 0–0.4)
LACTATE SERPL-SCNC: 1.5 MMOL/L (ref 0.5–2.2)
LACTATE SERPL-SCNC: 2.6 MMOL/L (ref 0.5–2.2)
LACTATE SERPL-SCNC: 3.1 MMOL/L (ref 0.5–2.2)
LYMPHOCYTES # BLD AUTO: 0.34 K/UL (ref 1–4.8)
LYMPHOCYTES NFR BLD AUTO: 2.4 % (ref 27–41)
MAGNESIUM SERPL-MCNC: 2.2 MG/DL (ref 1.6–2.6)
MCH RBC QN AUTO: 29 PG (ref 27–31)
MCHC RBC AUTO-ENTMCNC: 35 G/DL (ref 32–36)
MCV RBC AUTO: 83 FL (ref 80–96)
MONOCYTES # BLD AUTO: 0.94 K/UL (ref 0–0.8)
MONOCYTES NFR BLD AUTO: 6.7 % (ref 2–6)
MPC BLD CALC-MCNC: 10.7 FL (ref 9.4–12.4)
NEUTROPHILS # BLD AUTO: 12.74 K/UL (ref 1.8–7.7)
NEUTROPHILS NFR BLD AUTO: 90.1 % (ref 53–65)
NRBC # BLD AUTO: 0 X10E3/UL
NRBC, AUTO (.00): 0 %
OHS QRS DURATION: 92 MS
OHS QTC CALCULATION: 512 MS
PLATELET # BLD AUTO: 209 K/UL (ref 150–400)
POTASSIUM SERPL-SCNC: 3.4 MMOL/L (ref 3.5–5.1)
POTASSIUM SERPL-SCNC: 4.1 MMOL/L (ref 3.5–5.1)
PROT SERPL-MCNC: 6.7 G/DL (ref 6.4–8.3)
RBC # BLD AUTO: 3.65 M/UL (ref 4.6–6.2)
SODIUM SERPL-SCNC: 134 MMOL/L (ref 136–145)
SODIUM SERPL-SCNC: 135 MMOL/L (ref 136–145)
TROPONIN I SERPL HS-MCNC: 649.4 NG/L
WBC # BLD AUTO: 14.13 K/UL (ref 4.5–11)

## 2024-11-16 PROCEDURE — 27000221 HC OXYGEN, UP TO 24 HOURS

## 2024-11-16 PROCEDURE — 99291 CRITICAL CARE FIRST HOUR: CPT | Mod: ,,, | Performed by: STUDENT IN AN ORGANIZED HEALTH CARE EDUCATION/TRAINING PROGRAM

## 2024-11-16 PROCEDURE — 83605 ASSAY OF LACTIC ACID: CPT | Performed by: STUDENT IN AN ORGANIZED HEALTH CARE EDUCATION/TRAINING PROGRAM

## 2024-11-16 PROCEDURE — 99900035 HC TECH TIME PER 15 MIN (STAT)

## 2024-11-16 PROCEDURE — 20000000 HC ICU ROOM

## 2024-11-16 PROCEDURE — 84484 ASSAY OF TROPONIN QUANT: CPT | Performed by: STUDENT IN AN ORGANIZED HEALTH CARE EDUCATION/TRAINING PROGRAM

## 2024-11-16 PROCEDURE — 94640 AIRWAY INHALATION TREATMENT: CPT

## 2024-11-16 PROCEDURE — 25000242 PHARM REV CODE 250 ALT 637 W/ HCPCS: Performed by: STUDENT IN AN ORGANIZED HEALTH CARE EDUCATION/TRAINING PROGRAM

## 2024-11-16 PROCEDURE — 25000003 PHARM REV CODE 250: Performed by: STUDENT IN AN ORGANIZED HEALTH CARE EDUCATION/TRAINING PROGRAM

## 2024-11-16 PROCEDURE — 80053 COMPREHEN METABOLIC PANEL: CPT | Performed by: STUDENT IN AN ORGANIZED HEALTH CARE EDUCATION/TRAINING PROGRAM

## 2024-11-16 PROCEDURE — 83735 ASSAY OF MAGNESIUM: CPT | Performed by: STUDENT IN AN ORGANIZED HEALTH CARE EDUCATION/TRAINING PROGRAM

## 2024-11-16 PROCEDURE — 85025 COMPLETE CBC W/AUTO DIFF WBC: CPT | Performed by: STUDENT IN AN ORGANIZED HEALTH CARE EDUCATION/TRAINING PROGRAM

## 2024-11-16 PROCEDURE — 36415 COLL VENOUS BLD VENIPUNCTURE: CPT | Performed by: STUDENT IN AN ORGANIZED HEALTH CARE EDUCATION/TRAINING PROGRAM

## 2024-11-16 PROCEDURE — 63600175 PHARM REV CODE 636 W HCPCS: Performed by: STUDENT IN AN ORGANIZED HEALTH CARE EDUCATION/TRAINING PROGRAM

## 2024-11-16 PROCEDURE — 25000003 PHARM REV CODE 250: Performed by: EMERGENCY MEDICINE

## 2024-11-16 PROCEDURE — 94761 N-INVAS EAR/PLS OXIMETRY MLT: CPT

## 2024-11-16 RX ORDER — POTASSIUM CHLORIDE 20 MEQ/1
40 TABLET, EXTENDED RELEASE ORAL EVERY 4 HOURS
Status: COMPLETED | OUTPATIENT
Start: 2024-11-16 | End: 2024-11-16

## 2024-11-16 RX ADMIN — POTASSIUM CHLORIDE 40 MEQ: 1500 TABLET, EXTENDED RELEASE ORAL at 03:11

## 2024-11-16 RX ADMIN — IPRATROPIUM BROMIDE AND ALBUTEROL SULFATE 3 ML: .5; 3 SOLUTION RESPIRATORY (INHALATION) at 12:11

## 2024-11-16 RX ADMIN — IPRATROPIUM BROMIDE AND ALBUTEROL SULFATE 3 ML: .5; 3 SOLUTION RESPIRATORY (INHALATION) at 07:11

## 2024-11-16 RX ADMIN — FUROSEMIDE 80 MG: 10 INJECTION, SOLUTION INTRAMUSCULAR; INTRAVENOUS at 08:11

## 2024-11-16 RX ADMIN — GUAIFENESIN, DEXTROMETHORPHAN HBR 1 TABLET: 600; 30 TABLET ORAL at 09:11

## 2024-11-16 RX ADMIN — ONDANSETRON 4 MG: 2 INJECTION INTRAMUSCULAR; INTRAVENOUS at 06:11

## 2024-11-16 RX ADMIN — METHYLPREDNISOLONE SODIUM SUCCINATE 40 MG: 40 INJECTION, POWDER, FOR SOLUTION INTRAMUSCULAR; INTRAVENOUS at 08:11

## 2024-11-16 RX ADMIN — MILRINONE LACTATE IN DEXTROSE 0.25 MCG/KG/MIN: 200 INJECTION, SOLUTION INTRAVENOUS at 08:11

## 2024-11-16 RX ADMIN — POTASSIUM CHLORIDE 40 MEQ: 1500 TABLET, EXTENDED RELEASE ORAL at 06:11

## 2024-11-16 RX ADMIN — FUROSEMIDE 80 MG: 10 INJECTION, SOLUTION INTRAMUSCULAR; INTRAVENOUS at 09:11

## 2024-11-16 RX ADMIN — DAPSONE 100 MG: 25 TABLET ORAL at 08:11

## 2024-11-16 RX ADMIN — MUPIROCIN: 20 OINTMENT TOPICAL at 09:11

## 2024-11-16 RX ADMIN — HEPARIN SODIUM 5000 UNITS: 5000 INJECTION, SOLUTION INTRAVENOUS; SUBCUTANEOUS at 05:11

## 2024-11-16 RX ADMIN — MUPIROCIN: 20 OINTMENT TOPICAL at 08:11

## 2024-11-16 RX ADMIN — HEPARIN SODIUM 5000 UNITS: 5000 INJECTION, SOLUTION INTRAVENOUS; SUBCUTANEOUS at 03:11

## 2024-11-16 RX ADMIN — POTASSIUM CHLORIDE 40 MEQ: 1500 TABLET, EXTENDED RELEASE ORAL at 09:11

## 2024-11-16 RX ADMIN — HEPARIN SODIUM 5000 UNITS: 5000 INJECTION, SOLUTION INTRAVENOUS; SUBCUTANEOUS at 09:11

## 2024-11-16 RX ADMIN — POTASSIUM CHLORIDE 40 MEQ: 1500 TABLET, EXTENDED RELEASE ORAL at 11:11

## 2024-11-16 RX ADMIN — PANTOPRAZOLE SODIUM 40 MG: 40 TABLET, DELAYED RELEASE ORAL at 08:11

## 2024-11-16 RX ADMIN — IPRATROPIUM BROMIDE AND ALBUTEROL SULFATE 3 ML: .5; 3 SOLUTION RESPIRATORY (INHALATION) at 06:11

## 2024-11-16 RX ADMIN — HYDROCODONE BITARTRATE AND ACETAMINOPHEN 1 TABLET: 5; 325 TABLET ORAL at 06:11

## 2024-11-16 RX ADMIN — BUDESONIDE 0.5 MG: 0.5 INHALANT RESPIRATORY (INHALATION) at 07:11

## 2024-11-16 NOTE — ASSESSMENT & PLAN NOTE
[facility] in this patient with pre-capillary pulmonary hypertension that has progressed in the past 4 years (Normal JONNY 2019) presents in acute decompensated RV failure with evidence of cardiogenic shock on presentation; Tn elevation, LA and elevated central pressures. Acute cor pulmonale with fluid overload.  - rhythm: stop nifedipine and BB outpatient Rx indefinitely; no arrhythmia on presentation  - inotropy: cont dobutamine 5 mcg/kg/min and milrinone 0.25 mcg/kg/min   -- will follow up on NTproBNP, plan to wean milrinone vs dobutamine  - diuresis: cont bolus dosing Lasix 80 mg IV BID, goal net -2L   -- maintain IV diuresis for now, de-escalate to PO following RHC  - MAP: goal MAP >60, Levophed available for hypotension  - plan for RHC this admission for progression in PH; goal diuresis towards euvolemia  - case discussed with provider at [facility] and [facility]; no ICU beds available at this time at [facility]  - low suspicion of sepsis at this time; will stop vancomycin and zosyn and monitor fever curve; remains afebrile  Will wean off Milrinone today      TTE on admission:  LVEF 60%, severe RV enlargement, TAPSE 1.12, normal LA size, severely dilated RA, moderate-to-severe TR, PASP 74

## 2024-11-16 NOTE — ASSESSMENT & PLAN NOTE
Severe obstructive lung disease 2/2 sarcoidosis.   - manage with scheduled Pulmicort Q12H and scheduled DuoNebs

## 2024-11-16 NOTE — PROGRESS NOTES
[facility]  Critical Care Medicine  Progress Note    Patient Name: Bo Ku  MRN: [id number]  Admission Date: 11/15/2024  Hospital Length of Stay: 1 days  Code Status: Full Code  Attending Provider: Beverley Muir MD  Primary Care Provider: Julia Alba FNP   Principal Problem: Acute on chronic right heart failure    Subjective:     HPI:  33 yo M with cavitary pulmonary sarcoidosis (Dx 2019) c/b severe obstructive lung disease (FEV1 1.15, 11/2024) on chronic immunosuppression (MTX and prednisone), chronic respiratory failure with hypoxia and pre-capillary pulmonary hypertension (RHC 03/2023 mPAP 34, PCWP 12, PVR 5.56, CO/CI 3.96/2.05, Td) who presented to ED with complaints of shortness of breath.    This is his 2nd presentation to the emergency department; previously assessed 11/16 for shortness of breath with CT-PE that was negative.  On presentation today, he was hypothermic with temperature 94°, heart rate 79, BP 96/71, SpO2 chas 91%. Labs notable for POC lactate 8.2, VBG 7.3/38, Na 133, serum SCr 2.04 (last 1.08 11/13), AST 54, NT pro BNP highest to date 20/7 1287, troponin 763.3 (6.0 11/13). During his ED course, he was trialed on BPAP which he was uncomfortable and transitioned to high-flow for hypoxia.  For management and concerns of sepsis, he received LR x2 L, magnesium sulfate, Solu-Medrol 125 mg IV and was initiated on vancomycin and Zosyn.    At time of my assessment, patient reports having some mild chest discomfort and improved shortness of breath.  He can not lay recumbent though due to recurrence of his dyspnea.  POCUS evaluation with evidence of acute RV failure with admission thereafter to ICU for planned diuresis with inotropic support.  [facility] transfer request was placed at time of admission due to his comorbidities and potential transplant candidacy. Discussion with [facility] team and patient was previously assessed in Sarcoidosis clinic and at the time, no evaluation for  transplant.     Hospital/ICU Course:  11/15: successful diuresis with IV lasix, added on milrinone, LA peak 4.5    Interval History/Significant Events: feels improved this am, improved appetite, improved shortness of breath, good diuresis overnight, no arrhythmia, improving SCr, LA cleared, no pressor requirement overnight    Review of Systems  Objective:     Vital Signs (Most Recent):  Temp: 97.9 °F (36.6 °C) (11/16/24 0728)  Pulse: 99 (11/16/24 1005)  Resp: (!) 21 (11/16/24 1005)  BP: (!) 97/58 (11/16/24 1005)  SpO2: 96 % (11/16/24 1005) Vital Signs (24h Range):  Temp:  [97.6 °F (36.4 °C)-98.2 °F (36.8 °C)] 97.9 °F (36.6 °C)  Pulse:  [] 99  Resp:  [10-26] 21  SpO2:  [84 %-100 %] 96 %  BP: ()/(36-73) 97/58   Weight: 77.9 kg (171 lb 11.8 oz)  Body mass index is 22.66 kg/m².      Intake/Output Summary (Last 24 hours) at 11/16/2024 1020  Last data filed at 11/16/2024 1001  Gross per 24 hour   Intake 665.24 ml   Output 6015 ml   Net -5349.76 ml          Physical Exam  Constitutional:       Appearance: He is not toxic-appearing or diaphoretic.   HENT:      Head: Normocephalic and atraumatic.      Mouth/Throat:      Mouth: Mucous membranes are moist.   Eyes:      General: No scleral icterus.  Cardiovascular:      Rate and Rhythm: Normal rate and regular rhythm.      Heart sounds: No murmur heard.  Pulmonary:      Effort: Pulmonary effort is normal.      Breath sounds: Rales (bibasilry inspiratory) present. No rhonchi.   Abdominal:      Palpations: Abdomen is soft.      Tenderness: There is no abdominal tenderness. There is no guarding.   Musculoskeletal:         General: Normal range of motion.      Right lower leg: No edema.      Left lower leg: No edema.   Skin:     Capillary Refill: Capillary refill takes less than 2 seconds.      Coloration: Skin is not jaundiced.      Comments:     Neurological:      General: No focal deficit present.      Mental Status: He is alert and oriented to person, place, and  time.            Vents:  Oxygen Concentration (%): 34 (11/16/24 0701)  Lines/Drains/Airways       Peripheral Intravenous Line  Duration                  Peripheral IV - Single Lumen 11/15/24 0530 20 G 1 in Left;Posterior Hand 1 day         Peripheral IV - Single Lumen 11/15/24 0640 20 G 1 in Anterior;Distal;Right Forearm 1 day                  Significant Labs:    CBC/Anemia Profile:  Recent Labs   Lab 11/15/24  0523 11/15/24  0607 11/16/24  0711   WBC 14.54*  --  14.13*   HGB 11.4*  --  10.6*   HCT 33.9* 39 30.3*     --  209   MCV 86.5  --  83.0   RDW 14.0  --  13.6        Chemistries:  Recent Labs   Lab 11/15/24  0523 11/16/24  0711   * 135*   K 4.5 3.4*   CL 97* 95*   CO2 18* 29   BUN 36* 40*   CREATININE 2.04* 1.34*   CALCIUM 9.2 9.3   ALBUMIN 2.9* 3.0*   PROT 7.1 6.7   BILITOT 1.5 0.9   ALKPHOS 114 113   ALT 15 24   AST 54* 42*       All pertinent labs within the past 24 hours have been reviewed.    Significant Imaging: I have reviewed all pertinent imaging results/findings within the past 24 hours.    ABG  Recent Labs   Lab 11/15/24  0607   PH 7.30*   PO2 36   PCO2 38*   HCO3 18.7*     Assessment/Plan:     Pulmonary  Acute on chronic respiratory failure with hypoxia  Acute on chronic respiratory failure 2/2 volume overload.   - supplement for goal SpO2 >92%  - diuresis allowing for HFNC wean; transition to NC once on 30L flow    Sarcoidosis of lung with sarcoidosis of lymph nodes  CT Chest 2 days PTA with stable pulmonary disease. Will mange with IV steroids for decompensated RV failure with hypervolemia.   - cont methylprednisone 40 mg IV  - cont MTX Q7days  - cont dapsone ppx    Obstructive lung disease  Severe obstructive lung disease 2/2 sarcoidosis.   - manage with scheduled Pulmicort Q12H and scheduled DuoNebs    Cardiac/Vascular  * Acute on chronic right heart failure  [facility] in this patient with pre-capillary pulmonary hypertension that has progressed in the past 4 years (Normal JONNY 2019)  presents in acute decompensated RV failure with evidence of cardiogenic shock on presentation; Tn elevation, LA and elevated central pressures. Acute cor pulmonale with fluid overload.  - chronotropic: stop nifedipine and BB outpatient Rx indefinitely; no arrhythmia on presentation  - inotrope: cont dobutamine 5 mcg/kg/min and milrinone 0.25 mcg/kg/min  - diuresis: cont bolus dosing Lasix 80 mg IV BID, goal net -2L  - MAP: goal MAP >60, Levophed available for hypotension  - case discussed with provider at [facility]; no ICU beds available at this time at [facility]  - low suspicion of sepsis at this time; will stop vancomycin and zosyn and monitor fever curve; remains afebrile      TTE on admission:  LVEF 60%, severe RV enlargement, TAPSE 1.12, normal LA size, severely dilated RA, moderate-to-severe TR, PASP 74    Renal/  Acute renal failure  Last SCr 1.08 now with Cr 2.04 a/w decreased UOP in setting of decompensated heart failure. Etiology 2/2 pre-renal azotemia with component of vascular congestion.  - diuresis as per heart failure plan  - improving SCr with diuresis  - twice daily BMP for aggressive diuresis  - strict Is/Os and renally dosed Rx        Critical Care Medicine Daily Checklist:11/16/2024   F: Feeding Diet ordered.   A: Analgesia   N/A   S: Sedation CAM-ICU negative   N/A           T: Thromboprophylaxis Lower extremity SCD and Heparin SQ   H: HOB > 300 Yes.   U: Stress Ulcer Prophylaxis N/A   G: Glucose Control Within goal (upper limit 140-180 mg/dL).   S: SAT & SBT Coordinated?  N/A   B: Bowel Regimen No BM in last 24hrs.   I: Indwelling Catheter Reviewed Maintain: N/A  Remove: N/A   D: De-escalation of Antimicrobials N/A    Disposition ICU     Critical Care Time: 50 minutes  Critical secondary to Patient has a condition that poses threat to life and bodily function: Acute Renal Failure and Acute cor pulmonale      Critical care was time spent personally by me on the following activities:  development of treatment plan with patient or surrogate and bedside caregivers, discussions with consultants, evaluation of patient's response to treatment, examination of patient, ordering and performing treatments and interventions, ordering and review of laboratory studies, ordering and review of radiographic studies, pulse oximetry, re-evaluation of patient's condition. This critical care time did not overlap with that of any other provider or involve time for any procedures.     Beverley Muir MD  Critical Care Medicine  [facility]

## 2024-11-16 NOTE — ASSESSMENT & PLAN NOTE
[facility] in this patient with pre-capillary pulmonary hypertension that has progressed in the past 4 years (Normal JONNY 2019) presents in acute decompensated RV failure with evidence of cardiogenic shock on presentation; Tn elevation, LA and elevated central pressures. Acute cor pulmonale with fluid overload.  - chronotropic: stop nifedipine and BB outpatient Rx indefinitely; no arrhythmia on presentation  - inotrope: cont dobutamine 5 mcg/kg/min and milrinone 0.25 mcg/kg/min  - diuresis: cont bolus dosing Lasix 80 mg IV BID, goal net -2L  - MAP: goal MAP >60, Levophed available for hypotension  - case discussed with provider at [facility]; no ICU beds available at this time at [facility]  - low suspicion of sepsis at this time; will stop vancomycin and zosyn and monitor fever curve; remains afebrile

## 2024-11-16 NOTE — ASSESSMENT & PLAN NOTE
CT Chest 2 days PTA with stable pulmonary disease. Will mange with IV steroids for decompensated RV failure with hypervolemia.   - cont methylprednisone 40 mg IV  - cont MTX Q7days  - cont dapsone ppx

## 2024-11-16 NOTE — PLAN OF CARE
Problem: Breathing Pattern Ineffective  Goal: Effective Breathing Pattern  Outcome: Progressing     Problem: Gas Exchange Impaired  Goal: Optimal Gas Exchange  Outcome: Progressing     Problem: Airway Clearance Ineffective  Goal: Effective Airway Clearance  Outcome: Progressing

## 2024-11-16 NOTE — HOSPITAL COURSE
11/15: successful diuresis with IV lasix, added on milrinone, LA peak 4.5  11/19/2024 patient has ongoing decreased cardiac output increased left ventricular filling pressures requires inotrope in offloading  11/21/2024 patient has right heart failure which is continuing to resolve we have stopped his offloading agent will go to oral diuretics if he is doing well he could go home tomorrow follow up in clinic with

## 2024-11-16 NOTE — PLAN OF CARE
Problem: Adult Inpatient Plan of Care  Goal: Plan of Care Review  Outcome: Progressing  Goal: Patient-Specific Goal (Individualized)  Outcome: Progressing  Goal: Absence of Hospital-Acquired Illness or Injury  Outcome: Progressing  Goal: Optimal Comfort and Wellbeing  Outcome: Progressing  Goal: Readiness for Transition of Care  Outcome: Progressing     Problem: Acute Kidney Injury/Impairment  Goal: Fluid and Electrolyte Balance  Outcome: Progressing  Goal: Improved Oral Intake  Outcome: Progressing  Goal: Effective Renal Function  Outcome: Progressing     Problem: Breathing Pattern Ineffective  Goal: Effective Breathing Pattern  Outcome: Progressing     Problem: Gas Exchange Impaired  Goal: Optimal Gas Exchange  Outcome: Progressing     Problem: Airway Clearance Ineffective  Goal: Effective Airway Clearance  Outcome: Progressing

## 2024-11-16 NOTE — SUBJECTIVE & OBJECTIVE
Interval History/Significant Events: feels improved this am, improved appetite, improved shortness of breath, good diuresis overnight, no arrhythmia, improving SCr, LA cleared, no pressor requirement overnight    Review of Systems  Objective:     Vital Signs (Most Recent):  Temp: 97.9 °F (36.6 °C) (11/16/24 0728)  Pulse: 99 (11/16/24 1005)  Resp: (!) 21 (11/16/24 1005)  BP: (!) 97/58 (11/16/24 1005)  SpO2: 96 % (11/16/24 1005) Vital Signs (24h Range):  Temp:  [97.6 °F (36.4 °C)-98.2 °F (36.8 °C)] 97.9 °F (36.6 °C)  Pulse:  [] 99  Resp:  [10-26] 21  SpO2:  [84 %-100 %] 96 %  BP: ()/(36-73) 97/58   Weight: 77.9 kg (171 lb 11.8 oz)  Body mass index is 22.66 kg/m².      Intake/Output Summary (Last 24 hours) at 11/16/2024 1020  Last data filed at 11/16/2024 1001  Gross per 24 hour   Intake 665.24 ml   Output 6015 ml   Net -5349.76 ml          Physical Exam  Constitutional:       Appearance: He is not toxic-appearing or diaphoretic.   HENT:      Head: Normocephalic and atraumatic.      Mouth/Throat:      Mouth: Mucous membranes are moist.   Eyes:      General: No scleral icterus.  Cardiovascular:      Rate and Rhythm: Normal rate and regular rhythm.      Heart sounds: No murmur heard.  Pulmonary:      Effort: Pulmonary effort is normal.      Breath sounds: Rales (bibasilry inspiratory) present. No rhonchi.   Abdominal:      Palpations: Abdomen is soft.      Tenderness: There is no abdominal tenderness. There is no guarding.   Musculoskeletal:         General: Normal range of motion.      Right lower leg: No edema.      Left lower leg: No edema.   Skin:     Capillary Refill: Capillary refill takes less than 2 seconds.      Coloration: Skin is not jaundiced.      Comments:     Neurological:      General: No focal deficit present.      Mental Status: He is alert and oriented to person, place, and time.            Vents:  Oxygen Concentration (%): 34 (11/16/24 0701)  Lines/Drains/Airways       Peripheral  Intravenous Line  Duration                  Peripheral IV - Single Lumen 11/15/24 0530 20 G 1 in Left;Posterior Hand 1 day         Peripheral IV - Single Lumen 11/15/24 0640 20 G 1 in Anterior;Distal;Right Forearm 1 day                  Significant Labs:    CBC/Anemia Profile:  Recent Labs   Lab 11/15/24  0523 11/15/24  0607 11/16/24  0711   WBC 14.54*  --  14.13*   HGB 11.4*  --  10.6*   HCT 33.9* 39 30.3*     --  209   MCV 86.5  --  83.0   RDW 14.0  --  13.6        Chemistries:  Recent Labs   Lab 11/15/24  0523 11/16/24  0711   * 135*   K 4.5 3.4*   CL 97* 95*   CO2 18* 29   BUN 36* 40*   CREATININE 2.04* 1.34*   CALCIUM 9.2 9.3   ALBUMIN 2.9* 3.0*   PROT 7.1 6.7   BILITOT 1.5 0.9   ALKPHOS 114 113   ALT 15 24   AST 54* 42*       All pertinent labs within the past 24 hours have been reviewed.    Significant Imaging: I have reviewed all pertinent imaging results/findings within the past 24 hours.

## 2024-11-17 PROBLEM — E87.5 HYPERKALEMIA: Status: ACTIVE | Noted: 2024-11-17

## 2024-11-17 LAB
ALBUMIN SERPL BCP-MCNC: 3.2 G/DL (ref 3.5–5)
ALBUMIN/GLOB SERPL: 0.9 {RATIO}
ALP SERPL-CCNC: 107 U/L (ref 40–150)
ALT SERPL W P-5'-P-CCNC: 29 U/L (ref 0–55)
ANION GAP SERPL CALCULATED.3IONS-SCNC: 14 MMOL/L (ref 7–16)
AST SERPL W P-5'-P-CCNC: 39 U/L (ref 5–34)
BASOPHILS # BLD AUTO: 0.02 K/UL (ref 0–0.2)
BASOPHILS NFR BLD AUTO: 0.1 % (ref 0–1)
BILIRUB SERPL-MCNC: 0.6 MG/DL
BUN SERPL-MCNC: 41 MG/DL (ref 9–21)
BUN/CREAT SERPL: 30 (ref 6–20)
CALCIUM SERPL-MCNC: 9.5 MG/DL (ref 8.4–10.2)
CHLORIDE SERPL-SCNC: 99 MMOL/L (ref 98–107)
CO2 SERPL-SCNC: 26 MMOL/L (ref 22–29)
CREAT SERPL-MCNC: 1.38 MG/DL (ref 0.72–1.25)
DIFFERENTIAL METHOD BLD: ABNORMAL
EGFR (NO RACE VARIABLE) (RUSH/TITUS): 68 ML/MIN/1.73M2
EOSINOPHIL # BLD AUTO: 0.34 K/UL (ref 0–0.5)
EOSINOPHIL NFR BLD AUTO: 2.2 % (ref 1–4)
ERYTHROCYTE [DISTWIDTH] IN BLOOD BY AUTOMATED COUNT: 14.1 % (ref 11.5–14.5)
GLOBULIN SER-MCNC: 3.6 G/DL (ref 2–4)
GLUCOSE SERPL-MCNC: 105 MG/DL (ref 74–100)
HCT VFR BLD AUTO: 31.7 % (ref 40–54)
HGB BLD-MCNC: 10.9 G/DL (ref 13.5–18)
IMM GRANULOCYTES # BLD AUTO: 0.18 K/UL (ref 0–0.04)
IMM GRANULOCYTES NFR BLD: 1.2 % (ref 0–0.4)
LACTATE SERPL-SCNC: 1.3 MMOL/L (ref 0.5–2.2)
LACTATE SERPL-SCNC: 1.7 MMOL/L (ref 0.5–2.2)
LYMPHOCYTES # BLD AUTO: 0.48 K/UL (ref 1–4.8)
LYMPHOCYTES NFR BLD AUTO: 3.1 % (ref 27–41)
LYMPHOCYTES NFR BLD MANUAL: 2 % (ref 27–41)
MAGNESIUM SERPL-MCNC: 2 MG/DL (ref 1.6–2.6)
MCH RBC QN AUTO: 28.8 PG (ref 27–31)
MCHC RBC AUTO-ENTMCNC: 34.4 G/DL (ref 32–36)
MCV RBC AUTO: 83.6 FL (ref 80–96)
MONOCYTES # BLD AUTO: 1.14 K/UL (ref 0–0.8)
MONOCYTES NFR BLD AUTO: 7.3 % (ref 2–6)
MONOCYTES NFR BLD MANUAL: 2 % (ref 2–6)
MPC BLD CALC-MCNC: 10.8 FL (ref 9.4–12.4)
NEUTROPHILS # BLD AUTO: 13.37 K/UL (ref 1.8–7.7)
NEUTROPHILS NFR BLD AUTO: 86.1 % (ref 53–65)
NEUTS SEG NFR BLD MANUAL: 96 % (ref 50–62)
NRBC # BLD AUTO: 0 X10E3/UL
NRBC, AUTO (.00): 0 %
NT-PROBNP SERPL-MCNC: 9558 PG/ML (ref 1–125)
PLATELET # BLD AUTO: 227 K/UL (ref 150–400)
PLATELET MORPHOLOGY: NORMAL
POTASSIUM SERPL-SCNC: 5.2 MMOL/L (ref 3.5–5.1)
PROT SERPL-MCNC: 6.8 G/DL (ref 6.4–8.3)
RBC # BLD AUTO: 3.79 M/UL (ref 4.6–6.2)
RBC MORPH BLD: NORMAL
SODIUM SERPL-SCNC: 134 MMOL/L (ref 136–145)
WBC # BLD AUTO: 15.53 K/UL (ref 4.5–11)

## 2024-11-17 PROCEDURE — 36415 COLL VENOUS BLD VENIPUNCTURE: CPT | Performed by: STUDENT IN AN ORGANIZED HEALTH CARE EDUCATION/TRAINING PROGRAM

## 2024-11-17 PROCEDURE — 63600175 PHARM REV CODE 636 W HCPCS: Performed by: STUDENT IN AN ORGANIZED HEALTH CARE EDUCATION/TRAINING PROGRAM

## 2024-11-17 PROCEDURE — 99900035 HC TECH TIME PER 15 MIN (STAT)

## 2024-11-17 PROCEDURE — 94640 AIRWAY INHALATION TREATMENT: CPT

## 2024-11-17 PROCEDURE — 83735 ASSAY OF MAGNESIUM: CPT | Performed by: STUDENT IN AN ORGANIZED HEALTH CARE EDUCATION/TRAINING PROGRAM

## 2024-11-17 PROCEDURE — 20000000 HC ICU ROOM

## 2024-11-17 PROCEDURE — 85025 COMPLETE CBC W/AUTO DIFF WBC: CPT | Performed by: STUDENT IN AN ORGANIZED HEALTH CARE EDUCATION/TRAINING PROGRAM

## 2024-11-17 PROCEDURE — 94761 N-INVAS EAR/PLS OXIMETRY MLT: CPT

## 2024-11-17 PROCEDURE — 80053 COMPREHEN METABOLIC PANEL: CPT | Performed by: STUDENT IN AN ORGANIZED HEALTH CARE EDUCATION/TRAINING PROGRAM

## 2024-11-17 PROCEDURE — 27000221 HC OXYGEN, UP TO 24 HOURS

## 2024-11-17 PROCEDURE — 99291 CRITICAL CARE FIRST HOUR: CPT | Mod: ,,, | Performed by: STUDENT IN AN ORGANIZED HEALTH CARE EDUCATION/TRAINING PROGRAM

## 2024-11-17 PROCEDURE — 25000003 PHARM REV CODE 250: Performed by: EMERGENCY MEDICINE

## 2024-11-17 PROCEDURE — 25000242 PHARM REV CODE 250 ALT 637 W/ HCPCS: Performed by: STUDENT IN AN ORGANIZED HEALTH CARE EDUCATION/TRAINING PROGRAM

## 2024-11-17 PROCEDURE — 83880 ASSAY OF NATRIURETIC PEPTIDE: CPT | Performed by: STUDENT IN AN ORGANIZED HEALTH CARE EDUCATION/TRAINING PROGRAM

## 2024-11-17 PROCEDURE — 83605 ASSAY OF LACTIC ACID: CPT | Performed by: STUDENT IN AN ORGANIZED HEALTH CARE EDUCATION/TRAINING PROGRAM

## 2024-11-17 PROCEDURE — 25000003 PHARM REV CODE 250: Performed by: STUDENT IN AN ORGANIZED HEALTH CARE EDUCATION/TRAINING PROGRAM

## 2024-11-17 RX ADMIN — HEPARIN SODIUM 5000 UNITS: 5000 INJECTION, SOLUTION INTRAVENOUS; SUBCUTANEOUS at 02:11

## 2024-11-17 RX ADMIN — BUDESONIDE 0.5 MG: 0.5 INHALANT RESPIRATORY (INHALATION) at 07:11

## 2024-11-17 RX ADMIN — MUPIROCIN: 20 OINTMENT TOPICAL at 09:11

## 2024-11-17 RX ADMIN — MILRINONE LACTATE IN DEXTROSE 0.25 MCG/KG/MIN: 200 INJECTION, SOLUTION INTRAVENOUS at 05:11

## 2024-11-17 RX ADMIN — FUROSEMIDE 80 MG: 10 INJECTION, SOLUTION INTRAMUSCULAR; INTRAVENOUS at 09:11

## 2024-11-17 RX ADMIN — IPRATROPIUM BROMIDE AND ALBUTEROL SULFATE 3 ML: .5; 3 SOLUTION RESPIRATORY (INHALATION) at 12:11

## 2024-11-17 RX ADMIN — HEPARIN SODIUM 5000 UNITS: 5000 INJECTION, SOLUTION INTRAVENOUS; SUBCUTANEOUS at 09:11

## 2024-11-17 RX ADMIN — DOBUTAMINE HYDROCHLORIDE 5 MCG/KG/MIN: 400 INJECTION INTRAVENOUS at 05:11

## 2024-11-17 RX ADMIN — HEPARIN SODIUM 5000 UNITS: 5000 INJECTION, SOLUTION INTRAVENOUS; SUBCUTANEOUS at 05:11

## 2024-11-17 RX ADMIN — DAPSONE 100 MG: 25 TABLET ORAL at 09:11

## 2024-11-17 RX ADMIN — METHYLPREDNISOLONE SODIUM SUCCINATE 40 MG: 40 INJECTION, POWDER, FOR SOLUTION INTRAMUSCULAR; INTRAVENOUS at 09:11

## 2024-11-17 RX ADMIN — MILRINONE LACTATE IN DEXTROSE 0.25 MCG/KG/MIN: 200 INJECTION, SOLUTION INTRAVENOUS at 12:11

## 2024-11-17 RX ADMIN — IPRATROPIUM BROMIDE AND ALBUTEROL SULFATE 3 ML: .5; 3 SOLUTION RESPIRATORY (INHALATION) at 07:11

## 2024-11-17 RX ADMIN — GUAIFENESIN, DEXTROMETHORPHAN HBR 1 TABLET: 600; 30 TABLET ORAL at 09:11

## 2024-11-17 RX ADMIN — PANTOPRAZOLE SODIUM 40 MG: 40 TABLET, DELAYED RELEASE ORAL at 09:11

## 2024-11-17 RX ADMIN — IPRATROPIUM BROMIDE AND ALBUTEROL SULFATE 3 ML: .5; 3 SOLUTION RESPIRATORY (INHALATION) at 06:11

## 2024-11-17 NOTE — PLAN OF CARE
Problem: Adult Inpatient Plan of Care  Goal: Plan of Care Review  Outcome: Progressing  Goal: Patient-Specific Goal (Individualized)  Outcome: Progressing  Goal: Absence of Hospital-Acquired Illness or Injury  Outcome: Progressing  Goal: Optimal Comfort and Wellbeing  Outcome: Progressing  Goal: Readiness for Transition of Care  Outcome: Progressing     Problem: Acute Kidney Injury/Impairment  Goal: Fluid and Electrolyte Balance  Outcome: Progressing  Intervention: Monitor and Manage Fluid and Electrolyte Balance  Flowsheets (Taken 11/17/2024 0742)  Fluid/Electrolyte Management: fluids provided  Goal: Improved Oral Intake  Outcome: Progressing  Goal: Effective Renal Function  Outcome: Progressing  Intervention: Monitor and Support Renal Function  Flowsheets (Taken 11/17/2024 0742)  Stabilization Measures: airway opened  Medication Review/Management:   medications reviewed   high-risk medications identified     Problem: Breathing Pattern Ineffective  Goal: Effective Breathing Pattern  Outcome: Progressing     Problem: Gas Exchange Impaired  Goal: Optimal Gas Exchange  Outcome: Progressing     Problem: Airway Clearance Ineffective  Goal: Effective Airway Clearance  Outcome: Progressing

## 2024-11-17 NOTE — PROGRESS NOTES
[facility] ICU  Critical Care Medicine  Progress Note    Patient Name: Bo Ku  MRN: [id number]  Admission Date: 11/15/2024  Hospital Length of Stay: 2 days  Code Status: Full Code  Attending Provider: Beverley Muir MD  Primary Care Provider: Julia Alba FNP   Principal Problem: Acute on chronic right heart failure    Subjective:     HPI:  35 yo M with cavitary pulmonary sarcoidosis (Dx 2019) c/b severe obstructive lung disease (FEV1 1.15, 11/2024) on chronic immunosuppression (MTX and prednisone), chronic respiratory failure with hypoxia and pre-capillary pulmonary hypertension (RHC 03/2023 mPAP 34, PCWP 12, PVR 5.56, CO/CI 3.96/2.05, Td) who presented to ED with complaints of shortness of breath.    This is his 2nd presentation to the emergency department; previously assessed 11/16 for shortness of breath with CT-PE that was negative.  On presentation today, he was hypothermic with temperature 94°, heart rate 79, BP 96/71, SpO2 chas 91%. Labs notable for POC lactate 8.2, VBG 7.3/38, Na 133, serum SCr 2.04 (last 1.08 11/13), AST 54, NT pro BNP highest to date 20/7 1287, troponin 763.3 (6.0 11/13). During his ED course, he was trialed on BPAP which he was uncomfortable and transitioned to high-flow for hypoxia.  For management and concerns of sepsis, he received LR x2 L, magnesium sulfate, Solu-Medrol 125 mg IV and was initiated on vancomycin and Zosyn.    At time of my assessment, patient reports having some mild chest discomfort and improved shortness of breath.  He can not lay recumbent though due to recurrence of his dyspnea.  POCUS evaluation with evidence of acute RV failure with admission thereafter to ICU for planned diuresis with inotropic support.  [facility] transfer request was placed at time of admission due to his comorbidities and potential transplant candidacy. Discussion with [facility] team and patient was previously assessed in Sarcoidosis clinic and at the time, no evaluation for  transplant.     Hospital/ICU Course:  11/15: successful diuresis with IV lasix, added on milrinone, LA peak 4.5    Interval History/Significant Events: no acute complaints, has a cough that is improved with breathing treatments and cough suppressant, continues to have improvement in his breathing, improved sleep, good UOP with diuretic efforts    Review of Systems  Objective:     Vital Signs (Most Recent):  Temp: 97.7 °F (36.5 °C) (11/17/24 0730)  Pulse: 82 (11/17/24 0930)  Resp: 20 (11/17/24 0930)  BP: 130/83 (11/17/24 0900)  SpO2: 96 % (11/17/24 0930) Vital Signs (24h Range):  Temp:  [97.7 °F (36.5 °C)-98.3 °F (36.8 °C)] 97.7 °F (36.5 °C)  Pulse:  [] 82  Resp:  [12-31] 20  SpO2:  [87 %-98 %] 96 %  BP: ()/(50-93) 130/83   Weight: 77.9 kg (171 lb 11.8 oz)  Body mass index is 22.66 kg/m².      Intake/Output Summary (Last 24 hours) at 11/17/2024 1006  Last data filed at 11/17/2024 0929  Gross per 24 hour   Intake 270.24 ml   Output 3075 ml   Net -2804.76 ml          Physical Exam  Constitutional:       Appearance: He is not toxic-appearing or diaphoretic.   HENT:      Head: Normocephalic and atraumatic.      Mouth/Throat:      Mouth: Mucous membranes are moist.   Eyes:      General: No scleral icterus.  Cardiovascular:      Rate and Rhythm: Normal rate and regular rhythm.      Heart sounds: No murmur heard.  Pulmonary:      Effort: Pulmonary effort is normal.      Breath sounds: Rales (bibasilry inspiratory) present. No rhonchi.   Abdominal:      Palpations: Abdomen is soft.      Tenderness: There is no abdominal tenderness. There is no guarding.   Musculoskeletal:         General: Normal range of motion.      Right lower leg: No edema.      Left lower leg: No edema.   Skin:     Capillary Refill: Capillary refill takes less than 2 seconds.      Coloration: Skin is not jaundiced.      Comments:     Neurological:      General: No focal deficit present.      Mental Status: He is alert and oriented to person,  place, and time.            Vents:  Oxygen Concentration (%): 40 (11/17/24 0700)  Lines/Drains/Airways       Peripheral Intravenous Line  Duration                  Peripheral IV - Single Lumen 11/15/24 0530 20 G 1 in Left;Posterior Hand 2 days         Peripheral IV - Single Lumen 11/15/24 0640 20 G 1 in Anterior;Distal;Right Forearm 2 days                  Significant Labs:    CBC/Anemia Profile:  Recent Labs   Lab 11/16/24  0711 11/17/24  0501   WBC 14.13* 15.53*   HGB 10.6* 10.9*   HCT 30.3* 31.7*    227   MCV 83.0 83.6   RDW 13.6 14.1        Chemistries:  Recent Labs   Lab 11/16/24  0711 11/16/24  1358 11/16/24  1825 11/17/24  0501   *  --  134* 134*   K 3.4*  --  4.1 5.2*   CL 95*  --  97* 99   CO2 29  --  23 26   BUN 40*  --  42* 41*   CREATININE 1.34*  --  1.50* 1.38*   CALCIUM 9.3  --  9.6 9.5   ALBUMIN 3.0*  --   --  3.2*   PROT 6.7  --   --  6.8   BILITOT 0.9  --   --  0.6   ALKPHOS 113  --   --  107   ALT 24  --   --  29   AST 42*  --   --  39*   MG  --  2.2  --  2.0       All pertinent labs within the past 24 hours have been reviewed.    Significant Imaging: I have reviewed all pertinent imaging results/findings within the past 24 hours.    ABG  Recent Labs   Lab 11/15/24  0607   PH 7.30*   PO2 36   PCO2 38*   HCO3 18.7*     Assessment/Plan:     Pulmonary  Acute on chronic respiratory failure with hypoxia  Acute on chronic respiratory failure 2/2 volume overload.   - supplement for goal SpO2 >92%  - diuresis allowing for HFNC wean; transition to NC once on 30L flow    Sarcoidosis of lung with sarcoidosis of lymph nodes  CT Chest 2 days PTA with stable pulmonary disease. Will mange with IV steroids for decompensated RV failure with hypervolemia.   - cont methylprednisone 40 mg IV  - cont MTX Q7days  - cont dapsone ppx    Obstructive lung disease  Severe obstructive lung disease 2/2 sarcoidosis.   - manage with scheduled Pulmicort Q12H and scheduled DuoNebs    Cardiac/Vascular  * Acute on  chronic right heart failure  SAPH in this patient with pre-capillary pulmonary hypertension that has progressed in the past 4 years (Normal JONNY 2019) presents in acute decompensated RV failure with evidence of cardiogenic shock on presentation; Tn elevation, LA and elevated central pressures. Acute cor pulmonale with fluid overload.  - rhythm: stop nifedipine and BB outpatient Rx indefinitely; no arrhythmia on presentation  - inotropy: cont dobutamine 5 mcg/kg/min and milrinone 0.25 mcg/kg/min   -- will follow up on NTproBNP, plan to wean milrinone vs dobutamine  - diuresis: cont bolus dosing Lasix 80 mg IV BID, goal net -2L   -- maintain IV diuresis for now, de-escalate to PO following RHC  - MAP: goal MAP >60, Levophed available for hypotension  - plan for RHC this admission for progression in PH; goal diuresis towards euvolemia  - case discussed with provider at [facility]; no ICU beds available at this time at [facility]  - low suspicion of sepsis at this time; will stop vancomycin and zosyn and monitor fever curve; remains afebrile      TTE on admission:  LVEF 60%, severe RV enlargement, TAPSE 1.12, normal LA size, severely dilated RA, moderate-to-severe TR, PASP 74    Renal/  Hyperkalemia  Iatrogenic with repletion efforts. Asymptomatic. Management with scheduled diuresis.     Acute renal failure  Last SCr 1.08 now with Cr 2.04 a/w decreased UOP in setting of decompensated heart failure. Etiology 2/2 pre-renal azotemia with component of vascular congestion.  - diuresis as per heart failure plan  - improving SCr with diuresis  - twice daily BMP for aggressive diuresis  - strict Is/Os and renally dosed Rx        Critical Care Medicine Daily Checklist:11/17/2024   F: Feeding Diet ordered.   A: Analgesia   N/A   S: Sedation CAM-ICU negative   N/A           T: Thromboprophylaxis Lower extremity SCD and Heparin SQ   H: HOB > 300 Yes.   U: Stress Ulcer Prophylaxis N/A   G: Glucose Control Within goal (upper  limit 140-180 mg/dL).   S: SAT & SBT Coordinated?  N/A   B: Bowel Regimen No BM in last 24hrs.   I: Indwelling Catheter Reviewed Maintain: N/A  Remove: N/A   D: De-escalation of Antimicrobials N/A    Disposition ICU     Critical Care Time: 45 minutes  Critical secondary to Patient has a condition that poses threat to life and bodily function: Acute Renal Failure and Acute cor pulmonale      Critical care was time spent personally by me on the following activities: development of treatment plan with patient or surrogate and bedside caregivers, discussions with consultants, evaluation of patient's response to treatment, examination of patient, ordering and performing treatments and interventions, ordering and review of laboratory studies, ordering and review of radiographic studies, pulse oximetry, re-evaluation of patient's condition. This critical care time did not overlap with that of any other provider or involve time for any procedures.     Beverley Muir MD  Critical Care Medicine  [facility] ICU

## 2024-11-17 NOTE — SUBJECTIVE & OBJECTIVE
Interval History/Significant Events: no acute complaints, has a cough that is improved with breathing treatments and cough suppressant, continues to have improvement in his breathing, improved sleep, good UOP with diuretic efforts    Review of Systems  Objective:     Vital Signs (Most Recent):  Temp: 97.7 °F (36.5 °C) (11/17/24 0730)  Pulse: 82 (11/17/24 0930)  Resp: 20 (11/17/24 0930)  BP: 130/83 (11/17/24 0900)  SpO2: 96 % (11/17/24 0930) Vital Signs (24h Range):  Temp:  [97.7 °F (36.5 °C)-98.3 °F (36.8 °C)] 97.7 °F (36.5 °C)  Pulse:  [] 82  Resp:  [12-31] 20  SpO2:  [87 %-98 %] 96 %  BP: ()/(50-93) 130/83   Weight: 77.9 kg (171 lb 11.8 oz)  Body mass index is 22.66 kg/m².      Intake/Output Summary (Last 24 hours) at 11/17/2024 1006  Last data filed at 11/17/2024 0929  Gross per 24 hour   Intake 270.24 ml   Output 3075 ml   Net -2804.76 ml          Physical Exam  Constitutional:       Appearance: He is not toxic-appearing or diaphoretic.   HENT:      Head: Normocephalic and atraumatic.      Mouth/Throat:      Mouth: Mucous membranes are moist.   Eyes:      General: No scleral icterus.  Cardiovascular:      Rate and Rhythm: Normal rate and regular rhythm.      Heart sounds: No murmur heard.  Pulmonary:      Effort: Pulmonary effort is normal.      Breath sounds: Rales (bibasilry inspiratory) present. No rhonchi.   Abdominal:      Palpations: Abdomen is soft.      Tenderness: There is no abdominal tenderness. There is no guarding.   Musculoskeletal:         General: Normal range of motion.      Right lower leg: No edema.      Left lower leg: No edema.   Skin:     Capillary Refill: Capillary refill takes less than 2 seconds.      Coloration: Skin is not jaundiced.      Comments:     Neurological:      General: No focal deficit present.      Mental Status: He is alert and oriented to person, place, and time.            Vents:  Oxygen Concentration (%): 40 (11/17/24 0700)  Lines/Drains/Airways        Peripheral Intravenous Line  Duration                  Peripheral IV - Single Lumen 11/15/24 0530 20 G 1 in Left;Posterior Hand 2 days         Peripheral IV - Single Lumen 11/15/24 0640 20 G 1 in Anterior;Distal;Right Forearm 2 days                  Significant Labs:    CBC/Anemia Profile:  Recent Labs   Lab 11/16/24  0711 11/17/24  0501   WBC 14.13* 15.53*   HGB 10.6* 10.9*   HCT 30.3* 31.7*    227   MCV 83.0 83.6   RDW 13.6 14.1        Chemistries:  Recent Labs   Lab 11/16/24  0711 11/16/24  1358 11/16/24  1825 11/17/24  0501   *  --  134* 134*   K 3.4*  --  4.1 5.2*   CL 95*  --  97* 99   CO2 29  --  23 26   BUN 40*  --  42* 41*   CREATININE 1.34*  --  1.50* 1.38*   CALCIUM 9.3  --  9.6 9.5   ALBUMIN 3.0*  --   --  3.2*   PROT 6.7  --   --  6.8   BILITOT 0.9  --   --  0.6   ALKPHOS 113  --   --  107   ALT 24  --   --  29   AST 42*  --   --  39*   MG  --  2.2  --  2.0       All pertinent labs within the past 24 hours have been reviewed.    Significant Imaging: I have reviewed all pertinent imaging results/findings within the past 24 hours.

## 2024-11-17 NOTE — PLAN OF CARE
Care plan ongoing       Problem: Adult Inpatient Plan of Care  Goal: Plan of Care Review  Outcome: Progressing  Goal: Patient-Specific Goal (Individualized)  Outcome: Progressing  Goal: Absence of Hospital-Acquired Illness or Injury  Outcome: Progressing  Goal: Optimal Comfort and Wellbeing  Outcome: Progressing  Goal: Readiness for Transition of Care  Outcome: Progressing     Problem: Acute Kidney Injury/Impairment  Goal: Fluid and Electrolyte Balance  Outcome: Progressing  Goal: Improved Oral Intake  Outcome: Progressing  Goal: Effective Renal Function  Outcome: Progressing     Problem: Breathing Pattern Ineffective  Goal: Effective Breathing Pattern  Outcome: Progressing     Problem: Gas Exchange Impaired  Goal: Optimal Gas Exchange  Outcome: Progressing     Problem: Airway Clearance Ineffective  Goal: Effective Airway Clearance  Outcome: Progressing

## 2024-11-18 LAB
ANION GAP SERPL CALCULATED.3IONS-SCNC: 14 MMOL/L (ref 7–16)
BASOPHILS # BLD AUTO: 0.03 K/UL (ref 0–0.2)
BASOPHILS NFR BLD AUTO: 0.2 % (ref 0–1)
BUN SERPL-MCNC: 47 MG/DL (ref 9–21)
BUN/CREAT SERPL: 37 (ref 6–20)
CALCIUM SERPL-MCNC: 10.3 MG/DL (ref 8.4–10.2)
CHLORIDE SERPL-SCNC: 94 MMOL/L (ref 98–107)
CO2 SERPL-SCNC: 30 MMOL/L (ref 22–29)
CREAT SERPL-MCNC: 1.28 MG/DL (ref 0.72–1.25)
DIFFERENTIAL METHOD BLD: ABNORMAL
EGFR (NO RACE VARIABLE) (RUSH/TITUS): 74 ML/MIN/1.73M2
EOSINOPHIL # BLD AUTO: 0 K/UL (ref 0–0.5)
EOSINOPHIL NFR BLD AUTO: 0 % (ref 1–4)
ERYTHROCYTE [DISTWIDTH] IN BLOOD BY AUTOMATED COUNT: 14.5 % (ref 11.5–14.5)
GLUCOSE SERPL-MCNC: 92 MG/DL (ref 74–100)
HCT VFR BLD AUTO: 35.9 % (ref 40–54)
HGB BLD-MCNC: 12.2 G/DL (ref 13.5–18)
IMM GRANULOCYTES # BLD AUTO: 0.22 K/UL (ref 0–0.04)
IMM GRANULOCYTES NFR BLD: 1.4 % (ref 0–0.4)
LACTATE SERPL-SCNC: 1.5 MMOL/L (ref 0.5–2.2)
LYMPHOCYTES # BLD AUTO: 0.67 K/UL (ref 1–4.8)
LYMPHOCYTES NFR BLD AUTO: 4.4 % (ref 27–41)
MAGNESIUM SERPL-MCNC: 2 MG/DL (ref 1.6–2.6)
MCH RBC QN AUTO: 29.2 PG (ref 27–31)
MCHC RBC AUTO-ENTMCNC: 34 G/DL (ref 32–36)
MCV RBC AUTO: 85.9 FL (ref 80–96)
MONOCYTES # BLD AUTO: 1.31 K/UL (ref 0–0.8)
MONOCYTES NFR BLD AUTO: 8.6 % (ref 2–6)
MPC BLD CALC-MCNC: 10.6 FL (ref 9.4–12.4)
NEUTROPHILS # BLD AUTO: 13.08 K/UL (ref 1.8–7.7)
NEUTROPHILS NFR BLD AUTO: 85.4 % (ref 53–65)
NRBC # BLD AUTO: 0 X10E3/UL
NRBC, AUTO (.00): 0 %
PLATELET # BLD AUTO: 260 K/UL (ref 150–400)
POTASSIUM SERPL-SCNC: 5.5 MMOL/L (ref 3.5–5.1)
RBC # BLD AUTO: 4.18 M/UL (ref 4.6–6.2)
SODIUM SERPL-SCNC: 132 MMOL/L (ref 136–145)
WBC # BLD AUTO: 15.31 K/UL (ref 4.5–11)

## 2024-11-18 PROCEDURE — C1894 INTRO/SHEATH, NON-LASER: HCPCS | Performed by: STUDENT IN AN ORGANIZED HEALTH CARE EDUCATION/TRAINING PROGRAM

## 2024-11-18 PROCEDURE — 4A023N6 MEASUREMENT OF CARDIAC SAMPLING AND PRESSURE, RIGHT HEART, PERCUTANEOUS APPROACH: ICD-10-PCS | Performed by: STUDENT IN AN ORGANIZED HEALTH CARE EDUCATION/TRAINING PROGRAM

## 2024-11-18 PROCEDURE — 27000221 HC OXYGEN, UP TO 24 HOURS

## 2024-11-18 PROCEDURE — 36415 COLL VENOUS BLD VENIPUNCTURE: CPT | Performed by: STUDENT IN AN ORGANIZED HEALTH CARE EDUCATION/TRAINING PROGRAM

## 2024-11-18 PROCEDURE — 99152 MOD SED SAME PHYS/QHP 5/>YRS: CPT | Mod: ,,, | Performed by: STUDENT IN AN ORGANIZED HEALTH CARE EDUCATION/TRAINING PROGRAM

## 2024-11-18 PROCEDURE — 83605 ASSAY OF LACTIC ACID: CPT | Performed by: STUDENT IN AN ORGANIZED HEALTH CARE EDUCATION/TRAINING PROGRAM

## 2024-11-18 PROCEDURE — 80048 BASIC METABOLIC PNL TOTAL CA: CPT | Performed by: STUDENT IN AN ORGANIZED HEALTH CARE EDUCATION/TRAINING PROGRAM

## 2024-11-18 PROCEDURE — 83735 ASSAY OF MAGNESIUM: CPT | Performed by: STUDENT IN AN ORGANIZED HEALTH CARE EDUCATION/TRAINING PROGRAM

## 2024-11-18 PROCEDURE — 63600175 PHARM REV CODE 636 W HCPCS: Performed by: STUDENT IN AN ORGANIZED HEALTH CARE EDUCATION/TRAINING PROGRAM

## 2024-11-18 PROCEDURE — 25000003 PHARM REV CODE 250: Performed by: STUDENT IN AN ORGANIZED HEALTH CARE EDUCATION/TRAINING PROGRAM

## 2024-11-18 PROCEDURE — 99900035 HC TECH TIME PER 15 MIN (STAT)

## 2024-11-18 PROCEDURE — 20000000 HC ICU ROOM

## 2024-11-18 PROCEDURE — 85025 COMPLETE CBC W/AUTO DIFF WBC: CPT | Performed by: STUDENT IN AN ORGANIZED HEALTH CARE EDUCATION/TRAINING PROGRAM

## 2024-11-18 PROCEDURE — 93451 RIGHT HEART CATH: CPT | Performed by: STUDENT IN AN ORGANIZED HEALTH CARE EDUCATION/TRAINING PROGRAM

## 2024-11-18 PROCEDURE — A4216 STERILE WATER/SALINE, 10 ML: HCPCS | Performed by: STUDENT IN AN ORGANIZED HEALTH CARE EDUCATION/TRAINING PROGRAM

## 2024-11-18 PROCEDURE — C1769 GUIDE WIRE: HCPCS | Performed by: STUDENT IN AN ORGANIZED HEALTH CARE EDUCATION/TRAINING PROGRAM

## 2024-11-18 PROCEDURE — 94761 N-INVAS EAR/PLS OXIMETRY MLT: CPT

## 2024-11-18 PROCEDURE — 99152 MOD SED SAME PHYS/QHP 5/>YRS: CPT | Performed by: STUDENT IN AN ORGANIZED HEALTH CARE EDUCATION/TRAINING PROGRAM

## 2024-11-18 PROCEDURE — 94640 AIRWAY INHALATION TREATMENT: CPT

## 2024-11-18 PROCEDURE — C1751 CATH, INF, PER/CENT/MIDLINE: HCPCS | Performed by: STUDENT IN AN ORGANIZED HEALTH CARE EDUCATION/TRAINING PROGRAM

## 2024-11-18 PROCEDURE — 25000242 PHARM REV CODE 250 ALT 637 W/ HCPCS: Performed by: STUDENT IN AN ORGANIZED HEALTH CARE EDUCATION/TRAINING PROGRAM

## 2024-11-18 PROCEDURE — 99233 SBSQ HOSP IP/OBS HIGH 50: CPT | Mod: ,,, | Performed by: INTERNAL MEDICINE

## 2024-11-18 PROCEDURE — 63600175 PHARM REV CODE 636 W HCPCS: Performed by: INTERNAL MEDICINE

## 2024-11-18 PROCEDURE — 25000003 PHARM REV CODE 250: Performed by: EMERGENCY MEDICINE

## 2024-11-18 PROCEDURE — 93451 RIGHT HEART CATH: CPT | Mod: 26,,, | Performed by: STUDENT IN AN ORGANIZED HEALTH CARE EDUCATION/TRAINING PROGRAM

## 2024-11-18 RX ORDER — LIDOCAINE HYDROCHLORIDE 10 MG/ML
INJECTION, SOLUTION INFILTRATION; PERINEURAL
Status: DISCONTINUED | OUTPATIENT
Start: 2024-11-18 | End: 2024-11-18 | Stop reason: HOSPADM

## 2024-11-18 RX ORDER — FUROSEMIDE 10 MG/ML
80 INJECTION INTRAMUSCULAR; INTRAVENOUS EVERY 8 HOURS
Status: DISCONTINUED | OUTPATIENT
Start: 2024-11-18 | End: 2024-11-21

## 2024-11-18 RX ORDER — SODIUM CHLORIDE 0.9 % (FLUSH) 0.9 %
SYRINGE (ML) INJECTION
Status: DISCONTINUED | OUTPATIENT
Start: 2024-11-18 | End: 2024-11-18 | Stop reason: HOSPADM

## 2024-11-18 RX ORDER — DOBUTAMINE HYDROCHLORIDE 400 MG/100ML
2.5 INJECTION INTRAVENOUS CONTINUOUS
Status: DISCONTINUED | OUTPATIENT
Start: 2024-11-18 | End: 2024-11-20

## 2024-11-18 RX ORDER — MIDAZOLAM HYDROCHLORIDE 1 MG/ML
INJECTION INTRAMUSCULAR; INTRAVENOUS
Status: DISCONTINUED | OUTPATIENT
Start: 2024-11-18 | End: 2024-11-18 | Stop reason: HOSPADM

## 2024-11-18 RX ADMIN — METHYLPREDNISOLONE SODIUM SUCCINATE 40 MG: 40 INJECTION, POWDER, FOR SOLUTION INTRAMUSCULAR; INTRAVENOUS at 08:11

## 2024-11-18 RX ADMIN — BUDESONIDE 0.5 MG: 0.5 INHALANT RESPIRATORY (INHALATION) at 07:11

## 2024-11-18 RX ADMIN — MUPIROCIN: 20 OINTMENT TOPICAL at 09:11

## 2024-11-18 RX ADMIN — FUROSEMIDE 80 MG: 10 INJECTION, SOLUTION INTRAMUSCULAR; INTRAVENOUS at 08:11

## 2024-11-18 RX ADMIN — IPRATROPIUM BROMIDE AND ALBUTEROL SULFATE 3 ML: .5; 3 SOLUTION RESPIRATORY (INHALATION) at 12:11

## 2024-11-18 RX ADMIN — DOBUTAMINE HYDROCHLORIDE 5 MCG/KG/MIN: 400 INJECTION INTRAVENOUS at 07:11

## 2024-11-18 RX ADMIN — IPRATROPIUM BROMIDE AND ALBUTEROL SULFATE 3 ML: .5; 3 SOLUTION RESPIRATORY (INHALATION) at 07:11

## 2024-11-18 RX ADMIN — HYDROCODONE BITARTRATE AND ACETAMINOPHEN 1 TABLET: 5; 325 TABLET ORAL at 09:11

## 2024-11-18 RX ADMIN — HEPARIN SODIUM 5000 UNITS: 5000 INJECTION, SOLUTION INTRAVENOUS; SUBCUTANEOUS at 05:11

## 2024-11-18 RX ADMIN — FUROSEMIDE 80 MG: 10 INJECTION, SOLUTION INTRAMUSCULAR; INTRAVENOUS at 09:11

## 2024-11-18 RX ADMIN — HEPARIN SODIUM 5000 UNITS: 5000 INJECTION, SOLUTION INTRAVENOUS; SUBCUTANEOUS at 09:11

## 2024-11-18 RX ADMIN — PANTOPRAZOLE SODIUM 40 MG: 40 TABLET, DELAYED RELEASE ORAL at 08:11

## 2024-11-18 RX ADMIN — MUPIROCIN: 20 OINTMENT TOPICAL at 08:11

## 2024-11-18 RX ADMIN — DAPSONE 100 MG: 25 TABLET ORAL at 09:11

## 2024-11-18 NOTE — PLAN OF CARE
[facility] ICU  Initial Discharge Assessment       Primary Care Provider: Julia Alba FNP    Admission Diagnosis: Shortness of breath [R06.02]  SOB (shortness of breath) [R06.02]  Hypothermia, initial encounter [T68.XXXA]  Acute on chronic respiratory failure with hypoxia [J96.21]  Sepsis, due to unspecified organism, unspecified whether acute organ dysfunction present [A41.9]    Admission Date: 11/15/2024  Expected Discharge Date:     Transition of Care Barriers: None    Payor: [employer] / Plan: [employer] [facility] [employer] [place] / Product Type: Commercial /     Extended Emergency Contact Information  Primary Emergency Contact: ElmiraLorean  [employer] Phone: [phone]  Relation: Mother  Secondary Emergency Contact: Dara Cosme  Home Phone: [phone]  Relation: Relative  Preferred language: English   needed? No    Discharge Plan A: Home  Discharge Plan B: Home      [facility] [address] [place] [address] - [address] [facility]  [address] [facility] [address]  Phone: [phone] Fax: [phone]      Initial Assessment (most recent)       Adult Discharge Assessment - 11/18/24 1200          Discharge Assessment    Assessment Type Discharge Planning Assessment     Confirmed/corrected address, phone number and insurance Yes     Confirmed Demographics Correct on Facesheet     Source of Information patient     Communicated FELIPE with patient/caregiver Yes     People in Home alone     Do you expect to return to your current living situation? Yes     Do you have help at home or someone to help you manage your care at home? No     Prior to hospitilization cognitive status: Unable to Assess     Current cognitive status: Alert/Oriented     Walking or Climbing Stairs Difficulty no     Home Layout Able to live on 1st floor     Equipment Currently Used at Home oxygen     Readmission within 30 days? No     Patient currently being followed by outpatient case management?  No     Do you currently have service(s) that help you manage your care at home? No     Do you take prescription medications? Yes     Do you have prescription coverage? Yes     Do you have any problems affording any of your prescribed medications? No     Is the patient taking medications as prescribed? yes     How do you get to doctors appointments? car, drives self;family or friend will provide     Are you on dialysis? No     Do you take coumadin? No     Discharge Plan A Home     Discharge Plan B Home     DME Needed Upon Discharge  none     Discharge Plan discussed with: Patient     Transition of Care Barriers None                   Spoke with pt in room. Pt lives home alone, plans to return at DC. Added mother as emergency contact. Reviewed chart, 0 dc needs noted. Will follow consults.

## 2024-11-18 NOTE — NURSING
1530 pt to cath lab via bed with cath lab Rns  1645 pt back from cath lab. Right IJ access. Site c/d/I. No bleeding or hematoma.

## 2024-11-18 NOTE — PLAN OF CARE
Problem: Adult Inpatient Plan of Care  Goal: Plan of Care Review  Outcome: Met  Goal: Patient-Specific Goal (Individualized)  Outcome: Met  Goal: Absence of Hospital-Acquired Illness or Injury  Outcome: Met  Goal: Optimal Comfort and Wellbeing  Outcome: Met  Goal: Readiness for Transition of Care  Outcome: Met     Problem: Acute Kidney Injury/Impairment  Goal: Fluid and Electrolyte Balance  Outcome: Met  Goal: Improved Oral Intake  Outcome: Met  Goal: Effective Renal Function  Outcome: Met     Problem: Breathing Pattern Ineffective  Goal: Effective Breathing Pattern  Outcome: Met     Problem: Gas Exchange Impaired  Goal: Optimal Gas Exchange  Outcome: Met     Problem: Airway Clearance Ineffective  Goal: Effective Airway Clearance  Outcome: Met

## 2024-11-18 NOTE — PROGRESS NOTES
[facility]  Critical Care Medicine  Progress Note    Patient Name: Bo Ku  MRN: [id number]  Admission Date: 11/15/2024  Hospital Length of Stay: 3 days  Code Status: Full Code  Attending Provider: Beverley Muir MD  Primary Care Provider: Julia Alba FNP   Principal Problem: Acute on chronic right heart failure    Subjective:     HPI:  33 yo M with cavitary pulmonary sarcoidosis (Dx 2019) c/b severe obstructive lung disease (FEV1 1.15, 11/2024) on chronic immunosuppression (MTX and prednisone), chronic respiratory failure with hypoxia and pre-capillary pulmonary hypertension (RHC 03/2023 mPAP 34, PCWP 12, PVR 5.56, CO/CI 3.96/2.05, Td) who presented to ED with complaints of shortness of breath.    This is his 2nd presentation to the emergency department; previously assessed 11/16 for shortness of breath with CT-PE that was negative.  On presentation today, he was hypothermic with temperature 94°, heart rate 79, BP 96/71, SpO2 chas 91%. Labs notable for POC lactate 8.2, VBG 7.3/38, Na 133, serum SCr 2.04 (last 1.08 11/13), AST 54, NT pro BNP highest to date 20/7 1287, troponin 763.3 (6.0 11/13). During his ED course, he was trialed on BPAP which he was uncomfortable and transitioned to high-flow for hypoxia.  For management and concerns of sepsis, he received LR x2 L, magnesium sulfate, Solu-Medrol 125 mg IV and was initiated on vancomycin and Zosyn.    At time of my assessment, patient reports having some mild chest discomfort and improved shortness of breath.  He can not lay recumbent though due to recurrence of his dyspnea.  POCUS evaluation with evidence of acute RV failure with admission thereafter to ICU for planned diuresis with inotropic support.  [facility] transfer request was placed at time of admission due to his comorbidities and potential transplant candidacy. Discussion with [facility] team and patient was previously assessed in Sarcoidosis clinic and at the time, no evaluation for  transplant.     Hospital/ICU Course:  11/15: successful diuresis with IV lasix, added on milrinone, LA peak 4.5    Interval History/Significant Events: no acute complaints, has a cough that is improved with breathing treatments and cough suppressant, continues to have improvement in his breathing, improved sleep, good UOP with diuretic efforts    Review of Systems  Objective:     Vital Signs (Most Recent):  Temp: 97.7 °F (36.5 °C) (11/17/24 0730)  Pulse: 82 (11/17/24 0930)  Resp: 20 (11/17/24 0930)  BP: 130/83 (11/17/24 0900)  SpO2: 96 % (11/17/24 0930) Vital Signs (24h Range):  Temp:  [97.7 °F (36.5 °C)-98.3 °F (36.8 °C)] 97.7 °F (36.5 °C)  Pulse:  [] 82  Resp:  [12-31] 20  SpO2:  [87 %-98 %] 96 %  BP: ()/(50-93) 130/83   Weight: 77.9 kg (171 lb 11.8 oz)  Body mass index is 22.66 kg/m².      Intake/Output Summary (Last 24 hours) at 11/17/2024 1006  Last data filed at 11/17/2024 0929  Gross per 24 hour   Intake 270.24 ml   Output 3075 ml   Net -2804.76 ml          Physical Exam  Constitutional:       Appearance: He is not toxic-appearing or diaphoretic.   HENT:      Head: Normocephalic and atraumatic.      Mouth/Throat:      Mouth: Mucous membranes are moist.   Eyes:      General: No scleral icterus.  Cardiovascular:      Rate and Rhythm: Normal rate and regular rhythm.      Heart sounds: No murmur heard.  Pulmonary:      Effort: Pulmonary effort is normal.      Breath sounds: Rales (bibasilry inspiratory) present. No rhonchi.   Abdominal:      Palpations: Abdomen is soft.      Tenderness: There is no abdominal tenderness. There is no guarding.   Musculoskeletal:         General: Normal range of motion.      Right lower leg: No edema.      Left lower leg: No edema.   Skin:     Capillary Refill: Capillary refill takes less than 2 seconds.      Coloration: Skin is not jaundiced.      Comments:     Neurological:      General: No focal deficit present.      Mental Status: He is alert and oriented to person,  place, and time.            Vents:  Oxygen Concentration (%): 40 (11/17/24 0700)  Lines/Drains/Airways       Peripheral Intravenous Line  Duration                  Peripheral IV - Single Lumen 11/15/24 0530 20 G 1 in Left;Posterior Hand 2 days         Peripheral IV - Single Lumen 11/15/24 0640 20 G 1 in Anterior;Distal;Right Forearm 2 days                  Significant Labs:    CBC/Anemia Profile:  Recent Labs   Lab 11/16/24  0711 11/17/24  0501   WBC 14.13* 15.53*   HGB 10.6* 10.9*   HCT 30.3* 31.7*    227   MCV 83.0 83.6   RDW 13.6 14.1        Chemistries:  Recent Labs   Lab 11/16/24  0711 11/16/24  1358 11/16/24  1825 11/17/24  0501   *  --  134* 134*   K 3.4*  --  4.1 5.2*   CL 95*  --  97* 99   CO2 29  --  23 26   BUN 40*  --  42* 41*   CREATININE 1.34*  --  1.50* 1.38*   CALCIUM 9.3  --  9.6 9.5   ALBUMIN 3.0*  --   --  3.2*   PROT 6.7  --   --  6.8   BILITOT 0.9  --   --  0.6   ALKPHOS 113  --   --  107   ALT 24  --   --  29   AST 42*  --   --  39*   MG  --  2.2  --  2.0       All pertinent labs within the past 24 hours have been reviewed.    Significant Imaging: I have reviewed all pertinent imaging results/findings within the past 24 hours.    ABG  Recent Labs   Lab 11/15/24  0607   PH 7.30*   PO2 36   PCO2 38*   HCO3 18.7*     Assessment/Plan:     Pulmonary  Acute on chronic respiratory failure with hypoxia  Acute on chronic respiratory failure 2/2 volume overload.   - supplement for goal SpO2 >92%  - diuresis allowing for HFNC wean; transition to NC once on 30L flow    Sarcoidosis of lung with sarcoidosis of lymph nodes  CT Chest 2 days PTA with stable pulmonary disease. Will mange with IV steroids for decompensated RV failure with hypervolemia.   - cont methylprednisone 40 mg IV  - cont MTX Q7days  - cont dapsone ppx    Obstructive lung disease  Severe obstructive lung disease 2/2 sarcoidosis.   - manage with scheduled Pulmicort Q12H and scheduled DuoNebs    Cardiac/Vascular  * Acute on  chronic right heart failure  S[facility] in this patient with pre-capillary pulmonary hypertension that has progressed in the past 4 years (Normal JONNY 2019) presents in acute decompensated RV failure with evidence of cardiogenic shock on presentation; Tn elevation, LA and elevated central pressures. Acute cor pulmonale with fluid overload.  - rhythm: stop nifedipine and BB outpatient Rx indefinitely; no arrhythmia on presentation  - inotropy: cont dobutamine 5 mcg/kg/min and milrinone 0.25 mcg/kg/min   -- will follow up on NTproBNP, plan to wean milrinone vs dobutamine  - diuresis: cont bolus dosing Lasix 80 mg IV BID, goal net -2L   -- maintain IV diuresis for now, de-escalate to PO following RHC  - MAP: goal MAP >60, Levophed available for hypotension  - plan for RHC this admission for progression in PH; goal diuresis towards euvolemia  - case discussed with provider at [facility]; no ICU beds available at this time at [facility]  - low suspicion of sepsis at this time; will stop vancomycin and zosyn and monitor fever curve; remains afebrile  Will wean off Milrinone today      TTE on admission:  LVEF 60%, severe RV enlargement, TAPSE 1.12, normal LA size, severely dilated RA, moderate-to-severe TR, PASP 74    Renal/  Hyperkalemia  Iatrogenic with repletion efforts. Asymptomatic. Management with scheduled diuresis.     Acute renal failure  Last SCr 1.08 now with Cr 2.04 a/w decreased UOP in setting of decompensated heart failure. Etiology 2/2 pre-renal azotemia with component of vascular congestion.  - diuresis as per heart failure plan  - improving SCr with diuresis  - twice daily BMP for aggressive diuresis  - strict Is/Os and renally dosed Rx               Arden Spear MD  Critical Care Medicine  [facility]

## 2024-11-19 LAB
ANION GAP SERPL CALCULATED.3IONS-SCNC: 17 MMOL/L (ref 7–16)
BASOPHILS # BLD AUTO: 0.06 K/UL (ref 0–0.2)
BASOPHILS NFR BLD AUTO: 0.7 % (ref 0–1)
BUN SERPL-MCNC: 49 MG/DL (ref 9–21)
BUN/CREAT SERPL: 34 (ref 6–20)
CALCIUM SERPL-MCNC: 9.3 MG/DL (ref 8.4–10.2)
CHLORIDE SERPL-SCNC: 93 MMOL/L (ref 98–107)
CO2 SERPL-SCNC: 28 MMOL/L (ref 22–29)
CREAT SERPL-MCNC: 1.44 MG/DL (ref 0.72–1.25)
DIFFERENTIAL METHOD BLD: ABNORMAL
EGFR (NO RACE VARIABLE) (RUSH/TITUS): 65 ML/MIN/1.73M2
EOSINOPHIL # BLD AUTO: 0.03 K/UL (ref 0–0.5)
EOSINOPHIL NFR BLD AUTO: 0.4 % (ref 1–4)
ERYTHROCYTE [DISTWIDTH] IN BLOOD BY AUTOMATED COUNT: 14.8 % (ref 11.5–14.5)
GLUCOSE SERPL-MCNC: 90 MG/DL (ref 74–100)
HCT VFR BLD AUTO: 40.1 % (ref 40–54)
HGB BLD-MCNC: 13.2 G/DL (ref 13.5–18)
IMM GRANULOCYTES # BLD AUTO: 0.46 K/UL (ref 0–0.04)
IMM GRANULOCYTES NFR BLD: 5.6 % (ref 0–0.4)
LACTATE SERPL-SCNC: 1.8 MMOL/L (ref 0.5–2.2)
LYMPHOCYTES # BLD AUTO: 0.88 K/UL (ref 1–4.8)
LYMPHOCYTES NFR BLD AUTO: 10.7 % (ref 27–41)
LYMPHOCYTES NFR BLD MANUAL: 10 % (ref 27–41)
MAGNESIUM SERPL-MCNC: 1.7 MG/DL (ref 1.6–2.6)
MCH RBC QN AUTO: 28.2 PG (ref 27–31)
MCHC RBC AUTO-ENTMCNC: 32.9 G/DL (ref 32–36)
MCV RBC AUTO: 85.7 FL (ref 80–96)
METAMYELOCYTES NFR BLD MANUAL: 2 %
MONOCYTES # BLD AUTO: 0.97 K/UL (ref 0–0.8)
MONOCYTES NFR BLD AUTO: 11.8 % (ref 2–6)
MONOCYTES NFR BLD MANUAL: 9 % (ref 2–6)
MPC BLD CALC-MCNC: 9.9 FL (ref 9.4–12.4)
NEUTROPHILS # BLD AUTO: 5.81 K/UL (ref 1.8–7.7)
NEUTROPHILS NFR BLD AUTO: 70.8 % (ref 53–65)
NEUTS BAND NFR BLD MANUAL: 1 % (ref 1–5)
NEUTS SEG NFR BLD MANUAL: 78 % (ref 50–62)
NRBC # BLD AUTO: 0 X10E3/UL
NRBC, AUTO (.00): 0 %
PLATELET # BLD AUTO: 230 K/UL (ref 150–400)
PLATELET MORPHOLOGY: NORMAL
POTASSIUM SERPL-SCNC: 5.5 MMOL/L (ref 3.5–5.1)
RBC # BLD AUTO: 4.68 M/UL (ref 4.6–6.2)
RBC MORPH BLD: NORMAL
SODIUM SERPL-SCNC: 132 MMOL/L (ref 136–145)
WBC # BLD AUTO: 8.21 K/UL (ref 4.5–11)

## 2024-11-19 PROCEDURE — 94640 AIRWAY INHALATION TREATMENT: CPT

## 2024-11-19 PROCEDURE — 27000221 HC OXYGEN, UP TO 24 HOURS

## 2024-11-19 PROCEDURE — 83605 ASSAY OF LACTIC ACID: CPT | Performed by: STUDENT IN AN ORGANIZED HEALTH CARE EDUCATION/TRAINING PROGRAM

## 2024-11-19 PROCEDURE — 80048 BASIC METABOLIC PNL TOTAL CA: CPT | Performed by: STUDENT IN AN ORGANIZED HEALTH CARE EDUCATION/TRAINING PROGRAM

## 2024-11-19 PROCEDURE — 25000003 PHARM REV CODE 250: Performed by: INTERNAL MEDICINE

## 2024-11-19 PROCEDURE — 20000000 HC ICU ROOM

## 2024-11-19 PROCEDURE — 99900035 HC TECH TIME PER 15 MIN (STAT)

## 2024-11-19 PROCEDURE — 63600175 PHARM REV CODE 636 W HCPCS

## 2024-11-19 PROCEDURE — 25000003 PHARM REV CODE 250: Performed by: STUDENT IN AN ORGANIZED HEALTH CARE EDUCATION/TRAINING PROGRAM

## 2024-11-19 PROCEDURE — 36415 COLL VENOUS BLD VENIPUNCTURE: CPT | Performed by: STUDENT IN AN ORGANIZED HEALTH CARE EDUCATION/TRAINING PROGRAM

## 2024-11-19 PROCEDURE — 25000242 PHARM REV CODE 250 ALT 637 W/ HCPCS: Performed by: STUDENT IN AN ORGANIZED HEALTH CARE EDUCATION/TRAINING PROGRAM

## 2024-11-19 PROCEDURE — 93005 ELECTROCARDIOGRAM TRACING: CPT

## 2024-11-19 PROCEDURE — 83735 ASSAY OF MAGNESIUM: CPT | Performed by: STUDENT IN AN ORGANIZED HEALTH CARE EDUCATION/TRAINING PROGRAM

## 2024-11-19 PROCEDURE — 25000003 PHARM REV CODE 250: Performed by: EMERGENCY MEDICINE

## 2024-11-19 PROCEDURE — 63600175 PHARM REV CODE 636 W HCPCS: Performed by: INTERNAL MEDICINE

## 2024-11-19 PROCEDURE — 63600175 PHARM REV CODE 636 W HCPCS: Performed by: STUDENT IN AN ORGANIZED HEALTH CARE EDUCATION/TRAINING PROGRAM

## 2024-11-19 PROCEDURE — 93010 ELECTROCARDIOGRAM REPORT: CPT | Mod: ,,, | Performed by: INTERNAL MEDICINE

## 2024-11-19 PROCEDURE — 99233 SBSQ HOSP IP/OBS HIGH 50: CPT | Mod: ,,, | Performed by: INTERNAL MEDICINE

## 2024-11-19 PROCEDURE — 85025 COMPLETE CBC W/AUTO DIFF WBC: CPT | Performed by: STUDENT IN AN ORGANIZED HEALTH CARE EDUCATION/TRAINING PROGRAM

## 2024-11-19 RX ORDER — CARVEDILOL 6.25 MG/1
6.25 TABLET ORAL 2 TIMES DAILY
Status: DISCONTINUED | OUTPATIENT
Start: 2024-11-19 | End: 2024-11-20

## 2024-11-19 RX ORDER — MAGNESIUM SULFATE 1 G/100ML
1 INJECTION INTRAVENOUS ONCE
Status: COMPLETED | OUTPATIENT
Start: 2024-11-19 | End: 2024-11-19

## 2024-11-19 RX ORDER — PREDNISONE 20 MG/1
20 TABLET ORAL DAILY
Status: DISCONTINUED | OUTPATIENT
Start: 2024-11-19 | End: 2024-11-24 | Stop reason: HOSPADM

## 2024-11-19 RX ADMIN — BUDESONIDE 0.5 MG: 0.5 INHALANT RESPIRATORY (INHALATION) at 07:11

## 2024-11-19 RX ADMIN — PANTOPRAZOLE SODIUM 40 MG: 40 TABLET, DELAYED RELEASE ORAL at 08:11

## 2024-11-19 RX ADMIN — MUPIROCIN: 20 OINTMENT TOPICAL at 09:11

## 2024-11-19 RX ADMIN — HEPARIN SODIUM 5000 UNITS: 5000 INJECTION, SOLUTION INTRAVENOUS; SUBCUTANEOUS at 09:11

## 2024-11-19 RX ADMIN — FUROSEMIDE 80 MG: 10 INJECTION, SOLUTION INTRAMUSCULAR; INTRAVENOUS at 02:11

## 2024-11-19 RX ADMIN — IPRATROPIUM BROMIDE AND ALBUTEROL SULFATE 3 ML: .5; 3 SOLUTION RESPIRATORY (INHALATION) at 07:11

## 2024-11-19 RX ADMIN — HEPARIN SODIUM 5000 UNITS: 5000 INJECTION, SOLUTION INTRAVENOUS; SUBCUTANEOUS at 02:11

## 2024-11-19 RX ADMIN — HYDROCODONE BITARTRATE AND ACETAMINOPHEN 1 TABLET: 5; 325 TABLET ORAL at 06:11

## 2024-11-19 RX ADMIN — CARVEDILOL 6.25 MG: 6.25 TABLET, FILM COATED ORAL at 09:11

## 2024-11-19 RX ADMIN — MAGNESIUM SULFATE IN DEXTROSE 1 G: 10 INJECTION, SOLUTION INTRAVENOUS at 02:11

## 2024-11-19 RX ADMIN — IPRATROPIUM BROMIDE AND ALBUTEROL SULFATE 3 ML: .5; 3 SOLUTION RESPIRATORY (INHALATION) at 12:11

## 2024-11-19 RX ADMIN — PREDNISONE 20 MG: 20 TABLET ORAL at 08:11

## 2024-11-19 RX ADMIN — HYDROCODONE BITARTRATE AND ACETAMINOPHEN 1 TABLET: 5; 325 TABLET ORAL at 02:11

## 2024-11-19 RX ADMIN — CARVEDILOL 6.25 MG: 6.25 TABLET, FILM COATED ORAL at 08:11

## 2024-11-19 RX ADMIN — DAPSONE 100 MG: 25 TABLET ORAL at 08:11

## 2024-11-19 RX ADMIN — FUROSEMIDE 80 MG: 10 INJECTION, SOLUTION INTRAMUSCULAR; INTRAVENOUS at 09:11

## 2024-11-19 RX ADMIN — HEPARIN SODIUM 5000 UNITS: 5000 INJECTION, SOLUTION INTRAVENOUS; SUBCUTANEOUS at 05:11

## 2024-11-19 RX ADMIN — ACETAMINOPHEN 650 MG: 325 TABLET ORAL at 09:11

## 2024-11-19 RX ADMIN — FUROSEMIDE 80 MG: 10 INJECTION, SOLUTION INTRAMUSCULAR; INTRAVENOUS at 05:11

## 2024-11-19 RX ADMIN — MUPIROCIN: 20 OINTMENT TOPICAL at 08:11

## 2024-11-19 NOTE — CARE UPDATE
Done by RT student Luis Daniel Mueller       11/19/24 4206   Patient Assessment/Suction   Level of Consciousness (AVPU) alert   Respiratory Effort Normal   Expansion/Accessory Muscles/Retractions no use of accessory muscles   All Lung Fields Breath Sounds Anterior:;clear   Cough Frequency infrequent   Cough Type nonproductive   PRE-TX-O2   Device (Oxygen Therapy) nasal cannula   Flow (L/min) (Oxygen Therapy) 2   Oxygen Concentration (%) 28   SpO2 97 %   Pulse Oximetry Type Continuous   Pulse 102   Resp 17   Aerosol Therapy   $ Aerosol Therapy Charges Aerosol Treatment   Daily Review of Necessity (SVN) completed   Respiratory Treatment Status (SVN) given   Treatment Route (SVN) mask   Patient Position HOB elevated   Post Treatment Assessment (SVN) breath sounds unchanged   Signs of Intolerance (SVN) none   Breath Sounds Post-Respiratory Treatment   Throughout All Fields Post-Treatment All Fields   Throughout All Fields Post-Treatment no change   Post-treatment Heart Rate (beats/min) 99   Post-treatment Resp Rate (breaths/min) 16   Ready to Wean/Extubation Screen   FIO2<=50 (chart decimal) 0.28

## 2024-11-19 NOTE — PLAN OF CARE
Care plan ongoing     Problem: Gas Exchange Impaired  Goal: Optimal Gas Exchange  Outcome: Progressing     Problem: Skin Injury Risk Increased  Goal: Skin Health and Integrity  Outcome: Progressing

## 2024-11-19 NOTE — CARE UPDATE
Done by RT student Luis Daniel Mueller       11/19/24 0739   Patient Assessment/Suction   Level of Consciousness (AVPU) alert   Respiratory Effort Normal   Expansion/Accessory Muscles/Retractions no use of accessory muscles   All Lung Fields Breath Sounds Anterior:;clear   Cough Frequency infrequent   Cough Type nonproductive   PRE-TX-O2   Device (Oxygen Therapy) nasal cannula   Flow (L/min) (Oxygen Therapy) 3.5   SpO2 98 %   Pulse Oximetry Type Continuous   Pulse (!) 113   Resp 15   Aerosol Therapy   $ Aerosol Therapy Charges Aerosol Treatment   Daily Review of Necessity (SVN) completed   Respiratory Treatment Status (SVN) given   Treatment Route (SVN) mask   Patient Position HOB elevated   Post Treatment Assessment (SVN) breath sounds unchanged   Signs of Intolerance (SVN) none   Breath Sounds Post-Respiratory Treatment   Throughout All Fields Post-Treatment All Fields   Throughout All Fields Post-Treatment no change   Post-treatment Heart Rate (beats/min) 111   Post-treatment Resp Rate (breaths/min) 12

## 2024-11-19 NOTE — SUBJECTIVE & OBJECTIVE
Interval History/Significant Events:  patient without complaints    Review of Systems  Objective:     Vital Signs (Most Recent):  Temp: 98.3 °F (36.8 °C) (11/19/24 0314)  Pulse: (!) 114 (11/19/24 0600)  Resp: 16 (11/19/24 0633)  BP: (!) 119/94 (11/19/24 0515)  SpO2: 96 % (11/19/24 0600) Vital Signs (24h Range):  Temp:  [97.9 °F (36.6 °C)-98.5 °F (36.9 °C)] 98.3 °F (36.8 °C)  Pulse:  [] 114  Resp:  [12-35] 16  SpO2:  [86 %-99 %] 96 %  BP: (118-145)/() 119/94   Weight: 79.6 kg (175 lb 7.8 oz)  Body mass index is 23.15 kg/m².      Intake/Output Summary (Last 24 hours) at 11/19/2024 0719  Last data filed at 11/19/2024 0453  Gross per 24 hour   Intake 833.2 ml   Output 2500 ml   Net -1666.8 ml          Physical Exam  Vitals reviewed.   Constitutional:       Appearance: Normal appearance.      Interventions: He is not intubated.  HENT:      Head: Normocephalic and atraumatic.      Nose: Nose normal.      Mouth/Throat:      Mouth: Mucous membranes are dry.      Pharynx: Oropharynx is clear.   Eyes:      Extraocular Movements: Extraocular movements intact.      Conjunctiva/sclera: Conjunctivae normal.      Pupils: Pupils are equal, round, and reactive to light.   Cardiovascular:      Rate and Rhythm: Normal rate.      Heart sounds: Normal heart sounds. No murmur heard.  Pulmonary:      Effort: Pulmonary effort is normal. He is not intubated.      Breath sounds: Normal breath sounds.   Abdominal:      General: Abdomen is flat. Bowel sounds are normal.      Palpations: Abdomen is soft.   Musculoskeletal:         General: Normal range of motion.      Cervical back: Normal range of motion and neck supple.      Right lower leg: No edema.      Left lower leg: No edema.   Skin:     General: Skin is warm and dry.      Capillary Refill: Capillary refill takes less than 2 seconds.   Neurological:      General: No focal deficit present.      Mental Status: He is alert and oriented to person, place, and time.   Psychiatric:          Mood and Affect: Mood normal.         Behavior: Behavior normal.            Vents:  Oxygen Concentration (%): 25 (weaned from 30% to 25%) (11/19/24 0354)  Lines/Drains/Airways       Peripheral Intravenous Line  Duration                  Peripheral IV - Single Lumen 11/17/24 1500 20 G Left;Lateral Wrist 1 day                  Significant Labs:    CBC/Anemia Profile:  Recent Labs   Lab 11/18/24  0528 11/19/24  0533   WBC 15.31* 8.21   HGB 12.2* 13.2*   HCT 35.9* 40.1    230   MCV 85.9 85.7   RDW 14.5 14.8*        Chemistries:  Recent Labs   Lab 11/18/24 0528 11/19/24  0533   * 132*   K 5.5* 5.5*   CL 94* 93*   CO2 30* 28   BUN 47* 49*   CREATININE 1.28* 1.44*   CALCIUM 10.3* 9.3   MG 2.0 1.7       Recent Lab Results         11/19/24  0533        Anion Gap 17       Bands 1       Baso # 0.06       Basophil % 0.7       BUN 49       BUN/CREAT RATIO 34       Calcium 9.3       Chloride 93       CO2 28       Creatinine 1.44       Differential Method Manual       eGFR 65       Eos # 0.03       Eos % 0.4       Glucose 90       Hematocrit 40.1       Hemoglobin 13.2       Immature Grans (Abs) 0.46       Immature Granulocytes 5.6       Lactic Acid Level 1.8       Lymph # 0.88       Lymph % 10.7        10       Magnesium  1.7       MCH 28.2       MCHC 32.9       MCV 85.7       Metamyelocytes 2       Mono # 0.97       Mono % 11.8        9       MPV 9.9       Neutrophils, Abs 5.81       Neutrophils Relative 70.8       nRBC 0.0       NUCLEATED RBC ABSOLUTE 0.00       PLATELET MORPHOLOGY Normal       Platelet Count 230       Potassium 5.5       RBC 4.68       RBC Morphology Normal       RDW 14.8       Segmented Neutrophils, Man % 78       Sodium 132       WBC 8.21               Significant Imaging:  I have reviewed all pertinent imaging results/findings within the past 24 hours.

## 2024-11-19 NOTE — CARE UPDATE
Done by RT student Luis Daniel Mueller       11/19/24 0734   Patient Assessment/Suction   Level of Consciousness (AVPU) alert   Respiratory Effort Normal   Expansion/Accessory Muscles/Retractions no use of accessory muscles   All Lung Fields Breath Sounds Anterior:;clear   Cough Frequency infrequent   Cough Type nonproductive   Skin Integrity   $ Wound Care Tech Time 15 min   Area Observed Left;Right;Behind ear   Skin Appearance without discoloration   Barrier used? Other (see comments)  (soft cannula)   PRE-TX-O2   Device (Oxygen Therapy) nasal cannula   $ Is the patient on Low Flow Oxygen? Yes   Flow (L/min) (Oxygen Therapy) 3.5   SpO2 98 %   Pulse Oximetry Type Continuous   Pulse (!) 112   Resp 18   Aerosol Therapy   $ Aerosol Therapy Charges Aerosol Treatment   Daily Review of Necessity (SVN) completed   Respiratory Treatment Status (SVN) given   Treatment Route (SVN) mask   Patient Position HOB elevated   Post Treatment Assessment (SVN) breath sounds unchanged   Signs of Intolerance (SVN) none   Breath Sounds Post-Respiratory Treatment   Throughout All Fields Post-Treatment All Fields   Throughout All Fields Post-Treatment no change   Post-treatment Heart Rate (beats/min) 113   Post-treatment Resp Rate (breaths/min) 15

## 2024-11-19 NOTE — ASSESSMENT & PLAN NOTE
[facility] in this patient with pre-capillary pulmonary hypertension that has progressed in the past 4 years (Normal JONNY 2019) presents in acute decompensated RV failure with evidence of cardiogenic shock on presentation; Tn elevation, LA and elevated central pressures. Acute cor pulmonale with fluid overload.  - rhythm: stop nifedipine and BB outpatient Rx indefinitely; no arrhythmia on presentation  - inotropy: cont dobutamine 5 mcg/kg/min and milrinone 0.25 mcg/kg/min   -- will follow up on NTproBNP, plan to wean milrinone vs dobutamine  - diuresis: cont bolus dosing Lasix 80 mg IV BID, goal net -2L   -- maintain IV diuresis for now, de-escalate to PO following RHC  - MAP: goal MAP >60, Levophed available for hypotension  - plan for RHC this admission for progression in PH; goal diuresis towards euvolemia  - case discussed with provider at [facility]; no ICU beds available at this time at [facility]  - low suspicion of sepsis at this time; will stop vancomycin and zosyn and monitor fever curve; remains afebrile  Restarted dobutamine increase diuresis start offloading      TTE on admission:  LVEF 60%, severe RV enlargement, TAPSE 1.12, normal LA size, severely dilated RA, moderate-to-severe TR, PASP 74

## 2024-11-19 NOTE — ASSESSMENT & PLAN NOTE
Acute on chronic respiratory failure 2/2 volume overload.   - supplement for goal SpO2 >92%  Will try low-flow O2 today

## 2024-11-19 NOTE — NURSING
"0217: pt complaining of pain radiating from neck to chest to back, states " worse every felt"  tachcardic, o2 sats 99 on [place] NC    2020: notified MD, EKG done, NSR, pain meds given, pt alert and oriented, positive pulses, will reevaluate effects of pain meds   "

## 2024-11-19 NOTE — PROGRESS NOTES
[facility]  Wound Care    Patient Name:  Bo Ku   MRN:  [id number]  Date: 2024  Diagnosis: Acute on chronic right heart failure    History:     Past Medical History:   Diagnosis Date    Asthma     Cavitary lung disease     Hypertension     Sarcoidosis of lung with sarcoidosis of lymph nodes 2019    Severe pulmonary hypertension        Social History     Socioeconomic History    Marital status: Single   Tobacco Use    Smoking status: Former     Current packs/day: 0.00     Average packs/day: 1 pack/day for 15.0 years (15.0 ttl pk-yrs)     Types: Cigarettes     Start date:      Quit date: 2022     Years since quittin.8    Smokeless tobacco: Never   Substance and Sexual Activity    Alcohol use: Yes     Alcohol/week: 2.0 standard drinks of alcohol     Types: 2 Cans of beer per week    Drug use: Never    Sexual activity: Yes     Partners: Female     Social Drivers of Health     Financial Resource Strain: Low Risk  (11/15/2024)    Overall Financial Resource Strain (CARDIA)     Difficulty of Paying Living Expenses: Not hard at all   Food Insecurity: No Food Insecurity (11/15/2024)    Hunger Vital Sign     Worried About Running Out of Food in the Last Year: Never true     Ran Out of Food in the Last Year: Never true   Transportation Needs: No Transportation Needs (11/15/2024)    TRANSPORTATION NEEDS     Transportation : No   Physical Activity: Inactive (3/17/2023)    Exercise Vital Sign     Days of Exercise per Week: 0 days     Minutes of Exercise per Session: 0 min   Stress: No Stress Concern Present (11/15/2024)    [employer] [employer] - Occupational Stress Questionnaire     Feeling of Stress : Not at all   Housing Stability: Low Risk  (11/15/2024)    Housing Stability Vital Sign     Unable to Pay for Housing in the Last Year: No     Homeless in the Last Year: No       Precautions:     Allergies as of 11/15/2024 - Reviewed 11/15/2024   Allergen Reaction  Noted    Fish containing products Anaphylaxis 05/31/2021    Shellfish containing products Anaphylaxis 05/31/2021    Lisinopril  05/30/2021    Sulfamethoxazole-trimethoprim Hives 06/17/2024       WOC Assessment Details/Treatment     Narrative: Seen patient for initiation of preventative skin care measures    Patient in bed, Alert. States has no skin issues. On low air loss mattress  Ranjit score 23    Consult wound care for any skin issues         11/19/2024

## 2024-11-19 NOTE — PROGRESS NOTES
[facility]  Critical Care Medicine  Progress Note    Patient Name: Bo Ku  MRN: [id number]  Admission Date: 11/15/2024  Hospital Length of Stay: 4 days  Code Status: Full Code  Attending Provider: Beverley Muir MD  Primary Care Provider: Julia Alba FNP   Principal Problem: Acute on chronic right heart failure    Subjective:     HPI:  35 yo M with cavitary pulmonary sarcoidosis (Dx 2019) c/b severe obstructive lung disease (FEV1 1.15, 11/2024) on chronic immunosuppression (MTX and prednisone), chronic respiratory failure with hypoxia and pre-capillary pulmonary hypertension (RHC 03/2023 mPAP 34, PCWP 12, PVR 5.56, CO/CI 3.96/2.05, Td) who presented to ED with complaints of shortness of breath.    This is his 2nd presentation to the emergency department; previously assessed 11/16 for shortness of breath with CT-PE that was negative.  On presentation today, he was hypothermic with temperature 94°, heart rate 79, BP 96/71, SpO2 chas 91%. Labs notable for POC lactate 8.2, VBG 7.3/38, Na 133, serum SCr 2.04 (last 1.08 11/13), AST 54, NT pro BNP highest to date 20/7 1287, troponin 763.3 (6.0 11/13). During his ED course, he was trialed on BPAP which he was uncomfortable and transitioned to high-flow for hypoxia.  For management and concerns of sepsis, he received LR x2 L, magnesium sulfate, Solu-Medrol 125 mg IV and was initiated on vancomycin and Zosyn.    At time of my assessment, patient reports having some mild chest discomfort and improved shortness of breath.  He can not lay recumbent though due to recurrence of his dyspnea.  POCUS evaluation with evidence of acute RV failure with admission thereafter to ICU for planned diuresis with inotropic support.  PFC transfer request was placed at time of admission due to his comorbidities and potential transplant candidacy. Discussion with [facility] team and patient was previously assessed in Sarcoidosis clinic and at the time, no evaluation for  transplant.     Hospital/ICU Course:  11/15: successful diuresis with IV lasix, added on milrinone, LA peak 4.5  11/19/2024 patient has ongoing decreased cardiac output increased left ventricular filling pressures requires inotrope in offloading    Interval History/Significant Events:  patient without complaints    Review of Systems  Objective:     Vital Signs (Most Recent):  Temp: 98.3 °F (36.8 °C) (11/19/24 0314)  Pulse: (!) 114 (11/19/24 0600)  Resp: 16 (11/19/24 0633)  BP: (!) 119/94 (11/19/24 0515)  SpO2: 96 % (11/19/24 0600) Vital Signs (24h Range):  Temp:  [97.9 °F (36.6 °C)-98.5 °F (36.9 °C)] 98.3 °F (36.8 °C)  Pulse:  [] 114  Resp:  [12-35] 16  SpO2:  [86 %-99 %] 96 %  BP: (118-145)/() 119/94   Weight: 79.6 kg (175 lb 7.8 oz)  Body mass index is 23.15 kg/m².      Intake/Output Summary (Last 24 hours) at 11/19/2024 0719  Last data filed at 11/19/2024 0453  Gross per 24 hour   Intake 833.2 ml   Output 2500 ml   Net -1666.8 ml          Physical Exam  Vitals reviewed.   Constitutional:       Appearance: Normal appearance.      Interventions: He is not intubated.  HENT:      Head: Normocephalic and atraumatic.      Nose: Nose normal.      Mouth/Throat:      Mouth: Mucous membranes are dry.      Pharynx: Oropharynx is clear.   Eyes:      Extraocular Movements: Extraocular movements intact.      Conjunctiva/sclera: Conjunctivae normal.      Pupils: Pupils are equal, round, and reactive to light.   Cardiovascular:      Rate and Rhythm: Normal rate.      Heart sounds: Normal heart sounds. No murmur heard.  Pulmonary:      Effort: Pulmonary effort is normal. He is not intubated.      Breath sounds: Normal breath sounds.   Abdominal:      General: Abdomen is flat. Bowel sounds are normal.      Palpations: Abdomen is soft.   Musculoskeletal:         General: Normal range of motion.      Cervical back: Normal range of motion and neck supple.      Right lower leg: No edema.      Left lower leg: No edema.    Skin:     General: Skin is warm and dry.      Capillary Refill: Capillary refill takes less than 2 seconds.   Neurological:      General: No focal deficit present.      Mental Status: He is alert and oriented to person, place, and time.   Psychiatric:         Mood and Affect: Mood normal.         Behavior: Behavior normal.            Vents:  Oxygen Concentration (%): 25 (weaned from 30% to 25%) (11/19/24 0354)  Lines/Drains/Airways       Peripheral Intravenous Line  Duration                  Peripheral IV - Single Lumen 11/17/24 1500 20 G Left;Lateral Wrist 1 day                  Significant Labs:    CBC/Anemia Profile:  Recent Labs   Lab 11/18/24 0528 11/19/24  0533   WBC 15.31* 8.21   HGB 12.2* 13.2*   HCT 35.9* 40.1    230   MCV 85.9 85.7   RDW 14.5 14.8*        Chemistries:  Recent Labs   Lab 11/18/24 0528 11/19/24  0533   * 132*   K 5.5* 5.5*   CL 94* 93*   CO2 30* 28   BUN 47* 49*   CREATININE 1.28* 1.44*   CALCIUM 10.3* 9.3   MG 2.0 1.7       Recent Lab Results         11/19/24  0533        Anion Gap 17       Bands 1       Baso # 0.06       Basophil % 0.7       BUN 49       BUN/CREAT RATIO 34       Calcium 9.3       Chloride 93       CO2 28       Creatinine 1.44       Differential Method Manual       eGFR 65       Eos # 0.03       Eos % 0.4       Glucose 90       Hematocrit 40.1       Hemoglobin 13.2       Immature Grans (Abs) 0.46       Immature Granulocytes 5.6       Lactic Acid Level 1.8       Lymph # 0.88       Lymph % 10.7        10       Magnesium  1.7       MCH 28.2       MCHC 32.9       MCV 85.7       Metamyelocytes 2       Mono # 0.97       Mono % 11.8        9       MPV 9.9       Neutrophils, Abs 5.81       Neutrophils Relative 70.8       nRBC 0.0       NUCLEATED RBC ABSOLUTE 0.00       PLATELET MORPHOLOGY Normal       Platelet Count 230       Potassium 5.5       RBC 4.68       RBC Morphology Normal       RDW 14.8       Segmented Neutrophils, Man % 78       Sodium 132       WBC  8.21               Significant Imaging:  I have reviewed all pertinent imaging results/findings within the past 24 hours.    ABG  Recent Labs   Lab 11/15/24  0607   PH 7.30*   PO2 36   PCO2 38*   HCO3 18.7*     Assessment/Plan:     Pulmonary  Acute on chronic respiratory failure with hypoxia  Acute on chronic respiratory failure 2/2 volume overload.   - supplement for goal SpO2 >92%  Will try low-flow O2 today    Sarcoidosis of lung with sarcoidosis of lymph nodes  CT Chest 2 days PTA with stable pulmonary disease. Will mange with IV steroids for decompensated RV failure with hypervolemia.   - cont methylprednisone 40 mg IV  - cont MTX Q7days  - cont dapsone ppx    Obstructive lung disease  Severe obstructive lung disease 2/2 sarcoidosis.   - manage with scheduled Pulmicort Q12H and scheduled DuoNebs    Cardiac/Vascular  * Acute on chronic right heart failure  SAPH in this patient with pre-capillary pulmonary hypertension that has progressed in the past 4 years (Normal JONNY 2019) presents in acute decompensated RV failure with evidence of cardiogenic shock on presentation; Tn elevation, LA and elevated central pressures. Acute cor pulmonale with fluid overload.  - rhythm: stop nifedipine and BB outpatient Rx indefinitely; no arrhythmia on presentation  - inotropy: cont dobutamine 5 mcg/kg/min and milrinone 0.25 mcg/kg/min   -- will follow up on NTproBNP, plan to wean milrinone vs dobutamine  - diuresis: cont bolus dosing Lasix 80 mg IV BID, goal net -2L   -- maintain IV diuresis for now, de-escalate to PO following RHC  - MAP: goal MAP >60, Levophed available for hypotension  - plan for RHC this admission for progression in PH; goal diuresis towards euvolemia  - case discussed with provider at [facility] and [facility]; no ICU beds available at this time at [facility]  - low suspicion of sepsis at this time; will stop vancomycin and zosyn and monitor fever curve; remains afebrile  Restarted dobutamine increase diuresis start  offloading      TTE on admission:  LVEF 60%, severe RV enlargement, TAPSE 1.12, normal LA size, severely dilated RA, moderate-to-severe TR, PASP 74    Renal/  Hyperkalemia  Iatrogenic with repletion efforts. Asymptomatic. Management with scheduled diuresis.     Acute renal failure  Clearly improving today, increase urine output             Arden Spear MD  Critical Care Medicine  [facility]

## 2024-11-20 LAB
ANION GAP SERPL CALCULATED.3IONS-SCNC: 16 MMOL/L (ref 7–16)
BASOPHILS # BLD AUTO: 0.05 K/UL (ref 0–0.2)
BASOPHILS NFR BLD AUTO: 0.4 % (ref 0–1)
BUN SERPL-MCNC: 46 MG/DL (ref 9–21)
BUN/CREAT SERPL: 42 (ref 6–20)
CALCIUM SERPL-MCNC: 9 MG/DL (ref 8.4–10.2)
CHLORIDE SERPL-SCNC: 91 MMOL/L (ref 98–107)
CO2 SERPL-SCNC: 29 MMOL/L (ref 22–29)
CREAT SERPL-MCNC: 1.1 MG/DL (ref 0.72–1.25)
DIFFERENTIAL METHOD BLD: ABNORMAL
EGFR (NO RACE VARIABLE) (RUSH/TITUS): 89 ML/MIN/1.73M2
EOSINOPHIL # BLD AUTO: 0.08 K/UL (ref 0–0.5)
EOSINOPHIL NFR BLD AUTO: 0.6 % (ref 1–4)
ERYTHROCYTE [DISTWIDTH] IN BLOOD BY AUTOMATED COUNT: 14.5 % (ref 11.5–14.5)
GLUCOSE SERPL-MCNC: 115 MG/DL (ref 74–100)
HCT VFR BLD AUTO: 38 % (ref 40–54)
HGB BLD-MCNC: 13.1 G/DL (ref 13.5–18)
IMM GRANULOCYTES # BLD AUTO: 0.52 K/UL (ref 0–0.04)
IMM GRANULOCYTES NFR BLD: 4.2 % (ref 0–0.4)
LYMPHOCYTES # BLD AUTO: 0.87 K/UL (ref 1–4.8)
LYMPHOCYTES NFR BLD AUTO: 7 % (ref 27–41)
LYMPHOCYTES NFR BLD MANUAL: 8 % (ref 27–41)
MAGNESIUM SERPL-MCNC: 2.1 MG/DL (ref 1.6–2.6)
MCH RBC QN AUTO: 28.7 PG (ref 27–31)
MCHC RBC AUTO-ENTMCNC: 34.5 G/DL (ref 32–36)
MCV RBC AUTO: 83.3 FL (ref 80–96)
MONOCYTES # BLD AUTO: 1.16 K/UL (ref 0–0.8)
MONOCYTES NFR BLD AUTO: 9.4 % (ref 2–6)
MONOCYTES NFR BLD MANUAL: 8 % (ref 2–6)
MPC BLD CALC-MCNC: 10.5 FL (ref 9.4–12.4)
NEUTROPHILS # BLD AUTO: 9.72 K/UL (ref 1.8–7.7)
NEUTROPHILS NFR BLD AUTO: 78.4 % (ref 53–65)
NEUTS BAND NFR BLD MANUAL: 4 % (ref 1–5)
NEUTS SEG NFR BLD MANUAL: 80 % (ref 50–62)
NRBC # BLD AUTO: 0 X10E3/UL
NRBC, AUTO (.00): 0 %
OHS QRS DURATION: 86 MS
OHS QTC CALCULATION: 419 MS
PLATELET # BLD AUTO: 244 K/UL (ref 150–400)
PLATELET MORPHOLOGY: NORMAL
POTASSIUM SERPL-SCNC: 3.6 MMOL/L (ref 3.5–5.1)
RBC # BLD AUTO: 4.56 M/UL (ref 4.6–6.2)
RBC MORPH BLD: NORMAL
SODIUM SERPL-SCNC: 132 MMOL/L (ref 136–145)
WBC # BLD AUTO: 12.4 K/UL (ref 4.5–11)

## 2024-11-20 PROCEDURE — 63600175 PHARM REV CODE 636 W HCPCS: Performed by: STUDENT IN AN ORGANIZED HEALTH CARE EDUCATION/TRAINING PROGRAM

## 2024-11-20 PROCEDURE — 99900035 HC TECH TIME PER 15 MIN (STAT)

## 2024-11-20 PROCEDURE — 94640 AIRWAY INHALATION TREATMENT: CPT

## 2024-11-20 PROCEDURE — 36415 COLL VENOUS BLD VENIPUNCTURE: CPT | Performed by: STUDENT IN AN ORGANIZED HEALTH CARE EDUCATION/TRAINING PROGRAM

## 2024-11-20 PROCEDURE — 99233 SBSQ HOSP IP/OBS HIGH 50: CPT | Mod: ,,, | Performed by: INTERNAL MEDICINE

## 2024-11-20 PROCEDURE — 85025 COMPLETE CBC W/AUTO DIFF WBC: CPT | Performed by: STUDENT IN AN ORGANIZED HEALTH CARE EDUCATION/TRAINING PROGRAM

## 2024-11-20 PROCEDURE — 20000000 HC ICU ROOM

## 2024-11-20 PROCEDURE — 25000003 PHARM REV CODE 250: Performed by: INTERNAL MEDICINE

## 2024-11-20 PROCEDURE — 27000221 HC OXYGEN, UP TO 24 HOURS

## 2024-11-20 PROCEDURE — 94761 N-INVAS EAR/PLS OXIMETRY MLT: CPT

## 2024-11-20 PROCEDURE — 80048 BASIC METABOLIC PNL TOTAL CA: CPT | Performed by: STUDENT IN AN ORGANIZED HEALTH CARE EDUCATION/TRAINING PROGRAM

## 2024-11-20 PROCEDURE — 25000242 PHARM REV CODE 250 ALT 637 W/ HCPCS: Performed by: STUDENT IN AN ORGANIZED HEALTH CARE EDUCATION/TRAINING PROGRAM

## 2024-11-20 PROCEDURE — 63600175 PHARM REV CODE 636 W HCPCS: Performed by: INTERNAL MEDICINE

## 2024-11-20 PROCEDURE — 25000003 PHARM REV CODE 250: Performed by: STUDENT IN AN ORGANIZED HEALTH CARE EDUCATION/TRAINING PROGRAM

## 2024-11-20 PROCEDURE — 83735 ASSAY OF MAGNESIUM: CPT | Performed by: STUDENT IN AN ORGANIZED HEALTH CARE EDUCATION/TRAINING PROGRAM

## 2024-11-20 RX ORDER — CARVEDILOL 12.5 MG/1
12.5 TABLET ORAL 2 TIMES DAILY
Status: DISCONTINUED | OUTPATIENT
Start: 2024-11-20 | End: 2024-11-23

## 2024-11-20 RX ADMIN — HEPARIN SODIUM 5000 UNITS: 5000 INJECTION, SOLUTION INTRAVENOUS; SUBCUTANEOUS at 09:11

## 2024-11-20 RX ADMIN — IPRATROPIUM BROMIDE AND ALBUTEROL SULFATE 3 ML: .5; 3 SOLUTION RESPIRATORY (INHALATION) at 07:11

## 2024-11-20 RX ADMIN — HEPARIN SODIUM 5000 UNITS: 5000 INJECTION, SOLUTION INTRAVENOUS; SUBCUTANEOUS at 06:11

## 2024-11-20 RX ADMIN — PANTOPRAZOLE SODIUM 40 MG: 40 TABLET, DELAYED RELEASE ORAL at 09:11

## 2024-11-20 RX ADMIN — HYDROCODONE BITARTRATE AND ACETAMINOPHEN 1 TABLET: 5; 325 TABLET ORAL at 07:11

## 2024-11-20 RX ADMIN — IPRATROPIUM BROMIDE AND ALBUTEROL SULFATE 3 ML: .5; 3 SOLUTION RESPIRATORY (INHALATION) at 01:11

## 2024-11-20 RX ADMIN — CARVEDILOL 12.5 MG: 12.5 TABLET, FILM COATED ORAL at 09:11

## 2024-11-20 RX ADMIN — IPRATROPIUM BROMIDE AND ALBUTEROL SULFATE 3 ML: .5; 3 SOLUTION RESPIRATORY (INHALATION) at 12:11

## 2024-11-20 RX ADMIN — BUDESONIDE 0.5 MG: 0.5 INHALANT RESPIRATORY (INHALATION) at 07:11

## 2024-11-20 RX ADMIN — FUROSEMIDE 80 MG: 10 INJECTION, SOLUTION INTRAMUSCULAR; INTRAVENOUS at 06:11

## 2024-11-20 RX ADMIN — DAPSONE 100 MG: 25 TABLET ORAL at 09:11

## 2024-11-20 RX ADMIN — PREDNISONE 20 MG: 20 TABLET ORAL at 09:11

## 2024-11-20 RX ADMIN — FUROSEMIDE 80 MG: 10 INJECTION, SOLUTION INTRAMUSCULAR; INTRAVENOUS at 09:11

## 2024-11-20 RX ADMIN — FUROSEMIDE 80 MG: 10 INJECTION, SOLUTION INTRAMUSCULAR; INTRAVENOUS at 01:11

## 2024-11-20 RX ADMIN — HEPARIN SODIUM 5000 UNITS: 5000 INJECTION, SOLUTION INTRAVENOUS; SUBCUTANEOUS at 01:11

## 2024-11-20 NOTE — SUBJECTIVE & OBJECTIVE
Interval History/Significant Events:  Patient without complaints this morning    Review of Systems  Objective:     Vital Signs (Most Recent):  Temp: 98.6 °F (37 °C) (11/20/24 0304)  Pulse: 88 (11/20/24 0630)  Resp: 13 (11/20/24 0630)  BP: (!) 136/106 (11/20/24 0630)  SpO2: 97 % (11/20/24 0630) Vital Signs (24h Range):  Temp:  [97.5 °F (36.4 °C)-98.6 °F (37 °C)] 98.6 °F (37 °C)  Pulse:  [] 88  Resp:  [13-30] 13  SpO2:  [88 %-98 %] 97 %  BP: (114-148)/() 136/106   Weight: 79.6 kg (175 lb 7.8 oz)  Body mass index is 23.15 kg/m².      Intake/Output Summary (Last 24 hours) at 11/20/2024 0656  Last data filed at 11/20/2024 0120  Gross per 24 hour   Intake 89.52 ml   Output 4825 ml   Net -4735.48 ml          Physical Exam  Vitals reviewed.   Constitutional:       Appearance: Normal appearance.      Interventions: He is not intubated.  HENT:      Head: Normocephalic and atraumatic.      Nose: Nose normal.      Mouth/Throat:      Mouth: Mucous membranes are dry.      Pharynx: Oropharynx is clear.   Eyes:      Extraocular Movements: Extraocular movements intact.      Conjunctiva/sclera: Conjunctivae normal.      Pupils: Pupils are equal, round, and reactive to light.   Cardiovascular:      Rate and Rhythm: Normal rate.      Heart sounds: Normal heart sounds. No murmur heard.  Pulmonary:      Effort: Pulmonary effort is normal. He is not intubated.      Breath sounds: Normal breath sounds.   Abdominal:      General: Abdomen is flat. Bowel sounds are normal.      Palpations: Abdomen is soft.   Musculoskeletal:         General: Normal range of motion.      Cervical back: Normal range of motion and neck supple.      Right lower leg: No edema.      Left lower leg: No edema.   Skin:     General: Skin is warm and dry.      Capillary Refill: Capillary refill takes less than 2 seconds.   Neurological:      General: No focal deficit present.      Mental Status: He is alert and oriented to person, place, and time.    Psychiatric:         Mood and Affect: Mood normal.         Behavior: Behavior normal.            Vents:  Oxygen Concentration (%): 28 (11/20/24 0150)  Lines/Drains/Airways       Peripheral Intravenous Line  Duration                  Peripheral IV - Single Lumen 11/17/24 1500 20 G Left;Lateral Wrist 2 days         Peripheral IV - Single Lumen 11/19/24 1600 20 G No Anterior;Distal;Left Forearm <1 day                  Significant Labs:    CBC/Anemia Profile:  Recent Labs   Lab 11/19/24 0533 11/20/24 0439   WBC 8.21 12.40*   HGB 13.2* 13.1*   HCT 40.1 38.0*    244   MCV 85.7 83.3   RDW 14.8* 14.5        Chemistries:  Recent Labs   Lab 11/19/24 0533 11/20/24 0439   * 132*   K 5.5* 3.6   CL 93* 91*   CO2 28 29   BUN 49* 46*   CREATININE 1.44* 1.10   CALCIUM 9.3 9.0   MG 1.7 2.1       Recent Lab Results         11/20/24 0439        Anion Gap 16       Bands 4       Baso # 0.05       Basophil % 0.4       BUN 46       BUN/CREAT RATIO 42       Calcium 9.0       Chloride 91       CO2 29       Creatinine 1.10       Differential Method Manual       eGFR 89       Eos # 0.08       Eos % 0.6       Glucose 115       Hematocrit 38.0       Hemoglobin 13.1       Immature Grans (Abs) 0.52       Immature Granulocytes 4.2       Lymph # 0.87       Lymph % 7.0        8       Magnesium  2.1       MCH 28.7       MCHC 34.5       MCV 83.3       Mono # 1.16       Mono % 9.4        8       MPV 10.5       Neutrophils, Abs 9.72       Neutrophils Relative 78.4       nRBC 0.0       NUCLEATED RBC ABSOLUTE 0.00       PLATELET MORPHOLOGY Normal       Platelet Count 244       Potassium 3.6       RBC 4.56       RBC Morphology Normal       RDW 14.5       Segmented Neutrophils, Man % 80       Sodium 132       WBC 12.40               Significant Imaging:  I have reviewed all pertinent imaging results/findings within the past 24 hours.

## 2024-11-20 NOTE — ASSESSMENT & PLAN NOTE
SAPH in this patient with pre-capillary pulmonary hypertension that has progressed in the past 4 years (Normal JONNY 2019) presents in acute decompensated RV failure with evidence of cardiogenic shock on presentation; Tn elevation, LA and elevated central pressures. Acute cor pulmonale with fluid overload.  - rhythm: stop nifedipine and BB outpatient Rx indefinitely; no arrhythmia on presentation  - inotropy: cont dobutamine 5 mcg/kg/min and milrinone 0.25 mcg/kg/min   -- will follow up on NTproBNP, plan to wean milrinone vs dobutamine  - diuresis: cont bolus dosing Lasix 80 mg IV BID, goal net -2L   -- maintain IV diuresis for now, de-escalate to PO following RHC  - MAP: goal MAP >60, Levophed available for hypotension  - plan for RHC this admission for progression in PH; goal diuresis towards euvolemia  - case discussed with provider at [facility]; no ICU beds available at this time at [facility]  - low suspicion of sepsis at this time; will stop vancomycin and zosyn and monitor fever curve; remains afebrile  Stopped dobutamine in continue offloading and diuresis      TTE on admission:  LVEF 60%, severe RV enlargement, TAPSE 1.12, normal LA size, severely dilated RA, moderate-to-severe TR, PASP 74

## 2024-11-20 NOTE — PROGRESS NOTES
[facility]  Critical Care Medicine  Progress Note    Patient Name: Bo Ku  MRN: [id number]  Admission Date: 11/15/2024  Hospital Length of Stay: 5 days  Code Status: Full Code  Attending Provider: Beverley Muir MD  Primary Care Provider: Julia Alba FNP   Principal Problem: Acute on chronic right heart failure    Subjective:     HPI:  35 yo M with cavitary pulmonary sarcoidosis (Dx 2019) c/b severe obstructive lung disease (FEV1 1.15, 11/2024) on chronic immunosuppression (MTX and prednisone), chronic respiratory failure with hypoxia and pre-capillary pulmonary hypertension (RHC 03/2023 mPAP 34, PCWP 12, PVR 5.56, CO/CI 3.96/2.05, Td) who presented to ED with complaints of shortness of breath.    This is his 2nd presentation to the emergency department; previously assessed 11/16 for shortness of breath with CT-PE that was negative.  On presentation today, he was hypothermic with temperature 94°, heart rate 79, BP 96/71, SpO2 chas 91%. Labs notable for POC lactate 8.2, VBG 7.3/38, Na 133, serum SCr 2.04 (last 1.08 11/13), AST 54, NT pro BNP highest to date 20/7 1287, troponin 763.3 (6.0 11/13). During his ED course, he was trialed on BPAP which he was uncomfortable and transitioned to high-flow for hypoxia.  For management and concerns of sepsis, he received LR x2 L, magnesium sulfate, Solu-Medrol 125 mg IV and was initiated on vancomycin and Zosyn.    At time of my assessment, patient reports having some mild chest discomfort and improved shortness of breath.  He can not lay recumbent though due to recurrence of his dyspnea.  POCUS evaluation with evidence of acute RV failure with admission thereafter to ICU for planned diuresis with inotropic support.  PFC transfer request was placed at time of admission due to his comorbidities and potential transplant candidacy. Discussion with [facility] team and patient was previously assessed in Sarcoidosis clinic and at the time, no evaluation for  transplant.     Hospital/ICU Course:  11/15: successful diuresis with IV lasix, added on milrinone, LA peak 4.5  11/19/2024 patient has ongoing decreased cardiac output increased left ventricular filling pressures requires inotrope in offloading    Interval History/Significant Events:  Patient without complaints this morning    Review of Systems  Objective:     Vital Signs (Most Recent):  Temp: 98.6 °F (37 °C) (11/20/24 0304)  Pulse: 88 (11/20/24 0630)  Resp: 13 (11/20/24 0630)  BP: (!) 136/106 (11/20/24 0630)  SpO2: 97 % (11/20/24 0630) Vital Signs (24h Range):  Temp:  [97.5 °F (36.4 °C)-98.6 °F (37 °C)] 98.6 °F (37 °C)  Pulse:  [] 88  Resp:  [13-30] 13  SpO2:  [88 %-98 %] 97 %  BP: (114-148)/() 136/106   Weight: 79.6 kg (175 lb 7.8 oz)  Body mass index is 23.15 kg/m².      Intake/Output Summary (Last 24 hours) at 11/20/2024 0656  Last data filed at 11/20/2024 0120  Gross per 24 hour   Intake 89.52 ml   Output 4825 ml   Net -4735.48 ml          Physical Exam  Vitals reviewed.   Constitutional:       Appearance: Normal appearance.      Interventions: He is not intubated.  HENT:      Head: Normocephalic and atraumatic.      Nose: Nose normal.      Mouth/Throat:      Mouth: Mucous membranes are dry.      Pharynx: Oropharynx is clear.   Eyes:      Extraocular Movements: Extraocular movements intact.      Conjunctiva/sclera: Conjunctivae normal.      Pupils: Pupils are equal, round, and reactive to light.   Cardiovascular:      Rate and Rhythm: Normal rate.      Heart sounds: Normal heart sounds. No murmur heard.  Pulmonary:      Effort: Pulmonary effort is normal. He is not intubated.      Breath sounds: Normal breath sounds.   Abdominal:      General: Abdomen is flat. Bowel sounds are normal.      Palpations: Abdomen is soft.   Musculoskeletal:         General: Normal range of motion.      Cervical back: Normal range of motion and neck supple.      Right lower leg: No edema.      Left lower leg: No edema.    Skin:     General: Skin is warm and dry.      Capillary Refill: Capillary refill takes less than 2 seconds.   Neurological:      General: No focal deficit present.      Mental Status: He is alert and oriented to person, place, and time.   Psychiatric:         Mood and Affect: Mood normal.         Behavior: Behavior normal.            Vents:  Oxygen Concentration (%): 28 (11/20/24 0150)  Lines/Drains/Airways       Peripheral Intravenous Line  Duration                  Peripheral IV - Single Lumen 11/17/24 1500 20 G Left;Lateral Wrist 2 days         Peripheral IV - Single Lumen 11/19/24 1600 20 G No Anterior;Distal;Left Forearm <1 day                  Significant Labs:    CBC/Anemia Profile:  Recent Labs   Lab 11/19/24  0533 11/20/24  0439   WBC 8.21 12.40*   HGB 13.2* 13.1*   HCT 40.1 38.0*    244   MCV 85.7 83.3   RDW 14.8* 14.5        Chemistries:  Recent Labs   Lab 11/19/24  0533 11/20/24  0439   * 132*   K 5.5* 3.6   CL 93* 91*   CO2 28 29   BUN 49* 46*   CREATININE 1.44* 1.10   CALCIUM 9.3 9.0   MG 1.7 2.1       Recent Lab Results         11/20/24 0439        Anion Gap 16       Bands 4       Baso # 0.05       Basophil % 0.4       BUN 46       BUN/CREAT RATIO 42       Calcium 9.0       Chloride 91       CO2 29       Creatinine 1.10       Differential Method Manual       eGFR 89       Eos # 0.08       Eos % 0.6       Glucose 115       Hematocrit 38.0       Hemoglobin 13.1       Immature Grans (Abs) 0.52       Immature Granulocytes 4.2       Lymph # 0.87       Lymph % 7.0        8       Magnesium  2.1       MCH 28.7       MCHC 34.5       MCV 83.3       Mono # 1.16       Mono % 9.4        8       MPV 10.5       Neutrophils, Abs 9.72       Neutrophils Relative 78.4       nRBC 0.0       NUCLEATED RBC ABSOLUTE 0.00       PLATELET MORPHOLOGY Normal       Platelet Count 244       Potassium 3.6       RBC 4.56       RBC Morphology Normal       RDW 14.5       Segmented Neutrophils, Man % 80       Sodium  132       WBC 12.40               Significant Imaging:  I have reviewed all pertinent imaging results/findings within the past 24 hours.    ABG  Recent Labs   Lab 11/15/24  0607   PH 7.30*   PO2 36   PCO2 38*   HCO3 18.7*     Assessment/Plan:     Pulmonary  Acute on chronic respiratory failure with hypoxia  Acute on chronic respiratory failure 2/2 volume overload.   - supplement for goal SpO2 >92%  Will try low-flow O2 today    Sarcoidosis of lung with sarcoidosis of lymph nodes  CT Chest 2 days PTA with stable pulmonary disease. Will mange with IV steroids for decompensated RV failure with hypervolemia.   - cont methylprednisone 40 mg IV  - cont MTX Q7days  - cont dapsone ppx    Obstructive lung disease  Severe obstructive lung disease 2/2 sarcoidosis.   - manage with scheduled Pulmicort Q12H and scheduled DuoNebs    Cardiac/Vascular  * Acute on chronic right heart failure  S[facility] in this patient with pre-capillary pulmonary hypertension that has progressed in the past 4 years (Normal JONNY 2019) presents in acute decompensated RV failure with evidence of cardiogenic shock on presentation; Tn elevation, LA and elevated central pressures. Acute cor pulmonale with fluid overload.  - rhythm: stop nifedipine and BB outpatient Rx indefinitely; no arrhythmia on presentation  - inotropy: cont dobutamine 5 mcg/kg/min and milrinone 0.25 mcg/kg/min   -- will follow up on NTproBNP, plan to wean milrinone vs dobutamine  - diuresis: cont bolus dosing Lasix 80 mg IV BID, goal net -2L   -- maintain IV diuresis for now, de-escalate to PO following RHC  - MAP: goal MAP >60, Levophed available for hypotension  - plan for RHC this admission for progression in PH; goal diuresis towards euvolemia  - case discussed with provider at [facility] and [facility]; no ICU beds available at this time at [facility]  - low suspicion of sepsis at this time; will stop vancomycin and zosyn and monitor fever curve; remains afebrile  Stopped dobutamine in continue  offloading and diuresis      TTE on admission:  LVEF 60%, severe RV enlargement, TAPSE 1.12, normal LA size, severely dilated RA, moderate-to-severe TR, PASP 74    Renal/  Hyperkalemia  Resolved    Acute renal failure  Clearly improving today, increase urine output             Arden Spear MD  Critical Care Medicine  [facility]

## 2024-11-21 LAB
ANION GAP SERPL CALCULATED.3IONS-SCNC: 15 MMOL/L (ref 7–16)
BACTERIA BLD CULT: NORMAL
BACTERIA BLD CULT: NORMAL
BASOPHILS # BLD AUTO: 0.08 K/UL (ref 0–0.2)
BASOPHILS NFR BLD AUTO: 0.7 % (ref 0–1)
BUN SERPL-MCNC: 44 MG/DL (ref 9–21)
BUN/CREAT SERPL: 33 (ref 6–20)
CALCIUM SERPL-MCNC: 9.6 MG/DL (ref 8.4–10.2)
CHLORIDE SERPL-SCNC: 91 MMOL/L (ref 98–107)
CO2 SERPL-SCNC: 30 MMOL/L (ref 22–29)
CREAT SERPL-MCNC: 1.33 MG/DL (ref 0.72–1.25)
DIFFERENTIAL METHOD BLD: ABNORMAL
EGFR (NO RACE VARIABLE) (RUSH/TITUS): 71 ML/MIN/1.73M2
EOSINOPHIL # BLD AUTO: 0.07 K/UL (ref 0–0.5)
EOSINOPHIL NFR BLD AUTO: 0.6 % (ref 1–4)
ERYTHROCYTE [DISTWIDTH] IN BLOOD BY AUTOMATED COUNT: 15.2 % (ref 11.5–14.5)
GLUCOSE SERPL-MCNC: 93 MG/DL (ref 74–100)
HCT VFR BLD AUTO: 41.5 % (ref 40–54)
HGB BLD-MCNC: 14.2 G/DL (ref 13.5–18)
IMM GRANULOCYTES # BLD AUTO: 0.67 K/UL (ref 0–0.04)
IMM GRANULOCYTES NFR BLD: 5.5 % (ref 0–0.4)
LYMPHOCYTES # BLD AUTO: 0.99 K/UL (ref 1–4.8)
LYMPHOCYTES NFR BLD AUTO: 8.2 % (ref 27–41)
LYMPHOCYTES NFR BLD MANUAL: 7 % (ref 27–41)
MAGNESIUM SERPL-MCNC: 2 MG/DL (ref 1.6–2.6)
MCH RBC QN AUTO: 29.3 PG (ref 27–31)
MCHC RBC AUTO-ENTMCNC: 34.2 G/DL (ref 32–36)
MCV RBC AUTO: 85.7 FL (ref 80–96)
METAMYELOCYTES NFR BLD MANUAL: 3 %
MONOCYTES # BLD AUTO: 1.65 K/UL (ref 0–0.8)
MONOCYTES NFR BLD AUTO: 13.6 % (ref 2–6)
MONOCYTES NFR BLD MANUAL: 9 % (ref 2–6)
MPC BLD CALC-MCNC: 10.6 FL (ref 9.4–12.4)
NEUTROPHILS # BLD AUTO: 8.66 K/UL (ref 1.8–7.7)
NEUTROPHILS NFR BLD AUTO: 71.4 % (ref 53–65)
NEUTS SEG NFR BLD MANUAL: 81 % (ref 50–62)
NRBC # BLD AUTO: 0 X10E3/UL
NRBC, AUTO (.00): 0 %
PLATELET # BLD AUTO: 231 K/UL (ref 150–400)
PLATELET MORPHOLOGY: NORMAL
POTASSIUM SERPL-SCNC: 4.2 MMOL/L (ref 3.5–5.1)
RBC # BLD AUTO: 4.84 M/UL (ref 4.6–6.2)
SODIUM SERPL-SCNC: 132 MMOL/L (ref 136–145)
TARGETS BLD QL SMEAR: ABNORMAL
WBC # BLD AUTO: 12.12 K/UL (ref 4.5–11)

## 2024-11-21 PROCEDURE — 83735 ASSAY OF MAGNESIUM: CPT | Performed by: STUDENT IN AN ORGANIZED HEALTH CARE EDUCATION/TRAINING PROGRAM

## 2024-11-21 PROCEDURE — 25000242 PHARM REV CODE 250 ALT 637 W/ HCPCS: Performed by: STUDENT IN AN ORGANIZED HEALTH CARE EDUCATION/TRAINING PROGRAM

## 2024-11-21 PROCEDURE — 25000003 PHARM REV CODE 250: Performed by: STUDENT IN AN ORGANIZED HEALTH CARE EDUCATION/TRAINING PROGRAM

## 2024-11-21 PROCEDURE — 63600175 PHARM REV CODE 636 W HCPCS: Performed by: INTERNAL MEDICINE

## 2024-11-21 PROCEDURE — 85025 COMPLETE CBC W/AUTO DIFF WBC: CPT | Performed by: STUDENT IN AN ORGANIZED HEALTH CARE EDUCATION/TRAINING PROGRAM

## 2024-11-21 PROCEDURE — 25000003 PHARM REV CODE 250: Performed by: INTERNAL MEDICINE

## 2024-11-21 PROCEDURE — 63600175 PHARM REV CODE 636 W HCPCS: Performed by: STUDENT IN AN ORGANIZED HEALTH CARE EDUCATION/TRAINING PROGRAM

## 2024-11-21 PROCEDURE — 36415 COLL VENOUS BLD VENIPUNCTURE: CPT | Performed by: STUDENT IN AN ORGANIZED HEALTH CARE EDUCATION/TRAINING PROGRAM

## 2024-11-21 PROCEDURE — 27000221 HC OXYGEN, UP TO 24 HOURS

## 2024-11-21 PROCEDURE — 94761 N-INVAS EAR/PLS OXIMETRY MLT: CPT

## 2024-11-21 PROCEDURE — 99233 SBSQ HOSP IP/OBS HIGH 50: CPT | Mod: ,,, | Performed by: INTERNAL MEDICINE

## 2024-11-21 PROCEDURE — 99900031 HC PATIENT EDUCATION (STAT)

## 2024-11-21 PROCEDURE — 80048 BASIC METABOLIC PNL TOTAL CA: CPT | Performed by: STUDENT IN AN ORGANIZED HEALTH CARE EDUCATION/TRAINING PROGRAM

## 2024-11-21 PROCEDURE — 94640 AIRWAY INHALATION TREATMENT: CPT

## 2024-11-21 PROCEDURE — 11000001 HC ACUTE MED/SURG PRIVATE ROOM

## 2024-11-21 PROCEDURE — 99900035 HC TECH TIME PER 15 MIN (STAT)

## 2024-11-21 RX ORDER — FUROSEMIDE 40 MG/1
80 TABLET ORAL 2 TIMES DAILY
Status: DISCONTINUED | OUTPATIENT
Start: 2024-11-21 | End: 2024-11-22

## 2024-11-21 RX ADMIN — IPRATROPIUM BROMIDE AND ALBUTEROL SULFATE 3 ML: .5; 3 SOLUTION RESPIRATORY (INHALATION) at 12:11

## 2024-11-21 RX ADMIN — CARVEDILOL 12.5 MG: 12.5 TABLET, FILM COATED ORAL at 10:11

## 2024-11-21 RX ADMIN — HEPARIN SODIUM 5000 UNITS: 5000 INJECTION, SOLUTION INTRAVENOUS; SUBCUTANEOUS at 06:11

## 2024-11-21 RX ADMIN — HYDROCODONE BITARTRATE AND ACETAMINOPHEN 1 TABLET: 5; 325 TABLET ORAL at 08:11

## 2024-11-21 RX ADMIN — CARVEDILOL 12.5 MG: 12.5 TABLET, FILM COATED ORAL at 08:11

## 2024-11-21 RX ADMIN — HEPARIN SODIUM 5000 UNITS: 5000 INJECTION, SOLUTION INTRAVENOUS; SUBCUTANEOUS at 02:11

## 2024-11-21 RX ADMIN — BUDESONIDE 0.5 MG: 0.5 INHALANT RESPIRATORY (INHALATION) at 07:11

## 2024-11-21 RX ADMIN — IPRATROPIUM BROMIDE AND ALBUTEROL SULFATE 3 ML: .5; 3 SOLUTION RESPIRATORY (INHALATION) at 07:11

## 2024-11-21 RX ADMIN — PANTOPRAZOLE SODIUM 40 MG: 40 TABLET, DELAYED RELEASE ORAL at 08:11

## 2024-11-21 RX ADMIN — PREDNISONE 20 MG: 20 TABLET ORAL at 08:11

## 2024-11-21 RX ADMIN — HEPARIN SODIUM 5000 UNITS: 5000 INJECTION, SOLUTION INTRAVENOUS; SUBCUTANEOUS at 10:11

## 2024-11-21 RX ADMIN — HYDROCODONE BITARTRATE AND ACETAMINOPHEN 1 TABLET: 5; 325 TABLET ORAL at 10:11

## 2024-11-21 RX ADMIN — FUROSEMIDE 80 MG: 40 TABLET ORAL at 05:11

## 2024-11-21 RX ADMIN — ACETAMINOPHEN 650 MG: 325 TABLET ORAL at 11:11

## 2024-11-21 RX ADMIN — FUROSEMIDE 80 MG: 10 INJECTION, SOLUTION INTRAMUSCULAR; INTRAVENOUS at 06:11

## 2024-11-21 RX ADMIN — DAPSONE 100 MG: 25 TABLET ORAL at 08:11

## 2024-11-21 NOTE — ASSESSMENT & PLAN NOTE
SAPH in this patient with pre-capillary pulmonary hypertension that has progressed in the past 4 years (Normal JONNY 2019) presents in acute decompensated RV failure with evidence of cardiogenic shock on presentation; Tn elevation, LA and elevated central pressures. Acute cor pulmonale with fluid overload.  - rhythm: stop nifedipine and BB outpatient Rx indefinitely; no arrhythmia on presentation  - inotropy: cont dobutamine 5 mcg/kg/min and milrinone 0.25 mcg/kg/min   -- will follow up on NTproBNP, plan to wean milrinone vs dobutamine  - diuresis: cont bolus dosing Lasix 80 mg IV BID, goal net -2L   -- maintain IV diuresis for now, de-escalate to PO following RHC  - MAP: goal MAP >60, Levophed available for hypotension  - plan for RHC this admission for progression in PH; goal diuresis towards euvolemia  - case discussed with provider at [facility] and [facility]; no ICU beds available at this time at [facility]  - low suspicion of sepsis at this time; will stop vancomycin and zosyn and monitor fever curve; remains afebrile  Switch to oral diuretics      TTE on admission:  LVEF 60%, severe RV enlargement, TAPSE 1.12, normal LA size, severely dilated RA, moderate-to-severe TR, PASP 74

## 2024-11-21 NOTE — SUBJECTIVE & OBJECTIVE
Interval History/Significant Events:  Patient without complaints    Review of Systems  Objective:     Vital Signs (Most Recent):  Temp: 98.8 °F (37.1 °C) (11/21/24 0303)  Pulse: 94 (11/21/24 0600)  Resp: 20 (11/21/24 0415)  BP: 134/88 (11/21/24 0600)  SpO2: (!) 93 % (11/21/24 0600) Vital Signs (24h Range):  Temp:  [97.9 °F (36.6 °C)-98.8 °F (37.1 °C)] 98.8 °F (37.1 °C)  Pulse:  [] 94  Resp:  [12-33] 20  SpO2:  [89 %-100 %] 93 %  BP: (110-148)/() 134/88   Weight: 76.9 kg (169 lb 8.5 oz)  Body mass index is 22.37 kg/m².      Intake/Output Summary (Last 24 hours) at 11/21/2024 0652  Last data filed at 11/21/2024 0500  Gross per 24 hour   Intake 86.51 ml   Output 3850 ml   Net -3763.49 ml          Physical Exam  Vitals reviewed.   Constitutional:       Appearance: Normal appearance.      Interventions: He is not intubated.  HENT:      Head: Normocephalic and atraumatic.      Nose: Nose normal.      Mouth/Throat:      Mouth: Mucous membranes are dry.      Pharynx: Oropharynx is clear.   Eyes:      Extraocular Movements: Extraocular movements intact.      Conjunctiva/sclera: Conjunctivae normal.      Pupils: Pupils are equal, round, and reactive to light.   Cardiovascular:      Rate and Rhythm: Normal rate.      Heart sounds: Normal heart sounds. No murmur heard.  Pulmonary:      Effort: Pulmonary effort is normal. He is not intubated.      Breath sounds: Normal breath sounds.   Abdominal:      General: Abdomen is flat. Bowel sounds are normal.      Palpations: Abdomen is soft.   Musculoskeletal:         General: Normal range of motion.      Cervical back: Normal range of motion and neck supple.      Right lower leg: No edema.      Left lower leg: No edema.   Skin:     General: Skin is warm and dry.      Capillary Refill: Capillary refill takes less than 2 seconds.   Neurological:      General: No focal deficit present.      Mental Status: He is alert and oriented to person, place, and time.   Psychiatric:          Mood and Affect: Mood normal.         Behavior: Behavior normal.            Vents:  Oxygen Concentration (%): 28 (11/21/24 0008)  Lines/Drains/Airways       Peripheral Intravenous Line  Duration                  Peripheral IV - Single Lumen 11/19/24 1600 20 G No Anterior;Distal;Left Forearm 1 day                  Significant Labs:    CBC/Anemia Profile:  Recent Labs   Lab 11/20/24 0439 11/21/24  0408   WBC 12.40* 12.12*   HGB 13.1* 14.2   HCT 38.0* 41.5    231   MCV 83.3 85.7   RDW 14.5 15.2*        Chemistries:  Recent Labs   Lab 11/20/24 0439 11/21/24  0408   * 132*   K 3.6 4.2   CL 91* 91*   CO2 29 30*   BUN 46* 44*   CREATININE 1.10 1.33*   CALCIUM 9.0 9.6   MG 2.1 2.0       Recent Lab Results         11/21/24 0408        Anion Gap 15       Baso # 0.08       Basophil % 0.7       BUN 44       BUN/CREAT RATIO 33       Calcium 9.6       Chloride 91       CO2 30       Creatinine 1.33       Differential Method Manual       eGFR 71       Eos # 0.07       Eos % 0.6       Glucose 93       Hematocrit 41.5       Hemoglobin 14.2       Immature Grans (Abs) 0.67       Immature Granulocytes 5.5       Lymph # 0.99       Lymph % 8.2        7       Magnesium  2.0       MCH 29.3       MCHC 34.2       MCV 85.7       Metamyelocytes 3       Mono # 1.65       Mono % 13.6        9       MPV 10.6       Neutrophils, Abs 8.66       Neutrophils Relative 71.4       nRBC 0.0       NUCLEATED RBC ABSOLUTE 0.00       PLATELET MORPHOLOGY Normal       Platelet Count 231       Potassium 4.2       RBC 4.84       RDW 15.2       Segmented Neutrophils, Man % 81       Sodium 132       Target Cells 1+       WBC 12.12               Significant Imaging:  I have reviewed all pertinent imaging results/findings within the past 24 hours.

## 2024-11-21 NOTE — PLAN OF CARE
Problem: Gas Exchange Impaired  Goal: Optimal Gas Exchange  Outcome: Progressing  Intervention: Optimize Oxygenation and Ventilation  Flowsheets (Taken 11/21/2024 0751)  Airway/Ventilation Management: airway patency maintained     Problem: Skin Injury Risk Increased  Goal: Skin Health and Integrity  Outcome: Progressing

## 2024-11-21 NOTE — PROGRESS NOTES
[facility]  Critical Care Medicine  Progress Note    Patient Name: Bo Ku  MRN: [id number]  Admission Date: 11/15/2024  Hospital Length of Stay: 6 days  Code Status: Full Code  Attending Provider: Arden Spear MD  Primary Care Provider: Julia Alba FNP   Principal Problem: Acute on chronic right heart failure    Subjective:     HPI:  35 yo M with cavitary pulmonary sarcoidosis (Dx 2019) c/b severe obstructive lung disease (FEV1 1.15, 11/2024) on chronic immunosuppression (MTX and prednisone), chronic respiratory failure with hypoxia and pre-capillary pulmonary hypertension (RHC 03/2023 mPAP 34, PCWP 12, PVR 5.56, CO/CI 3.96/2.05, Td) who presented to ED with complaints of shortness of breath.    This is his 2nd presentation to the emergency department; previously assessed 11/16 for shortness of breath with CT-PE that was negative.  On presentation today, he was hypothermic with temperature 94°, heart rate 79, BP 96/71, SpO2 chas 91%. Labs notable for POC lactate 8.2, VBG 7.3/38, Na 133, serum SCr 2.04 (last 1.08 11/13), AST 54, NT pro BNP highest to date 20/7 1287, troponin 763.3 (6.0 11/13). During his ED course, he was trialed on BPAP which he was uncomfortable and transitioned to high-flow for hypoxia.  For management and concerns of sepsis, he received LR x2 L, magnesium sulfate, Solu-Medrol 125 mg IV and was initiated on vancomycin and Zosyn.    At time of my assessment, patient reports having some mild chest discomfort and improved shortness of breath.  He can not lay recumbent though due to recurrence of his dyspnea.  POCUS evaluation with evidence of acute RV failure with admission thereafter to ICU for planned diuresis with inotropic support.  PFC transfer request was placed at time of admission due to his comorbidities and potential transplant candidacy. Discussion with [facility] team and patient was previously assessed in Sarcoidosis clinic and at the time, no  evaluation for transplant.     Hospital/ICU Course:  11/15: successful diuresis with IV lasix, added on milrinone, LA peak 4.5  11/19/2024 patient has ongoing decreased cardiac output increased left ventricular filling pressures requires inotrope in offloading  11/21/2024 patient has right heart failure which is continuing to resolve we have stopped his offloading agent will go to oral diuretics if he is doing well he could go home tomorrow follow up in clinic with      Interval History/Significant Events:  Patient without complaints    Review of Systems  Objective:     Vital Signs (Most Recent):  Temp: 98.8 °F (37.1 °C) (11/21/24 0303)  Pulse: 94 (11/21/24 0600)  Resp: 20 (11/21/24 0415)  BP: 134/88 (11/21/24 0600)  SpO2: (!) 93 % (11/21/24 0600) Vital Signs (24h Range):  Temp:  [97.9 °F (36.6 °C)-98.8 °F (37.1 °C)] 98.8 °F (37.1 °C)  Pulse:  [] 94  Resp:  [12-33] 20  SpO2:  [89 %-100 %] 93 %  BP: (110-148)/() 134/88   Weight: 76.9 kg (169 lb 8.5 oz)  Body mass index is 22.37 kg/m².      Intake/Output Summary (Last 24 hours) at 11/21/2024 0652  Last data filed at 11/21/2024 0500  Gross per 24 hour   Intake 86.51 ml   Output 3850 ml   Net -3763.49 ml          Physical Exam  Vitals reviewed.   Constitutional:       Appearance: Normal appearance.      Interventions: He is not intubated.  HENT:      Head: Normocephalic and atraumatic.      Nose: Nose normal.      Mouth/Throat:      Mouth: Mucous membranes are dry.      Pharynx: Oropharynx is clear.   Eyes:      Extraocular Movements: Extraocular movements intact.      Conjunctiva/sclera: Conjunctivae normal.      Pupils: Pupils are equal, round, and reactive to light.   Cardiovascular:      Rate and Rhythm: Normal rate.      Heart sounds: Normal heart sounds. No murmur heard.  Pulmonary:      Effort: Pulmonary effort is normal. He is not intubated.      Breath sounds: Normal breath sounds.   Abdominal:      General: Abdomen is flat. Bowel sounds are  normal.      Palpations: Abdomen is soft.   Musculoskeletal:         General: Normal range of motion.      Cervical back: Normal range of motion and neck supple.      Right lower leg: No edema.      Left lower leg: No edema.   Skin:     General: Skin is warm and dry.      Capillary Refill: Capillary refill takes less than 2 seconds.   Neurological:      General: No focal deficit present.      Mental Status: He is alert and oriented to person, place, and time.   Psychiatric:         Mood and Affect: Mood normal.         Behavior: Behavior normal.            Vents:  Oxygen Concentration (%): 28 (11/21/24 0008)  Lines/Drains/Airways       Peripheral Intravenous Line  Duration                  Peripheral IV - Single Lumen 11/19/24 1600 20 G No Anterior;Distal;Left Forearm 1 day                  Significant Labs:    CBC/Anemia Profile:  Recent Labs   Lab 11/20/24 0439 11/21/24 0408   WBC 12.40* 12.12*   HGB 13.1* 14.2   HCT 38.0* 41.5    231   MCV 83.3 85.7   RDW 14.5 15.2*        Chemistries:  Recent Labs   Lab 11/20/24 0439 11/21/24 0408   * 132*   K 3.6 4.2   CL 91* 91*   CO2 29 30*   BUN 46* 44*   CREATININE 1.10 1.33*   CALCIUM 9.0 9.6   MG 2.1 2.0       Recent Lab Results         11/21/24 0408        Anion Gap 15       Baso # 0.08       Basophil % 0.7       BUN 44       BUN/CREAT RATIO 33       Calcium 9.6       Chloride 91       CO2 30       Creatinine 1.33       Differential Method Manual       eGFR 71       Eos # 0.07       Eos % 0.6       Glucose 93       Hematocrit 41.5       Hemoglobin 14.2       Immature Grans (Abs) 0.67       Immature Granulocytes 5.5       Lymph # 0.99       Lymph % 8.2        7       Magnesium  2.0       MCH 29.3       MCHC 34.2       MCV 85.7       Metamyelocytes 3       Mono # 1.65       Mono % 13.6        9       MPV 10.6       Neutrophils, Abs 8.66       Neutrophils Relative 71.4       nRBC 0.0       NUCLEATED RBC ABSOLUTE 0.00       PLATELET MORPHOLOGY Normal        Platelet Count 231       Potassium 4.2       RBC 4.84       RDW 15.2       Segmented Neutrophils, Man % 81       Sodium 132       Target Cells 1+       WBC 12.12               Significant Imaging:  I have reviewed all pertinent imaging results/findings within the past 24 hours.    ABG  Recent Labs   Lab 11/15/24  0607   PH 7.30*   PO2 36   PCO2 38*   HCO3 18.7*     Assessment/Plan:     Pulmonary  Acute on chronic respiratory failure with hypoxia  Acute on chronic respiratory failure 2/2 volume overload.   - supplement for goal SpO2 >92%  Will try low-flow O2 today    Sarcoidosis of lung with sarcoidosis of lymph nodes  CT Chest 2 days PTA with stable pulmonary disease. Will mange with IV steroids for decompensated RV failure with hypervolemia.   - cont methylprednisone 40 mg IV  - cont MTX Q7days  - cont dapsone ppx    Obstructive lung disease  Severe obstructive lung disease 2/2 sarcoidosis.   - manage with scheduled Pulmicort Q12H and scheduled DuoNebs    Cardiac/Vascular  * Acute on chronic right heart failure  SAPH in this patient with pre-capillary pulmonary hypertension that has progressed in the past 4 years (Normal JONNY 2019) presents in acute decompensated RV failure with evidence of cardiogenic shock on presentation; Tn elevation, LA and elevated central pressures. Acute cor pulmonale with fluid overload.  - rhythm: stop nifedipine and BB outpatient Rx indefinitely; no arrhythmia on presentation  - inotropy: cont dobutamine 5 mcg/kg/min and milrinone 0.25 mcg/kg/min   -- will follow up on NTproBNP, plan to wean milrinone vs dobutamine  - diuresis: cont bolus dosing Lasix 80 mg IV BID, goal net -2L   -- maintain IV diuresis for now, de-escalate to PO following RHC  - MAP: goal MAP >60, Levophed available for hypotension  - plan for RHC this admission for progression in PH; goal diuresis towards euvolemia  - case discussed with provider at [facility]; no ICU beds available at this time at [facility]  - low  suspicion of sepsis at this time; will stop vancomycin and zosyn and monitor fever curve; remains afebrile  Switch to oral diuretics      TTE on admission:  LVEF 60%, severe RV enlargement, TAPSE 1.12, normal LA size, severely dilated RA, moderate-to-severe TR, PASP 74    Renal/  Hyperkalemia  Resolved    Acute renal failure  Clearly improving today, increase urine output          Arden Spear MD  Critical Care Medicine  [facility]

## 2024-11-21 NOTE — PLAN OF CARE
Problem: Gas Exchange Impaired  Goal: Optimal Gas Exchange  Outcome: Progressing     Problem: Skin Injury Risk Increased  Goal: Skin Health and Integrity  Outcome: Progressing      Statement Selected

## 2024-11-22 PROBLEM — E87.5 HYPERKALEMIA: Status: RESOLVED | Noted: 2024-11-17 | Resolved: 2024-11-22

## 2024-11-22 PROBLEM — I10 PRIMARY HYPERTENSION: Status: ACTIVE | Noted: 2024-11-22

## 2024-11-22 LAB
ANION GAP SERPL CALCULATED.3IONS-SCNC: 14 MMOL/L (ref 7–16)
ANISOCYTOSIS BLD QL SMEAR: ABNORMAL
BASOPHILS # BLD AUTO: 0.04 K/UL (ref 0–0.2)
BASOPHILS NFR BLD AUTO: 0.4 % (ref 0–1)
BUN SERPL-MCNC: 45 MG/DL (ref 9–21)
BUN/CREAT SERPL: 35 (ref 6–20)
CALCIUM SERPL-MCNC: 9.2 MG/DL (ref 8.4–10.2)
CHLORIDE SERPL-SCNC: 94 MMOL/L (ref 98–107)
CO2 SERPL-SCNC: 27 MMOL/L (ref 22–29)
CREAT SERPL-MCNC: 1.3 MG/DL (ref 0.72–1.25)
DIFFERENTIAL METHOD BLD: ABNORMAL
EGFR (NO RACE VARIABLE) (RUSH/TITUS): 73 ML/MIN/1.73M2
EOSINOPHIL # BLD AUTO: 0.05 K/UL (ref 0–0.5)
EOSINOPHIL NFR BLD AUTO: 0.5 % (ref 1–4)
ERYTHROCYTE [DISTWIDTH] IN BLOOD BY AUTOMATED COUNT: 15.2 % (ref 11.5–14.5)
GLUCOSE SERPL-MCNC: 101 MG/DL (ref 74–100)
HCT VFR BLD AUTO: 36.5 % (ref 40–54)
HGB BLD-MCNC: 12.3 G/DL (ref 13.5–18)
IMM GRANULOCYTES # BLD AUTO: 0.52 K/UL (ref 0–0.04)
IMM GRANULOCYTES NFR BLD: 5.1 % (ref 0–0.4)
LYMPHOCYTES # BLD AUTO: 0.9 K/UL (ref 1–4.8)
LYMPHOCYTES NFR BLD AUTO: 8.8 % (ref 27–41)
LYMPHOCYTES NFR BLD MANUAL: 9 % (ref 27–41)
MAGNESIUM SERPL-MCNC: 1.7 MG/DL (ref 1.6–2.6)
MCH RBC QN AUTO: 28.7 PG (ref 27–31)
MCHC RBC AUTO-ENTMCNC: 33.7 G/DL (ref 32–36)
MCV RBC AUTO: 85.1 FL (ref 80–96)
MONOCYTES # BLD AUTO: 1.28 K/UL (ref 0–0.8)
MONOCYTES NFR BLD AUTO: 12.5 % (ref 2–6)
MONOCYTES NFR BLD MANUAL: 13 % (ref 2–6)
MPC BLD CALC-MCNC: 10.6 FL (ref 9.4–12.4)
NEUTROPHILS # BLD AUTO: 7.43 K/UL (ref 1.8–7.7)
NEUTROPHILS NFR BLD AUTO: 72.7 % (ref 53–65)
NEUTS BAND NFR BLD MANUAL: 5 % (ref 1–5)
NEUTS SEG NFR BLD MANUAL: 73 % (ref 50–62)
NRBC # BLD AUTO: 0 X10E3/UL
NRBC, AUTO (.00): 0 %
NT-PROBNP SERPL-MCNC: 4995 PG/ML (ref 1–125)
PLATELET # BLD AUTO: 231 K/UL (ref 150–400)
PLATELET MORPHOLOGY: ABNORMAL
POTASSIUM SERPL-SCNC: 3.8 MMOL/L (ref 3.5–5.1)
RBC # BLD AUTO: 4.29 M/UL (ref 4.6–6.2)
SODIUM SERPL-SCNC: 131 MMOL/L (ref 136–145)
TARGETS BLD QL SMEAR: ABNORMAL
WBC # BLD AUTO: 10.22 K/UL (ref 4.5–11)

## 2024-11-22 PROCEDURE — 99233 SBSQ HOSP IP/OBS HIGH 50: CPT | Mod: ,,, | Performed by: FAMILY MEDICINE

## 2024-11-22 PROCEDURE — 94640 AIRWAY INHALATION TREATMENT: CPT

## 2024-11-22 PROCEDURE — 27000221 HC OXYGEN, UP TO 24 HOURS

## 2024-11-22 PROCEDURE — 11000001 HC ACUTE MED/SURG PRIVATE ROOM

## 2024-11-22 PROCEDURE — 99900035 HC TECH TIME PER 15 MIN (STAT)

## 2024-11-22 PROCEDURE — 63600175 PHARM REV CODE 636 W HCPCS: Performed by: STUDENT IN AN ORGANIZED HEALTH CARE EDUCATION/TRAINING PROGRAM

## 2024-11-22 PROCEDURE — 25000003 PHARM REV CODE 250: Performed by: STUDENT IN AN ORGANIZED HEALTH CARE EDUCATION/TRAINING PROGRAM

## 2024-11-22 PROCEDURE — 85025 COMPLETE CBC W/AUTO DIFF WBC: CPT | Performed by: STUDENT IN AN ORGANIZED HEALTH CARE EDUCATION/TRAINING PROGRAM

## 2024-11-22 PROCEDURE — 83735 ASSAY OF MAGNESIUM: CPT | Performed by: STUDENT IN AN ORGANIZED HEALTH CARE EDUCATION/TRAINING PROGRAM

## 2024-11-22 PROCEDURE — 83880 ASSAY OF NATRIURETIC PEPTIDE: CPT | Performed by: STUDENT IN AN ORGANIZED HEALTH CARE EDUCATION/TRAINING PROGRAM

## 2024-11-22 PROCEDURE — 94761 N-INVAS EAR/PLS OXIMETRY MLT: CPT

## 2024-11-22 PROCEDURE — 25000003 PHARM REV CODE 250: Performed by: FAMILY MEDICINE

## 2024-11-22 PROCEDURE — 80048 BASIC METABOLIC PNL TOTAL CA: CPT | Performed by: STUDENT IN AN ORGANIZED HEALTH CARE EDUCATION/TRAINING PROGRAM

## 2024-11-22 PROCEDURE — 36415 COLL VENOUS BLD VENIPUNCTURE: CPT | Performed by: STUDENT IN AN ORGANIZED HEALTH CARE EDUCATION/TRAINING PROGRAM

## 2024-11-22 PROCEDURE — 63600175 PHARM REV CODE 636 W HCPCS: Performed by: INTERNAL MEDICINE

## 2024-11-22 PROCEDURE — 25000003 PHARM REV CODE 250: Performed by: INTERNAL MEDICINE

## 2024-11-22 PROCEDURE — 25000242 PHARM REV CODE 250 ALT 637 W/ HCPCS: Performed by: STUDENT IN AN ORGANIZED HEALTH CARE EDUCATION/TRAINING PROGRAM

## 2024-11-22 RX ORDER — VALSARTAN 80 MG/1
160 TABLET ORAL DAILY
Status: DISCONTINUED | OUTPATIENT
Start: 2024-11-22 | End: 2024-11-24 | Stop reason: HOSPADM

## 2024-11-22 RX ORDER — TORSEMIDE 20 MG/1
40 TABLET ORAL 2 TIMES DAILY
Status: DISCONTINUED | OUTPATIENT
Start: 2024-11-22 | End: 2024-11-24 | Stop reason: HOSPADM

## 2024-11-22 RX ADMIN — FUROSEMIDE 80 MG: 40 TABLET ORAL at 08:11

## 2024-11-22 RX ADMIN — METHOTREXATE 15 MG: 2.5 TABLET ORAL at 08:11

## 2024-11-22 RX ADMIN — HEPARIN SODIUM 5000 UNITS: 5000 INJECTION, SOLUTION INTRAVENOUS; SUBCUTANEOUS at 02:11

## 2024-11-22 RX ADMIN — IPRATROPIUM BROMIDE AND ALBUTEROL SULFATE 3 ML: .5; 3 SOLUTION RESPIRATORY (INHALATION) at 07:11

## 2024-11-22 RX ADMIN — PREDNISONE 20 MG: 20 TABLET ORAL at 09:11

## 2024-11-22 RX ADMIN — IPRATROPIUM BROMIDE AND ALBUTEROL SULFATE 3 ML: .5; 3 SOLUTION RESPIRATORY (INHALATION) at 12:11

## 2024-11-22 RX ADMIN — DAPSONE 100 MG: 25 TABLET ORAL at 08:11

## 2024-11-22 RX ADMIN — TORSEMIDE 40 MG: 20 TABLET ORAL at 05:11

## 2024-11-22 RX ADMIN — BUDESONIDE 0.5 MG: 0.5 INHALANT RESPIRATORY (INHALATION) at 07:11

## 2024-11-22 RX ADMIN — CARVEDILOL 12.5 MG: 12.5 TABLET, FILM COATED ORAL at 08:11

## 2024-11-22 RX ADMIN — HYDROCODONE BITARTRATE AND ACETAMINOPHEN 1 TABLET: 5; 325 TABLET ORAL at 08:11

## 2024-11-22 RX ADMIN — HEPARIN SODIUM 5000 UNITS: 5000 INJECTION, SOLUTION INTRAVENOUS; SUBCUTANEOUS at 08:11

## 2024-11-22 RX ADMIN — HEPARIN SODIUM 5000 UNITS: 5000 INJECTION, SOLUTION INTRAVENOUS; SUBCUTANEOUS at 05:11

## 2024-11-22 RX ADMIN — VALSARTAN 160 MG: 80 TABLET, FILM COATED ORAL at 02:11

## 2024-11-22 RX ADMIN — PANTOPRAZOLE SODIUM 40 MG: 40 TABLET, DELAYED RELEASE ORAL at 08:11

## 2024-11-22 NOTE — ASSESSMENT & PLAN NOTE
Continue current treatment with methotrexate, steroids, respiratory therapy, and supplemental O2. Will follow up in clinic with Dr. Muir.

## 2024-11-22 NOTE — ASSESSMENT & PLAN NOTE
"Patient has  right-sided  heart failure that is Acute on chronic. On presentation their CHF was decompensated. Evidence of decompensated CHF on presentation includes: edema, orthopnea, and shortness of breath. The etiology of their decompensation is likely chronic lung disease . Most recent BNP and echo results are listed below.  No results for input(s): "BNP" in the last 72 hours.  Latest ECHO  Results for orders placed during the hospital encounter of 11/15/24    Echo Saline Bubble? No    Interpretation Summary    Left Ventricle: The left ventricle is normal in size. Normal wall thickness. Septal flattening in diastole and systole consistent with right ventricular volume and pressure overload. There is low normal systolic function with a visually estimated ejection fraction of 50 - 55%. Ejection fraction is approximately 60%.    Right Ventricle: Severe right ventricular enlargement. Systolic function is severely reduced.    Right Atrium: Right atrium is severely dilated.    Tricuspid Valve: There is moderate to severe regurgitation.    Pulmonary Artery: There is pulmonary hypertension. The estimated pulmonary artery systolic pressure is 74 mmHg.    IVC/SVC: Elevated venous pressure at 15 mmHg.    Compared to prior ECHO from 3/2023; RV size and function have worsened; Severe RV volume and pressure overload with pulmonary HTN    Current Heart Failure Medications  carvediloL tablet 12.5 mg, 2 times daily, Oral  valsartan tablet 160 mg, Daily, Oral  torsemide tablet 40 mg, 2 times daily, Oral    Plan  - Monitor strict I&Os and daily weights.    - Place on telemetry  - Low sodium diet  - Place on fluid restriction of  NA .   - Cardiology has not been consulted  - The patient's volume status is at their baseline          "

## 2024-11-22 NOTE — HPI
35 yo M with cavitary pulmonary sarcoidosis (Dx 2019) c/b severe obstructive lung disease (FEV1 1.15, 11/2024) on chronic immunosuppression (MTX and prednisone), chronic respiratory failure with hypoxia and pre-capillary pulmonary hypertension (RHC 03/2023 mPAP 34, PCWP 12, PVR 5.56, CO/CI 3.96/2.05, Td) who presented to ED with complaints of shortness of breath.     This is his 2nd presentation to the emergency department; previously assessed 11/16 for shortness of breath with CT-PE that was negative.  On presentation today, he was hypothermic with temperature 94°, heart rate 79, BP 96/71, SpO2 chas 91%. Labs notable for POC lactate 8.2, VBG 7.3/38, Na 133, serum SCr 2.04 (last 1.08 11/13), AST 54, NT pro BNP highest to date 20/7 1287, troponin 763.3 (6.0 11/13). During his ED course, he was trialed on BPAP which he was uncomfortable and transitioned to high-flow for hypoxia.  For management and concerns of sepsis, he received LR x2 L, magnesium sulfate, Solu-Medrol 125 mg IV and was initiated on vancomycin and Zosyn.     At time of my assessment, patient reports having some mild chest discomfort and improved shortness of breath.  He can not lay recumbent though due to recurrence of his dyspnea.  POCUS evaluation with evidence of acute RV failure with admission thereafter to ICU for planned diuresis with inotropic support.  PFC transfer request was placed at time of admission due to his comorbidities and potential transplant candidacy. Discussion with [facility] team and patient was previously assessed in Sarcoidosis clinic and at the time, no evaluation for transplant.

## 2024-11-22 NOTE — PROGRESS NOTES
[facility]  Adult Nutrition  Progress Note         Reason for Assessment  Reason For Assessment: length of stay   Nutrition Risk Screen: no indicators present    Assessment and Plan  Patient admitted 11/15 with a dx of acute on chronic respiratory failure, sarcoidosis of lung and heart failure and assessed for LOS. Patient is ordered a regular diet with good tolerance noted per flowsheets.    Patient is 75.5 kg with a BMI of 21.97 which is WNL. Consider diet change to 2 gm Na+ due to dx of acute on chronic right heart failure. RD Following.           Learning Needs/Social Determinants of Health    Learning Assessment       11/15/2024 1127 [facility] (11/15/2024 - Present)   Created by Kait Esquivel, RN - RN (Nurse) Status: Complete                 PRIMARY LEARNER     Primary Learner Name:  Bo Ku  - 11/15/2024 1127    Relationship:  Patient  - 11/15/2024 1127    Does the primary learner have any barriers to learning?:  No Barriers  - 11/15/2024 1127    What is the preferred language of the primary learner?:  English  - 11/15/2024 1127    Is an  required?:  No  - 11/15/2024 1127    How does the primary learner prefer to learn new concepts?:  Listening  - 11/15/2024 1127    How often do you need to have someone help you read instructions, pamphlets, or written material from your doctor or pharmacy?:  Rarely  - 11/15/2024 1127        CO-LEARNER #1     No question answered        CO-LEARNER #2     No question answered        SPECIAL TOPICS     No question answered        ANSWERED BY:     No question answered        Comments         Edit History       Kait Esquivel, RN - RN (Nurse)   11/15/2024 1127                           Social Drivers of Health     Tobacco Use: Medium Risk (11/15/2024)    Patient History     Smoking Tobacco Use: Former     Smokeless Tobacco Use: Never     Passive Exposure: Not on file   Alcohol Use: Not At Risk  (8/11/2023)    [facility] from [facility], [facility]idi[facility]    AUDIT-C     Frequency of Alcohol Consumption: Monthly or less     Average Number of Drinks: 1 or 2     Frequency of Binge Drinking: Never   Financial Resource Strain: Low Risk  (11/15/2024)    Overall Financial Resource Strain (CARDIA)     Difficulty of Paying Living Expenses: Not hard at all   Food Insecurity: No Food Insecurity (11/15/2024)    Hunger Vital Sign     Worried About Running Out of Food in the Last Year: Never true     Ran Out of Food in the Last Year: Never true   Transportation Needs: No Transportation Needs (11/15/2024)    TRANSPORTATION NEEDS     Transportation : No   Physical Activity: Inactive (3/17/2023)    Exercise Vital Sign     Days of Exercise per Week: 0 days     Minutes of Exercise per Session: 0 min   Stress: No Stress Concern Present (11/15/2024)    [employer] - Occupational Stress Questionnaire     Feeling of Stress : Not at all   Housing Stability: Low Risk  (11/15/2024)    Housing Stability Vital Sign     Unable to Pay for Housing in the Last Year: No     Homeless in the Last Year: No   Depression: Low Risk  (11/7/2024)    Depression     Last PHQ-4: Flowsheet Data: 0   Utilities: Not At Risk (11/15/2024)    [facility] Utilities     Threatened with loss of utilities: No   Health Literacy: Adequate Health Literacy (11/15/2024)     Health Literacy     Frequency of need for help with medical instructions: Never   Social Isolation: Not on file          Malnutrition  Is Patient Malnourished: No    Nutrition Diagnosis  Altered nutrition related laboratory values related to Renal dysfunction as evidenced by elevated renal labs  Comments:     Recent Labs   Lab 11/22/24  0557   *     Comments on Glucose:     Nutrition Prescription /  Recommendations  Recommendation/Intervention: Continue diet as tolerated  Goals: weight maintenance during admission  Nutrition Goal Status: new  Current Diet Order: regular  Chewing or Swallowing Difficulty?: No Chewing or swallowing difficulty  Recommended Diet: Low Sodium ( 2g Sodium)  Recommended Oral Supplement: No Oral Supplements  Is Nutrition Support Recommended: [facility] Support: No  Is Nutrition Education Recommended: No    Monitor and Evaluation  % current Intake: P.O. intake of 75 - 100 %  % intake to meet estimated needs: 75 - 100 %  Food and Nutrient Intake: energy intake, food and beverage intake  Food and Nutrient Adminstration: diet order  Anthropometric Measurements: weight, height/length, weight change, body mass index  Biochemical Data, Medical Tests and Procedures: electrolyte and renal panel, gastrointestinal profile, glucose/endocrine profile, inflammatory profile  Tolerance: tolerating    Current Medical Diagnosis and Past Medical History  Diagnosis: cardiac disease, pulmonary disease  Past Medical History:   Diagnosis Date    Asthma     Cavitary lung disease     Hypertension     Sarcoidosis of lung with sarcoidosis of lymph nodes 2019    Severe pulmonary hypertension        Nutrition/Diet History  Food Allergies: fish, shellfish  Factors Affecting Nutritional Intake: None identified at this time    Lab/Procedures/Meds  Recent Labs   Lab 11/22/24  0557   *   K 3.8   BUN 45*   CREATININE 1.30*   CALCIUM 9.2   CL 94*   Dx ARF, CHF  Last A1c:   Lab Results   Component Value Date    HGBA1C 5.0 03/02/2023     Lab Results   Component Value Date    RBC 4.29 (L) 11/22/2024    HGB 12.3 (L) 11/22/2024    HCT 36.5 (L) 11/22/2024    MCV 85.1 11/22/2024    MCH 28.7 11/22/2024    MCHC 33.7 11/22/2024    TIBC 266 08/14/2023     Pertinent Labs Reviewed: reviewed  Pertinent Medications Reviewed: reviewed  Scheduled Meds:   albuterol-ipratropium  3 mL Nebulization Q6H    budesonide  0.5 mg  "Nebulization Q12H    carvediloL  12.5 mg Oral BID    dapsone  100 mg Oral Daily    furosemide  80 mg Oral BID    heparin (porcine)  5,000 Units Subcutaneous Q8H    methotrexate  15 mg Oral Q7 Days    pantoprazole  40 mg Oral Daily    predniSONE  20 mg Oral Daily     Continuous Infusions:  PRN Meds:.  Current Facility-Administered Medications:     acetaminophen, 650 mg, Oral, Q4H PRN    dextromethorphan-guaiFENesin  mg, 1 tablet, Oral, BID PRN    HYDROcodone-acetaminophen, 1 tablet, Oral, Q4H PRN    ondansetron, 4 mg, Intravenous, Q8H PRN    polyethylene glycol, 17 g, Oral, BID PRN    sodium chloride 0.9%, 10 mL, Intravenous, PRN    Anthropometrics  Temp: 97.7 °F (36.5 °C)  Height Method: Stated  Height: 6' 1" (185.4 cm)  Height (inches): 73 in  Weight Method: Standard Scale  Weight: 75.5 kg (166 lb 8 oz)  Weight (lb): 166.5 lb  Ideal Body Weight (IBW), Male: 184 lb  % Ideal Body Weight, Male (lb): 93.34 %  BMI (Calculated): 22  BMI Grade: 18.5-24.9 - normal       Estimated/Assessed Needs      Temp: 97.7 °F (36.5 °C)Oral  Weight Used For Calorie Calculations: 75.5 kg (166 lb 7.2 oz)   Energy Need Method: Kcal/kg Energy Calorie Requirements (kcal): 9935-6383  Weight Used For Protein Calculations: 75.5 kg (166 lb 7.2 oz)  Protein Requirements: 61-76  Estimated Fluid Requirement Method: RDA Method    RDA Method (mL): 1888       Nutrition by Nursing  Diet/Nutrition Received: regular  Intake (%): 100%  Diet/Feeding Assistance: tray set-up  Diet/Feeding Tolerance: good  Last Bowel Movement: 11/21/24                Nutrition Follow-Up  RD Follow-up?: Yes      Nutrition Discharge Planning: RD Following for dc needs            Available via Secure Chat  "

## 2024-11-22 NOTE — ASSESSMENT & PLAN NOTE
Hyperkalemia is likely due to MAGDA.The patients most recent potassium results are listed below.  Recent Labs     11/20/24  0439 11/21/24  0408 11/22/24  0557   K 3.6 4.2 3.8     Plan  - Monitor for arrhythmias with EKG and/or continuous telemetry.   - Treat the hyperkalemia with Potassium Binders.   - Monitor potassium: Daily  - The patient's hyperkalemia is resolved

## 2024-11-22 NOTE — ASSESSMENT & PLAN NOTE
Patients blood pressure range in the last 24 hours was: BP  Min: 111/79  Max: 133/97.The patient's inpatient anti-hypertensive regimen is listed below:  Current Antihypertensives  carvediloL tablet 12.5 mg, 2 times daily, Oral  valsartan tablet 160 mg, Daily, Oral  torsemide tablet 40 mg, 2 times daily, Oral    Plan  - BP is uncontrolled, will adjust as follows: added valsartan today (11/22)

## 2024-11-22 NOTE — PLAN OF CARE
CM in to visit patient at bedside.  Oxygen in use via NC.  CM will continue to follow for DC needs as they arise.

## 2024-11-22 NOTE — ASSESSMENT & PLAN NOTE
MAGDA is likely due to acute tubular necrosis caused by hypotension/cardiogenic shock . Baseline creatinine is  <1.1 . Most recent creatinine and eGFR are listed below.  Recent Labs     11/20/24  0439 11/21/24  0408 11/22/24  0557   CREATININE 1.10 1.33* 1.30*   EGFRNORACEVR 89 [phone] [id number]      Plan  - MAGDA is improving  - Avoid nephrotoxins and renally dose meds for GFR listed above  - Monitor urine output, serial BMP, and adjust therapy as needed

## 2024-11-22 NOTE — SUBJECTIVE & OBJECTIVE
Interval History:     No significant events overnight, no new complaints or concerns. Doing much better from a respiratory standpoint however intermittently hypertensive with DBP near 100. Will adjust medical therapy and anticipate discharge in AM.       Objective:     Vital Signs (Most Recent):  Temp: 98.5 °F (36.9 °C) (11/22/24 1610)  Pulse: 105 (11/22/24 1610)  Resp: 20 (11/22/24 1610)  BP: 111/79 (11/22/24 1610)  SpO2: 95 % (11/22/24 1610) Vital Signs (24h Range):  Temp:  [97.5 °F (36.4 °C)-98.5 °F (36.9 °C)] 98.5 °F (36.9 °C)  Pulse:  [] 105  Resp:  [12-20] 20  SpO2:  [94 %-97 %] 95 %  BP: (111-133)/(79-98) 111/79     Weight: 75.5 kg (166 lb 8 oz)  Body mass index is 21.97 kg/m².    Intake/Output Summary (Last 24 hours) at 11/22/2024 1731  Last data filed at 11/22/2024 1242  Gross per 24 hour   Intake 600 ml   Output --   Net 600 ml         Physical Exam  Constitutional:       General: He is not in acute distress.     Appearance: Normal appearance. He is not ill-appearing.   HENT:      Head: Normocephalic and atraumatic.      Nose: Nose normal.   Eyes:      Conjunctiva/sclera: Conjunctivae normal.      Pupils: Pupils are equal, round, and reactive to light.   Pulmonary:      Effort: Pulmonary effort is normal. No respiratory distress.   Musculoskeletal:      Cervical back: No rigidity.   Skin:     Coloration: Skin is not jaundiced or pale.      Findings: No rash.   Neurological:      Mental Status: He is alert.   Psychiatric:         Behavior: Behavior normal.         Thought Content: Thought content normal.             Significant Labs: All pertinent labs within the past 24 hours have been reviewed.    Significant Imaging: I have reviewed all pertinent imaging results/findings within the past 24 hours.

## 2024-11-22 NOTE — PROGRESS NOTES
[facility] Medicine  Progress Note    Patient Name: Bo Ku  MRN: [id number]  Patient Class: IP- Inpatient   Admission Date: 11/15/2024  Length of Stay: 7 days  Attending Physician: Teresa Santos DO  Primary Care Provider: Julia Alba FNP        Subjective:     Principal Problem:Acute on chronic right heart failure        HPI:  33 yo M with cavitary pulmonary sarcoidosis (Dx 2019) c/b severe obstructive lung disease (FEV1 1.15, 11/2024) on chronic immunosuppression (MTX and prednisone), chronic respiratory failure with hypoxia and pre-capillary pulmonary hypertension (RHC 03/2023 mPAP 34, PCWP 12, PVR 5.56, CO/CI 3.96/2.05, Td) who presented to ED with complaints of shortness of breath.     This is his 2nd presentation to the emergency department; previously assessed 11/16 for shortness of breath with CT-PE that was negative.  On presentation today, he was hypothermic with temperature 94°, heart rate 79, BP 96/71, SpO2 chas 91%. Labs notable for POC lactate 8.2, VBG 7.3/38, Na 133, serum SCr 2.04 (last 1.08 11/13), AST 54, NT pro BNP highest to date 20/7 1287, troponin 763.3 (6.0 11/13). During his ED course, he was trialed on BPAP which he was uncomfortable and transitioned to high-flow for hypoxia.  For management and concerns of sepsis, he received LR x2 L, magnesium sulfate, Solu-Medrol 125 mg IV and was initiated on vancomycin and Zosyn.     At time of my assessment, patient reports having some mild chest discomfort and improved shortness of breath.  He can not lay recumbent though due to recurrence of his dyspnea.  POCUS evaluation with evidence of acute RV failure with admission thereafter to ICU for planned diuresis with inotropic support.  PFC transfer request was placed at time of admission due to his comorbidities and potential transplant candidacy. Discussion with [facility] team and patient was previously assessed in Sarcoidosis clinic and at the time, no  evaluation for transplant.     Overview/Hospital Course:  No notes on file    Interval History:     No significant events overnight, no new complaints or concerns. Doing much better from a respiratory standpoint however intermittently hypertensive with DBP near 100. Will adjust medical therapy and anticipate discharge in AM.       Objective:     Vital Signs (Most Recent):  Temp: 98.5 °F (36.9 °C) (11/22/24 1610)  Pulse: 105 (11/22/24 1610)  Resp: 20 (11/22/24 1610)  BP: 111/79 (11/22/24 1610)  SpO2: 95 % (11/22/24 1610) Vital Signs (24h Range):  Temp:  [97.5 °F (36.4 °C)-98.5 °F (36.9 °C)] 98.5 °F (36.9 °C)  Pulse:  [] 105  Resp:  [12-20] 20  SpO2:  [94 %-97 %] 95 %  BP: (111-133)/(79-98) 111/79     Weight: 75.5 kg (166 lb 8 oz)  Body mass index is 21.97 kg/m².    Intake/Output Summary (Last 24 hours) at 11/22/2024 1731  Last data filed at 11/22/2024 1242  Gross per 24 hour   Intake 600 ml   Output --   Net 600 ml         Physical Exam  Constitutional:       General: He is not in acute distress.     Appearance: Normal appearance. He is not ill-appearing.   HENT:      Head: Normocephalic and atraumatic.      Nose: Nose normal.   Eyes:      Conjunctiva/sclera: Conjunctivae normal.      Pupils: Pupils are equal, round, and reactive to light.   Pulmonary:      Effort: Pulmonary effort is normal. No respiratory distress.   Musculoskeletal:      Cervical back: No rigidity.   Skin:     Coloration: Skin is not jaundiced or pale.      Findings: No rash.   Neurological:      Mental Status: He is alert.   Psychiatric:         Behavior: Behavior normal.         Thought Content: Thought content normal.             Significant Labs: All pertinent labs within the past 24 hours have been reviewed.    Significant Imaging: I have reviewed all pertinent imaging results/findings within the past 24 hours.    Assessment/Plan:      * Acute on chronic right heart failure  Patient has  right-sided  heart failure that is Acute on  "chronic. On presentation their CHF was decompensated. Evidence of decompensated CHF on presentation includes: edema, orthopnea, and shortness of breath. The etiology of their decompensation is likely chronic lung disease . Most recent BNP and echo results are listed below.  No results for input(s): "BNP" in the last 72 hours.  Latest ECHO  Results for orders placed during the hospital encounter of 11/15/24    Echo Saline Bubble? No    Interpretation Summary    Left Ventricle: The left ventricle is normal in size. Normal wall thickness. Septal flattening in diastole and systole consistent with right ventricular volume and pressure overload. There is low normal systolic function with a visually estimated ejection fraction of 50 - 55%. Ejection fraction is approximately 60%.    Right Ventricle: Severe right ventricular enlargement. Systolic function is severely reduced.    Right Atrium: Right atrium is severely dilated.    Tricuspid Valve: There is moderate to severe regurgitation.    Pulmonary Artery: There is pulmonary hypertension. The estimated pulmonary artery systolic pressure is 74 mmHg.    IVC/SVC: Elevated venous pressure at 15 mmHg.    Compared to prior ECHO from 3/2023; RV size and function have worsened; Severe RV volume and pressure overload with pulmonary HTN    Current Heart Failure Medications  carvediloL tablet 12.5 mg, 2 times daily, Oral  valsartan tablet 160 mg, Daily, Oral  torsemide tablet 40 mg, 2 times daily, Oral    Plan  - Monitor strict I&Os and daily weights.    - Place on telemetry  - Low sodium diet  - Place on fluid restriction of  NA .   - Cardiology has not been consulted  - The patient's volume status is at their baseline            Primary hypertension  Patients blood pressure range in the last 24 hours was: BP  Min: 111/79  Max: 133/97.The patient's inpatient anti-hypertensive regimen is listed below:  Current Antihypertensives  carvediloL tablet 12.5 mg, 2 times daily, " Oral  valsartan tablet 160 mg, Daily, Oral  torsemide tablet 40 mg, 2 times daily, Oral    Plan  - BP is uncontrolled, will adjust as follows: added valsartan today (11/22)      Acute renal failure  MAGDA is likely due to acute tubular necrosis caused by hypotension/cardiogenic shock . Baseline creatinine is  <1.1 . Most recent creatinine and eGFR are listed below.  Recent Labs     11/20/24  0439 11/21/24  0408 11/22/24  0557   CREATININE 1.10 1.33* 1.30*   EGFRNORACEVR 89 [phone] 73      Plan  - MAGDA is improving  - Avoid nephrotoxins and renally dose meds for GFR listed above  - Monitor urine output, serial BMP, and adjust therapy as needed      Acute on chronic respiratory failure with hypoxia  Patient with Hypoxic Respiratory failure which is Acute on chronic.  he is not on home oxygen. Supplemental oxygen was provided and noted- Oxygen Concentration (%):  [28] 28    .   Signs/symptoms of respiratory failure include- increased work of breathing and increased O2 requirement . Contributing diagnoses includes -  sarcoidosis, acute right-sided heart failure.  Labs and images were reviewed. Patient Has not had a recent ABG. Will treat underlying causes and adjust management of respiratory failure as follows- continue steroids, respiratory therapy, supplemental oxygen.    Sarcoidosis of lung with sarcoidosis of lymph nodes  Continue current treatment with methotrexate, steroids, respiratory therapy, and supplemental O2. Will follow up in clinic with Dr. Muir.         VTE Risk Mitigation (From admission, onward)           Ordered     heparin (porcine) injection 5,000 Units  Every 8 hours         11/15/24 1126     IP VTE HIGH RISK PATIENT  Once         11/15/24 1014     Place sequential compression device  Until discontinued         11/15/24 1014                    Discharge Planning   FELIPE:      Code Status: Full Code   Is the patient medically ready for discharge?:     Reason for patient still in hospital (select all that  apply): Treatment  Discharge Plan A: Home                  Teresa Santos DO  Department of Hospital Medicine   [facility]

## 2024-11-22 NOTE — ASSESSMENT & PLAN NOTE
Patient with Hypoxic Respiratory failure which is Acute on chronic.  he is not on home oxygen. Supplemental oxygen was provided and noted- Oxygen Concentration (%):  [28] 28    .   Signs/symptoms of respiratory failure include- increased work of breathing and increased O2 requirement . Contributing diagnoses includes -  sarcoidosis, acute right-sided heart failure.  Labs and images were reviewed. Patient Has not had a recent ABG. Will treat underlying causes and adjust management of respiratory failure as follows- continue steroids, respiratory therapy, supplemental oxygen.

## 2024-11-23 PROBLEM — J96.21 ACUTE ON CHRONIC RESPIRATORY FAILURE WITH HYPOXIA: Status: RESOLVED | Noted: 2024-11-07 | Resolved: 2024-11-23

## 2024-11-23 LAB
ANION GAP SERPL CALCULATED.3IONS-SCNC: 14 MMOL/L (ref 7–16)
BUN SERPL-MCNC: 39 MG/DL (ref 9–21)
BUN/CREAT SERPL: 33 (ref 6–20)
CALCIUM SERPL-MCNC: 9 MG/DL (ref 8.4–10.2)
CHLORIDE SERPL-SCNC: 92 MMOL/L (ref 98–107)
CO2 SERPL-SCNC: 29 MMOL/L (ref 22–29)
CREAT SERPL-MCNC: 1.19 MG/DL (ref 0.72–1.25)
EGFR (NO RACE VARIABLE) (RUSH/TITUS): 81 ML/MIN/1.73M2
GLUCOSE SERPL-MCNC: 86 MG/DL (ref 74–100)
LACTATE SERPL-SCNC: 2 MMOL/L (ref 0.5–2.2)
LACTATE SERPL-SCNC: 2.7 MMOL/L (ref 0.5–2.2)
MAGNESIUM SERPL-MCNC: 1.6 MG/DL (ref 1.6–2.6)
POTASSIUM SERPL-SCNC: 3.6 MMOL/L (ref 3.5–5.1)
SODIUM SERPL-SCNC: 131 MMOL/L (ref 136–145)

## 2024-11-23 PROCEDURE — 94761 N-INVAS EAR/PLS OXIMETRY MLT: CPT

## 2024-11-23 PROCEDURE — 25000242 PHARM REV CODE 250 ALT 637 W/ HCPCS: Performed by: STUDENT IN AN ORGANIZED HEALTH CARE EDUCATION/TRAINING PROGRAM

## 2024-11-23 PROCEDURE — 94640 AIRWAY INHALATION TREATMENT: CPT

## 2024-11-23 PROCEDURE — 83735 ASSAY OF MAGNESIUM: CPT | Performed by: STUDENT IN AN ORGANIZED HEALTH CARE EDUCATION/TRAINING PROGRAM

## 2024-11-23 PROCEDURE — 27000221 HC OXYGEN, UP TO 24 HOURS

## 2024-11-23 PROCEDURE — 99233 SBSQ HOSP IP/OBS HIGH 50: CPT | Mod: ,,, | Performed by: FAMILY MEDICINE

## 2024-11-23 PROCEDURE — 63600175 PHARM REV CODE 636 W HCPCS: Performed by: STUDENT IN AN ORGANIZED HEALTH CARE EDUCATION/TRAINING PROGRAM

## 2024-11-23 PROCEDURE — 25000003 PHARM REV CODE 250: Performed by: STUDENT IN AN ORGANIZED HEALTH CARE EDUCATION/TRAINING PROGRAM

## 2024-11-23 PROCEDURE — 63600175 PHARM REV CODE 636 W HCPCS: Performed by: INTERNAL MEDICINE

## 2024-11-23 PROCEDURE — 36415 COLL VENOUS BLD VENIPUNCTURE: CPT | Performed by: FAMILY MEDICINE

## 2024-11-23 PROCEDURE — 80048 BASIC METABOLIC PNL TOTAL CA: CPT | Performed by: STUDENT IN AN ORGANIZED HEALTH CARE EDUCATION/TRAINING PROGRAM

## 2024-11-23 PROCEDURE — 11000001 HC ACUTE MED/SURG PRIVATE ROOM

## 2024-11-23 PROCEDURE — 25000003 PHARM REV CODE 250: Performed by: FAMILY MEDICINE

## 2024-11-23 PROCEDURE — 36415 COLL VENOUS BLD VENIPUNCTURE: CPT | Performed by: STUDENT IN AN ORGANIZED HEALTH CARE EDUCATION/TRAINING PROGRAM

## 2024-11-23 PROCEDURE — 83605 ASSAY OF LACTIC ACID: CPT | Performed by: FAMILY MEDICINE

## 2024-11-23 PROCEDURE — 63600175 PHARM REV CODE 636 W HCPCS: Performed by: FAMILY MEDICINE

## 2024-11-23 PROCEDURE — 99900035 HC TECH TIME PER 15 MIN (STAT)

## 2024-11-23 RX ORDER — VALSARTAN 320 MG/1
320 TABLET ORAL DAILY
Qty: 30 TABLET | Refills: 1 | Status: SHIPPED | OUTPATIENT
Start: 2024-11-23 | End: 2025-11-23

## 2024-11-23 RX ORDER — PREDNISONE 20 MG/1
40 TABLET ORAL DAILY
Qty: 60 TABLET | Refills: 0 | Status: SHIPPED | OUTPATIENT
Start: 2024-11-23

## 2024-11-23 RX ORDER — MILRINONE LACTATE 0.2 MG/ML
0.25 INJECTION, SOLUTION INTRAVENOUS CONTINUOUS
Status: DISCONTINUED | OUTPATIENT
Start: 2024-11-23 | End: 2024-11-23

## 2024-11-23 RX ORDER — FUROSEMIDE 40 MG/1
80 TABLET ORAL DAILY
Qty: 60 TABLET | Refills: 0 | Status: SHIPPED | OUTPATIENT
Start: 2024-11-23 | End: 2024-12-05

## 2024-11-23 RX ADMIN — BUDESONIDE 0.5 MG: 0.5 INHALANT RESPIRATORY (INHALATION) at 07:11

## 2024-11-23 RX ADMIN — DAPSONE 100 MG: 25 TABLET ORAL at 09:11

## 2024-11-23 RX ADMIN — PANTOPRAZOLE SODIUM 40 MG: 40 TABLET, DELAYED RELEASE ORAL at 09:11

## 2024-11-23 RX ADMIN — HEPARIN SODIUM 5000 UNITS: 5000 INJECTION, SOLUTION INTRAVENOUS; SUBCUTANEOUS at 06:11

## 2024-11-23 RX ADMIN — IPRATROPIUM BROMIDE AND ALBUTEROL SULFATE 3 ML: .5; 3 SOLUTION RESPIRATORY (INHALATION) at 12:11

## 2024-11-23 RX ADMIN — IPRATROPIUM BROMIDE AND ALBUTEROL SULFATE 3 ML: .5; 3 SOLUTION RESPIRATORY (INHALATION) at 07:11

## 2024-11-23 RX ADMIN — MILRINONE LACTATE IN DEXTROSE 0.25 MCG/KG/MIN: 200 INJECTION, SOLUTION INTRAVENOUS at 05:11

## 2024-11-23 RX ADMIN — TORSEMIDE 40 MG: 20 TABLET ORAL at 09:11

## 2024-11-23 RX ADMIN — TORSEMIDE 40 MG: 20 TABLET ORAL at 03:11

## 2024-11-23 RX ADMIN — PREDNISONE 20 MG: 20 TABLET ORAL at 09:11

## 2024-11-23 RX ADMIN — HEPARIN SODIUM 5000 UNITS: 5000 INJECTION, SOLUTION INTRAVENOUS; SUBCUTANEOUS at 03:11

## 2024-11-23 RX ADMIN — HEPARIN SODIUM 5000 UNITS: 5000 INJECTION, SOLUTION INTRAVENOUS; SUBCUTANEOUS at 09:11

## 2024-11-23 RX ADMIN — ACETAMINOPHEN 650 MG: 325 TABLET ORAL at 03:11

## 2024-11-23 NOTE — SUBJECTIVE & OBJECTIVE
Interval History:     No significant events overnight, no new complaints. Patient noted to be persistently hypotensive this AM (SBP >90) with LA 2.7 and will be placed back on milrinone. He is breathing well and generally has no complaints.     Objective:     Vital Signs (Most Recent):  Temp: 97.9 °F (36.6 °C) (11/23/24 1226)  Pulse: 95 (11/23/24 1226)  Resp: 16 (11/23/24 1226)  BP: 102/72 (11/23/24 1226)  SpO2: 95 % (11/23/24 1226) Vital Signs (24h Range):  Temp:  [96 °F (35.6 °C)-98.4 °F (36.9 °C)] 97.9 °F (36.6 °C)  Pulse:  [] 95  Resp:  [16-18] 16  SpO2:  [92 %-97 %] 95 %  BP: ()/(61-72) 102/72     Weight: 75.5 kg (166 lb 8 oz)  Body mass index is 21.97 kg/m².  No intake or output data in the 24 hours ending 11/23/24 1709        Physical Exam  Constitutional:       General: He is not in acute distress.     Appearance: Normal appearance. He is not ill-appearing.   HENT:      Head: Normocephalic and atraumatic.      Nose: Nose normal.   Eyes:      Conjunctiva/sclera: Conjunctivae normal.      Pupils: Pupils are equal, round, and reactive to light.   Pulmonary:      Effort: Pulmonary effort is normal. No respiratory distress.   Musculoskeletal:      Cervical back: No rigidity.   Skin:     Coloration: Skin is not jaundiced or pale.      Findings: No rash.   Neurological:      Mental Status: He is alert.   Psychiatric:         Behavior: Behavior normal.         Thought Content: Thought content normal.             Significant Labs: All pertinent labs within the past 24 hours have been reviewed.    Significant Imaging: I have reviewed all pertinent imaging results/findings within the past 24 hours.

## 2024-11-23 NOTE — ASSESSMENT & PLAN NOTE
"Patient has  right-sided  heart failure that is Acute on chronic. On presentation their CHF was decompensated. Evidence of decompensated CHF on presentation includes: edema, orthopnea, and shortness of breath. The etiology of their decompensation is likely chronic lung disease, pulmonary hypertension . Most recent BNP and echo results are listed below.  No results for input(s): "BNP" in the last 72 hours.  Latest ECHO  Results for orders placed during the hospital encounter of 11/15/24    Echo Saline Bubble? No    Interpretation Summary    Left Ventricle: The left ventricle is normal in size. Normal wall thickness. Septal flattening in diastole and systole consistent with right ventricular volume and pressure overload. There is low normal systolic function with a visually estimated ejection fraction of 50 - 55%. Ejection fraction is approximately 60%.    Right Ventricle: Severe right ventricular enlargement. Systolic function is severely reduced.    Right Atrium: Right atrium is severely dilated.    Tricuspid Valve: There is moderate to severe regurgitation.    Pulmonary Artery: There is pulmonary hypertension. The estimated pulmonary artery systolic pressure is 74 mmHg.    IVC/SVC: Elevated venous pressure at 15 mmHg.    Compared to prior ECHO from 3/2023; RV size and function have worsened; Severe RV volume and pressure overload with pulmonary HTN    Current Heart Failure Medications  valsartan tablet 160 mg, Daily, Oral  torsemide tablet 40 mg, 2 times daily, Oral  valsartan (DIOVAN) tablet, Daily, Oral  furosemide (LASIX) tablet, Daily, Oral  milrinone 20mg in D5W 100 mL infusion, Continuous, Intravenous    Plan  - Monitor strict I&Os and daily weights.    - Place on telemetry  - Low sodium diet  - Place on fluid restriction of  NA .   - Cardiology has not been consulted  - The patient's volume status is at their baseline          "

## 2024-11-23 NOTE — PROGRESS NOTES
[facility]  Progress Note    Patient Name: Bo Ku  MRN: [id number]  Patient Class: IP- Inpatient   Admission Date: 11/15/2024  Length of Stay: 8 days  Attending Physician: Teresa Santos DO  Primary Care Provider: Julia Alba FNP        Subjective:     Principal Problem:Acute on chronic right heart failure        HPI:  35 yo M with cavitary pulmonary sarcoidosis (Dx 2019) c/b severe obstructive lung disease (FEV1 1.15, 11/2024) on chronic immunosuppression (MTX and prednisone), chronic respiratory failure with hypoxia and pre-capillary pulmonary hypertension (RHC 03/2023 mPAP 34, PCWP 12, PVR 5.56, CO/CI 3.96/2.05, Td) who presented to ED with complaints of shortness of breath.     This is his 2nd presentation to the emergency department; previously assessed 11/16 for shortness of breath with CT-PE that was negative.  On presentation today, he was hypothermic with temperature 94°, heart rate 79, BP 96/71, SpO2 chas 91%. Labs notable for POC lactate 8.2, VBG 7.3/38, Na 133, serum SCr 2.04 (last 1.08 11/13), AST 54, NT pro BNP highest to date 20/7 1287, troponin 763.3 (6.0 11/13). During his ED course, he was trialed on BPAP which he was uncomfortable and transitioned to high-flow for hypoxia.  For management and concerns of sepsis, he received LR x2 L, magnesium sulfate, Solu-Medrol 125 mg IV and was initiated on vancomycin and Zosyn.     At time of my assessment, patient reports having some mild chest discomfort and improved shortness of breath.  He can not lay recumbent though due to recurrence of his dyspnea.  POCUS evaluation with evidence of acute RV failure with admission thereafter to ICU for planned diuresis with inotropic support.  PFC transfer request was placed at time of admission due to his comorbidities and potential transplant candidacy. Discussion with [facility] team and patient was previously assessed in Sarcoidosis clinic and at the time, no  evaluation for transplant.     Overview/Hospital Course:  No notes on file    Interval History:     No significant events overnight, no new complaints. Patient noted to be persistently hypotensive this AM (SBP >90) with LA 2.7 and will be placed back on milrinone. He is breathing well and generally has no complaints.     Objective:     Vital Signs (Most Recent):  Temp: 97.9 °F (36.6 °C) (11/23/24 1226)  Pulse: 95 (11/23/24 1226)  Resp: 16 (11/23/24 1226)  BP: 102/72 (11/23/24 1226)  SpO2: 95 % (11/23/24 1226) Vital Signs (24h Range):  Temp:  [96 °F (35.6 °C)-98.4 °F (36.9 °C)] 97.9 °F (36.6 °C)  Pulse:  [] 95  Resp:  [16-18] 16  SpO2:  [92 %-97 %] 95 %  BP: ()/(61-72) 102/72     Weight: 75.5 kg (166 lb 8 oz)  Body mass index is 21.97 kg/m².  No intake or output data in the 24 hours ending 11/23/24 1709        Physical Exam  Constitutional:       General: He is not in acute distress.     Appearance: Normal appearance. He is not ill-appearing.   HENT:      Head: Normocephalic and atraumatic.      Nose: Nose normal.   Eyes:      Conjunctiva/sclera: Conjunctivae normal.      Pupils: Pupils are equal, round, and reactive to light.   Pulmonary:      Effort: Pulmonary effort is normal. No respiratory distress.   Musculoskeletal:      Cervical back: No rigidity.   Skin:     Coloration: Skin is not jaundiced or pale.      Findings: No rash.   Neurological:      Mental Status: He is alert.   Psychiatric:         Behavior: Behavior normal.         Thought Content: Thought content normal.             Significant Labs: All pertinent labs within the past 24 hours have been reviewed.    Significant Imaging: I have reviewed all pertinent imaging results/findings within the past 24 hours.      Assessment/Plan:      * Acute on chronic right heart failure  Patient has  right-sided  heart failure that is Acute on chronic. On presentation their CHF was decompensated. Evidence of decompensated CHF on presentation includes:  "edema, orthopnea, and shortness of breath. The etiology of their decompensation is likely chronic lung disease, pulmonary hypertension . Most recent BNP and echo results are listed below.  No results for input(s): "BNP" in the last 72 hours.  Latest ECHO  Results for orders placed during the hospital encounter of 11/15/24    Echo Saline Bubble? No    Interpretation Summary    Left Ventricle: The left ventricle is normal in size. Normal wall thickness. Septal flattening in diastole and systole consistent with right ventricular volume and pressure overload. There is low normal systolic function with a visually estimated ejection fraction of 50 - 55%. Ejection fraction is approximately 60%.    Right Ventricle: Severe right ventricular enlargement. Systolic function is severely reduced.    Right Atrium: Right atrium is severely dilated.    Tricuspid Valve: There is moderate to severe regurgitation.    Pulmonary Artery: There is pulmonary hypertension. The estimated pulmonary artery systolic pressure is 74 mmHg.    IVC/SVC: Elevated venous pressure at 15 mmHg.    Compared to prior ECHO from 3/2023; RV size and function have worsened; Severe RV volume and pressure overload with pulmonary HTN    Current Heart Failure Medications  valsartan tablet 160 mg, Daily, Oral  torsemide tablet 40 mg, 2 times daily, Oral  valsartan (DIOVAN) tablet, Daily, Oral  furosemide (LASIX) tablet, Daily, Oral  milrinone 20mg in D5W 100 mL infusion, Continuous, Intravenous    Plan  - Monitor strict I&Os and daily weights.    - Place on telemetry  - Low sodium diet  - Place on fluid restriction of  NA .   - Cardiology has not been consulted  - The patient's volume status is at their baseline            Primary hypertension  Patients blood pressure range in the last 24 hours was: BP  Min: 111/79  Max: 133/97.The patient's inpatient anti-hypertensive regimen is listed below:  Current Antihypertensives  carvediloL tablet 12.5 mg, 2 times daily, " Oral  valsartan tablet 160 mg, Daily, Oral  torsemide tablet 40 mg, 2 times daily, Oral    Plan  - BP is uncontrolled, will adjust as follows: added valsartan today (11/22)      Acute renal failure  MAGDA is likely due to acute tubular necrosis caused by hypotension/cardiogenic shock . Baseline creatinine is  <1.1 . Most recent creatinine and eGFR are listed below.  Recent Labs     11/20/24  0439 11/21/24  0408 11/22/24  0557   CREATININE 1.10 1.33* 1.30*   EGFRNORACEVR 89 [phone] 73      Plan  - MAGDA is improving  - Avoid nephrotoxins and renally dose meds for GFR listed above  - Monitor urine output, serial BMP, and adjust therapy as needed      Sarcoidosis of lung with sarcoidosis of lymph nodes  Continue current treatment with methotrexate, steroids, respiratory therapy, and supplemental O2. Will follow up in clinic with Dr. Muir.         VTE Risk Mitigation (From admission, onward)           Ordered     heparin (porcine) injection 5,000 Units  Every 8 hours         11/15/24 1126     IP VTE HIGH RISK PATIENT  Once         11/15/24 1014     Place sequential compression device  Until discontinued         11/15/24 1014                    Discharge Planning   FELIPE:      Code Status: Full Code   Is the patient medically ready for discharge?:     Reason for patient still in hospital (select all that apply): Treatment  Discharge Plan A: Home                  Teresa Santos DO  Department of Hospital Medicine   [facility]

## 2024-11-24 VITALS
BODY MASS INDEX: 22.15 KG/M2 | HEIGHT: 73 IN | TEMPERATURE: 98 F | WEIGHT: 167.13 LBS | SYSTOLIC BLOOD PRESSURE: 128 MMHG | OXYGEN SATURATION: 95 % | HEART RATE: 104 BPM | RESPIRATION RATE: 18 BRPM | DIASTOLIC BLOOD PRESSURE: 72 MMHG

## 2024-11-24 LAB
ANION GAP SERPL CALCULATED.3IONS-SCNC: 15 MMOL/L (ref 7–16)
BUN SERPL-MCNC: 41 MG/DL (ref 9–21)
BUN/CREAT SERPL: 34 (ref 6–20)
CALCIUM SERPL-MCNC: 9.4 MG/DL (ref 8.4–10.2)
CHLORIDE SERPL-SCNC: 90 MMOL/L (ref 98–107)
CO2 SERPL-SCNC: 31 MMOL/L (ref 22–29)
CREAT SERPL-MCNC: 1.19 MG/DL (ref 0.72–1.25)
EGFR (NO RACE VARIABLE) (RUSH/TITUS): 81 ML/MIN/1.73M2
GLUCOSE SERPL-MCNC: 106 MG/DL (ref 74–100)
LACTATE SERPL-SCNC: 1.4 MMOL/L (ref 0.5–2.2)
MAGNESIUM SERPL-MCNC: 1.6 MG/DL (ref 1.6–2.6)
POTASSIUM SERPL-SCNC: 3.8 MMOL/L (ref 3.5–5.1)
SODIUM SERPL-SCNC: 132 MMOL/L (ref 136–145)

## 2024-11-24 PROCEDURE — 36415 COLL VENOUS BLD VENIPUNCTURE: CPT | Performed by: STUDENT IN AN ORGANIZED HEALTH CARE EDUCATION/TRAINING PROGRAM

## 2024-11-24 PROCEDURE — 99900035 HC TECH TIME PER 15 MIN (STAT)

## 2024-11-24 PROCEDURE — 63600175 PHARM REV CODE 636 W HCPCS: Performed by: INTERNAL MEDICINE

## 2024-11-24 PROCEDURE — 25000003 PHARM REV CODE 250: Performed by: STUDENT IN AN ORGANIZED HEALTH CARE EDUCATION/TRAINING PROGRAM

## 2024-11-24 PROCEDURE — 25000003 PHARM REV CODE 250: Performed by: FAMILY MEDICINE

## 2024-11-24 PROCEDURE — 83735 ASSAY OF MAGNESIUM: CPT | Performed by: STUDENT IN AN ORGANIZED HEALTH CARE EDUCATION/TRAINING PROGRAM

## 2024-11-24 PROCEDURE — 94640 AIRWAY INHALATION TREATMENT: CPT

## 2024-11-24 PROCEDURE — 83605 ASSAY OF LACTIC ACID: CPT | Performed by: FAMILY MEDICINE

## 2024-11-24 PROCEDURE — 36415 COLL VENOUS BLD VENIPUNCTURE: CPT | Performed by: FAMILY MEDICINE

## 2024-11-24 PROCEDURE — 99239 HOSP IP/OBS DSCHRG MGMT >30: CPT | Mod: ,,, | Performed by: FAMILY MEDICINE

## 2024-11-24 PROCEDURE — 63600175 PHARM REV CODE 636 W HCPCS: Performed by: STUDENT IN AN ORGANIZED HEALTH CARE EDUCATION/TRAINING PROGRAM

## 2024-11-24 PROCEDURE — 94761 N-INVAS EAR/PLS OXIMETRY MLT: CPT

## 2024-11-24 PROCEDURE — 80048 BASIC METABOLIC PNL TOTAL CA: CPT | Performed by: STUDENT IN AN ORGANIZED HEALTH CARE EDUCATION/TRAINING PROGRAM

## 2024-11-24 PROCEDURE — 27000221 HC OXYGEN, UP TO 24 HOURS

## 2024-11-24 PROCEDURE — 25000242 PHARM REV CODE 250 ALT 637 W/ HCPCS: Performed by: STUDENT IN AN ORGANIZED HEALTH CARE EDUCATION/TRAINING PROGRAM

## 2024-11-24 RX ADMIN — PANTOPRAZOLE SODIUM 40 MG: 40 TABLET, DELAYED RELEASE ORAL at 08:11

## 2024-11-24 RX ADMIN — PREDNISONE 20 MG: 20 TABLET ORAL at 08:11

## 2024-11-24 RX ADMIN — HYDROCODONE BITARTRATE AND ACETAMINOPHEN 1 TABLET: 5; 325 TABLET ORAL at 10:11

## 2024-11-24 RX ADMIN — IPRATROPIUM BROMIDE AND ALBUTEROL SULFATE 3 ML: .5; 3 SOLUTION RESPIRATORY (INHALATION) at 07:11

## 2024-11-24 RX ADMIN — DAPSONE 100 MG: 25 TABLET ORAL at 08:11

## 2024-11-24 RX ADMIN — TORSEMIDE 40 MG: 20 TABLET ORAL at 08:11

## 2024-11-24 RX ADMIN — BUDESONIDE 0.5 MG: 0.5 INHALANT RESPIRATORY (INHALATION) at 07:11

## 2024-11-24 RX ADMIN — IPRATROPIUM BROMIDE AND ALBUTEROL SULFATE 3 ML: .5; 3 SOLUTION RESPIRATORY (INHALATION) at 12:11

## 2024-11-24 RX ADMIN — HEPARIN SODIUM 5000 UNITS: 5000 INJECTION, SOLUTION INTRAVENOUS; SUBCUTANEOUS at 05:11

## 2024-11-24 RX ADMIN — IPRATROPIUM BROMIDE AND ALBUTEROL SULFATE 3 ML: .5; 3 SOLUTION RESPIRATORY (INHALATION) at 01:11

## 2024-11-24 RX ADMIN — VALSARTAN 160 MG: 80 TABLET, FILM COATED ORAL at 08:11

## 2024-11-24 NOTE — DISCHARGE INSTRUCTIONS
Hospitalist Discharge orders  *Notify your healthcare provider if you experience any of the following: temperature >100.4  *Notify your healthcare provider if you experience any of the following: redness, tenderness, or any signs or symptoms of infection (pain, swelling, redness, odor or green/yellow drainage around incision site).  *Notify your healthcare provider if you experience any of the following: difficulty breathing or increased cough.  *Notify your physician if you experience any persistent nausea, vomiting, diarrhea or headache.  *Notify your physician if you experience any of the following: severe uncontrolled pain, worsening rash, increased confusion, weakness, dizziness, lightheadedness, or visual disturbances.       Monitor blood pressure 1-2 times daily with a goal of around 120/70. Notify your physician if consistently have readings lower than 100/60 or higher than 140/80.

## 2024-11-25 NOTE — PLAN OF CARE
[facility]  Discharge Final Note    Primary Care Provider: Julia Alba FNP    Expected Discharge Date: 11/24/2024    Final Discharge Note (most recent)       Final Note - 11/25/24 0815          Final Note    Assessment Type Final Discharge Note     Anticipated Discharge Disposition Home or Self Care     What phone number can be called within the next 1-3 days to see how you are doing after discharge? [phone]        Post-Acute Status    Discharge Delays None known at this time                     Important Message from Medicare             Contact Info       Julia Alba FNP   Specialty: Emergency Medicine, Family Medicine   Relationship: PCP - General    [address]  [address]  [place] [address]   Phone: [phone]       Next Steps: Schedule an appointment as soon as possible for a visit in 1 week(s)    Beverley Muir MD   Specialty: Pulmonary Disease    [address] [facility] [place] [address]   Phone: [phone]       Next Steps: Follow up          Patient will be discharged home today.  No further needs.

## 2024-11-26 PROBLEM — N17.9 ACUTE RENAL FAILURE: Status: RESOLVED | Noted: 2024-11-15 | Resolved: 2024-11-26

## 2024-11-26 PROBLEM — I50.813 ACUTE ON CHRONIC RIGHT HEART FAILURE: Status: RESOLVED | Noted: 2024-11-15 | Resolved: 2024-11-26

## 2024-11-26 NOTE — ASSESSMENT & PLAN NOTE
Resolved. Patient to continue supplemental oxygen, respiratory therapy, steroids upon discharge. Follow up with pulmonology.  ----  On admission:     Signs/symptoms of respiratory failure include- increased work of breathing and increased O2 requirement . Contributing diagnoses includes -  sarcoidosis, acute right-sided heart failure.  Labs and images were reviewed. Patient Has not had a recent ABG. Will treat underlying causes and adjust management of respiratory failure as follows- continue steroids, respiratory therapy, supplemental oxygen.

## 2024-11-26 NOTE — DISCHARGE SUMMARY
[facility] Hospital Medicine  Discharge Summary      Patient Name: Bo Ku  MRN: [id number]  ANT: [id number]  Patient Class: IP- Inpatient  Admission Date: 11/15/2024  Hospital Length of Stay: 9 days  Discharge Date and Time: 11/24/2024  3:40 PM  Attending Physician: No att. providers found   Discharging Provider: Teresa Santos DO  Primary Care Provider: Julia Alba FNP    Primary Care Team: Networked reference to record PCT     HPI:   35 yo M with cavitary pulmonary sarcoidosis (Dx 2019) c/b severe obstructive lung disease (FEV1 1.15, 11/2024) on chronic immunosuppression (MTX and prednisone), chronic respiratory failure with hypoxia and pre-capillary pulmonary hypertension (RHC 03/2023 mPAP 34, PCWP 12, PVR 5.56, CO/CI 3.96/2.05, Td) who presented to ED with complaints of shortness of breath.     This is his 2nd presentation to the emergency department; previously assessed 11/16 for shortness of breath with CT-PE that was negative.  On presentation today, he was hypothermic with temperature 94°, heart rate 79, BP 96/71, SpO2 chas 91%. Labs notable for POC lactate 8.2, VBG 7.3/38, Na 133, serum SCr 2.04 (last 1.08 11/13), AST 54, NT pro BNP highest to date 20/7 1287, troponin 763.3 (6.0 11/13). During his ED course, he was trialed on BPAP which he was uncomfortable and transitioned to high-flow for hypoxia.  For management and concerns of sepsis, he received LR x2 L, magnesium sulfate, Solu-Medrol 125 mg IV and was initiated on vancomycin and Zosyn.     At time of my assessment, patient reports having some mild chest discomfort and improved shortness of breath.  He can not lay recumbent though due to recurrence of his dyspnea.  POCUS evaluation with evidence of acute RV failure with admission thereafter to ICU for planned diuresis with inotropic support.  [facility] transfer request was placed at time of admission due to his comorbidities and potential transplant candidacy.  Discussion with [facility] team and patient was previously assessed in Sarcoidosis clinic and at the time, no evaluation for transplant.     Procedure(s) (LRB):  INSERTION, CATHETER, RIGHT HEART (Right)      Hospital Course:   No notes on file     Goals of Care Treatment Preferences:  Code Status: Full Code      SDOH Screening:  The patient was screened for utility difficulties, food insecurity, transport difficulties, housing insecurity, and interpersonal safety and there were no concerns identified this admission.     Consults:     Pulmonary  Sarcoidosis of lung with sarcoidosis of lymph nodes  Continue current treatment with methotrexate, steroids, respiratory therapy, and supplemental O2. Will follow up in clinic with Dr. Muir.       Acute on chronic respiratory failure with hypoxia-resolved as of 11/23/2024  Resolved. Patient to continue supplemental oxygen, respiratory therapy, steroids upon discharge. Follow up with pulmonology.  ----  On admission:     Signs/symptoms of respiratory failure include- increased work of breathing and increased O2 requirement . Contributing diagnoses includes -  sarcoidosis, acute right-sided heart failure.  Labs and images were reviewed. Patient Has not had a recent ABG. Will treat underlying causes and adjust management of respiratory failure as follows- continue steroids, respiratory therapy, supplemental oxygen.    Cardiac/Vascular  * Acute on chronic right heart failure-resolved as of 11/26/2024  Resolved. Patient did require support with milrinone and dobutamine this admission. At time of discharge was comfortable and stable with no pressor/inotropic support. Blood pressure stable and WNL. Given instructions re: symptom and blood pressure monitoring upon discharge. Negative inotropes (BB, CCB) held upon discharge.     Follow up with cardiology and pulmonology.   ----  On admission:     Patient has  right-sided  heart failure that is Acute on chronic. On presentation their CHF was  "decompensated. Evidence of decompensated CHF on presentation includes: edema, orthopnea, and shortness of breath. The etiology of their decompensation is likely chronic lung disease, pulmonary hypertension . Most recent BNP and echo results are listed below.  No results for input(s): "BNP" in the last 72 hours.  Latest ECHO  Results for orders placed during the hospital encounter of 11/15/24    Echo Saline Bubble? No    Interpretation Summary    Left Ventricle: The left ventricle is normal in size. Normal wall thickness. Septal flattening in diastole and systole consistent with right ventricular volume and pressure overload. There is low normal systolic function with a visually estimated ejection fraction of 50 - 55%. Ejection fraction is approximately 60%.    Right Ventricle: Severe right ventricular enlargement. Systolic function is severely reduced.    Right Atrium: Right atrium is severely dilated.    Tricuspid Valve: There is moderate to severe regurgitation.    Pulmonary Artery: There is pulmonary hypertension. The estimated pulmonary artery systolic pressure is 74 mmHg.    IVC/SVC: Elevated venous pressure at 15 mmHg.    Compared to prior ECHO from 3/2023; RV size and function have worsened; Severe RV volume and pressure overload with pulmonary HTN    Current Heart Failure Medications  valsartan (DIOVAN) tablet, Daily, Oral  furosemide (LASIX) tablet, Daily, Oral    Plan  - Monitor strict I&Os and daily weights.    - Place on telemetry  - Low sodium diet  - Place on fluid restriction of  NA .   - Cardiology has not been consulted  - The patient's volume status is worsening as indicated by edema, crackles on lung auscultation, orthopnea, and dyspnea on exertion (HOWARD). Will treat with IV diuresis, inotropic and respiratory support.            Primary hypertension  Controlled at time of discharge. Negative inotropes (BB, CCB) held this admission due to PAH and right-sided HF. Continue medications as below. " Educated on home blood pressure monitoring. Follow up with cardiology.     The patient's inpatient anti-hypertensive regimen is listed below:  Current Antihypertensives  valsartan (DIOVAN) tablet, Daily, Oral  furosemide (LASIX) tablet, Daily, Oral          Renal/  Acute renal failure-resolved as of 11/26/2024  MAGDA is likely due to acute tubular necrosis caused by hypotension/cardiogenic shock . Baseline creatinine is  <1.1 . Most recent creatinine and eGFR are listed below.  Recent Labs     11/24/24  0540   CREATININE 1.19   EGFRNORACEVR 81         ARF is resolved. Recommend follow up with primary care within one week.          Final Active Diagnoses:    Diagnosis Date Noted POA    Primary hypertension [I10] 11/22/2024 Yes    Sarcoidosis of lung with sarcoidosis of lymph nodes [D86.2] 08/11/2023 Yes    Obstructive lung disease [J44.9] 03/26/2018 Yes      Problems Resolved During this Admission:    Diagnosis Date Noted Date Resolved POA    PRINCIPAL PROBLEM:  Acute on chronic right heart failure [I50.813] 11/15/2024 11/26/2024 Yes    Hyperkalemia [E87.5] 11/17/2024 11/22/2024 No    Acute renal failure [N17.9] 11/15/2024 11/26/2024 Yes    Acute on chronic respiratory failure with hypoxia [J96.21] 11/07/2024 11/23/2024 Yes       Discharged Condition: stable    Disposition: Home or Self Care    Follow Up:   Follow-up Information       Julia Alba, BRIANP. Schedule an appointment as soon as possible for a visit in 1 week(s).    Specialties: Emergency Medicine, Family Medicine  Why: Please follow up on December 2 at 11:00 a.m  Contact information:  [address]  [address] [place] [address]  [phone]               Beverley Muir MD Follow up.    Specialty: Pulmonary Disease  Why: Please follow up on December 5 at 10:20 a.m  Contact information:  [address] [facility] [address]  [phone]                           Patient Instructions:      Notify your health care provider if you experience any of the  following:  difficulty breathing or increased cough     Notify your health care provider if you experience any of the following:  severe persistent headache     Notify your health care provider if you experience any of the following:  persistent dizziness, light-headedness, or visual disturbances     Notify your health care provider if you experience any of the following:  increased confusion or weakness     Notify your health care provider if you experience any of the following:  temperature >100.4     Notify your health care provider if you experience any of the following:  persistent nausea and vomiting or diarrhea     Notify your health care provider if you experience any of the following:  severe uncontrolled pain     Notify your health care provider if you experience any of the following:  redness, tenderness, or signs of infection (pain, swelling, redness, odor or green/yellow discharge around incision site)     Notify your health care provider if you experience any of the following:  worsening rash     Activity as tolerated       Significant Diagnostic Studies: N/A    Pending Diagnostic Studies:       Procedure Component Value Units Date/Time    EXTRA TUBES [[id number]] Collected: 11/23/24 1549    Order Status: Sent Lab Status: In process Updated: 11/23/24 1549    Specimen: Blood, Venous     Narrative:      The following orders were created for panel order EXTRA TUBES.  Procedure                               Abnormality         Status                     ---------                               -----------         ------                     Light Green Top Hold[[id number]]                            In process                 Light Green Top Hold[[id number]]                            In process                 Lavender Top Hold[[id number]]                               In process                   Please view results for these tests on the individual orders.    EXTRA TUBES [[id number]] Collected: 11/15/24  1132    Order Status: Sent Lab Status: In process Updated: 11/15/24 1132    Specimen: Blood, Venous     Narrative:      The following orders were created for panel order EXTRA TUBES.  Procedure                               Abnormality         Status                     ---------                               -----------         ------                     Light Green Top Hold[[id number]]                            In process                 Light Green Top Hold[[id number]]                            In process                 Lavender Top Hold[[id number]]                               In process                   Please view results for these tests on the individual orders.           Medications:  Reconciled Home Medications:      Medication List        CHANGE how you take these medications      furosemide 40 MG tablet  Commonly known as: LASIX  Take 2 tablets (80 mg total) by mouth once daily.  What changed: See the new instructions.     predniSONE 20 MG tablet  Commonly known as: DELTASONE  Take 2 tablets (40 mg total) by mouth once daily.  What changed: when to take this            CONTINUE taking these medications      * albuterol 2.5 mg /3 mL (0.083 %) nebulizer solution  Commonly known as: PROVENTIL  USE 1 VIAL IN NEBULIZER EVERY 4 TO 6 HOURS AS NEEDED     * albuterol 90 mcg/actuation inhaler  Commonly known as: PROVENTIL/VENTOLIN HFA     albuterol-ipratropium 2.5 mg-0.5 mg/3 mL nebulizer solution  Commonly known as: DUO-NEB  Take 3 mLs by nebulization every 6 (six) hours as needed for Wheezing. Rescue     budesonide-formoterol 80-4.5 mcg 80-4.5 mcg/actuation Hfaa  Commonly known as: SYMBICORT  Inhale 2 puffs into the lungs 2 (two) times daily.     dapsone 100 MG Tab  Take 1 tablet (100 mg total) by mouth once daily.     folic acid 1 MG tablet  Commonly known as: FOLVITE  Take 1,000 mcg by mouth once daily.     gabapentin 300 MG capsule  Commonly known as: NEURONTIN  Take 1 capsule by mouth once daily.      methotrexate 2.5 MG Tab  Take 6 tablets (15 mg total) by mouth every 7 days.     omeprazole 40 MG capsule  Commonly known as: PRILOSEC  Take 40 mg by mouth.     potassium chloride 20 mEq  Commonly known as: K-TAB  Take 1 tablet by mouth once daily.     valsartan 320 MG tablet  Commonly known as: DIOVAN  Take 1 tablet (320 mg total) by mouth once daily.           * This list has 2 medication(s) that are the same as other medications prescribed for you. Read the directions carefully, and ask your doctor or other care provider to review them with you.                STOP taking these medications      isosorbide mononitrate 30 MG 24 hr tablet  Commonly known as: IMDUR     metOLazone 2.5 MG tablet  Commonly known as: ZAROXOLYN     nebivoloL 10 MG Tab  Commonly known as: BYSTOLIC     NIFEdipine 60 MG Tbsr  Commonly known as: ADALAT CC              Indwelling Lines/Drains at time of discharge:   Lines/Drains/Airways       None                   Time spent on the discharge of patient: 40 minutes         Teresa Santos DO  Department of Hospital Medicine  [facility]

## 2024-11-26 NOTE — ASSESSMENT & PLAN NOTE
"Resolved. Patient did require support with milrinone and dobutamine this admission. At time of discharge was comfortable and stable with no pressor/inotropic support. Blood pressure stable and WNL. Given instructions re: symptom and blood pressure monitoring upon discharge. Negative inotropes (BB, CCB) held upon discharge.     Follow up with cardiology and pulmonology.   ----  On admission:     Patient has  right-sided  heart failure that is Acute on chronic. On presentation their CHF was decompensated. Evidence of decompensated CHF on presentation includes: edema, orthopnea, and shortness of breath. The etiology of their decompensation is likely chronic lung disease, pulmonary hypertension . Most recent BNP and echo results are listed below.  No results for input(s): "BNP" in the last 72 hours.  Latest ECHO  Results for orders placed during the hospital encounter of 11/15/24    Echo Saline Bubble? No    Interpretation Summary    Left Ventricle: The left ventricle is normal in size. Normal wall thickness. Septal flattening in diastole and systole consistent with right ventricular volume and pressure overload. There is low normal systolic function with a visually estimated ejection fraction of 50 - 55%. Ejection fraction is approximately 60%.    Right Ventricle: Severe right ventricular enlargement. Systolic function is severely reduced.    Right Atrium: Right atrium is severely dilated.    Tricuspid Valve: There is moderate to severe regurgitation.    Pulmonary Artery: There is pulmonary hypertension. The estimated pulmonary artery systolic pressure is 74 mmHg.    IVC/SVC: Elevated venous pressure at 15 mmHg.    Compared to prior ECHO from 3/2023; RV size and function have worsened; Severe RV volume and pressure overload with pulmonary HTN    Current Heart Failure Medications  valsartan (DIOVAN) tablet, Daily, Oral  furosemide (LASIX) tablet, Daily, Oral    Plan  - Monitor strict I&Os and daily weights.    - Place " on telemetry  - Low sodium diet  - Place on fluid restriction of  NA .   - Cardiology has not been consulted  - The patient's volume status is worsening as indicated by edema, crackles on lung auscultation, orthopnea, and dyspnea on exertion (HOWARD). Will treat with IV diuresis, inotropic and respiratory support.

## 2024-11-26 NOTE — ASSESSMENT & PLAN NOTE
MAGDA is likely due to acute tubular necrosis caused by hypotension/cardiogenic shock . Baseline creatinine is  <1.1 . Most recent creatinine and eGFR are listed below.  Recent Labs     11/24/24  0540   CREATININE 1.19   EGFRNORACEVR 81         ARF is resolved. Recommend follow up with primary care within one week.

## 2024-11-26 NOTE — ASSESSMENT & PLAN NOTE
Controlled at time of discharge. Negative inotropes (BB, CCB) held this admission due to PAH and right-sided HF. Continue medications as below. Educated on home blood pressure monitoring. Follow up with cardiology.     The patient's inpatient anti-hypertensive regimen is listed below:  Current Antihypertensives  valsartan (DIOVAN) tablet, Daily, Oral  furosemide (LASIX) tablet, Daily, Oral

## 2024-11-29 NOTE — PHYSICIAN QUERY
Given the conflicting documentation, please clarify the renal diagnosis.     Acute kidney failure/injury with tubular necrosis

## 2024-12-05 ENCOUNTER — OFFICE VISIT (OUTPATIENT)
Dept: PULMONOLOGY | Facility: CLINIC | Age: 34
End: 2024-12-05
Payer: COMMERCIAL

## 2024-12-05 ENCOUNTER — HOSPITAL ENCOUNTER (OUTPATIENT)
Dept: RADIOLOGY | Facility: HOSPITAL | Age: 34
Discharge: HOME | End: 2024-12-05
Attending: STUDENT IN AN ORGANIZED HEALTH CARE EDUCATION/TRAINING PROGRAM
Payer: COMMERCIAL

## 2024-12-05 ENCOUNTER — TELEPHONE (OUTPATIENT)
Dept: PULMONOLOGY | Facility: CLINIC | Age: 34
End: 2024-12-05
Payer: COMMERCIAL

## 2024-12-05 VITALS
OXYGEN SATURATION: 97 % | DIASTOLIC BLOOD PRESSURE: 72 MMHG | SYSTOLIC BLOOD PRESSURE: 108 MMHG | WEIGHT: 180 LBS | BODY MASS INDEX: 23.86 KG/M2 | HEART RATE: 105 BPM | RESPIRATION RATE: 16 BRPM | HEIGHT: 73 IN

## 2024-12-05 DIAGNOSIS — D86.2 SARCOIDOSIS OF LUNG WITH SARCOIDOSIS OF LYMPH NODES: ICD-10-CM

## 2024-12-05 DIAGNOSIS — J84.9 INTERSTITIAL PULMONARY DISEASE, UNSPECIFIED: ICD-10-CM

## 2024-12-05 DIAGNOSIS — D86.0 PULMONARY SARCOIDOSIS: ICD-10-CM

## 2024-12-05 DIAGNOSIS — I27.29 PULMONARY HYPERTENSION ASSOCIATED WITH SARCOIDOSIS: Primary | ICD-10-CM

## 2024-12-05 DIAGNOSIS — D86.9 PULMONARY HYPERTENSION ASSOCIATED WITH SARCOIDOSIS: Primary | ICD-10-CM

## 2024-12-05 DIAGNOSIS — D86.9 SARCOIDOSIS: ICD-10-CM

## 2024-12-05 PROCEDURE — 4010F ACE/ARB THERAPY RXD/TAKEN: CPT | Mod: CPTII,,, | Performed by: STUDENT IN AN ORGANIZED HEALTH CARE EDUCATION/TRAINING PROGRAM

## 2024-12-05 PROCEDURE — 71250 CT THORAX DX C-: CPT | Mod: TC

## 2024-12-05 PROCEDURE — 3078F DIAST BP <80 MM HG: CPT | Mod: CPTII,,, | Performed by: STUDENT IN AN ORGANIZED HEALTH CARE EDUCATION/TRAINING PROGRAM

## 2024-12-05 PROCEDURE — 71250 CT THORAX DX C-: CPT | Mod: 26,,, | Performed by: RADIOLOGY

## 2024-12-05 PROCEDURE — 99214 OFFICE O/P EST MOD 30 MIN: CPT | Mod: S$PBB,,, | Performed by: STUDENT IN AN ORGANIZED HEALTH CARE EDUCATION/TRAINING PROGRAM

## 2024-12-05 PROCEDURE — 1160F RVW MEDS BY RX/DR IN RCRD: CPT | Mod: CPTII,,, | Performed by: STUDENT IN AN ORGANIZED HEALTH CARE EDUCATION/TRAINING PROGRAM

## 2024-12-05 PROCEDURE — 3074F SYST BP LT 130 MM HG: CPT | Mod: CPTII,,, | Performed by: STUDENT IN AN ORGANIZED HEALTH CARE EDUCATION/TRAINING PROGRAM

## 2024-12-05 PROCEDURE — 3008F BODY MASS INDEX DOCD: CPT | Mod: CPTII,,, | Performed by: STUDENT IN AN ORGANIZED HEALTH CARE EDUCATION/TRAINING PROGRAM

## 2024-12-05 PROCEDURE — 1159F MED LIST DOCD IN RCRD: CPT | Mod: CPTII,,, | Performed by: STUDENT IN AN ORGANIZED HEALTH CARE EDUCATION/TRAINING PROGRAM

## 2024-12-05 PROCEDURE — 1111F DSCHRG MED/CURRENT MED MERGE: CPT | Mod: CPTII,,, | Performed by: STUDENT IN AN ORGANIZED HEALTH CARE EDUCATION/TRAINING PROGRAM

## 2024-12-05 PROCEDURE — 99999 PR PBB SHADOW E&M-EST. PATIENT-LVL V: CPT | Mod: PBBFAC,,, | Performed by: STUDENT IN AN ORGANIZED HEALTH CARE EDUCATION/TRAINING PROGRAM

## 2024-12-05 PROCEDURE — 99215 OFFICE O/P EST HI 40 MIN: CPT | Mod: PBBFAC,25 | Performed by: STUDENT IN AN ORGANIZED HEALTH CARE EDUCATION/TRAINING PROGRAM

## 2024-12-05 RX ORDER — BUMETANIDE 2 MG/1
2 TABLET ORAL DAILY
Qty: 30 TABLET | Refills: 11 | Status: SHIPPED | OUTPATIENT
Start: 2024-12-05 | End: 2025-12-05

## 2024-12-05 NOTE — TELEPHONE ENCOUNTER
Pt was already called and informed of results. Called pt back to make sure there was nothing else and there was not. Advised him to call us with any questions or concerns.      Dr. Muir has changed lasix to bumex and advised pt to go to ER if no BM or urination by tomorrow. Pt understood, will  bumex from pharmacy and start today, d/c lasix

## 2024-12-05 NOTE — PROGRESS NOTES
" [facility]  Pulmonology  ESTABLISHED VISIT     Patient Name:  Bo Ku  Primary Care Provider: Julia Alba FNP  Date of Service: 12/5/2024       Chief Complaint:  Shortness of breath    SUBJECTIVE   HPI:  Bo Ku is a 34 y.o. 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 Last seen 10/2024 with plan for PFT, 6MWT and CXR.    [place]mira reports having poor urine output for the past two days. He remains taking his lasix as previously prescribed and has had an overall decrease in volume and frequency of his UOP.  He reports feeling "filled with fluid" similar to how he felt prior to his recent hospitalization in 11/2024. He is also constipated. He has shortness of breath; his portable tanks are heavy to carry around and he feels limited in his mobility. He was admitted 11/2024 for decompensated RV failure with fluid overload requiring inotropic support for diuresis, MAGDA and HFNC supplementation for acute on chronic respiratory failure with hypoxia. TTE on admission: LVEF 60%, severe RV enlargement, TAPSE 1.12, normal LA size, severely dilated RA, moderate-to-severe TR, PASP 74. We reviewed his CT Chest.    Initial HPI  Elmira reports having shortness of breath that has become worse in the past few months.  He has a cough that is most prominent in the morning upon wakening up.  His cough is productive of clear or yellow tinged sputum.  He was noticed that his walking pace has reduced.  He has significant shortness of breath with walking upstairs.  Uses his oxygen which was previously prescribed to be taken nightly and intermittently with exertion.  With regards to his sarcoidosis, it was diagnosed in 2019 with bronchoscopy with referral to [facility].  For management, he has previously been on prednisone which was weaned down 40 mg " daily and methotrexate.  He states that he has run out of his methotrexate refill as he missed a follow-up with his team at [facility].  With regards to transplant referral, he is unaware that he has been considered for transplant.  We discussed his prior workup including right heart catheterization and his lung function and imaging at length.  He was upcoming follow-up with his [facility] team in the coming month.  He requests a refill of his methotrexate, prednisone as well as dapsone.        Past Medical History:   Diagnosis Date    Asthma     Cavitary lung disease     Hypertension     Sarcoidosis of lung with sarcoidosis of lymph nodes 2019    Severe pulmonary hypertension        Past Surgical History:   Procedure Laterality Date    ANTERIOR CRUCIATE LIGAMENT REPAIR Left 2004    FRACTURE SURGERY      RIGHT HEART CATHETERIZATION Right 2023    Procedure: INSERTION, CATHETER, RIGHT HEART;  Surgeon: Abdelrahman Adam MD;  Location: [facility] CATH LAB;  Service: Cardiology;  Laterality: Right;    RIGHT HEART CATHETERIZATION Right 2024    Procedure: INSERTION, CATHETER, RIGHT HEART;  Surgeon: Carl Leal MD;  Location: [facility] CATH LAB;  Service: Cardiology;  Laterality: Right;       No family history on file.     Social History     Socioeconomic History    Marital status: Single   Tobacco Use    Smoking status: Former     Current packs/day: 0.00     Average packs/day: 1 pack/day for 15.0 years (15.0 ttl pk-yrs)     Types: Cigarettes     Start date:      Quit date: 2022     Years since quittin.9    Smokeless tobacco: Never   Substance and Sexual Activity    Alcohol use: Yes     Alcohol/week: 2.0 standard drinks of alcohol     Types: 2 Cans of beer per week    Drug use: Never    Sexual activity: Yes     Partners: Female     Social Drivers of Health     Financial Resource Strain: Low Risk  (11/15/2024)    Overall Financial Resource Strain (CARDIA)     Difficulty of Paying Living Expenses: Not  "hard at all   Food Insecurity: No Food Insecurity (11/15/2024)    Hunger Vital Sign     Worried About Running Out of Food in the Last Year: Never true     Ran Out of Food in the Last Year: Never true   Transportation Needs: No Transportation Needs (11/15/2024)    TRANSPORTATION NEEDS     Transportation : No   Physical Activity: Inactive (3/17/2023)    Exercise Vital Sign     Days of Exercise per Week: 0 days     Minutes of Exercise per Session: 0 min   Stress: No Stress Concern Present (11/15/2024)    [employer] - Occupational Stress Questionnaire     Feeling of Stress : Not at all   Housing Stability: Low Risk  (11/15/2024)    Housing Stability Vital Sign     Unable to Pay for Housing in the Last Year: No     Homeless in the Last Year: No       Social History     Social History Narrative    Not on file       Review of patient's allergies indicates:   Allergen Reactions    Fish containing products Anaphylaxis    Shellfish containing products Anaphylaxis    Lisinopril     Sulfamethoxazole-trimethoprim Hives        Medications: Medications reviewed to include over the counter medications.    Review of Systems: A focused ROS was completed and found to be negative except for that mentioned above.    OBJECTIVE   PHYSICAL EXAM:  Vitals:    12/05/24 1031   BP: 108/72   BP Location: Left arm   Patient Position: Sitting   Pulse: 105   Resp: 16   SpO2: 97%  Comment: on 2L of o2   Weight: 81.6 kg (180 lb)   Height: 6' 1" (1.854 m)        GENERAL: NAD  HEENT: normocephalic, non-icteric conjunctivae, moist oral mucosa  RESPIRATORY: mild inspiratory rales in bibasilar region, no wheezing or rhonchi, on NC  CARDIOVASCULAR: Regular rate and rhythm, no murmurs rubs or gallops  SKIN: no rash, jaundice, ecchymosis or ulcers  MUSCULOSKELETAL: No clubbing or cyanosis; 1+  pedal edema  NEUROLOGIC: AO ×3, no gross deficits    LABS:  Lab studies reviewed and notable for H/H 12.7/38.5, MCV 90, SEos 50, CO2 29, " SCr 1.15, T bili 0.7, ALP 88, AST/ALT 17/13, NT pro BNP 11223 (11/2024), Aspergillus IgG 20.6, Aspergillus antigen and IgE undetectable, Total IgE 30.4 (11/2024)   Latest Reference Range & Units 02/28/23 12:02 03/14/23 22:07 06/19/23 14:13 08/11/23 08:59 06/17/24 13:13 11/07/24 11:14   NT-proBNP 1 - 125 pg/mL 15,772 (H) 3,007 (H) 2,969 (H) 7,311 (H) 5,785 (H) 11,674 (H)      Latest Reference Range & Units 11/07/24 11:14   Aspergillus Fumigatus, IgE <0.70 kU/L <0.10   Aspergillus fumigatus, IgG Ab, S <=102 mg/L 20.6   Cladosporium IgE <0.70 kU/L <0.10   Cockroach, IgE <0.70 kU/L 0.23   Dog Dander IgE <0.70 kU/L <0.10   Elm, IgE <0.70 kU/L <0.10   Oak, IgE <0.70 kU/L <0.10   Pecan Hickory, IgE <0.70 kU/L <0.10   Penicillium IgE <0.70 kU/L <0.10   [place] Tree IgE <0.70 kU/L <0.10       IMAGING:  Imaging reviewed and notable for CT chest 08/2023 with dense consolidative opacities in bilateral mid and lower lung zone with cavitary lesion in the left lower lung, no pleural effusion    RHC 03/2023:  mPAP 34, PCWP 12, PVR 5.56, CO/CI 3.96/2.05 (Td)    LUNG FUNCTION TESTING:     SPIROMETRY/PFT:  11/2024 Pre Post   FVC 2.20/-5.23 2.51/-4.77   FEV1 0.98/-5.79 1.15/-5.57   FEV1/FVC 45/-4.35 46/-4.27   TLC 5.30/-2.24    FRC  3.66/-0.12    RV 2.82/1.99    DLCO 5.46/-9.25      6MWD:  Date Distance (ft) Resting SpO2; Edwin SpO2 O2 Required   11/2024 973 91%, RA;86% 2L           ASSESSMENT & PLAN     1. Pulmonary hypertension associated with sarcoidosis  Assessment & Plan:  Right heart catheterization completed 03/2023 with precapillary pulmonary hypertension with PVR 5.56.  He is currently followed at [facility] and his medication regimen includes Imdur.  Given sarcoidosis overlap and increasing O2 requirements is ongoing concern for progression in his pulmonary vascular disease.  Previously assessed at [facility]; discussed with [facility] during recent ICU admission and previously only assessed in Sarcoidosis clinic.   - discontinued Imdur and BB  during recent hospitalization  - NTproBNP obtained following this clinic visit increased from new baseline with associated poor UOP  -- stop Lasix  -- start Bumex 2 mg Qday  -- to present to ED for continued poor UOP    Orders:  -     Basic Metabolic Panel; Future; Expected date: 12/05/2024  -     NT-Pro Natriuretic Peptide; Future; Expected date: 12/05/2024  -     bumetanide (BUMEX) 2 MG tablet; Take 1 tablet (2 mg total) by mouth once daily.  Dispense: 30 tablet; Refill: 11  -     OXYGEN FOR HOME USE  -     Ambulatory referral/consult to Transplant, Lung; Future; Expected date: 12/13/2024    2. Pulmonary sarcoidosis  -     Ambulatory referral/consult to Transplant, Lung; Future; Expected date: 12/13/2024    3. Sarcoidosis of lung with sarcoidosis of lymph nodes  Assessment & Plan:  33 yo M with sarcoidosis with pulmonary involvement including cavitary basilar lung disease with previous occupational exposures including heavy metal.  Course with decompensation including progression in exertional dyspnea and new O2 requirement. Now with pulmonary hypertension with hospitalization 11/2024 for acute decompensation. Aspergillus studies negative. Plan for management as follows:   - cont methotrexate and prednisone  - cont PJP prophylaxis with dapsone   - transplant referral to [facility] for SAPH   -- [facility] declined for insurance barrier   -- previously established at [facility] for sarcoidosis; will refer to transplant to establish  - Dx:  2016, bronchoscopy in [place]  - smoking status: former, quit 2022  - exposure history: metal exposure; worked in metal plant with metal molding responsibilities, 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 as well  - baseline lung imaging: CT Chest 2023                      -- CT Chest 12/2024 with stable bibasilar involvement  - baseline PFT: 11/2023 with severe obstruction (FEV1 1.15), moderate restriction and severe diffusion deficit  - baseline TTE and EKG:  We will follow-up on [facility] imaging, patient reports  recent TTE              -- precapillary pulmonary hypertension diagnosed in 2023   -- decompensated 11/2024; will refer as above to Lung trasnplant, follow up with repeat TTE once euvolemic  - cough: cont Symbicort BID  - CMP and CBC Qyearly: due 11/2025, labs stable 11/2024  - Ophthalmology evaluation Qyear: due 01/2025, established and last seen 01/2024  - skin manifestations limited to tattoos          Follow up in about 7 weeks (around 1/23/2025) for Routine follow up.      Case was discussed with patient; all questions were answered to patient's satisfaction and patient verbalized understanding.     Beverley Muir MD  Pulmonary Medicine  [facility]  Phone: [phone]

## 2024-12-05 NOTE — TELEPHONE ENCOUNTER
----- Message from Dionne sent at 12/5/2024  1:33 PM CST -----  Regarding: Results  Who Called: Bo Ku    Caller is requesting information on test results.    Name of Test (Lab/Mammo/Etc): CT CHEST NON CON  Date of Test: 12/05/2024  Where the test was performed: [facility], Radiology  Ordering Provider:  Estela Watson      Name of Test (Lab/Mammo/Etc): NON FASTING LAB   Date of Test: 12/05/2024  Where the test was performed: [facility], Lab  Ordering Provider: ESTELA WATSON      Preferred Method of Contact: Phone Call  Patient's Preferred Phone Number on File: [phone]

## 2024-12-06 NOTE — ASSESSMENT & PLAN NOTE
Right heart catheterization completed 03/2023 with precapillary pulmonary hypertension with PVR 5.56.  He is currently followed at [facility] and his medication regimen includes Imdur.  Given sarcoidosis overlap and increasing O2 requirements is ongoing concern for progression in his pulmonary vascular disease.  Previously assessed at [facility]; discussed with [facility] during recent ICU admission and previously only assessed in Sarcoidosis clinic.   - discontinued Imdur and BB during recent hospitalization  - NTproBNP obtained following this clinic visit increased from new baseline with associated poor UOP  -- stop Lasix  -- start Bumex 2 mg Qday  -- to present to ED for continued poor UOP

## 2024-12-06 NOTE — ASSESSMENT & PLAN NOTE
35 yo M with sarcoidosis with pulmonary involvement including cavitary basilar lung disease with previous occupational exposures including heavy metal.  Course with decompensation including progression in exertional dyspnea and new O2 requirement. Now with pulmonary hypertension with hospitalization 11/2024 for acute decompensation. Aspergillus studies negative. 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  - baseline lung imaging: CT Chest 2023                      -- CT Chest 12/2024 with stable bibasilar involvement  - baseline PFT: 11/2023 with severe obstruction (FEV1 1.15), moderate restriction and severe diffusion deficit  - baseline TTE and EKG:  We will follow-up on [facility] imaging, patient reports recent TTE              -- precapillary pulmonary hypertension diagnosed in 2023   -- decompensated 11/2024; will refer as above to Lung trasnplant, follow up with repeat TTE once euvolemic  - cough: cont Symbicort BID  - CMP and CBC Qyearly: due 11/2025, labs stable 11/2024  - Ophthalmology evaluation Qyear: due 01/2025, established and last seen 01/2024  - skin manifestations limited to tattoos

## 2024-12-12 ENCOUNTER — HOSPITAL ENCOUNTER (EMERGENCY)
Facility: HOSPITAL | Age: 34
Discharge: HOME | End: 2024-12-12
Payer: COMMERCIAL

## 2024-12-12 VITALS
TEMPERATURE: 98 F | HEART RATE: 91 BPM | DIASTOLIC BLOOD PRESSURE: 86 MMHG | RESPIRATION RATE: 20 BRPM | WEIGHT: 180 LBS | BODY MASS INDEX: 23.86 KG/M2 | OXYGEN SATURATION: 95 % | HEIGHT: 73 IN | SYSTOLIC BLOOD PRESSURE: 109 MMHG

## 2024-12-12 DIAGNOSIS — J96.11 CHRONIC RESPIRATORY FAILURE WITH HYPOXIA: Primary | ICD-10-CM

## 2024-12-12 DIAGNOSIS — I27.20 PULMONARY HTN: ICD-10-CM

## 2024-12-12 DIAGNOSIS — D86.9 SARCOIDOSIS: ICD-10-CM

## 2024-12-12 DIAGNOSIS — R07.9 CHEST PAIN: ICD-10-CM

## 2024-12-12 LAB
ALBUMIN SERPL BCP-MCNC: 4 G/DL (ref 3.5–5)
ALBUMIN/GLOB SERPL: 1 {RATIO}
ALP SERPL-CCNC: 90 U/L (ref 40–150)
ALT SERPL W P-5'-P-CCNC: 31 U/L
ANION GAP SERPL CALCULATED.3IONS-SCNC: 16 MMOL/L (ref 7–16)
ANISOCYTOSIS BLD QL SMEAR: ABNORMAL
APTT PPP: 24.6 SECONDS (ref 25.2–37.3)
AST SERPL W P-5'-P-CCNC: 33 U/L (ref 5–34)
BASOPHILS # BLD AUTO: 0.01 K/UL (ref 0–0.2)
BASOPHILS NFR BLD AUTO: 0.1 % (ref 0–1)
BILIRUB SERPL-MCNC: 1 MG/DL
BUN SERPL-MCNC: 24 MG/DL (ref 9–21)
BUN/CREAT SERPL: 20 (ref 6–20)
CALCIUM SERPL-MCNC: 9.8 MG/DL (ref 8.4–10.2)
CHLORIDE SERPL-SCNC: 104 MMOL/L (ref 98–107)
CO2 SERPL-SCNC: 24 MMOL/L (ref 22–29)
CREAT SERPL-MCNC: 1.2 MG/DL (ref 0.72–1.25)
DIFFERENTIAL METHOD BLD: ABNORMAL
EGFR (NO RACE VARIABLE) (RUSH/TITUS): 80 ML/MIN/1.73M2
EOSINOPHIL # BLD AUTO: 0.02 K/UL (ref 0–0.5)
EOSINOPHIL NFR BLD AUTO: 0.3 % (ref 1–4)
ERYTHROCYTE [DISTWIDTH] IN BLOOD BY AUTOMATED COUNT: 20.7 % (ref 11.5–14.5)
GLOBULIN SER-MCNC: 4 G/DL (ref 2–4)
GLUCOSE SERPL-MCNC: 101 MG/DL (ref 74–100)
HCT VFR BLD AUTO: 43.8 % (ref 40–54)
HGB BLD-MCNC: 14.7 G/DL (ref 13.5–18)
INR BLD: 0.93
LYMPHOCYTES # BLD AUTO: 0.37 K/UL (ref 1–4.8)
LYMPHOCYTES NFR BLD AUTO: 5.4 % (ref 27–41)
LYMPHOCYTES NFR BLD MANUAL: 11 % (ref 27–41)
MAGNESIUM SERPL-MCNC: 1.9 MG/DL (ref 1.6–2.6)
MCH RBC QN AUTO: 29.6 PG (ref 27–31)
MCHC RBC AUTO-ENTMCNC: 33.6 G/DL (ref 32–36)
MCV RBC AUTO: 88.1 FL (ref 80–96)
MONOCYTES # BLD AUTO: 0.4 K/UL (ref 0–0.8)
MONOCYTES NFR BLD AUTO: 5.8 % (ref 2–6)
MONOCYTES NFR BLD MANUAL: 1 % (ref 2–6)
MPC BLD CALC-MCNC: 10.7 FL (ref 9.4–12.4)
NEUTROPHILS # BLD AUTO: 6.08 K/UL (ref 1.8–7.7)
NEUTROPHILS NFR BLD AUTO: 88.4 % (ref 53–65)
NEUTS SEG NFR BLD MANUAL: 88 % (ref 50–62)
NRBC BLD MANUAL-RTO: ABNORMAL %
NT-PROBNP SERPL-MCNC: 5291 PG/ML (ref 1–125)
OHS QRS DURATION: 84 MS
OHS QTC CALCULATION: 406 MS
PLATELET # BLD AUTO: 223 K/UL (ref 150–400)
PLATELET MORPHOLOGY: NORMAL
POIKILOCYTOSIS BLD QL SMEAR: ABNORMAL
POTASSIUM SERPL-SCNC: 5.1 MMOL/L (ref 3.5–5.1)
PROT SERPL-MCNC: 8 G/DL (ref 6.4–8.3)
PROTHROMBIN TIME: 13.1 SECONDS (ref 11.7–14.7)
RBC # BLD AUTO: 4.97 M/UL (ref 4.6–6.2)
SODIUM SERPL-SCNC: 139 MMOL/L (ref 136–145)
TROPONIN I SERPL HS-MCNC: 4.3 NG/L
TROPONIN I SERPL HS-MCNC: 4.4 NG/L
WBC # BLD AUTO: 6.88 K/UL (ref 4.5–11)

## 2024-12-12 PROCEDURE — 83880 ASSAY OF NATRIURETIC PEPTIDE: CPT | Performed by: NURSE PRACTITIONER

## 2024-12-12 PROCEDURE — 99285 EMERGENCY DEPT VISIT HI MDM: CPT | Mod: 25

## 2024-12-12 PROCEDURE — 85610 PROTHROMBIN TIME: CPT | Performed by: NURSE PRACTITIONER

## 2024-12-12 PROCEDURE — 96376 TX/PRO/DX INJ SAME DRUG ADON: CPT

## 2024-12-12 PROCEDURE — 99285 EMERGENCY DEPT VISIT HI MDM: CPT | Mod: ,,, | Performed by: NURSE PRACTITIONER

## 2024-12-12 PROCEDURE — 36415 COLL VENOUS BLD VENIPUNCTURE: CPT | Performed by: NURSE PRACTITIONER

## 2024-12-12 PROCEDURE — 96375 TX/PRO/DX INJ NEW DRUG ADDON: CPT

## 2024-12-12 PROCEDURE — 63600175 PHARM REV CODE 636 W HCPCS: Performed by: NURSE PRACTITIONER

## 2024-12-12 PROCEDURE — 25000003 PHARM REV CODE 250: Performed by: NURSE PRACTITIONER

## 2024-12-12 PROCEDURE — 93010 ELECTROCARDIOGRAM REPORT: CPT | Mod: 76,,, | Performed by: HOSPITALIST

## 2024-12-12 PROCEDURE — 93005 ELECTROCARDIOGRAM TRACING: CPT

## 2024-12-12 PROCEDURE — 84484 ASSAY OF TROPONIN QUANT: CPT | Performed by: NURSE PRACTITIONER

## 2024-12-12 PROCEDURE — 96374 THER/PROPH/DIAG INJ IV PUSH: CPT

## 2024-12-12 PROCEDURE — 85025 COMPLETE CBC W/AUTO DIFF WBC: CPT | Performed by: NURSE PRACTITIONER

## 2024-12-12 PROCEDURE — 83735 ASSAY OF MAGNESIUM: CPT | Performed by: NURSE PRACTITIONER

## 2024-12-12 PROCEDURE — 80053 COMPREHEN METABOLIC PANEL: CPT | Performed by: NURSE PRACTITIONER

## 2024-12-12 PROCEDURE — 85730 THROMBOPLASTIN TIME PARTIAL: CPT | Performed by: NURSE PRACTITIONER

## 2024-12-12 RX ORDER — METOPROLOL TARTRATE 1 MG/ML
5 INJECTION, SOLUTION INTRAVENOUS
Status: COMPLETED | OUTPATIENT
Start: 2024-12-12 | End: 2024-12-12

## 2024-12-12 RX ORDER — ONDANSETRON HYDROCHLORIDE 2 MG/ML
4 INJECTION, SOLUTION INTRAVENOUS
Status: COMPLETED | OUTPATIENT
Start: 2024-12-12 | End: 2024-12-12

## 2024-12-12 RX ORDER — MORPHINE SULFATE 4 MG/ML
2 INJECTION, SOLUTION INTRAMUSCULAR; INTRAVENOUS
Status: COMPLETED | OUTPATIENT
Start: 2024-12-12 | End: 2024-12-12

## 2024-12-12 RX ORDER — KETOROLAC TROMETHAMINE 30 MG/ML
15 INJECTION, SOLUTION INTRAMUSCULAR; INTRAVENOUS
Status: COMPLETED | OUTPATIENT
Start: 2024-12-12 | End: 2024-12-12

## 2024-12-12 RX ORDER — ASPIRIN 81 MG/1
324 TABLET ORAL
Status: COMPLETED | OUTPATIENT
Start: 2024-12-12 | End: 2024-12-12

## 2024-12-12 RX ORDER — MORPHINE SULFATE 4 MG/ML
4 INJECTION, SOLUTION INTRAMUSCULAR; INTRAVENOUS
Status: COMPLETED | OUTPATIENT
Start: 2024-12-12 | End: 2024-12-12

## 2024-12-12 RX ADMIN — MORPHINE SULFATE 4 MG: 4 INJECTION, SOLUTION INTRAMUSCULAR; INTRAVENOUS at 11:12

## 2024-12-12 RX ADMIN — KETOROLAC TROMETHAMINE 15 MG: 30 INJECTION, SOLUTION INTRAMUSCULAR at 12:12

## 2024-12-12 RX ADMIN — ASPIRIN 324 MG: 81 TABLET, COATED ORAL at 12:12

## 2024-12-12 RX ADMIN — ONDANSETRON 4 MG: 2 INJECTION INTRAMUSCULAR; INTRAVENOUS at 11:12

## 2024-12-12 RX ADMIN — MORPHINE SULFATE 2 MG: 4 INJECTION, SOLUTION INTRAMUSCULAR; INTRAVENOUS at 12:12

## 2024-12-12 RX ADMIN — METOPROLOL TARTRATE 5 MG: 1 INJECTION, SOLUTION INTRAVENOUS at 12:12

## 2024-12-12 NOTE — DISCHARGE INSTRUCTIONS
Continue to take your medications as previously prescribed. Keep your follow up appointment with your cardiologist this month as previously scheduled. Call them and let them know you have been seen. Follow up with your primary care provider within the next week. Return to the ED for worsening signs and symptoms or otherwise as needed.

## 2024-12-12 NOTE — ED PROVIDER NOTES
Encounter Date: 12/12/2024       History     Chief Complaint   Patient presents with    Chest Pain    Shortness of Breath     Onset 2-3 hours PTA     35 y/o AAM presents to the emergency department with c/o left side chest pain that he states started this morning when he woke up. He has PMH of Sarcoidosis, Pulmonary HTN, Right sided heart failure and chronic resp failure on chronic home O2 at 2L. He was recently hospitalized at [facility] for Sepsis, Acute on chronic heart failure, acute renal failure. He was in ICU from 11/15-11/21 and discharged home on 11/24. He had f/u with his pulmonologist on 12/05. At that visit he reported decreased urine output despite taking Lasix. He was changed to Bumex and reports he has been doing well since. He denies increase in shortness of breath with his chest pain. He has had no nausea, vomiting or diaphoresis. He denies fever or chills. He states that coughing and certain movements make his pain worse. He has taken nothing for his symptoms. Of note, supplemental oxygen requirement has been present since last hospitalization.    The history is provided by the patient and medical records.     Review of patient's allergies indicates:   Allergen Reactions    Fish containing products Anaphylaxis    Shellfish containing products Anaphylaxis    Lisinopril     Sulfamethoxazole-trimethoprim Hives     Past Medical History:   Diagnosis Date    Asthma     Cavitary lung disease     CHF (congestive heart failure)     Hypertension     Sarcoidosis of lung with sarcoidosis of lymph nodes 2019    Severe pulmonary hypertension      Past Surgical History:   Procedure Laterality Date    ANTERIOR CRUCIATE LIGAMENT REPAIR Left 09/01/2004    FRACTURE SURGERY      RIGHT HEART CATHETERIZATION Right 03/01/2023    Procedure: INSERTION, CATHETER, RIGHT HEART;  Surgeon: Abdelrahman Adam MD;  Location: [facility] CATH LAB;  Service: Cardiology;  Laterality: Right;    RIGHT HEART CATHETERIZATION Right 11/18/2024     Procedure: INSERTION, CATHETER, RIGHT HEART;  Surgeon: Carl Leal MD;  Location: [facility] CATH LAB;  Service: Cardiology;  Laterality: Right;     No family history on file.  Social History     Tobacco Use    Smoking status: Former     Current packs/day: 0.00     Average packs/day: 1 pack/day for 15.0 years (15.0 ttl pk-yrs)     Types: Cigarettes     Start date:      Quit date: 2022     Years since quittin.9     Passive exposure: Current    Smokeless tobacco: Never   Substance Use Topics    Alcohol use: Not Currently     Alcohol/week: 2.0 standard drinks of alcohol     Types: 2 Cans of beer per week    Drug use: Never     Review of Systems   All other systems reviewed and are negative.      Physical Exam     Initial Vitals [24 1107]   BP Pulse Resp Temp SpO2   (!) 134/94 (!) 125 (!) 28 97.5 °F (36.4 °C) 96 %      MAP       --         Physical Exam    Constitutional: He appears well-developed and well-nourished. He is not diaphoretic. He is cooperative.  Non-toxic appearance. He appears ill. No distress.   Cardiovascular:  Regular rhythm and normal heart sounds.   Tachycardia present.         Pulmonary/Chest: Effort normal. He has decreased breath sounds.   Nasal cannula in place   Abdominal: Abdomen is soft. Bowel sounds are normal. There is no abdominal tenderness.   Musculoskeletal:      Right lower leg: No edema.      Left lower leg: No edema.     Neurological: He is alert and oriented to person, place, and time. GCS eye subscore is 4. GCS verbal subscore is 5. GCS motor subscore is 6.   Skin: Skin is warm, dry and intact. Capillary refill takes less than 2 seconds.         Medical Screening Exam   See Full Note    ED Course   Procedures  Labs Reviewed   COMPREHENSIVE METABOLIC PANEL - Abnormal       Result Value    Sodium 139      Potassium 5.1      Chloride 104      CO2 24      Anion Gap 16      Glucose 101 (*)     BUN 24 (*)     Creatinine 1.20      BUN/Creatinine Ratio 20       Calcium 9.8      Total Protein 8.0      Albumin 4.0      Globulin 4.0      A/G Ratio 1.0      Bilirubin, Total 1.0      Alk Phos 90      ALT 31      AST 33      eGFR 80     APTT - Abnormal    PTT 24.6 (*)    CBC WITH DIFFERENTIAL - Abnormal    WBC 6.88      RBC 4.97      Hemoglobin 14.7      Hematocrit 43.8      MCV 88.1      MCH 29.6      MCHC 33.6      RDW 20.7 (*)     Platelet Count 223      MPV 10.7      Neutrophils % 88.4 (*)     Lymphocytes % 5.4 (*)     Neutrophils, Abs 6.08      Lymphocytes, Absolute 0.37 (*)     Diff Type Manual      Monocytes % 5.8      Eosinophils % 0.3 (*)     Basophils % 0.1      Monocytes, Absolute 0.40      Eosinophils, Absolute 0.02      Basophils, Absolute 0.01     NT-PRO NATRIURETIC PEPTIDE - Abnormal    ProBNP 5,291 (*)    MANUAL DIFFERENTIAL - Abnormal    Segmented Neutrophils, Man % 88 (*)     Lymphocytes, Man % 11 (*)     Monocytes, Man % 1 (*)     nRBC, Manual        Platelet Morphology Normal      Anisocytosis Few      Poikilocytosis Few     TROPONIN I - Normal    Troponin I High Sensitivity 4.4     PROTIME-INR - Normal    PT 13.1      INR 0.93     MAGNESIUM - Normal    Magnesium 1.9     TROPONIN I - Normal    Troponin I High Sensitivity 4.3     CBC W/ AUTO DIFFERENTIAL    Narrative:     The following orders were created for panel order CBC auto differential.  Procedure                               Abnormality         Status                     ---------                               -----------         ------                     CBC with Differential[[id number]]       Abnormal            Final result               Manual Differential[[id number]]         Abnormal            Final result                 Please view results for these tests on the individual orders.     EKG Readings: (Independently Interpreted)   Previous EKG: Compared with most recent EKG Previous EKG Date: 11/19/2024. Rhythm: Sinus Tachycardia. Heart Rate: 113. Clinical Impression: Sinus Tachycardia   Other  EKG Interpretations: 12/12/24, repeat EKG with rate of 85, reviewed by Dr. Maldonado, cardiologist, see interpretation per him     ECG Results              EKG 12-lead (Final result)        Collection Time Result Time QRS Duration OHS QTC Calculation    12/12/24 12:32:25 12/12/24 14:17:52 84 406                     Final result by Interface, Lab In [facility] (12/12/24 14:17:56)                   Narrative:    Test Reason : R07.9,    Vent. Rate :  85 BPM     Atrial Rate :  85 BPM     P-R Int : 128 ms          QRS Dur :  84 ms      QT Int : 342 ms       P-R-T Axes :  70  93 -42 degrees    QTcB Int : 406 ms    Normal sinus rhythm  Right atrial enlargement  Pulmonary disease pattern  Bi-V hypertrophy; diffuse TWI may be 2/2 RV/LV strain  ST and T wave abnormality, diffuse; cannot rule out ischemia - correlate  clinically  Abnormal ECG  When compared with ECG of 12-Dec-2024 11:15,  diffuse TWI now present.Â   Confirmed by Sulaiman Maldonado ([id number]) on 12/12/2024 2:17:51 PM    Referred By: AAAREFERRAL SELF           Confirmed By: Sulaiman Maldonado                                     EKG 12-lead (In process)        Collection Time Result Time QRS Duration OHS QTC Calculation    12/12/24 11:15:50 12/12/24 11:24:14 84 414                     In process by Interface, Lab In [facility] (12/12/24 11:24:23)                   Narrative:    Test Reason : R07.9,    Vent. Rate : 113 BPM     Atrial Rate : 113 BPM     P-R Int : 116 ms          QRS Dur :  84 ms      QT Int : 302 ms       P-R-T Axes :  71 103 -56 degrees    QTcB Int : 414 ms    Sinus tachycardia  Right atrial enlargement  Pulmonary disease pattern  Right ventricular hypertrophy  ST and T wave abnormality, consider inferior ischemia  ST and T wave abnormality, consider anterolateral ischemia  Abnormal ECG  No previous ECGs available    Referred By:            Confirmed By:                                   Imaging Results              X-Ray Chest AP Portable (Final result)  Result time  12/12/24 13:35:27      Final result by Dileep Mckeon MD (12/12/24 13:35:27)                   Impression:      No convincing evidence of acute detrimental change relative prior radiograph performed 11/15/2024, 05:26 hours.      Electronically signed by: Dileep Mckeon  Date:    12/12/2024  Time:    13:35               Narrative:    EXAMINATION:  XR CHEST AP PORTABLE    CLINICAL HISTORY:  Chest Pain;    TECHNIQUE:  Single frontal view of the chest was performed.    COMPARISON:  Chest radiograph performed 11/15/2024, 05:26 hours.    FINDINGS:  Monitoring leads over the chest.  Grossly unchanged cardiomediastinal contours.    Architectural distortion and patchy opacities in both lungs, felt relatively similar to prior study performed 11/15/2024, 05:26 hours.    No definite pneumothorax or large pleural effusion.  Similar blunting of the costophrenic angles, nonspecific.    No acute findings in the visualized abdomen.    Osseous and soft tissue structures appear without definite acute change.                                       Medications   morphine injection 4 mg (4 mg Intravenous Given 12/12/24 1123)   ondansetron injection 4 mg (4 mg Intravenous Given 12/12/24 1123)   aspirin EC tablet 324 mg (324 mg Oral Given 12/12/24 1223)   metoprolol injection 5 mg (5 mg Intravenous Given 12/12/24 1206)   morphine injection 2 mg (2 mg Intravenous Given 12/12/24 1207)   ketorolac injection 15 mg (15 mg Intravenous Given 12/12/24 1213)     Medical Decision Making  33 y/o AAM presents to the emergency department with c/o left side chest pain that he states started this morning when he woke up. He has PMH of Sarcoidosis, Pulmonary HTN, Right sided heart failure and chronic resp failure on chronic home O2 at 2L. He was recently hospitalized at [facility] for Sepsis, Acute on chronic heart failure, acute renal failure. He was in ICU from 11/15-11/21 and discharged home on 11/24. He had f/u with his pulmonologist on 12/05. At that  visit he reported decreased urine output despite taking Lasix. He was changed to Bumex and reports he has been doing well since. He denies increase in shortness of breath with his chest pain. He has had no nausea, vomiting or diaphoresis. He denies fever or chills. He states that coughing and certain movements make his pain worse. He has taken nothing for his symptoms. Of note, supplemental oxygen requirement has been present since last hospitalization.    Problems Addressed:  Chest pain:     Details: Pt given morphine and zofran on arrival with minimal improvement. ASA given and Toradol. WBC Count is normal. Pt is afebrile, non toxic appearing. Does have new T wave inversion on Today's EKG. He is tachycardic, lopressor given and rate controlled and repeat EKG with same. Serial troponins negative. CXR negative for acute change. BNP improved from previous. H/H is normal. Electrolytes and renal fx ok. Pt discussed with cardiologist on call, Dr. Maldonado, EKG and chart reviewed by him. He feels ok to d/c and f/u with pt's cardiologist as he has an appt on 12/22 per pt. Plan of care discussed with patient and he is agreeable. He has been vitally/hemodynamically stable throughout his stay. Pain improved significantly at time of discharge. Follow up instructions given. Warning s/s discussed and return precautions given; the patient has v/u.      Amount and/or Complexity of Data Reviewed  Labs: ordered.  Radiology: ordered.  Discussion of management or test interpretation with external provider(s): Pt discussed with cardiologist on call, Dr. Maldonado, EKG and chart reviewed by him. He feels ok to d/c and f/u with pt's cardiologist as he has an appt on 12/22 per pt.    Risk  OTC drugs.  Prescription drug management.                                      Clinical Impression:   Final diagnoses:  [R07.9] Chest pain  [J96.11] Chronic respiratory failure with hypoxia (Primary)  [I27.20] Pulmonary HTN  [D86.9] Sarcoidosis        ED  Disposition Condition    Discharge Stable          ED Prescriptions    None       Follow-up Information       Follow up With Specialties Details Why Contact Info    Julia Alba, JEREMIAS Emergency Medicine, Family Medicine In 1 week  [address] [facility] [address]  [phone]      Cardiology  Schedule an appointment as soon as possible for a visit                Dara Pena FNP  12/12/24 1825       Dara Pena FNP  12/13/24 0413

## 2024-12-18 LAB
OHS QRS DURATION: 84 MS
OHS QTC CALCULATION: 414 MS

## 2025-01-16 ENCOUNTER — OFFICE VISIT (OUTPATIENT)
Dept: PULMONOLOGY | Facility: CLINIC | Age: 35
End: 2025-01-16
Payer: COMMERCIAL

## 2025-01-16 VITALS
WEIGHT: 179.88 LBS | OXYGEN SATURATION: 90 % | DIASTOLIC BLOOD PRESSURE: 90 MMHG | HEIGHT: 73 IN | BODY MASS INDEX: 23.84 KG/M2 | SYSTOLIC BLOOD PRESSURE: 112 MMHG | HEART RATE: 117 BPM | RESPIRATION RATE: 16 BRPM

## 2025-01-16 DIAGNOSIS — D86.0 PULMONARY SARCOIDOSIS: Primary | ICD-10-CM

## 2025-01-16 DIAGNOSIS — D86.9 PULMONARY HYPERTENSION ASSOCIATED WITH SARCOIDOSIS: ICD-10-CM

## 2025-01-16 DIAGNOSIS — D86.9 SARCOIDOSIS: ICD-10-CM

## 2025-01-16 DIAGNOSIS — I27.29 PULMONARY HYPERTENSION ASSOCIATED WITH SARCOIDOSIS: ICD-10-CM

## 2025-01-16 DIAGNOSIS — J45.40 MODERATE PERSISTENT ASTHMA WITHOUT COMPLICATION: ICD-10-CM

## 2025-01-16 PROCEDURE — 99214 OFFICE O/P EST MOD 30 MIN: CPT | Mod: PBBFAC | Performed by: STUDENT IN AN ORGANIZED HEALTH CARE EDUCATION/TRAINING PROGRAM

## 2025-01-16 PROCEDURE — 99214 OFFICE O/P EST MOD 30 MIN: CPT | Mod: S$PBB,,, | Performed by: STUDENT IN AN ORGANIZED HEALTH CARE EDUCATION/TRAINING PROGRAM

## 2025-01-16 PROCEDURE — 1159F MED LIST DOCD IN RCRD: CPT | Mod: CPTII,,, | Performed by: STUDENT IN AN ORGANIZED HEALTH CARE EDUCATION/TRAINING PROGRAM

## 2025-01-16 PROCEDURE — 1160F RVW MEDS BY RX/DR IN RCRD: CPT | Mod: CPTII,,, | Performed by: STUDENT IN AN ORGANIZED HEALTH CARE EDUCATION/TRAINING PROGRAM

## 2025-01-16 PROCEDURE — 3080F DIAST BP >= 90 MM HG: CPT | Mod: CPTII,,, | Performed by: STUDENT IN AN ORGANIZED HEALTH CARE EDUCATION/TRAINING PROGRAM

## 2025-01-16 PROCEDURE — 3074F SYST BP LT 130 MM HG: CPT | Mod: CPTII,,, | Performed by: STUDENT IN AN ORGANIZED HEALTH CARE EDUCATION/TRAINING PROGRAM

## 2025-01-16 PROCEDURE — 99999 PR PBB SHADOW E&M-EST. PATIENT-LVL IV: CPT | Mod: PBBFAC,,, | Performed by: STUDENT IN AN ORGANIZED HEALTH CARE EDUCATION/TRAINING PROGRAM

## 2025-01-16 PROCEDURE — 3008F BODY MASS INDEX DOCD: CPT | Mod: CPTII,,, | Performed by: STUDENT IN AN ORGANIZED HEALTH CARE EDUCATION/TRAINING PROGRAM

## 2025-01-16 RX ORDER — PREDNISONE 20 MG/1
20 TABLET ORAL DAILY
Qty: 30 TABLET | Refills: 11 | Status: SHIPPED | OUTPATIENT
Start: 2025-01-16

## 2025-01-16 RX ORDER — BUDESONIDE AND FORMOTEROL FUMARATE DIHYDRATE 80; 4.5 UG/1; UG/1
2 AEROSOL RESPIRATORY (INHALATION) 2 TIMES DAILY
Qty: 10.2 G | Refills: 11 | Status: SHIPPED | OUTPATIENT
Start: 2025-01-16

## 2025-01-16 RX ORDER — BUMETANIDE 2 MG/1
2 TABLET ORAL DAILY
Qty: 30 TABLET | Refills: 11 | Status: SHIPPED | OUTPATIENT
Start: 2025-01-16 | End: 2026-01-16

## 2025-01-16 NOTE — PROGRESS NOTES
[facility]  Pulmonology  ESTABLISHED VISIT     Patient Name:  Bo Ku  Primary Care Provider: Julia Alba FNP  Date of Service: 1/16/2025       Chief Complaint:  Shortness of breath    SUBJECTIVE   HPI:  Bo Ku is a 34 y.o. 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 Last seen 12/2024 with transition in diuretic to Bumex and referral to [facility] transplant medicine.       Elmira reports that he has had increase in his swelling for the past week.  This is associated with an increase in his shortness of breath.  He had 1 presentation to the ED in December for shortness of breath that was treated with diuretic therapy and discharge thereafter.  States that following transitioned to Bumex, he has had an improvement in his urine output.  This is change in the past week along with the increased swelling.  He has had no acute exacerbations of his breathing since last visit.  He needs refills for Symbicort and has been taking prednisone 40 mg daily and is consistent with his dapsone.  He has an appointment with [facility] transplant team on January 31st. He reports tingling and numbing sensation in his bilateral hands.      Initial HPI  Elmira reports having shortness of breath that has become worse in the past few months.  He has a cough that is most prominent in the morning upon wakening up.  His cough is productive of clear or yellow tinged sputum.  He was noticed that his walking pace has reduced.  He has significant shortness of breath with walking upstairs.  Uses his oxygen which was previously prescribed to be taken nightly and intermittently with exertion.  With regards to his sarcoidosis, it was diagnosed in 2019 with bronchoscopy with referral to [facility] Pulmonary transplant.  For management, he has previously been on prednisone which was weaned  "down 40 mg daily and methotrexate.  He states that he has run out of his methotrexate refill as he missed a follow-up with his team at [facility].  With regards to transplant referral, he is unaware that he has been considered for transplant.  We discussed his prior workup including right heart catheterization and his lung function and imaging at length.  He was upcoming follow-up with his [facility] team in the coming month.  He requests a refill of his methotrexate, prednisone as well as dapsone.    12/2024: reports having poor urine output for the past two days. He remains taking his lasix as previously prescribed and has had an overall decrease in volume and frequency of his UOP.  He reports feeling "filled with fluid" similar to how he felt prior to his recent hospitalization in 11/2024. He is also constipated. He has shortness of breath; his portable tanks are heavy to carry around and he feels limited in his mobility. He was admitted 11/2024 for decompensated RV failure with fluid overload requiring inotropic support for diuresis, MAGDA and HFNC supplementation for acute on chronic respiratory failure with hypoxia. TTE on admission: LVEF 60%, severe RV enlargement, TAPSE 1.12, normal LA size, severely dilated RA, moderate-to-severe TR, PASP 74. We reviewed his CT Chest.        Past Medical History:   Diagnosis Date    Asthma     Cavitary lung disease     CHF (congestive heart failure)     Hypertension     Sarcoidosis of lung with sarcoidosis of lymph nodes 2019    Severe pulmonary hypertension        Past Surgical History:   Procedure Laterality Date    ANTERIOR CRUCIATE LIGAMENT REPAIR Left 09/01/2004    FRACTURE SURGERY      RIGHT HEART CATHETERIZATION Right 03/01/2023    Procedure: INSERTION, CATHETER, RIGHT HEART;  Surgeon: Abdelrahman Adam MD;  Location: [facility] CATH LAB;  Service: Cardiology;  Laterality: Right;    RIGHT HEART CATHETERIZATION Right 11/18/2024    Procedure: INSERTION, CATHETER, RIGHT HEART;  " Surgeon: Carl Leal MD;  Location: [facility] CATH LAB;  Service: Cardiology;  Laterality: Right;       No family history on file.     Social History     Socioeconomic History    Marital status: Single   Tobacco Use    Smoking status: Former     Current packs/day: 0.00     Average packs/day: 1 pack/day for 15.0 years (15.0 ttl pk-yrs)     Types: Cigarettes     Start date: 2007     Quit date: 1/1/2022     Years since quitting: 3.0     Passive exposure: Current    Smokeless tobacco: Never   Substance and Sexual Activity    Alcohol use: Not Currently     Alcohol/week: 2.0 standard drinks of alcohol     Types: 2 Cans of beer per week    Drug use: Never    Sexual activity: Yes     Partners: Female     Social Drivers of Health     Financial Resource Strain: Low Risk  (11/15/2024)    Overall Financial Resource Strain (CARDIA)     Difficulty of Paying Living Expenses: Not hard at all   Food Insecurity: No Food Insecurity (11/15/2024)    Hunger Vital Sign     Worried About Running Out of Food in the Last Year: Never true     Ran Out of Food in the Last Year: Never true   Transportation Needs: No Transportation Needs (11/15/2024)    TRANSPORTATION NEEDS     Transportation : No   Physical Activity: Inactive (3/17/2023)    Exercise Vital Sign     Days of Exercise per Week: 0 days     Minutes of Exercise per Session: 0 min   Stress: No Stress Concern Present (11/15/2024)    [employer] [place] [employer] - Occupational Stress Questionnaire     Feeling of Stress : Not at all   Housing Stability: Low Risk  (11/15/2024)    Housing Stability Vital Sign     Unable to Pay for Housing in the Last Year: No     Homeless in the Last Year: No       Social History     Social History Narrative    Not on file       Review of patient's allergies indicates:   Allergen Reactions    Fish containing products Anaphylaxis    Shellfish containing products Anaphylaxis    Lisinopril     Sulfamethoxazole-trimethoprim Hives     "    Medications: Medications reviewed to include over the counter medications.    Review of Systems: A focused ROS was completed and found to be negative except for that mentioned above.    OBJECTIVE   PHYSICAL EXAM:  Vitals:    01/16/25 1304   BP: (!) 112/90   BP Location: Left arm   Patient Position: Sitting   Pulse: (!) 117   Resp: 16   SpO2: (!) 90%  Comment: 2L o2   Weight: 81.6 kg (179 lb 14.3 oz)   Height: 6' 1" (1.854 m)        GENERAL: NAD  HEENT: normocephalic, non-icteric conjunctivae, moist oral mucosa  RESPIRATORY: mild inspiratory rales in bibasilar region, no wheezing or rhonchi, on NC  CARDIOVASCULAR: Regular rate and rhythm, no murmurs rubs or gallops  SKIN: no rash, jaundice, ecchymosis or ulcers  MUSCULOSKELETAL: No clubbing or cyanosis; 1+  pedal edema  NEUROLOGIC: AO ×3, no gross deficits    LABS:  Lab studies reviewed and notable for H/H 12.7/38.5, MCV 90, SEos 50, CO2 29, SCr 1.15, T bili 0.7, ALP 88, AST/ALT 17/13, NT pro BNP 38446 (11/2024), Aspergillus IgG 20.6, Aspergillus antigen and IgE undetectable, Total IgE 30.4 (11/2024)   Latest Reference Range & Units 02/28/23 12:02 03/14/23 22:07 06/19/23 14:13 08/11/23 08:59 06/17/24 13:13 11/07/24 11:14   NT-proBNP 1 - 125 pg/mL 15,772 (H) 3,007 (H) 2,969 (H) 7,311 (H) 5,785 (H) 11,674 (H)      Latest Reference Range & Units 11/07/24 11:14   Aspergillus Fumigatus, IgE <0.70 kU/L <0.10   Aspergillus fumigatus, IgG Ab, S <=102 mg/L 20.6   Cladosporium IgE <0.70 kU/L <0.10   Cockroach, IgE <0.70 kU/L 0.23   Dog Dander IgE <0.70 kU/L <0.10   Elm, IgE <0.70 kU/L <0.10   Oak, IgE <0.70 kU/L <0.10   Pecan [place]ickory, IgE <0.70 kU/L <0.10   Penicillium IgE <0.70 kU/L <0.10   [place] Tree IgE <0.70 kU/L <0.10       IMAGING:  Imaging reviewed and notable for CT chest 08/2023 with dense consolidative opacities in bilateral mid and lower lung zone with cavitary lesion in the left lower lung, no pleural effusion.  CT chest 12/2024 with upper lung zone " centrilobular and panacinar emphysema, stable bibasilar consolidative processes consistent with his known pulmonary sarcoidosis, no GGO, mediastinal lymphadenopathy is present and appears stable, no pleural effusion, associated ectatic airways iron bibasilar lung fields with no airway secretions noted.    RHC 03/2023:  mPAP 34, PCWP 12, PVR 5.56, CO/CI 3.96/2.05 (Td)    LUNG FUNCTION TESTING:     SPIROMETRY/PFT:  11/2024 Pre Post   FVC 2.20/-5.23 2.51/-4.77   FEV1 0.98/-5.79 1.15/-5.57   FEV1/FVC 45/-4.35 46/-4.27   TLC 5.30/-2.24    FRC  3.66/-0.12    RV 2.82/1.99    DLCO 5.46/-9.25      6MWD:  Date Distance (ft) Resting SpO2; Edwin SpO2 O2 Required   11/2024 973 91%, RA;86% 2L           ASSESSMENT & PLAN     1. Pulmonary sarcoidosis  -     predniSONE (DELTASONE) 20 MG tablet; Take 1 tablet (20 mg total) by mouth once daily.  Dispense: 30 tablet; Refill: 11  -     budesonide-formoterol 80-4.5 mcg (SYMBICORT) 80-4.5 mcg/actuation HFAA; Inhale 2 puffs into the lungs 2 (two) times daily.  Dispense: 10.2 g; Refill: 11    2. Pulmonary hypertension associated with sarcoidosis  Assessment & Plan:  Right heart catheterization completed 03/2023 with precapillary pulmonary hypertension with PVR 5.56.  He is currently followed at [facility] and his medication regimen includes Imdur.  Given sarcoidosis overlap and increasing O2 requirements is ongoing concern for progression in his pulmonary vascular disease.  Previously assessed at [facility]; discussed with [facility] during recent ICU admission and previously only assessed in Sarcoidosis clinic.   - obtain NTproBNP and CMP today  - increase Bumex with twice daily dosing x5 days   -- potassium twice daily with bumex dosing  - NTproBNP baseline approx 5k goal    Orders:  -     bumetanide (BUMEX) 2 MG tablet; Take 1 tablet (2 mg total) by mouth once daily.  Dispense: 30 tablet; Refill: 11  -     Comprehensive Metabolic Panel; Future; Expected date: 01/16/2025  -     NT-Pro Natriuretic Peptide;  Future; Expected date: 01/16/2025    3. Moderate persistent asthma without complication  -     budesonide-formoterol 80-4.5 mcg (SYMBICORT) 80-4.5 mcg/actuation HFAA; Inhale 2 puffs into the lungs 2 (two) times daily.  Dispense: 10.2 g; Refill: 11    4. Sarcoidosis  Assessment & Plan:  35 yo M with sarcoidosis with pulmonary involvement including cavitary basilar lung disease with previous occupational exposures including heavy metal.  Course with decompensation including progression in exertional dyspnea and new O2 requirement.  Previously referred to [facility] and patient is unclear on transplant assessment with records unavailable for review at time of this visit.  Plan for management as follows:   - c/b SAPH  - cont methotrexate  - decrease prednisone dosing to 20 mg QOD  - cont PJP prophylaxis with dapsone   - f/u for continuity following [facility] evaluation; patient is interested in transplant if deemed candidate  - Dx:  2016, bronchoscopy in [place]  - smoking status: former, quit 2022  - exposure history: metal exposure; worked in metal plant with metal molding responsibilities, 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 as well  - baseline lung imaging: CT Chest 2023    -- stable imaging in 2024  - baseline PFT: 11/2023 with severe obstruction (FEV1 1.15), moderate restriction and severe diffusion deficit  - baseline TTE and EKG:  We will follow-up on [facility] imaging, patient reports recent TTE   -- precapillary pulmonary hypertension diagnosed in 2023  - cough: cont ICS-LABA BID, refill provided  - CMP and CBC Qyearly: due 11/2025, labs stable 12/2024  - Ophthalmology evaluation Qyear: due 01/2025, established and last seen 01/2024  - skin manifestations limited to tattoos  - will consider MRI cervical spine vs Neuro referral for hand tingling sensation; Rx changes as above          Follow up in about 4 weeks (around 2/13/2025) for Routine follow up.      Case was discussed with patient; all questions were answered to patient's satisfaction  and patient verbalized understanding.     Beverley Muir MD  Pulmonary Medicine  [facility]  Phone: [phone]

## 2025-01-16 NOTE — ASSESSMENT & PLAN NOTE
Right heart catheterization completed 03/2023 with precapillary pulmonary hypertension with PVR 5.56.  He is currently followed at [facility] and his medication regimen includes Imdur.  Given sarcoidosis overlap and increasing O2 requirements is ongoing concern for progression in his pulmonary vascular disease.  [facility]; discussed with [facility] during recent ICU admission and previously only assessed in Sarcoidosis clinic.   - obtain NTproBNP and CMP today  - increase Bumex with twice daily dosing x5 days   -- potassium twice daily with bumex dosing  - NTproBNP baseline approx 5k goal

## 2025-01-16 NOTE — ASSESSMENT & PLAN NOTE
35 yo M with sarcoidosis with pulmonary involvement including cavitary basilar lung disease with previous occupational exposures including heavy metal.  Course with decompensation including progression in exertional dyspnea and new O2 requirement.  Previously referred to [facility] and patient is unclear on transplant assessment with records unavailable for review at time of this visit.  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  - baseline lung imaging: CT Chest 2023    -- stable imaging in 2024  - baseline PFT: 11/2023 with severe obstruction (FEV1 1.15), moderate restriction and severe diffusion deficit  - baseline TTE and EKG:  We will follow-up on [facility] imaging, patient reports recent TTE   -- precapillary pulmonary hypertension diagnosed in 2023  - cough: cont ICS-LABA BID, refill provided  - CMP and CBC Qyearly: due 11/2025, labs stable 12/2024  - Ophthalmology evaluation Qyear: due 01/2025, established and last seen 01/2024  - skin manifestations limited to tattoos  - will consider MRI cervical spine vs Neuro referral for hand tingling sensation; Rx changes as above

## 2025-01-22 ENCOUNTER — TELEPHONE (OUTPATIENT)
Dept: PULMONOLOGY | Facility: CLINIC | Age: 35
End: 2025-01-22
Payer: COMMERCIAL

## 2025-01-22 NOTE — TELEPHONE ENCOUNTER
----- Message from Beverley Muir MD sent at 1/22/2025  1:20 PM CST -----  Kindly notify patient that his lab work is within baseline. Continue with previous plan  established during clinic visit. Thank you.

## 2025-02-07 RX ORDER — ISOSORBIDE MONONITRATE 30 MG/1
1 TABLET, EXTENDED RELEASE ORAL DAILY
COMMUNITY
Start: 2025-02-07

## 2025-02-13 ENCOUNTER — OFFICE VISIT (OUTPATIENT)
Dept: PULMONOLOGY | Facility: CLINIC | Age: 35
End: 2025-02-13
Payer: COMMERCIAL

## 2025-02-13 DIAGNOSIS — I27.29 PULMONARY HYPERTENSION ASSOCIATED WITH SARCOIDOSIS: Primary | ICD-10-CM

## 2025-02-13 DIAGNOSIS — D86.9 SARCOIDOSIS: ICD-10-CM

## 2025-02-13 DIAGNOSIS — D86.9 PULMONARY HYPERTENSION ASSOCIATED WITH SARCOIDOSIS: Primary | ICD-10-CM

## 2025-02-13 PROCEDURE — 99499 UNLISTED E&M SERVICE: CPT | Mod: 93,,, | Performed by: STUDENT IN AN ORGANIZED HEALTH CARE EDUCATION/TRAINING PROGRAM

## 2025-02-13 NOTE — PROGRESS NOTES
Audio Only Telehealth Visit     The patient location is: [facility], inpatient      lEmira is currently admitted at [facility] and undergoing lung transplant evaluation.  He was assessed by [facility] lung transplant program on 01/31/2025 with planned admission 02/03/2025 for lung transplant evaluation which has included repeat right heart catheterization, imaging and planned PET-CT.  Anticipated discharge date is 02/14/2025.  Transplant Clinic has provided records on their assessment and we will await decision on transplant candidacy.       This service was not originating from a related E/M service provided within the previous 7 days nor will  to an E/M service or procedure within the next 24 hours or my soonest available appointment.  Prevailing standard of care was able to be met in this audio-only visit.

## 2025-04-16 ENCOUNTER — TELEPHONE (OUTPATIENT)
Dept: PULMONOLOGY | Facility: CLINIC | Age: 35
End: 2025-04-16
Payer: COMMERCIAL

## 2025-04-16 NOTE — TELEPHONE ENCOUNTER
----- Message from "[employer] sent at 4/16/2025  4:20 PM CDT -----  Who Called: Bo Humphrey is requesting assistance/information from provider's office.Patient is requesting that a nurse calls himPreferred Method of Contact: Phone CallPatient's Preferred Phone Number on File: [phone] Best Call Back Number, if different:[phone] Information:

## 2025-06-01 DIAGNOSIS — D86.9 SARCOIDOSIS: ICD-10-CM

## 2025-06-02 RX ORDER — METHOTREXATE 2.5 MG/1
TABLET ORAL
Qty: 24 TABLET | Refills: 6 | Status: SHIPPED | OUTPATIENT
Start: 2025-06-02

## 2025-06-28 ENCOUNTER — HOSPITAL ENCOUNTER (EMERGENCY)
Facility: HOSPITAL | Age: 35
Discharge: HOME | End: 2025-06-28
Payer: COMMERCIAL

## 2025-06-28 VITALS
DIASTOLIC BLOOD PRESSURE: 94 MMHG | BODY MASS INDEX: 23.59 KG/M2 | RESPIRATION RATE: 21 BRPM | OXYGEN SATURATION: 94 % | SYSTOLIC BLOOD PRESSURE: 127 MMHG | HEART RATE: 109 BPM | TEMPERATURE: 98 F | HEIGHT: 73 IN | WEIGHT: 178 LBS

## 2025-06-28 DIAGNOSIS — R06.02 SOB (SHORTNESS OF BREATH): ICD-10-CM

## 2025-06-28 DIAGNOSIS — D86.9 SARCOIDOSIS: Primary | ICD-10-CM

## 2025-06-28 DIAGNOSIS — J18.9 PNEUMONIA OF LEFT LOWER LOBE DUE TO INFECTIOUS ORGANISM: ICD-10-CM

## 2025-06-28 LAB
ALBUMIN SERPL BCP-MCNC: 3.7 G/DL (ref 3.5–5)
ALBUMIN/GLOB SERPL: 1.2 {RATIO}
ALP SERPL-CCNC: 97 U/L (ref 40–150)
ALT SERPL W P-5'-P-CCNC: 20 U/L
ANION GAP SERPL CALCULATED.3IONS-SCNC: 15 MMOL/L (ref 7–16)
AST SERPL W P-5'-P-CCNC: 28 U/L (ref 11–45)
BASOPHILS # BLD AUTO: 0.02 K/UL (ref 0–0.2)
BASOPHILS NFR BLD AUTO: 0.2 % (ref 0–1)
BILIRUB SERPL-MCNC: 1.2 MG/DL
BILIRUB UR QL STRIP: ABNORMAL
BUN SERPL-MCNC: 19 MG/DL (ref 9–21)
BUN/CREAT SERPL: 16 (ref 6–20)
CALCIUM SERPL-MCNC: 9.4 MG/DL (ref 8.4–10.2)
CHLORIDE SERPL-SCNC: 98 MMOL/L (ref 98–107)
CLARITY UR: CLEAR
CO2 SERPL-SCNC: 30 MMOL/L (ref 22–29)
COLOR UR: ABNORMAL
CREAT SERPL-MCNC: 1.19 MG/DL (ref 0.72–1.25)
DIFFERENTIAL METHOD BLD: ABNORMAL
EGFR (NO RACE VARIABLE) (RUSH/TITUS): 81 ML/MIN/1.73M2
EOSINOPHIL # BLD AUTO: 0.06 K/UL (ref 0–0.5)
EOSINOPHIL NFR BLD AUTO: 0.6 % (ref 1–4)
ERYTHROCYTE [DISTWIDTH] IN BLOOD BY AUTOMATED COUNT: 16.8 % (ref 11.5–14.5)
GLOBULIN SER-MCNC: 3.1 G/DL (ref 2–4)
GLUCOSE SERPL-MCNC: 108 MG/DL (ref 74–100)
GLUCOSE UR STRIP-MCNC: NEGATIVE MG/DL
HCT VFR BLD AUTO: 37.9 % (ref 40–54)
HGB BLD-MCNC: 12.4 G/DL (ref 13.5–18)
INFLUENZA A MOLECULAR (OHS): NEGATIVE
INFLUENZA B MOLECULAR (OHS): NEGATIVE
KETONES UR STRIP-SCNC: NEGATIVE MG/DL
LACTATE SERPL-SCNC: 1.5 MMOL/L (ref 0.5–2.2)
LEUKOCYTE ESTERASE UR QL STRIP: NEGATIVE
LYMPHOCYTES # BLD AUTO: 0.65 K/UL (ref 1–4.8)
LYMPHOCYTES NFR BLD AUTO: 6.6 % (ref 27–41)
MAGNESIUM SERPL-MCNC: 1.6 MG/DL (ref 1.6–2.6)
MCH RBC QN AUTO: 31.2 PG (ref 27–31)
MCHC RBC AUTO-ENTMCNC: 32.7 G/DL (ref 32–36)
MCV RBC AUTO: 95.2 FL (ref 80–96)
MONOCYTES # BLD AUTO: 1.07 K/UL (ref 0–0.8)
MONOCYTES NFR BLD AUTO: 10.8 % (ref 2–6)
MPC BLD CALC-MCNC: 10.1 FL (ref 9.4–12.4)
NEUTROPHILS # BLD AUTO: 8.09 K/UL (ref 1.8–7.7)
NEUTROPHILS NFR BLD AUTO: 81.8 % (ref 53–65)
NITRITE UR QL STRIP: NEGATIVE
NT-PROBNP SERPL-MCNC: 4837 PG/ML (ref 1–125)
PH UR STRIP: 6 PH UNITS
PLATELET # BLD AUTO: 117 K/UL (ref 150–400)
POTASSIUM SERPL-SCNC: 3.5 MMOL/L (ref 3.5–5.1)
PROT SERPL-MCNC: 6.8 G/DL (ref 6.4–8.3)
PROT UR QL STRIP: 30
RBC # BLD AUTO: 3.98 M/UL (ref 4.6–6.2)
RBC # UR STRIP: NEGATIVE /UL
SARS-COV-2 RDRP RESP QL NAA+PROBE: NEGATIVE
SODIUM SERPL-SCNC: 139 MMOL/L (ref 136–145)
SP GR UR STRIP: 1.01
SQUAMOUS #/AREA URNS LPF: ABNORMAL /LPF
UROBILINOGEN UR STRIP-ACNC: 4 MG/DL
WBC # BLD AUTO: 9.89 K/UL (ref 4.5–11)
WBC #/AREA URNS HPF: ABNORMAL /HPF

## 2025-06-28 PROCEDURE — 96374 THER/PROPH/DIAG INJ IV PUSH: CPT

## 2025-06-28 PROCEDURE — 83880 ASSAY OF NATRIURETIC PEPTIDE: CPT | Performed by: NURSE PRACTITIONER

## 2025-06-28 PROCEDURE — 85025 COMPLETE CBC W/AUTO DIFF WBC: CPT | Performed by: NURSE PRACTITIONER

## 2025-06-28 PROCEDURE — 96375 TX/PRO/DX INJ NEW DRUG ADDON: CPT

## 2025-06-28 PROCEDURE — 36415 COLL VENOUS BLD VENIPUNCTURE: CPT | Performed by: NURSE PRACTITIONER

## 2025-06-28 PROCEDURE — 87635 SARS-COV-2 COVID-19 AMP PRB: CPT | Performed by: NURSE PRACTITIONER

## 2025-06-28 PROCEDURE — 80053 COMPREHEN METABOLIC PANEL: CPT | Performed by: NURSE PRACTITIONER

## 2025-06-28 PROCEDURE — 83605 ASSAY OF LACTIC ACID: CPT | Performed by: NURSE PRACTITIONER

## 2025-06-28 PROCEDURE — 99285 EMERGENCY DEPT VISIT HI MDM: CPT | Mod: 25

## 2025-06-28 PROCEDURE — 25000242 PHARM REV CODE 250 ALT 637 W/ HCPCS: Performed by: NURSE PRACTITIONER

## 2025-06-28 PROCEDURE — 93005 ELECTROCARDIOGRAM TRACING: CPT

## 2025-06-28 PROCEDURE — 63600175 PHARM REV CODE 636 W HCPCS: Mod: JZ,TB | Performed by: NURSE PRACTITIONER

## 2025-06-28 PROCEDURE — 87040 BLOOD CULTURE FOR BACTERIA: CPT | Performed by: NURSE PRACTITIONER

## 2025-06-28 PROCEDURE — 94640 AIRWAY INHALATION TREATMENT: CPT | Mod: XB

## 2025-06-28 PROCEDURE — 87502 INFLUENZA DNA AMP PROBE: CPT | Performed by: NURSE PRACTITIONER

## 2025-06-28 PROCEDURE — 99284 EMERGENCY DEPT VISIT MOD MDM: CPT | Mod: ,,, | Performed by: NURSE PRACTITIONER

## 2025-06-28 PROCEDURE — 81003 URINALYSIS AUTO W/O SCOPE: CPT | Performed by: NURSE PRACTITIONER

## 2025-06-28 PROCEDURE — 83735 ASSAY OF MAGNESIUM: CPT | Performed by: NURSE PRACTITIONER

## 2025-06-28 PROCEDURE — 93010 ELECTROCARDIOGRAM REPORT: CPT | Mod: ,,, | Performed by: STUDENT IN AN ORGANIZED HEALTH CARE EDUCATION/TRAINING PROGRAM

## 2025-06-28 RX ORDER — AZITHROMYCIN 250 MG/1
250 TABLET, FILM COATED ORAL DAILY
Qty: 6 TABLET | Refills: 0 | Status: SHIPPED | OUTPATIENT
Start: 2025-06-28

## 2025-06-28 RX ORDER — METHYLPREDNISOLONE SOD SUCC 125 MG
125 VIAL (EA) INJECTION ONCE
Status: COMPLETED | OUTPATIENT
Start: 2025-06-28 | End: 2025-06-28

## 2025-06-28 RX ORDER — IPRATROPIUM BROMIDE AND ALBUTEROL SULFATE 2.5; .5 MG/3ML; MG/3ML
3 SOLUTION RESPIRATORY (INHALATION)
Status: COMPLETED | OUTPATIENT
Start: 2025-06-28 | End: 2025-06-28

## 2025-06-28 RX ORDER — CEFTRIAXONE 1 G/1
1 INJECTION, POWDER, FOR SOLUTION INTRAMUSCULAR; INTRAVENOUS
Status: COMPLETED | OUTPATIENT
Start: 2025-06-28 | End: 2025-06-28

## 2025-06-28 RX ADMIN — IPRATROPIUM BROMIDE AND ALBUTEROL SULFATE 3 ML: 2.5; .5 SOLUTION RESPIRATORY (INHALATION) at 11:06

## 2025-06-28 RX ADMIN — METHYLPREDNISOLONE SODIUM SUCCINATE 125 MG: 125 INJECTION, POWDER, FOR SOLUTION INTRAMUSCULAR; INTRAVENOUS at 11:06

## 2025-06-28 RX ADMIN — CEFTRIAXONE SODIUM 1 G: 1 INJECTION, POWDER, FOR SOLUTION INTRAMUSCULAR; INTRAVENOUS at 11:06

## 2025-06-28 NOTE — ED PROVIDER NOTES
Encounter Date: 6/28/2025       History     Chief Complaint   Patient presents with    Shortness of Breath    Cough     35 y/o male with PMHx of HTN, Sarcoidosis (home O2 @ 3L/NC), Severe Pulmonary HTN and CHF arrived to the ED via POV with complaint of subjective fever, worsening SOB and cough over the past 3 days. Began nasal congestion, sinus pressure and cough that has progressively become worse and now having productive cough with thick yellow sputum. Unaware of sick contacts. Has been using neb treatments and taking daily prednisone 20 mg daily previously prescribed. No recent hospitalization or antibiotic use. Patient has not been seen by another provider for current symptoms.  He is followed by Dr. Muir, pulmonologist. Next appointment is scheduled in August.     The history is provided by the patient.     Review of patient's allergies indicates:   Allergen Reactions    Fish containing products Anaphylaxis    Shellfish containing products Anaphylaxis    Lisinopril     Sulfamethoxazole-trimethoprim Hives     Past Medical History:   Diagnosis Date    Asthma     Cavitary lung disease     CHF (congestive heart failure)     Hypertension     Sarcoidosis of lung with sarcoidosis of lymph nodes 2019    Severe pulmonary hypertension      Past Surgical History:   Procedure Laterality Date    ANTERIOR CRUCIATE LIGAMENT REPAIR Left 09/01/2004    FRACTURE SURGERY      RIGHT HEART CATHETERIZATION Right 03/01/2023    Procedure: INSERTION, CATHETER, RIGHT HEART;  Surgeon: Abdelrahman Adam MD;  Location: [facility] CATH LAB;  Service: Cardiology;  Laterality: Right;    RIGHT HEART CATHETERIZATION Right 11/18/2024    Procedure: INSERTION, CATHETER, RIGHT HEART;  Surgeon: Carl Leal MD;  Location: [facility] CATH LAB;  Service: Cardiology;  Laterality: Right;     No family history on file.  Social History[1]  Review of Systems   Constitutional:  Positive for chills, diaphoresis and fever (subjective). Negative for  activity change and appetite change.   HENT:  Positive for congestion and sinus pressure. Negative for dental problem, ear discharge, ear pain, postnasal drip, rhinorrhea, sinus pain, sore throat and trouble swallowing.    Eyes:  Negative for photophobia, pain, redness and visual disturbance.   Respiratory:  Positive for cough, chest tightness, shortness of breath and wheezing.    Cardiovascular:  Negative for chest pain, palpitations and leg swelling.   Gastrointestinal:  Negative for abdominal pain, constipation, diarrhea, nausea and vomiting.   Genitourinary:  Negative for dysuria, flank pain, frequency, hematuria and penile discharge.   Musculoskeletal:  Negative for arthralgias, back pain, gait problem, myalgias, neck pain and neck stiffness.   Skin:  Negative for rash.   Neurological:  Positive for headaches. Negative for dizziness, syncope, speech difficulty, weakness and light-headedness.   Hematological:  Negative for adenopathy. Does not bruise/bleed easily.   All other systems reviewed and are negative.      Physical Exam     Initial Vitals [06/28/25 1048]   BP Pulse Resp Temp SpO2   (!) 127/94 (!) 113 (!) 22 98 °F (36.7 °C) (!) 94 %      MAP       --         Physical Exam    Nursing note and vitals reviewed.  Constitutional: Vital signs are normal. He appears well-developed and well-nourished.  Non-toxic appearance. He does not have a sickly appearance. He appears ill. No distress.   HENT:   Head: Normocephalic and atraumatic.   Right Ear: Tympanic membrane, external ear and ear canal normal.   Left Ear: Tympanic membrane, external ear and ear canal normal.   Nose: Nose normal. Right sinus exhibits no maxillary sinus tenderness and no frontal sinus tenderness. Left sinus exhibits no maxillary sinus tenderness and no frontal sinus tenderness. Mouth/Throat: Uvula is midline, oropharynx is clear and moist and mucous membranes are normal.   Eyes: Conjunctivae, EOM and lids are normal. Pupils are equal,  round, and reactive to light.   Neck: Trachea normal and phonation normal. Neck supple. No JVD present.   Normal range of motion.   Full passive range of motion without pain.     Cardiovascular:  Regular rhythm, normal heart sounds and normal pulses.   Tachycardia present.         No edema to BLE   Pulmonary/Chest: Effort normal. No accessory muscle usage. Tachypnea noted. No respiratory distress. He has decreased breath sounds. He has wheezes. He has no rhonchi. He has no rales.   Abdominal: Abdomen is soft. Bowel sounds are normal. There is no abdominal tenderness.   Musculoskeletal:         General: Normal range of motion.      Cervical back: Full passive range of motion without pain, normal range of motion and neck supple. Normal range of motion.     Lymphadenopathy:     He has no cervical adenopathy.   Neurological: He is alert and oriented to person, place, and time.   Skin: Skin is warm, dry and intact. Capillary refill takes less than 2 seconds.   Psychiatric: He has a normal mood and affect. His speech is normal and behavior is normal. Judgment normal.         Medical Screening Exam   See Full Note    ED Course   Procedures  Labs Reviewed   COMPREHENSIVE METABOLIC PANEL - Abnormal       Result Value    Sodium 139      Potassium 3.5      Chloride 98      CO2 30 (*)     Anion Gap 15      Glucose 108 (*)     BUN 19      Creatinine 1.19      BUN/Creatinine Ratio 16      Calcium 9.4      Total Protein 6.8      Albumin 3.7      Globulin 3.1      A/G Ratio 1.2      Bilirubin, Total 1.2      Alk Phos 97      ALT 20      AST 28      eGFR 81     URINALYSIS, REFLEX TO URINE CULTURE - Abnormal    Color, UA [place] (*)     Clarity, UA Clear      pH, UA 6.0      Leukocytes, UA Negative      Nitrites, UA Negative      Protein, UA 30 (*)     Glucose, UA Negative      Ketones, UA Negative      Urobilinogen, UA 4.0 (*)     Bilirubin, UA Small (*)     Blood, UA Negative      Specific Gravity, UA 1.015     NT-PRO NATRIURETIC  PEPTIDE - Abnormal    ProBNP 4,837 (*)    CBC WITH DIFFERENTIAL - Abnormal    WBC 9.89      RBC 3.98 (*)     Hemoglobin 12.4 (*)     Hematocrit 37.9 (*)     MCV 95.2      MCH 31.2 (*)     MCHC 32.7      RDW 16.8 (*)     Platelet Count 117 (*)     MPV 10.1      Neutrophils % 81.8 (*)     Lymphocytes % 6.6 (*)     Neutrophils, Abs 8.09 (*)     Lymphocytes, Absolute 0.65 (*)     Diff Type Auto      Monocytes % 10.8 (*)     Eosinophils % 0.6 (*)     Basophils % 0.2      Monocytes, Absolute 1.07 (*)     Eosinophils, Absolute 0.06      Basophils, Absolute 0.02     URINALYSIS, MICROSCOPIC - Abnormal    WBC, UA 0-5      Squamous Epithelial Cells, UA Few (*)    INFLUENZA A & B BY MOLECULAR - Normal    INFLUENZA A MOLECULAR Negative      INFLUENZA B MOLECULAR  Negative     SARS-COV-2 RNA AMPLIFICATION, QUAL - Normal    SARS COV-2 Molecular Negative      Narrative:     Negative SARS-CoV results should not be used as the sole basis for treatment or patient management decisions; negative results should be considered in the context of a patient's recent exposures, history and the presene of clinical signs and symptoms consistent with COVID-19.  Negative results should be treated as presumptive and confirmed by molecular assay, if necessary for patient management.   LACTIC ACID, PLASMA - Normal    Lactic Acid 1.5     MAGNESIUM - Normal    Magnesium 1.6     CULTURE, BLOOD   CULTURE, BLOOD   CBC W/ AUTO DIFFERENTIAL    Narrative:     The following orders were created for panel order CBC auto differential.  Procedure                               Abnormality         Status                     ---------                               -----------         ------                     CBC with Differential[[id number]]       Abnormal            Final result                 Please view results for these tests on the individual orders.        ECG Results              EKG 12-lead (In process)        Collection Time Result Time QRS Duration OHS  QTC Calculation    06/28/25 10:55:16 06/28/25 11:28:59 92 430                     In process by Interface, Lab In [facility] (06/28/25 11:29:05)                   Narrative:    Test Reason : R06.02,    Vent. Rate : 105 BPM     Atrial Rate : 105 BPM     P-R Int : 130 ms          QRS Dur :  92 ms      QT Int : 326 ms       P-R-T Axes :  78 105  10 degrees    QTcB Int : 430 ms    Sinus tachycardia  Right atrial enlargement  Possible Right ventricular hypertrophy  ST and T wave abnormality, consider inferior ischemia  ST and T wave abnormality, consider anterolateral ischemia  Abnormal ECG  When compared with ECG of 12-Dec-2024 12:32,  No significant change was found    Referred By: AAAREFERRAL SELF           Confirmed By:                                   Imaging Results              X-Ray Chest AP Portable (Final result)  Result time 06/28/25 12:40:48      Final result by Asif Starr MD (06/28/25 12:40:48)                   Impression:      Suspected newly developed small left pleural effusion with left basilar opacity that may reflect atelectasis versus aspiration or pneumonia in this patient with history of sepsis.  Consider short-term follow-up chest radiography after therapy to ensure resolution.    Grossly stable additional chronic findings in the chest as further discussed in the body of the report.      Electronically signed by: Asif Starr MD  Date:    06/28/2025  Time:    12:40               Narrative:    EXAMINATION:  XR CHEST AP PORTABLE    CLINICAL HISTORY:  Sepsis;    TECHNIQUE:  Single frontal view of the chest was performed.    COMPARISON:  Chest radiograph 12/12/2024, chest CT 12/05/2024    FINDINGS:  Patient is slightly rotated.    Trachea is midline and patent.  Pulmonary emphysematous architecture with hyperinflation again noted.  Architectural distortion with scattered areas of platelike scarring versus atelectasis particularly at the mid lung zones in a similar distribution and appearance to  prior.  Similar patchy opacities at the bilateral perihilar and lower lung zones better demonstrated on previous cross-sectional imaging.  Increased blunting of the left costophrenic angle with hazy opacification of the diaphragm suggesting small left pleural effusion which is new from prior.  No sizable pleural effusion on the right.  Wedge-shaped opacity at the medial left lung base appears new from prior.  Mild biapical pleuroparenchymal scarring.  No pneumothorax.    Grossly unchanged cardiomediastinal and hilar contours.    No acute osseous process seen.  PA and lateral views can be obtained.                                       Medications   albuterol-ipratropium 2.5 mg-0.5 mg/3 mL nebulizer solution 3 mL (3 mLs Nebulization Given 6/28/25 1117)   albuterol-ipratropium 2.5 mg-0.5 mg/3 mL nebulizer solution 3 mL (3 mLs Nebulization Given 6/28/25 1105)   cefTRIAXone injection 1 g (1 g Intravenous Given 6/28/25 1143)   methylPREDNISolone sodium succinate injection 125 mg (125 mg Intravenous Given 6/28/25 1143)     Medical Decision Making  35 y/o male with PMHx of HTN, Sarcoidosis (home O2 @ 3L/NC), Severe Pulmonary HTN and CHF arrived to the ED via POV with complaint of subjective fever, worsening SOB and cough over the past 3 days. Began nasal congestion, sinus pressure and cough that has progressively become worse and now having productive cough with thick yellow sputum. Unaware of sick contacts. Has been using neb treatments and taking daily prednisone 20 mg daily previously prescribed. No recent hospitalization or antibiotic use. Patient has not been seen by another provider for current symptoms.  He is followed by Dr. Muir, pulmonologist. Next appointment is scheduled in August.     Problems Addressed:  Pneumonia of left lower lobe due to infectious organism:     Details: COVID/flu negative. WBC wnl. Lactic 1.5.  -Rocephin 1 gm IM.  RX for Zpak sent to pharmacy. Reviewed discharge instructions with patient.  Detailed strict return precautions. Patient agreed to treatment plan and verbalized understanding.  Sarcoidosis:     Details: Duo neb x 2, Solu Medrol 125 mg IV. Chest tightness resolved, air moving better. Only has faint wheezing present, O2 sat up to 96-97% on 3L/NC (baseline for patient)>  SOB (shortness of breath):     Details: BNP 4,800- baseline. Patient has not taken Bumex today. Encouraged patient to take daily as prescribed. Weight daily. CXR: left pleural effusion, possible pneumonia.     Amount and/or Complexity of Data Reviewed  External Data Reviewed: radiology and notes.     Details:  Heart Cat 11/18/2024 by Dr. Henry:    ·  The estimated blood loss was none.     1. Low right and elevated left sided filling pressures with pulmonary HTN (likely post capillary; PVR is around 1 wood unit)  2. Low systemic flow estimation by Katherine (Co/CI 3.9/2.0) and TD (3.0/1.6)     Plan:  1. Consider restarting inotrope's  2. Add afterload reduction with bidil/ACEi/ARB (Patient was hypertensive during the RHC)     The procedure log was documented by Documenter: Linda Saenz RN and verified by Carl Leal MD.     Date: 11/18/2024  Time: 4:36 PM      Echocardiogram 11/15/2024:  ·  Left Ventricle: The left ventricle is normal in size. Normal wall thickness. Septal flattening in diastole and systole consistent with right ventricular volume and pressure overload. There is low normal systolic function with a visually estimated ejection fraction of 50 - 55%. Ejection fraction is approximately 60%.  ·  Right Ventricle: Severe right ventricular enlargement. Systolic function is severely reduced.  ·  Right Atrium: Right atrium is severely dilated.  ·  Tricuspid Valve: There is moderate to severe regurgitation.  ·  Pulmonary Artery: There is pulmonary hypertension. The estimated pulmonary artery systolic pressure is 74 mmHg.  ·  IVC/SVC: Elevated venous pressure at 15 mmHg.  ·  Compared to prior ECHO from 3/2023; RV size  and function have worsened; Severe RV volume and pressure overload with pulmonary HTN    Labs: ordered.     Details: Lactic 4,837.Patient has not taken Bumex today.  Radiology: ordered.    Risk  Prescription drug management.                                      Clinical Impression:   Final diagnoses:  [R06.02] SOB (shortness of breath)  [D86.9] Sarcoidosis (Primary)  [J18.9] Pneumonia of left lower lobe due to infectious organism - possible early pneumonia        ED Disposition Condition    Discharge Stable          ED Prescriptions       Medication Sig Dispense Start Date End Date Auth. Provider    azithromycin (Z-JUNI) 250 MG tablet Take 1 tablet (250 mg total) by mouth once daily. Take first 2 tablets together, then 1 every day until finished. 6 tablet 6/28/2025 -- Martita Mcarthur FNP          Follow-up Information       Follow up With Specialties Details Why Contact Info    Julia Alba FNP Emergency Medicine, Family Medicine Call in 2 days schedule follow up from your ER visit [address] [facility] [address]  [phone]                     [1]   Social History  Tobacco Use    Smoking status: Former     Current packs/day: 0.00     Average packs/day: 1 pack/day for 15.0 years (15.0 ttl pk-yrs)     Types: Cigarettes     Start date: 2007     Quit date: 1/1/2022     Years since quitting: 3.4     Passive exposure: Current    Smokeless tobacco: Never   Substance Use Topics    Alcohol use: Not Currently     Alcohol/week: 2.0 standard drinks of alcohol     Types: 2 Cans of beer per week    Drug use: Never        Martita Mcarthur FNP  06/28/25 1300

## 2025-06-28 NOTE — ED TRIAGE NOTES
Received patient via wheelchair withnoted complaint. Patient voices increasing shortness of breath about 2 days PTA without relief from home o2 at 3 l/m nasal cannula. Has begun to have productive bloody sputum. Voices increased temperature at home.

## 2025-06-28 NOTE — DISCHARGE INSTRUCTIONS
-Start tomorrow with increased dose of Prednisone 20 mg to one tab by mouth twice a day x 5 days, then resume 20 mg daily  -Start today with Zpak, take as directed and complete  -Mucinex DM one tab by mouth twice a day  -Use Duo nebs every 4-6 hours as needed for cough/sob/wheezing  -Take Tylenol 500 mg one or two tabs by mouth eery 6 hours as needed for fever/headache/pain  -Take Bumex 2 mg daily as prescribed  -Weigh daily and record weight    -Monitor for fever,

## 2025-06-30 ENCOUNTER — TELEPHONE (OUTPATIENT)
Dept: EMERGENCY MEDICINE | Facility: HOSPITAL | Age: 35
End: 2025-06-30
Payer: COMMERCIAL

## 2025-07-01 ENCOUNTER — OFFICE VISIT (OUTPATIENT)
Dept: PULMONOLOGY | Facility: CLINIC | Age: 35
End: 2025-07-01
Payer: COMMERCIAL

## 2025-07-01 VITALS
DIASTOLIC BLOOD PRESSURE: 72 MMHG | SYSTOLIC BLOOD PRESSURE: 94 MMHG | OXYGEN SATURATION: 91 % | WEIGHT: 176.38 LBS | HEIGHT: 73 IN | BODY MASS INDEX: 23.37 KG/M2 | RESPIRATION RATE: 16 BRPM | HEART RATE: 125 BPM

## 2025-07-01 DIAGNOSIS — D86.0 PULMONARY SARCOIDOSIS: ICD-10-CM

## 2025-07-01 DIAGNOSIS — D86.9 PULMONARY HYPERTENSION ASSOCIATED WITH SARCOIDOSIS: ICD-10-CM

## 2025-07-01 DIAGNOSIS — I27.29 PULMONARY HYPERTENSION ASSOCIATED WITH SARCOIDOSIS: ICD-10-CM

## 2025-07-01 DIAGNOSIS — D86.2 SARCOIDOSIS OF LUNG WITH SARCOIDOSIS OF LYMPH NODES: Primary | ICD-10-CM

## 2025-07-01 PROCEDURE — 99214 OFFICE O/P EST MOD 30 MIN: CPT | Mod: S$PBB,,, | Performed by: STUDENT IN AN ORGANIZED HEALTH CARE EDUCATION/TRAINING PROGRAM

## 2025-07-01 PROCEDURE — 3008F BODY MASS INDEX DOCD: CPT | Mod: CPTII,,, | Performed by: STUDENT IN AN ORGANIZED HEALTH CARE EDUCATION/TRAINING PROGRAM

## 2025-07-01 PROCEDURE — 99999 PR PBB SHADOW E&M-EST. PATIENT-LVL V: CPT | Mod: PBBFAC,,, | Performed by: STUDENT IN AN ORGANIZED HEALTH CARE EDUCATION/TRAINING PROGRAM

## 2025-07-01 PROCEDURE — 3074F SYST BP LT 130 MM HG: CPT | Mod: CPTII,,, | Performed by: STUDENT IN AN ORGANIZED HEALTH CARE EDUCATION/TRAINING PROGRAM

## 2025-07-01 PROCEDURE — 3078F DIAST BP <80 MM HG: CPT | Mod: CPTII,,, | Performed by: STUDENT IN AN ORGANIZED HEALTH CARE EDUCATION/TRAINING PROGRAM

## 2025-07-01 PROCEDURE — 99215 OFFICE O/P EST HI 40 MIN: CPT | Mod: PBBFAC | Performed by: STUDENT IN AN ORGANIZED HEALTH CARE EDUCATION/TRAINING PROGRAM

## 2025-07-01 PROCEDURE — 1160F RVW MEDS BY RX/DR IN RCRD: CPT | Mod: CPTII,,, | Performed by: STUDENT IN AN ORGANIZED HEALTH CARE EDUCATION/TRAINING PROGRAM

## 2025-07-01 PROCEDURE — 4010F ACE/ARB THERAPY RXD/TAKEN: CPT | Mod: CPTII,,, | Performed by: STUDENT IN AN ORGANIZED HEALTH CARE EDUCATION/TRAINING PROGRAM

## 2025-07-01 PROCEDURE — 1159F MED LIST DOCD IN RCRD: CPT | Mod: CPTII,,, | Performed by: STUDENT IN AN ORGANIZED HEALTH CARE EDUCATION/TRAINING PROGRAM

## 2025-07-01 RX ORDER — LOSARTAN POTASSIUM AND HYDROCHLOROTHIAZIDE 12.5; 5 MG/1; MG/1
TABLET ORAL
COMMUNITY

## 2025-07-01 RX ORDER — LOSARTAN POTASSIUM AND HYDROCHLOROTHIAZIDE 25; 100 MG/1; MG/1
TABLET ORAL
COMMUNITY

## 2025-07-01 RX ORDER — TACROLIMUS 1 MG/G
OINTMENT TOPICAL
COMMUNITY

## 2025-07-01 RX ORDER — METOLAZONE 2.5 MG/1
TABLET ORAL
COMMUNITY

## 2025-07-01 RX ORDER — CEPHALEXIN 500 MG/1
TABLET, FILM COATED ORAL
COMMUNITY
Start: 2024-09-06

## 2025-07-01 RX ORDER — AMLODIPINE BESYLATE 5 MG/1
TABLET ORAL
COMMUNITY

## 2025-07-01 RX ORDER — PREDNISONE 10 MG/1
30 TABLET ORAL DAILY
Qty: 90 TABLET | Refills: 11 | Status: SHIPPED | OUTPATIENT
Start: 2025-07-01

## 2025-07-01 RX ORDER — AMMONIUM LACTATE 12 G/100G
LOTION TOPICAL
COMMUNITY

## 2025-07-01 RX ORDER — NEBIVOLOL 10 MG/1
TABLET ORAL
COMMUNITY

## 2025-07-01 NOTE — PROGRESS NOTES
[facility]  Pulmonology  ESTABLISHED VISIT     Patient Name:  Bo Ku  Primary Care Provider: Julia Alba FNP  Date of Service: 7/1/2025       Chief Complaint:  Shortness of breath    SUBJECTIVE   HPI:  Bo Ku is a 34 y.o. 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 Last seen by telehealth audio visit 02/2025 during which time he was admitted at [facility] and ongoing testing for lung transplant candidacy.  [facility] records are available prior to this visit and they were reviewed at length.    Mr. Ku presents for follow-up of sarcoidosis and to discuss recent test results, including a CT and chest XR. Mr. Ku reports a recent brief fever with chest tightness and shortness of breath. His BNP levels have been fluctuating, with recent measurements of 4,800-4,900, down from previous highs of 9,000 and 27,000 in November when he was admitted to the ICU. He had an episode of hemoptysis on Saturday, prompting an emergency room visit. The sputum included some small clots and yellow mucus the day before, but on Saturday morning it was fresh blood. This episode resolved by the end of the day. Mr. Ku has difficulty with fluid retention, noting rapid swelling even after a single meal. He takes Bumex 2 mg for fluid management but reports variable effectiveness. The medication works better when he is inactive, particularly at night, causing frequent urination. During the day or when he is active, he finds the medication less effective.  Mr. uK reports limited physical activity due to his condition. He attempts to move around as advised, but often lacks energy. Some days, he is confined to bed, only getting up for essential activities.  Mr. Ku has been taking prednisone, initially at 20 mg daily, which was recently increased to 40 mg for a  "5-day course. He also takes methotrexate weekly and uses inhalers, including budesonide regularly and symbicort less frequently due to limited supply.  Mr. Ku's sarcoidosis appears to be progressing, affecting not only his lungs but also his heart and facial tissues. He has been referred for a lung transplant evaluation but has had difficulties completing the necessary workup due to transportation issues.    SOCIAL HISTORY:  Occupation: On disability           Initial HPI  [place] reports having shortness of breath that has become worse in the past few months.  He has a cough that is most prominent in the morning upon wakening up.  His cough is productive of clear or yellow tinged sputum.  He was noticed that his walking pace has reduced.  He has significant shortness of breath with walking upstairs.  Uses his oxygen which was previously prescribed to be taken nightly and intermittently with exertion.  With regards to his sarcoidosis, it was diagnosed in 2019 with bronchoscopy with referral to [facility] transplant.  For management, he has previously been on prednisone which was weaned down 40 mg daily and methotrexate.  He states that he has run out of his methotrexate refill as he missed a follow-up with his team at [facility].  With regards to transplant referral, he is unaware that he has been considered for transplant.  We discussed his prior workup including right heart catheterization and his lung function and imaging at length.  He was upcoming follow-up with his [facility] team in the coming month.  He requests a refill of his methotrexate, prednisone as well as dapsone.    12/2024: reports having poor urine output for the past two days. He remains taking his lasix as previously prescribed and has had an overall decrease in volume and frequency of his UOP.  He reports feeling "filled with fluid" similar to how he felt prior to his recent hospitalization in 11/2024. He is also constipated. He has shortness of " breath; his portable tanks are heavy to carry around and he feels limited in his mobility. He was admitted 11/2024 for decompensated RV failure with fluid overload requiring inotropic support for diuresis, MAGDA and HFNC supplementation for acute on chronic respiratory failure with hypoxia. TTE on admission: LVEF 60%, severe RV enlargement, TAPSE 1.12, normal LA size, severely dilated RA, moderate-to-severe TR, PASP 74. We reviewed his CT Chest.  01/2025: reports that he has had increase in his swelling for the past week.  This is associated with an increase in his shortness of breath.  He had 1 presentation to the ED in December for shortness of breath that was treated with diuretic therapy and discharge thereafter.  States that following transitioned to Bumex, he has had an improvement in his urine output.  This is change in the past week along with the increased swelling.  He has had no acute exacerbations of his breathing since last visit.  He needs refills for Symbicort and has been taking prednisone 40 mg daily and is consistent with his dapsone.  He has an appointment with [facility] transplant team on January 31st. He reports tingling and numbing sensation in his bilateral hands.        Past Medical History:   Diagnosis Date    Asthma     Cavitary lung disease     CHF (congestive heart failure)     Hypertension     Sarcoidosis of lung with sarcoidosis of lymph nodes 2019    Severe pulmonary hypertension        Past Surgical History:   Procedure Laterality Date    ANTERIOR CRUCIATE LIGAMENT REPAIR Left 09/01/2004    FRACTURE SURGERY      RIGHT HEART CATHETERIZATION Right 03/01/2023    Procedure: INSERTION, CATHETER, RIGHT HEART;  Surgeon: Abdelrahman Adam MD;  Location: [facility] CATH LAB;  Service: Cardiology;  Laterality: Right;    RIGHT HEART CATHETERIZATION Right 11/18/2024    Procedure: INSERTION, CATHETER, RIGHT HEART;  Surgeon: Carl Leal MD;  Location: [facility] CATH LAB;  Service: Cardiology;   Laterality: Right;       No family history on file.     Social History     Socioeconomic History    Marital status: Single   Tobacco Use    Smoking status: Former     Current packs/day: 0.00     Average packs/day: 1 pack/day for 15.0 years (15.0 ttl pk-yrs)     Types: Cigarettes     Start date: 2007     Quit date: 1/1/2022     Years since quitting: 3.5     Passive exposure: Current    Smokeless tobacco: Never   Substance and Sexual Activity    Alcohol use: Not Currently     Alcohol/week: 2.0 standard drinks of alcohol     Types: 2 Cans of beer per week    Drug use: Never    Sexual activity: Yes     Partners: Female     Social Drivers of Health     Financial Resource Strain: Low Risk  (11/15/2024)    Overall Financial Resource Strain (CARDIA)     Difficulty of Paying Living Expenses: Not hard at all   Food Insecurity: No Food Insecurity (11/15/2024)    Hunger Vital Sign     Worried About Running Out of Food in the Last Year: Never true     Ran Out of Food in the Last Year: Never true   Transportation Needs: No Transportation Needs (11/15/2024)    TRANSPORTATION NEEDS     Transportation : No   Physical Activity: Inactive (3/17/2023)    Exercise Vital Sign     Days of Exercise per Week: 0 days     Minutes of Exercise per Session: 0 min   Stress: No Stress Concern Present (11/15/2024)    [employer] - Occupational Stress Questionnaire     Feeling of Stress : Not at all   Housing Stability: Unknown (11/15/2024)    Housing Stability Vital Sign     Unable to Pay for Housing in the Last Year: No     Homeless in the Last Year: No       Social History     Social History Narrative    Not on file       Review of patient's allergies indicates:   Allergen Reactions    Fish containing products Anaphylaxis    Shellfish containing products Anaphylaxis    Lisinopril     Sulfamethoxazole-trimethoprim Hives        Medications: Medications reviewed to include over the counter medications.    Review of  "Systems: A focused ROS was completed and found to be negative except for that mentioned above.    OBJECTIVE   PHYSICAL EXAM:  Vitals:    07/01/25 1343   BP: 94/72   BP Location: Left arm   Patient Position: Sitting   Pulse: (!) 125   Resp: 16   SpO2: (!) 91%  Comment: 3L o2   Weight: 80 kg (176 lb 5.9 oz)   Height: 6' 1" (1.854 m)        GENERAL: NAD  HEENT: normocephalic, non-icteric conjunctivae, moist oral mucosa  RESPIRATORY: mild inspiratory rales in bibasilar region, no wheezing or rhonchi, on NC  CARDIOVASCULAR: Regular rate and rhythm, no murmurs rubs or gallops  SKIN: no rash, jaundice, ecchymosis or ulcers  MUSCULOSKELETAL: No clubbing or cyanosis; 1+  pedal edema  NEUROLOGIC: AO ×3, no gross deficits    LABS:  Lab studies reviewed and notable for H/H 12.4/37.9, SEos 0.06, CO2 30, SCr 1.19 (06/2025), Aspergillus IgG 20.6, Aspergillus antigen and IgE undetectable, Total IgE 30.4 (11/2024)   Latest Reference Range & Units 11/17/24 10:36 11/22/24 05:57 12/05/24 11:22 12/12/24 11:35 01/16/25 13:57 06/28/25 11:05   NT-proBNP 1 - 125 pg/mL 9,558 (H) 4,995 (H) 9,463 (H) 5,291 (H) 4,905 (H) 4,837 (H)        Latest Reference Range & Units 11/07/24 11:14   Aspergillus Fumigatus, IgE <0.70 kU/L <0.10   Aspergillus fumigatus, IgG Ab, S <=102 mg/L 20.6   Cladosporium IgE <0.70 kU/L <0.10   Cockroach, IgE <0.70 kU/L 0.23   Dog Dander IgE <0.70 kU/L <0.10   Elm, IgE <0.70 kU/L <0.10   Oak, IgE <0.70 kU/L <0.10   Pecan Hickory, IgE <0.70 kU/L <0.10   Penicillium IgE <0.70 kU/L <0.10   Americus Tree IgE <0.70 kU/L <0.10       IMAGING:  Imaging reviewed and notable for CT chest 08/2023 with dense consolidative opacities in bilateral mid and lower lung zone with cavitary lesion in the left lower lung, no pleural effusion.  CT chest 12/2024 with upper lung zone centrilobular and panacinar emphysema, stable bibasilar consolidative processes consistent with his known pulmonary sarcoidosis, no GGO, mediastinal lymphadenopathy is " present and appears stable, no pleural effusion, associated ectatic airways iron bibasilar lung fields with no airway secretions noted.    RHC  OSH 02/2025:  RA 14, RV 69/6 (17), PA 74/45 (52), PCWP 45, CO/CI 4.93/2.35, Td CO/CI 4.75/2.3, PVR 1.41  03/2023:  mPAP 34, PCWP 12, PVR 5.56, CO/CI 3.96/2.05 (Td)    TTE 02/2025 OSH:  LVEF normal, mild concentric LV hypertrophy, flattened interventricular septum, moderately dilated RV, TAPSE 2.2, enlarged RA, moderate-to-severe TR, RVSP 60-70, mild LA, no pericardial effusion    Cardiac PET CT 02/2025:  Cardiomegaly especially right atrium and right ventricle with patchy prominent hypermetabolic activity all throughout the right ventricle including the interventricular septum and some areas of the basal left ventricle is indeterminate for active inflammation versus inadequate suppression of physiological myocardial glucose uptake.  A repeat cardiac only FDG PET study after dietary prep recommended.  Extensive active systemic inflammation involving the cutaneous right face, lymph nodes on both sides of the diaphragm and bilateral lung fields worsened since 12/06/2023 consistent with active sarcoidosis.    LUNG FUNCTION TESTING:     SPIROMETRY/PFT:  OSH 01/2025 Pre Post   FVC 2.16/-4.51    FEV1 0.74/-5.35    FEV1/FVC 34.06/-4.94    TLC 2.92/-5.46    FRC  1.75/-2.99    RV 0.76/-1.96    DLCO 5.64/-9.14      11/2024 Pre Post   FVC 2.20/-5.23 2.51/-4.77   FEV1 0.98/-5.79 1.15/-5.57   FEV1/FVC 45/-4.35 46/-4.27   TLC 5.30/-2.24    FRC  3.66/-0.12    RV 2.82/1.99    DLCO 5.46/-9.25      6MWD:  Date Distance (ft) Resting SpO2; Edwin SpO2 O2 Required   11/2024 973 91%, RA;86% 2L           ASSESSMENT & PLAN     IMPRESSION:  Sarcoidosis has progressed, now involving lungs, heart, and lung vessels despite current immunosuppressive therapy (prednisone, methotrexate).  Considered lung transplant as the next treatment option due to disease progression.  Discussed at length the severity of  his illness, recommended notification of his family who he currently lives with and offered my assistance with discussions with regards to his disease severity with family members.  Interpreted recent imaging: XR Chest showed possible increased fluid, CT Chest indicated more consolidation, suggesting sarcoidosis progression.  [facility] records reviewed at length including transplant workup completed 02/2025.  Renal function and BNP relatively stable compared to previous results, though still elevated.  Evaluated recent episode of hemoptysis, likely due to airway irritation but requires monitoring.  Discussed at length importance of notification of the lung clinic in episodes of hemoptysis given his abnormal lung anatomy; hemoptysis is a poor prognostic factor with his sarcoidosis and pulmonary vascular disease of which recent right heart catheterization during lung transplant trusting demonstrates a post capillary pulmonary hypertension..    SARCOIDOSIS:  - Educated on the severity of sarcoidosis, its progressive nature despite current therapy, and the importance of completing transplant workup.  - Prednisone increased to 40 mg daily for 5 days, then decreased to 30 mg daily; refill provided  - Continued methotrexate 6 tablets once weekly.  - Started Trelegy inhaler daily (sample provided) to replace Symbicort; return to Symbicort when sample is finished.  We will consider up step therapy to Trelegy on follow-up.  - Continued Budesonide inhaler as currently using.  - Advised on consistent inhaler use for airway management and to prevent triggering bleeds.  - Mr. Ku to contact transplant center to coordinate and complete remaining workup tests.  Planned call 07/02/2025.    HEMOPTYSIS:  - Mr. Ku to inform if coughing up blood occurs again, as it may indicate a medical emergency.    EDEMA:  - Continued Bumex 2 mg as currently prescribed.    OXYGEN DEPENDENCE:  - Mr. Ku to use oxygen as  prescribed.    FOLLOW-UP:  - Follow up in approximately 1 month, likely mid to late August.  - Contact the office if symptoms worsen even with the prescribed antibiotic.          1. Sarcoidosis of lung with sarcoidosis of lymph nodes  Assessment & Plan:  35 yo M with sarcoidosis with pulmonary involvement including cavitary basilar lung disease with previous occupational exposures including heavy metal.  Course with decompensation including progression in exertional dyspnea and new O2 requirement. Now with pulmonary hypertension with hospitalization 11/2024 for acute decompensation. Aspergillus studies negative. Plan for management as follows:   - cont methotrexate and prednisone   -- prednisone increased now to 30 mg daily  - cont PJP prophylaxis with dapsone   - follow-up with lung transplant team at [facility]  - Dx:  2016, bronchoscopy in [place]   -- [facility] transplant testing demonstrates increased sarcoidosis activity with extrapulmonary lymph node as well as cardiac involvement; steroid increase as above and close follow-up with transplant team  - smoking status: former, quit 2022  - exposure history: metal exposure; worked in metal plant with metal molding responsibilities, 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 Pulmonary sarcoidosis  -     predniSONE (DELTASONE) 10 MG tablet; Take 3 tablets (30 mg total) by mouth once daily.  Dispense: 90 tablet; Refill: 11    3. Pulmonary hypertension associated with sarcoidosis  Assessment & Plan:  Right heart catheterization completed 02/2025 at [facility] with post capillary pulmonary  hypertension.  NT pro BNP 0 06/2025 within baseline.  Continue diuresis with Bumex 2 mg daily and we will consider increase in dosing.  Follow-up with transplant team asap.  We will assist with coordinating this care.           This note was generated with the assistance of ambient listening technology. Verbal consent was obtained by the patient and accompanying visitor(s) for the recording of patient appointment to facilitate this note. I attest to having reviewed and edited the generated note for accuracy, though some syntax or spelling errors may persist. Please contact the author of this note for any clarification.       Follow up in about 5 weeks (around 8/5/2025) for Routine follow up.    Case was discussed with patient; all questions were answered to patient's satisfaction and patient verbalized understanding.     Beverley Muir MD  Pulmonary Medicine  [facility]  Phone: [phone]

## 2025-07-02 LAB
OHS QRS DURATION: 92 MS
OHS QTC CALCULATION: 430 MS

## 2025-07-03 NOTE — ASSESSMENT & PLAN NOTE
Right heart catheterization completed 02/2025 at [facility] with post capillary pulmonary hypertension.  NT pro BNP 0 06/2025 within baseline.  Continue diuresis with Bumex 2 mg daily and we will consider increase in dosing.  Follow-up with transplant team asap.  We will assist with coordinating this care.

## 2025-07-03 NOTE — ASSESSMENT & PLAN NOTE
33 yo M with sarcoidosis with pulmonary involvement including cavitary basilar lung disease with previous occupational exposures including heavy metal.  Course with decompensation including progression in exertional dyspnea and new O2 requirement. Now with pulmonary hypertension with hospitalization 11/2024 for acute decompensation. Aspergillus studies negative. Plan for management as follows:   - cont methotrexate and prednisone   -- prednisone increased now to 30 mg daily  - cont PJP prophylaxis with dapsone   - follow-up with lung transplant team at [facility]  - Dx:  2016, bronchoscopy in [place]   -- [facility] transplant testing demonstrates increased sarcoidosis activity with extrapulmonary lymph node as well as cardiac involvement; steroid increase as above and close follow-up with transplant team  - smoking status: former, quit 2022  - exposure history: metal exposure; worked in metal plant with metal molding responsibilities, 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

## 2025-07-04 LAB
BACTERIA BLD CULT: NORMAL
BACTERIA BLD CULT: NORMAL

## 2025-07-08 DIAGNOSIS — D86.9 SARCOIDOSIS: Primary | ICD-10-CM

## 2025-07-08 NOTE — TELEPHONE ENCOUNTER
Copied from CRM #[id number]. Topic: Medications - Medication Refill  >> Jul 7, 2025  1:57 PM Gayle wrote:  Who Called: Bo Ku    Refill or New Rx:Refill  RX Name and Strength:FOLIC ACID 1 MG  How is the patient currently taking it? (ex. 1XDay):1 TABLET BY MOUTH ONCE DAILY  Is this a 30 day or 90 day RX:  Local or Mail Order:LOCAL  List of preferred pharmacies on file (remove unneeded): [email or web address]  If different Pharmacy is requested, enter Pharmacy information here including location and phone number: [facility]   Ordering Provider:ESTELA WATSON      Preferred Method of Contact: Phone Call  Patient's Preferred Phone Number on File: [phone]   Best Call Back Number, if different:  Additional Information: PATIENT SAYS HE IS ALSO CHECKING ON A REFERRAL FOR A LUNG TRANSPLANT.

## 2025-07-09 RX ORDER — FOLIC ACID 1 MG/1
1000 TABLET ORAL DAILY
Qty: 30 TABLET | Refills: 11 | Status: SHIPPED | OUTPATIENT
Start: 2025-07-09 | End: 2026-07-09

## 2025-08-19 ENCOUNTER — HOSPITAL ENCOUNTER (OUTPATIENT)
Dept: RADIOLOGY | Facility: HOSPITAL | Age: 35
Discharge: HOME | End: 2025-08-19
Attending: STUDENT IN AN ORGANIZED HEALTH CARE EDUCATION/TRAINING PROGRAM
Payer: MEDICAID

## 2025-08-19 ENCOUNTER — OFFICE VISIT (OUTPATIENT)
Dept: PULMONOLOGY | Facility: CLINIC | Age: 35
End: 2025-08-19
Payer: COMMERCIAL

## 2025-08-19 VITALS
HEART RATE: 118 BPM | SYSTOLIC BLOOD PRESSURE: 139 MMHG | HEIGHT: 73 IN | BODY MASS INDEX: 26.11 KG/M2 | DIASTOLIC BLOOD PRESSURE: 79 MMHG | OXYGEN SATURATION: 94 % | RESPIRATION RATE: 18 BRPM | WEIGHT: 197 LBS

## 2025-08-19 DIAGNOSIS — Z79.52 CURRENT CHRONIC USE OF SYSTEMIC STEROIDS: ICD-10-CM

## 2025-08-19 DIAGNOSIS — J96.11 CHRONIC RESPIRATORY FAILURE WITH HYPOXIA: ICD-10-CM

## 2025-08-19 DIAGNOSIS — D86.2 SARCOIDOSIS OF LUNG WITH SARCOIDOSIS OF LYMPH NODES: Primary | ICD-10-CM

## 2025-08-19 DIAGNOSIS — I50.812 CHRONIC RIGHT-SIDED HEART FAILURE: ICD-10-CM

## 2025-08-19 DIAGNOSIS — D86.9 SARCOIDOSIS: ICD-10-CM

## 2025-08-19 PROCEDURE — 71046 X-RAY EXAM CHEST 2 VIEWS: CPT | Mod: 26,,, | Performed by: RADIOLOGY

## 2025-08-19 PROCEDURE — 99215 OFFICE O/P EST HI 40 MIN: CPT | Mod: S$PBB,,, | Performed by: STUDENT IN AN ORGANIZED HEALTH CARE EDUCATION/TRAINING PROGRAM

## 2025-08-19 PROCEDURE — 99999 PR PBB SHADOW E&M-EST. PATIENT-LVL V: CPT | Mod: PBBFAC,,, | Performed by: STUDENT IN AN ORGANIZED HEALTH CARE EDUCATION/TRAINING PROGRAM

## 2025-08-19 PROCEDURE — 4010F ACE/ARB THERAPY RXD/TAKEN: CPT | Mod: CPTII,,, | Performed by: STUDENT IN AN ORGANIZED HEALTH CARE EDUCATION/TRAINING PROGRAM

## 2025-08-19 PROCEDURE — 3008F BODY MASS INDEX DOCD: CPT | Mod: CPTII,,, | Performed by: STUDENT IN AN ORGANIZED HEALTH CARE EDUCATION/TRAINING PROGRAM

## 2025-08-19 PROCEDURE — 99215 OFFICE O/P EST HI 40 MIN: CPT | Mod: PBBFAC,25 | Performed by: STUDENT IN AN ORGANIZED HEALTH CARE EDUCATION/TRAINING PROGRAM

## 2025-08-19 PROCEDURE — 3044F HG A1C LEVEL LT 7.0%: CPT | Mod: CPTII,,, | Performed by: STUDENT IN AN ORGANIZED HEALTH CARE EDUCATION/TRAINING PROGRAM

## 2025-08-19 PROCEDURE — 1159F MED LIST DOCD IN RCRD: CPT | Mod: CPTII,,, | Performed by: STUDENT IN AN ORGANIZED HEALTH CARE EDUCATION/TRAINING PROGRAM

## 2025-08-19 PROCEDURE — 3078F DIAST BP <80 MM HG: CPT | Mod: CPTII,,, | Performed by: STUDENT IN AN ORGANIZED HEALTH CARE EDUCATION/TRAINING PROGRAM

## 2025-08-19 PROCEDURE — 3075F SYST BP GE 130 - 139MM HG: CPT | Mod: CPTII,,, | Performed by: STUDENT IN AN ORGANIZED HEALTH CARE EDUCATION/TRAINING PROGRAM

## 2025-08-19 PROCEDURE — 71046 X-RAY EXAM CHEST 2 VIEWS: CPT | Mod: TC

## 2025-08-19 RX ORDER — METHOTREXATE 2.5 MG/1
15 TABLET ORAL
Qty: 24 TABLET | Refills: 11 | Status: SHIPPED | OUTPATIENT
Start: 2025-08-19

## 2025-08-20 ENCOUNTER — TELEPHONE (OUTPATIENT)
Dept: PULMONOLOGY | Facility: CLINIC | Age: 35
End: 2025-08-20
Payer: COMMERCIAL

## 2025-08-20 PROBLEM — J96.11 CHRONIC RESPIRATORY FAILURE WITH HYPOXIA: Status: ACTIVE | Noted: 2025-08-20

## 2025-08-20 RX ORDER — POLYETHYLENE GLYCOL 3350 17 G/17G
17 POWDER, FOR SOLUTION ORAL DAILY
Qty: 238 G | Refills: 2 | Status: SHIPPED | OUTPATIENT
Start: 2025-08-20

## 2025-08-20 RX ORDER — SILDENAFIL CITRATE 20 MG/1
20 TABLET ORAL 3 TIMES DAILY
Qty: 30 TABLET | Refills: 11 | Status: SHIPPED | OUTPATIENT
Start: 2025-08-20

## (undated) DEVICE — DECANTER FLUID TRNSF WHITE 9IN

## (undated) DEVICE — CHLORAPREP 10.5 ML APPLICATOR

## (undated) DEVICE — CLIPPER BLADE MOD 4406 (CAREF)

## (undated) DEVICE — COVER PROBE US GEL BAND

## (undated) DEVICE — SHEATH INTRODUCER 7FR 11CM

## (undated) DEVICE — CATH SWAN GANZ STND 7FR

## (undated) DEVICE — NDL PERCUTANEOUS ENTRYBSDN 18

## (undated) DEVICE — GLOVE SURG ULTRA TOUCH 6

## (undated) DEVICE — Device

## (undated) DEVICE — CATH PROTECTIV PLUS 20G 1.25IN

## (undated) DEVICE — SOL IRR SOD CHL .9% POUR

## (undated) DEVICE — SET IV PRIMARY

## (undated) DEVICE — KIT IV START OCHSNER CUSTOM

## (undated) DEVICE — PROTECTOR ULNAR NERVE FOAM

## (undated) DEVICE — DRAPE ANGIO BRACH 38X44IN

## (undated) DEVICE — GLOVE BIOGEL SKINSENSE PI 7.5

## (undated) DEVICE — OXISENSOR ADULT DIGIT N/S

## (undated) DEVICE — DRESSING TRANS 4X4 TEGADERM

## (undated) DEVICE — GLOVE SURG BIOGEL LATEX SZ 7.5

## (undated) DEVICE — MASK NRB ADULT STD CONN

## (undated) DEVICE — GUIDE WIRE J-TIP 260CM .025

## (undated) DEVICE — SET EXTENSION CLEARLINK 2INJ

## (undated) DEVICE — INTRODUCER KIT MICRO 4FR